# Patient Record
Sex: FEMALE | Race: WHITE | NOT HISPANIC OR LATINO | Employment: FULL TIME | URBAN - METROPOLITAN AREA
[De-identification: names, ages, dates, MRNs, and addresses within clinical notes are randomized per-mention and may not be internally consistent; named-entity substitution may affect disease eponyms.]

---

## 2017-01-03 ENCOUNTER — TELEPHONE (OUTPATIENT)
Dept: INTERNAL MEDICINE | Facility: CLINIC | Age: 57
End: 2017-01-03

## 2017-01-03 NOTE — TELEPHONE ENCOUNTER
----- Message from Josefina Browning sent at 1/3/2017  9:42 AM EST -----   CALLED PT AND LET HER KNOW TO BE HERE ON 1/4/17 AT 3:45  ----- Message -----     From: Josefina Browning     Sent: 12/29/2016   1:01 PM       To: Usman Mullins Clinical Pool    PATIENT IS BEING DISCHARGED FROM [facility] TODAY AND NEEDS A HOSPITAL FOLLOW UP WITH DR MULLINS NEXT WEEK.  TELL ME WHERE TO PUT HER PLEASE.      [phone]

## 2017-01-04 ENCOUNTER — OFFICE VISIT (OUTPATIENT)
Dept: INTERNAL MEDICINE | Facility: CLINIC | Age: 57
End: 2017-01-04

## 2017-01-04 VITALS
BODY MASS INDEX: 49.24 KG/M2 | WEIGHT: 288.4 LBS | DIASTOLIC BLOOD PRESSURE: 82 MMHG | SYSTOLIC BLOOD PRESSURE: 118 MMHG | HEIGHT: 64 IN | OXYGEN SATURATION: 97 % | HEART RATE: 82 BPM

## 2017-01-04 DIAGNOSIS — K21.9 GASTROESOPHAGEAL REFLUX DISEASE, ESOPHAGITIS PRESENCE NOT SPECIFIED: ICD-10-CM

## 2017-01-04 DIAGNOSIS — E66.01 MORBID OBESITY, UNSPECIFIED OBESITY TYPE (HCC): ICD-10-CM

## 2017-01-04 DIAGNOSIS — R60.9 EDEMA, UNSPECIFIED TYPE: ICD-10-CM

## 2017-01-04 DIAGNOSIS — I26.99 OTHER ACUTE PULMONARY EMBOLISM WITHOUT ACUTE COR PULMONALE (HCC): Primary | ICD-10-CM

## 2017-01-04 PROCEDURE — 99214 OFFICE O/P EST MOD 30 MIN: CPT | Performed by: INTERNAL MEDICINE

## 2017-01-04 NOTE — PROGRESS NOTES
Subjective     Zulema Sousa is a 56 y.o. female, who presents with a chief complaint of   Chief Complaint   Patient presents with   • Follow-up     Was in admitted to [facility] X4[facility]   • Pulmonary Embolism       HPI   The pt was admitted on 12/28/16 at [facility].  She had had pulmonary emboli in the past.  She was on xarelto x 1 year then she came off the med x 1 year.  She had new soa and was found to have PE in the ER.  They couldn't tell if the clots were old or new but her sx were all new.  She had low sats at night while in the hospital.  She hasnt had a sleep study.  She is back on her xarelto and her soa is improving.  She has LE edema and is back on her lasix.  She is also wearing compression hose.  LE dopplers were neg.     She is now on omeprazole as well. Her stomach has been ok as well.  She has had a lap band in the past.  She wants to have fluid put back in the band.      The following portions of the patient's history were reviewed and updated as appropriate: allergies, current medications, past family history, past medical history, past social history, past surgical history and problem list.    Allergies: Lortab [hydrocodone-acetaminophen]    Review of Systems   Constitutional: Negative.    HENT: Negative.    Eyes: Negative.    Respiratory: Positive for shortness of breath.    Cardiovascular: Negative.    Gastrointestinal: Negative.    Endocrine: Negative.    Genitourinary: Negative.    Musculoskeletal: Negative.    Skin: Negative.    Allergic/Immunologic: Negative.    Neurological: Negative.    Hematological: Negative.    Psychiatric/Behavioral: Negative.    All other systems reviewed and are negative.      Objective     Wt Readings from Last 3 Encounters:   01/04/17 288 lb 6.4 oz (131 kg)   12/29/16 282 lb 9.6 oz (128 kg)   12/27/16 290 lb (132 kg)     Temp Readings from Last 3 Encounters:   12/29/16 97.4 °F (36.3 °C) (Oral)   12/27/16 97.6 °F (36.4 °C) (Oral)   10/14/16 98.4 °F (36.9 °C) (Temporal  Artery )     BP Readings from Last 3 Encounters:   01/04/17 118/82   12/29/16 134/91   12/27/16 150/75     Pulse Readings from Last 3 Encounters:   01/04/17 82   12/29/16 74   12/27/16 76     Body mass index is 49.5 kg/(m^2).    Physical Exam   Constitutional: She is oriented to person, place, and time. She appears well-developed and well-nourished. No distress.   HENT:   Head: Normocephalic and atraumatic.   Right Ear: External ear normal.   Left Ear: External ear normal.   Nose: Nose normal.   Mouth/Throat: Oropharynx is clear and moist.   Eyes: Conjunctivae and EOM are normal. Pupils are equal, round, and reactive to light.   Neck: Normal range of motion. Neck supple.   Cardiovascular: Normal rate, regular rhythm, normal heart sounds and intact distal pulses.    Pulmonary/Chest: Effort normal and breath sounds normal. No respiratory distress. She has no wheezes.   Musculoskeletal: Normal range of motion.   Normal gait   Neurological: She is alert and oriented to person, place, and time.   Skin: Skin is warm and dry.   Psychiatric: She has a normal mood and affect. Her behavior is normal. Judgment and thought content normal.   Nursing note and vitals reviewed.        echocardiogram showed some very mild elevation of right ventricular systolic pressure at 40 mmHg, otherwise ejection fraction 60%. Diastolic function normal.    Results for orders placed or performed during the hospital encounter of 12/28/16   Respiratory Panel, PCR   Result Value Ref Range    ADENOVIRUS, PCR Not Detected Not Detected    Coronavirus 229E Not Detected Not Detected    Coronavirus HKU1 Not Detected Not Detected    Coronavirus NL63 Not Detected Not Detected    Coronavirus OC43 Not Detected Not Detected    Human Metapneumovirus Not Detected Not Detected    Human Rhinovirus/Enterovirus Not Detected Not Detected    Influenza B PCR Not Detected Not Detected    Parainfluenza Virus 1 Not Detected Not Detected    Parainfluenza Virus 2 Not  Detected Not Detected    Parainfluenza Virus 3 Not Detected Not Detected    Parainfluenza Virus 4 Not Detected Not Detected    Bordetella pertussis pcr Not Detected Not Detected    Influenza 2009 H1N1 by PCR Not Detected Not Detected    Chlamydophila pneumoniae PCR Not Detected Not Detected    Mycoplasma pneumo by PCR Not Detected Not Detected    Influenza A PCR Not Detected Not Detected    Influenza A H3 Not Detected Not Detected    Influenza A H1 Not Detected Not Detected    RSV, PCR Not Detected Not Detected   Comprehensive Metabolic Panel   Result Value Ref Range    Glucose 98 65 - 99 mg/dL    BUN 15 6 - 20 mg/dL    Creatinine 0.80 0.57 - 1.00 mg/dL    Sodium 142 136 - 145 mmol/L    Potassium 4.0 3.5 - 5.2 mmol/L    Chloride 100 98 - 107 mmol/L    CO2 28.9 22.0 - 29.0 mmol/L    Calcium 9.9 8.6 - 10.5 mg/dL    Total Protein 7.9 6.0 - 8.5 g/dL    Albumin 4.00 3.50 - 5.20 g/dL    ALT (SGPT) 13 1 - 33 U/L    AST (SGOT) 18 1 - 32 U/L    Alkaline Phosphatase 99 39 - 117 U/L    Total Bilirubin <0.2 0.1 - 1.2 mg/dL    eGFR Non African Amer 74 >60 mL/min/1.73    Globulin 3.9 gm/dL    A/G Ratio 1.0 g/dL    BUN/Creatinine Ratio 18.8 7.0 - 25.0    Anion Gap 13.1 mmol/L   BNP   Result Value Ref Range    proBNP 427.7 0.0 - 900.0 pg/mL   Troponin   Result Value Ref Range    Troponin T <0.010 0.000 - 0.030 ng/mL   CBC Auto Differential   Result Value Ref Range    WBC 9.00 4.50 - 10.70 10*3/mm3    RBC 4.49 3.90 - 5.20 10*6/mm3    Hemoglobin 12.8 11.9 - 15.5 g/dL    Hematocrit 41.2 35.6 - 45.5 %    MCV 91.8 80.5 - 98.2 fL    MCH 28.5 26.9 - 32.0 pg    MCHC 31.1 (L) 32.4 - 36.3 g/dL    RDW 15.0 (H) 11.7 - 13.0 %    RDW-SD 50.5 37.0 - 54.0 fl    MPV 9.6 6.0 - 12.0 fL    Platelets 326 140 - 500 10*3/mm3    Neutrophil % 47.0 42.7 - 76.0 %    Lymphocyte % 43.4 19.6 - 45.3 %    Monocyte % 6.6 5.0 - 12.0 %    Eosinophil % 2.6 0.3 - 6.2 %    Basophil % 0.2 0.0 - 1.5 %    Immature Grans % 0.2 0.0 - 0.5 %    Neutrophils, Absolute 4.23 1.90  - 8.10 10*3/mm3    Lymphocytes, Absolute 3.91 0.90 - 4.80 10*3/mm3    Monocytes, Absolute 0.59 0.20 - 1.20 10*3/mm3    Eosinophils, Absolute 0.23 0.00 - 0.70 10*3/mm3    Basophils, Absolute 0.02 0.00 - 0.20 10*3/mm3    Immature Grans, Absolute 0.02 0.00 - 0.03 10*3/mm3   Procalcitonin   Result Value Ref Range    Procalcitonin 0.03 (L) 0.10 - 0.25 ng/mL   Basic Metabolic Panel   Result Value Ref Range    Glucose 92 65 - 99 mg/dL    BUN 11 6 - 20 mg/dL    Creatinine 0.63 0.57 - 1.00 mg/dL    Sodium 140 136 - 145 mmol/L    Potassium 3.9 3.5 - 5.2 mmol/L    Chloride 102 98 - 107 mmol/L    CO2 25.8 22.0 - 29.0 mmol/L    Calcium 9.2 8.6 - 10.5 mg/dL    eGFR Non African Amer 97 >60 mL/min/1.73    BUN/Creatinine Ratio 17.5 7.0 - 25.0    Anion Gap 12.2 mmol/L   CBC Auto Differential   Result Value Ref Range    WBC 5.60 4.50 - 10.70 10*3/mm3    RBC 4.17 3.90 - 5.20 10*6/mm3    Hemoglobin 11.7 (L) 11.9 - 15.5 g/dL    Hematocrit 38.0 35.6 - 45.5 %    MCV 91.1 80.5 - 98.2 fL    MCH 28.1 26.9 - 32.0 pg    MCHC 30.8 (L) 32.4 - 36.3 g/dL    RDW 15.1 (H) 11.7 - 13.0 %    RDW-SD 49.8 37.0 - 54.0 fl    MPV 9.6 6.0 - 12.0 fL    Platelets 287 140 - 500 10*3/mm3    Neutrophil % 48.1 42.7 - 76.0 %    Lymphocyte % 41.3 19.6 - 45.3 %    Monocyte % 6.3 5.0 - 12.0 %    Eosinophil % 3.9 0.3 - 6.2 %    Basophil % 0.4 0.0 - 1.5 %    Immature Grans % 0.0 0.0 - 0.5 %    Neutrophils, Absolute 2.70 1.90 - 8.10 10*3/mm3    Lymphocytes, Absolute 2.31 0.90 - 4.80 10*3/mm3    Monocytes, Absolute 0.35 0.20 - 1.20 10*3/mm3    Eosinophils, Absolute 0.22 0.00 - 0.70 10*3/mm3    Basophils, Absolute 0.02 0.00 - 0.20 10*3/mm3    Immature Grans, Absolute 0.00 0.00 - 0.03 10*3/mm3   Adult Transthoracic Echo Complete   Result Value Ref Range    BSA 2.3 m^2    IVSd 0.8 cm    LVIDd 4.9 cm    LVIDs 2.6 cm    LVPWd 0.6 cm    IVS/LVPW 1.3     FS 46.9 %    EDV(Teich) 112.8 ml    ESV(Teich) 24.6 ml    EF(Teich) 78.2 %    EDV(cubed) 117.6 ml    ESV(cubed) 17.6 ml     EF(cubed) 85.1 %    LV mass(C)d 110.8 grams    LV mass(C)dI 49.0 grams/m^2    SV(Teich) 88.2 ml    SI(Teich) 39.0 ml/m^2    SV(cubed) 100.1 ml    SI(cubed) 44.3 ml/m^2    Ao root diam 3.2 cm    Ao root area 8.0 cm^2    ACS 1.8 cm    LA dimension 3.3 cm    LA/Ao 1.0     LVOT diam 2.0 cm    LVOT area 3.1 cm^2    LVOT area(traced) 3.1 cm^2    RVOT diam 2.4 cm    RVOT area 4.5 cm^2    LVLd ap4 9.0 cm    EDV(MOD-sp4) 110.0 ml    LVLs ap4 7.5 cm    ESV(MOD-sp4) 43.0 ml    EF(MOD-sp4) 60.9 %    LVLd ap2 9.1 cm    EDV(MOD-sp2) 110.0 ml    LVLs ap2 7.2 cm    ESV(MOD-sp2) 39.0 ml    EF(MOD-sp2) 64.5 %    SV(MOD-sp4) 67.0 ml    SI(MOD-sp4) 29.6 ml/m^2    SV(MOD-sp2) 71.0 ml    SI(MOD-sp2) 31.4 ml/m^2    Ao root area (BSA corrected) 1.4     Ao root area (BSA corrected) 64.5 ml/m^2    CONTRAST EF 4CH 60.9 ml/m^2    LV Diastolic corrected for BSA 48.7 ml/m^2    LV Systolic corrected for BSA 19.0 ml/m^2    MV A dur 0.13 sec    MV E max manuel 96.7 cm/sec    MV A max manuel 95.8 cm/sec    MV E/A 1.0     MV V2 mean 66.6 cm/sec    MV mean PG 2.0 mmHg    MV V2 VTI 27.9 cm    MVA(VTI) 2.9 cm^2    MV P1/2t max manuel 96.3 cm/sec    MV P1/2t 60.5 msec    MVA(P1/2t) 3.6 cm^2    MV dec slope 466.0 cm/sec^2    MV dec time 0.18 sec    Ao V2 mean 97.7 cm/sec    Ao mean PG 5.0 mmHg    Ao mean PG (full) 3.0 mmHg    Ao V2 VTI 31.6 cm    YESI(I,A) 2.6 cm^2    YESI(I,D) 2.6 cm^2    LV V1 mean PG 2.0 mmHg    LV V1 mean 71.1 cm/sec    LV V1 VTI 25.8 cm    SV(Ao) 254.1 ml    SI(Ao) 112.4 ml/m^2    SV(LVOT) 81.1 ml    SV(RVOT) 57.9 ml    SI(LVOT) 35.9 ml/m^2    PA V2 max 93.0 cm/sec    PA max PG 3.5 mmHg    PA max PG (full) 2.2 mmHg    BH CV ECHO DAVID - PVA(V,A) 2.8 cm^2    BH CV ECHO DAVID - PVA(V,D) 2.8 cm^2    PA acc slope 888.0 cm/sec^2    PA acc time 0.1 sec    RV V1 max PG 1.3 mmHg    RV V1 mean PG 1.0 mmHg    RV V1 max 57.1 cm/sec    RV V1 mean 38.4 cm/sec    RV V1 VTI 12.8 cm    TR max manuel 284.0 cm/sec    RVSP(TR) 40.3 mmHg    RAP systole 8.0 mmHg    PA  pr(Accel) 34.5 mmHg    Pulm Sys Germán 87.9 cm/sec    Pulm Light Germán 84.0 cm/sec    Pulm S/D 1.0     Qp/Qs 0.71     Pulm A Revs Dur 0.14 sec    Pulm A Revs Germán 30.7 cm/sec    MVA P1/2T LCG 2.3 cm^2     CV ECHO DAVID - BZI_BMI 48.2 kilograms/m^2     CV ECHO DAVID - BSA(HAYCOCK) 2.5 m^2     CV ECHO DAVID - BZI_METRIC_WEIGHT 127.5 kg     CV ECHO DAVID - BZI_METRIC_HEIGHT 162.6 cm    TDI S' 16.00 cm/sec    LA volume 52.0 cm3    E/E' ratio 8.0     LA Volume Index 23.0 mL/m2    Ao max PG (full) 10.0 mmHg    Ao pk germán 156.0 cm/sec    Lat Peak E' Germán 16.0 cm/sec    LV V1 max 111.0 cm/sec    Med Peak E' Germán 11.00 cm/sec    TAPSE (>1.6) 2.40 cm2   Light Blue Top   Result Value Ref Range    Extra Tube hold for add-on    Gold Top - SST   Result Value Ref Range    Extra Tube Hold for add-ons.    Duplex Venous Lower Extremity - Bilateral CAR   Result Value Ref Range    Right Saphenofemoral Junction Spont 1     Left Saphenofemoral Junction Spont 1     Right Common Femoral Spont Y     Right Common Femoral Phasic Y     Right Common Femoral Augment Y     Right Common Femoral Competent Y     Right Common Femoral Compress C     Right Proximal Femoral Compress C     Right Mid Femoral Spont Y     Right Mid Femoral Phasic Y     Right Mid Femoral Augment Y     Right Mid Femoral Competent Y     Right Mid Femoral Compress C     Right Distal Femoral Compress C     Right Popliteal Spont Y     Right Popliteal Phasic Y     Right Popliteal Augment Y     Right Popliteal Competent Y     Right Popliteal Compress C     Right Posterior Tibial Compress C     Right Peroneal Compress C     Right GastronemiusSoleal Compress C     Right Greater Saph AK Compress C     Right Greater Saph BK Compress C     Right Lesser Saph Compress C     Left Common Femoral Spont Y     Left Common Femoral Phasic Y     Left Common Femoral Augment Y     Left Common Femoral Competent Y     Left Common Femoral Compress C     Left Proximal Femoral Compress C     Left Mid Femoral  Spont Y     Left Mid Femoral Phasic Y     Left Mid Femoral Augment Y     Left Mid Femoral Competent Y     Left Mid Femoral Compress C     Left Distal Femoral Compress C     Left Popliteal Spont Y     Left Popliteal Phasic Y     Left Popliteal Augment Y     Left Popliteal Competent Y     Left Popliteal Compress C     Left Posterior Tibial Compress C     Left Peroneal Compress C     Left GastronemiusSoleal Compress C     Left Greater Saph AK Compress C     Left Greater Saph BK Compress C     Left Lesser Saph Compress C     Left Saphenofemoral Junction Competent N     Left Saphenofemoral Junction Augment Y     Left Saphenofemoral Junction Phasic Y     Left Saphenofemoral Junction Spont Y     Left Saphenofemoral Junction Compress C     Right Saphenofemoral Junction Compress C     Right Saphenofemoral Junction Competent N     Right Saphenofemoral Junction Augment Y     Right Saphenofemoral Junction Phasic Y     Right Saphenofemoral Junction Spont Y        Assessment/Plan   Zulema was seen today for follow-up and pulmonary embolism.    Diagnoses and all orders for this visit:    Other acute pulmonary embolism without acute cor pulmonale - continue xarelto    Gastroesophageal reflux disease, esophagitis presence not specified - cont PPI    Morbid obesity, unspecified obesity type - ok to discuss putting fluid back in lap band once pt on lower dose of xarelto.  Pt to discuss further with bariatric surgery.    Edema, unspecified type - lasix PRN.         Current Outpatient Prescriptions:   •  furosemide (LASIX) 20 MG tablet, Take 1 tablet by mouth daily as needed., Disp: , Rfl:   •  omeprazole (PriLOSEC) 40 MG capsule, TAKE ONE CAPSULE BY MOUTH DAILY, Disp: 90 capsule, Rfl: 2  •  Rivaroxaban (XARELTO STARTER PACK) 15 & 20 MG tablet therapy pack, Take as directed, Disp: , Rfl:   There are no discontinued medications.    Return in about 3 months (around 4/4/2017) for Recheck.

## 2017-01-04 NOTE — MR AVS SNAPSHOT
Zulema Sousa   1/4/2017 3:50 PM   Office Visit    Dept Phone:  [phone]   Encounter #:  [id number]    Provider:  Marsha Mullins MD   Department:  [facility] INTERNAL MED AND PEDS                Your Full Care Plan              Your Updated Medication List          This list is accurate as of: 1/4/17  5:16 PM.  Always use your most recent med list.                furosemide 20 MG tablet   Commonly known as:  LASIX       omeprazole 40 MG capsule   Commonly known as:  priLOSEC   TAKE ONE CAPSULE BY MOUTH DAILY       Rivaroxaban 15 & 20 MG tablet therapy pack   Commonly known as:  XARELTO STARTER PACK   Take as directed               You Were Diagnosed With        Codes Comments    Other acute pulmonary embolism without acute cor pulmonale    -  Primary ICD-10-CM: I26.99     Gastroesophageal reflux disease, esophagitis presence not specified     ICD-10-CM: K21.9  ICD-9-CM: 530.81     Morbid obesity, unspecified obesity type     ICD-10-CM: E66.01  ICD-9-CM: 278.01     Edema, unspecified type     ICD-10-CM: R60.9  ICD-9-CM: 782.3       Instructions     None    Patient Instructions History      Upcoming Appointments     Visit Type Date Time Department    OFFICE VISIT 1/4/2017  3:50 PM [facility] DIANNE MULLINS      myEDmatch Signup     Our records indicate that you have an active [facility] myEDmatch account.    You can view your After Visit Summary by going to [employer] and logging in with your myEDmatch username and password.  If you don't have a myEDmatch username and password but a parent or guardian has access to your record, the parent or guardian should login with their own myEDmatch username and password and access your record to view the After Visit Summary.    If you have questions, you can email [email or web address] or call [phone] to talk to our myEDmatch staff.  Remember, myEDmatch is NOT to be used for urgent needs.  For medical emergencies,  "dial 911.               Other Info from Your Visit           Allergies     Lortab [Hydrocodone-acetaminophen]  Itching      Reason for Visit     Follow-up Was in admitted to [facility]    Pulmonary Embolism           Vital Signs     Blood Pressure Pulse Height Weight Oxygen Saturation Body Mass Index    118/82 (BP Location: Left arm, Patient Position: Sitting, Cuff Size: Adult) 82 64\" (162.6 cm) 288 lb 6.4 oz (131 kg) 97% 49.5 kg/m2    Smoking Status                   Former Smoker           Problems and Diagnoses Noted     Accumulation of fluid in tissues    Acid reflux disease    Severe obesity    Blood clot to lung        "

## 2017-01-12 ENCOUNTER — TELEPHONE (OUTPATIENT)
Dept: INTERNAL MEDICINE | Facility: CLINIC | Age: 57
End: 2017-01-12

## 2017-01-12 NOTE — TELEPHONE ENCOUNTER
Patient has been advised of her results. Is already wearing compression stockings.    ----- Message from Marsha Young MD sent at 1/10/2017 11:20 AM EST -----  Call pt about u/s  Pt has varicose veins.  No clots.  Needs compression hose

## 2017-01-25 ENCOUNTER — OFFICE VISIT (OUTPATIENT)
Dept: CARDIOLOGY | Facility: CLINIC | Age: 57
End: 2017-01-25

## 2017-01-25 VITALS
HEART RATE: 79 BPM | BODY MASS INDEX: 48.32 KG/M2 | SYSTOLIC BLOOD PRESSURE: 130 MMHG | DIASTOLIC BLOOD PRESSURE: 68 MMHG | WEIGHT: 283 LBS | HEIGHT: 64 IN

## 2017-01-25 DIAGNOSIS — I30.0 IDIOPATHIC PERICARDITIS, UNSPECIFIED CHRONICITY: ICD-10-CM

## 2017-01-25 DIAGNOSIS — I26.99 OTHER ACUTE PULMONARY EMBOLISM WITHOUT ACUTE COR PULMONALE (HCC): Primary | ICD-10-CM

## 2017-01-25 PROCEDURE — 99213 OFFICE O/P EST LOW 20 MIN: CPT | Performed by: INTERNAL MEDICINE

## 2017-01-25 PROCEDURE — 93000 ELECTROCARDIOGRAM COMPLETE: CPT | Performed by: INTERNAL MEDICINE

## 2017-01-25 RX ORDER — ACETAMINOPHEN 325 MG/1
TABLET ORAL EVERY 6 HOURS PRN
COMMUNITY
End: 2018-01-05

## 2017-01-30 ENCOUNTER — DOCUMENTATION (OUTPATIENT)
Dept: PHYSICAL THERAPY | Facility: HOSPITAL | Age: 57
End: 2017-01-30

## 2017-01-30 DIAGNOSIS — M25.561 PAIN IN BOTH KNEES, UNSPECIFIED CHRONICITY: Primary | ICD-10-CM

## 2017-01-30 DIAGNOSIS — M25.562 PAIN IN BOTH KNEES, UNSPECIFIED CHRONICITY: Primary | ICD-10-CM

## 2017-01-30 RX ORDER — RIVAROXABAN 20 MG/1
TABLET, FILM COATED ORAL
Qty: 30 TABLET | Refills: 10 | Status: SHIPPED | OUTPATIENT
Start: 2017-01-30 | End: 2018-01-10 | Stop reason: SDUPTHER

## 2017-02-19 NOTE — PROGRESS NOTES
Subjective:     Encounter Date:01/25/2017      Patient ID: Zulema Sousa is a 56 y.o. female.    Chief Complaint: pulmonary embolus, pericarditis    History of Present Illness     Dear Dr. Young,     I had the pleasure of seeing your patient in cardiac followup today.  As you well know, she is a merrick 56-year-old woman with history of bilateral pulmonary emboli treated with thrombectomy and thrombolytics.  She had relapsing pericarditis but this improved after steroid therapy.      I last saw her about 8 months ago.  She was in the hospital just last month due to dyspnea on exertion.  Her CT angiogram suggested small bilateral pulmonary emboli which could be a residual from her prior event or a small new event.  Her echocardiogram suggested mild pulmonary hypertension.      Since her hospitalization, she reports doing well.  She denies any complaints of chest pain or dyspnea.  She is using a CPAP.  She is on Xarelto for the long-term.          Review of Systems   All other systems reviewed and are negative.        ECG 12 Lead  Date/Time: 2/19/2017 11:52 AM  Performed by: FRANTZ SLATER  Authorized by: FRANTZ SLATER   Comparison: compared with previous ECG   Similar to previous ECG  Rhythm: sinus rhythm  BPM: 79  Other findings: early transition               Objective:     Physical Exam   Constitutional: She is oriented to person, place, and time. She appears well-developed and well-nourished.   HENT:   Head: Normocephalic and atraumatic.   Neck: Normal range of motion. Neck supple.   Cardiovascular: Normal rate, regular rhythm and normal heart sounds.    Pulmonary/Chest: Effort normal and breath sounds normal.   Abdominal: Soft. Bowel sounds are normal.   Musculoskeletal: Normal range of motion.   Neurological: She is alert and oriented to person, place, and time.   Skin: Skin is warm and dry.   Psychiatric: She has a normal mood and affect. Her behavior is normal. Thought content normal.   Vitals reviewed.      Lab  Review:       Assessment:          Diagnosis Plan   1. Other acute pulmonary embolism without acute cor pulmonale     2. Idiopathic pericarditis, unspecified chronicity            Plan:        It was a pleasure to see your patient in cardiac followup today.  She has a pulmonary embolism about a year ago and has improved substantially since then.  She still has some small residual pulmonary emboli which are more likely an old residual rather than a new event.  She remains on anticoagulation.  I would probably leave her on this chronically.      She has a history of pericarditis which is not active currently.  She will see me again in six months or sooner if symptoms warrant.

## 2017-02-20 RX ORDER — OMEPRAZOLE 40 MG/1
CAPSULE, DELAYED RELEASE ORAL
Qty: 90 CAPSULE | Refills: 1 | Status: SHIPPED | OUTPATIENT
Start: 2017-02-20 | End: 2017-08-06 | Stop reason: SDUPTHER

## 2017-03-21 ENCOUNTER — HOSPITAL ENCOUNTER (OUTPATIENT)
Dept: SLEEP MEDICINE | Facility: HOSPITAL | Age: 57
Discharge: HOME | End: 2017-03-21
Attending: INTERNAL MEDICINE

## 2017-03-21 ENCOUNTER — HOSPITAL ENCOUNTER (OUTPATIENT)
Dept: SLEEP MEDICINE | Facility: HOSPITAL | Age: 57
Discharge: HOME | End: 2017-03-21
Admitting: INTERNAL MEDICINE

## 2017-03-21 DIAGNOSIS — G47.33 OSA (OBSTRUCTIVE SLEEP APNEA): ICD-10-CM

## 2017-03-21 DIAGNOSIS — G47.33 OSA (OBSTRUCTIVE SLEEP APNEA): Primary | ICD-10-CM

## 2017-03-21 PROCEDURE — 95811 POLYSOM 6/>YRS CPAP 4/> PARM: CPT

## 2017-03-21 PROCEDURE — G0463 HOSPITAL OUTPT CLINIC VISIT: HCPCS

## 2017-03-23 NOTE — PROGRESS NOTES
REFERRING PHYSICIAN:  Paras Hernández MD     I had the pleasure of seeing the patient in the office today. Below, please find summary of pertinent for and conclusions.     HISTORY: The patient is a very pleasant 56-year-old female who is here today for evaluation of obstructive sleep apnea. She was recently admitted at [facility] in December 2016 for bilateral small pulmonary embolus. She was found to have nocturnal hypoxemia during most recent stay. She was treated with CPAP machine to treat sleep apnea. However, she lost 100 pounds and subsequently was asked to discontinue the CPAP device. She does have history of PE 2 years ago and was found again to have PE on most recent admission in December. Her sleep schedule is as follows: She goes to bed at midnight and is up at 6:30 a.m. She usually falls asleep within 5 minutes and gets 6 hours of sleep at night. She feels drowsy during the day, especially in the afternoon.  She denies drowsiness while driving. She gained 80 pounds in the past 5 years. She does snore loudly and has witnessed apneas. She has morning headaches. She has difficulty falling asleep at night and difficulty staying asleep.     PAST MEDICAL HISTORY:    1.   PE.   2.   GERD.   3.   Pericardial effusion.     FAMILY HISTORY: Significant for pancreatic cancer, COPD, daytime sleepiness, heart disease.     SOCIAL HISTORY: She  is a .  Smokes 4 cigarettes a day, currently. She has smoked since age 16 up to 1/4 pack a day. She drinks 2-3 caffeinated beverages a day in the form of sodas.     REVIEW OF SYSTEMS: Painful joints and muscles, swelling of the ankles and legs.     EPWORTH SLEEPINESS SCORE:   8     PHYSICAL EXAMINATION:  VITAL SIGNS: Height 5 feet 4 inches, weight 291, BMI 50 blood pressure 130/80, heart rate 85, neck size 16.   HEENT: Class III Mallampati airway. Normal size tongue. No evidence of exudate.   NECK: Trachea midline.   LUNGS: Respiratory effort  nonlabored.   HEART: Regular rate and rhythm. No murmurs, rubs.   ABDOMEN:  Soft and nontender.  Bowel sounds are present.    EXTREMITIES: With 3+ edema with compression stockings on     ASSESSMENT:    1.   Hypersomnia.   2.   Snoring.   3.   History of sleep apnea.   4.   Morbid obesity.   5.   Recurrent pulmonary embolus.   6.   Current smoker.     PLAN: The patient does have hypersomnia, snoring and typical his symptoms sleep apnea. She does have more BMI which is morbidly obese category. She was previously on CPAP lost 100 pounds and CPAP was therefore discontinued. She essentially gained 80 pounds. I suspect sleep apnea is likely remerged.  I was scheduled for split-night polysomnography to establish diagnosis of sleep-disordered breathing and get her back on the CPAP machine. Weight loss will be strongly beneficial to reduce severity of sleep-disordered breathing. She will need to get her Lab-Band redone. She does have recurrent pulmonary embolus. She is currently on Xarelto. She may require it lifelong.  She is a current smoker and complete cessation is recommended.     I appreciate the opportunity to participate in this patient’s care.  Caution during activities that require concentration is advised. The patient will follow up with us at [facility] after completion of polysomnography I appreciate the opportunity to participate in this patient's care.         MARNIE Robbins dictating for MD Jaja Black ARNP*  AZ/es  D:  03/23/2017 10:25:56   T:  03/23/2017 12:27:02   Job ID:  [id number]   Document ID:  [id number]  cc:  MD Marsha Bergeron MD

## 2017-03-28 ENCOUNTER — OFFICE VISIT (OUTPATIENT)
Dept: INTERNAL MEDICINE | Facility: CLINIC | Age: 57
End: 2017-03-28

## 2017-03-28 VITALS
HEART RATE: 81 BPM | OXYGEN SATURATION: 97 % | BODY MASS INDEX: 49.31 KG/M2 | SYSTOLIC BLOOD PRESSURE: 124 MMHG | HEIGHT: 64 IN | DIASTOLIC BLOOD PRESSURE: 80 MMHG | TEMPERATURE: 97.1 F | WEIGHT: 288.8 LBS

## 2017-03-28 DIAGNOSIS — G47.33 OBSTRUCTIVE SLEEP APNEA, ADULT: ICD-10-CM

## 2017-03-28 DIAGNOSIS — I26.99 OTHER ACUTE PULMONARY EMBOLISM WITHOUT ACUTE COR PULMONALE (HCC): ICD-10-CM

## 2017-03-28 DIAGNOSIS — E78.2 MIXED HYPERLIPIDEMIA: ICD-10-CM

## 2017-03-28 DIAGNOSIS — G89.29 CHRONIC LEFT SHOULDER PAIN: Primary | ICD-10-CM

## 2017-03-28 DIAGNOSIS — M25.512 CHRONIC LEFT SHOULDER PAIN: Primary | ICD-10-CM

## 2017-03-28 DIAGNOSIS — Z00.00 HEALTHCARE MAINTENANCE: ICD-10-CM

## 2017-03-28 DIAGNOSIS — M17.0 ARTHRITIS OF BOTH KNEES: ICD-10-CM

## 2017-03-28 PROCEDURE — 99214 OFFICE O/P EST MOD 30 MIN: CPT | Performed by: INTERNAL MEDICINE

## 2017-03-28 NOTE — PROGRESS NOTES
Subjective     Zulema Sousa is a 56 y.o. female, who presents with a chief complaint of   Chief Complaint   Patient presents with   • Heartburn   • Edema       HPI   The pt is here for follow up.    She had pulmonary emboli in January. She is still on xarelto.    Gerd - on PPI and doing ok    She had a sleep study and did have severe sleep apnea.  She is waiting to get a cpap machine.      She takes lasix PRN LE edema.      Obesity - she follows back up with the bariatric surgeon soon.      She has had both knees injected by ortho (dr. Ann) several months ago.  she was told she had arthritis bilat.  She also has left shoulder pain that has persisted since last summer.  She can now lift her arm better but she has some tingling in her arm.  She cant lay on the left side.  She is taking tylenol arthritis.  She hasnt done any physical therapy or had an injection.       The following portions of the patient's history were reviewed and updated as appropriate: allergies, current medications, past family history, past medical history, past social history, past surgical history and problem list.    Allergies: Lortab [hydrocodone-acetaminophen]    Review of Systems   Constitutional: Negative.    HENT: Negative.    Eyes: Negative.    Respiratory: Negative.    Cardiovascular: Negative.    Gastrointestinal: Negative.    Endocrine: Negative.    Genitourinary: Negative.    Musculoskeletal: Positive for arthralgias.        Bilat knee pain  Left shoulder pain   Skin: Negative.    Allergic/Immunologic: Negative.    Neurological: Negative.    Hematological: Negative.    Psychiatric/Behavioral: Negative.    All other systems reviewed and are negative.      Objective     Wt Readings from Last 3 Encounters:   03/28/17 288 lb 12.8 oz (131 kg)   01/25/17 283 lb (128 kg)   01/04/17 288 lb 6.4 oz (131 kg)     Temp Readings from Last 3 Encounters:   03/28/17 97.1 °F (36.2 °C)   12/29/16 97.4 °F (36.3 °C) (Oral)   12/27/16 97.6 °F  (36.4 °C) (Oral)     BP Readings from Last 3 Encounters:   03/28/17 124/80   01/25/17 130/68   01/04/17 118/82     Pulse Readings from Last 3 Encounters:   03/28/17 81   01/25/17 79   01/04/17 82     Body mass index is 49.57 kg/(m^2).  SpO2 Readings from Last 3 Encounters:   03/28/17 97%   01/04/17 97%   12/29/16 94%       Physical Exam   Constitutional: She is oriented to person, place, and time. She appears well-developed and well-nourished. No distress.   HENT:   Head: Normocephalic and atraumatic.   Right Ear: External ear normal.   Left Ear: External ear normal.   Nose: Nose normal.   Mouth/Throat: Oropharynx is clear and moist.   Eyes: Conjunctivae and EOM are normal. Pupils are equal, round, and reactive to light.   Neck: Normal range of motion. Neck supple.   Cardiovascular: Normal rate, regular rhythm, normal heart sounds and intact distal pulses.    Pulmonary/Chest: Effort normal and breath sounds normal. No respiratory distress. She has no wheezes.   Musculoskeletal:   Normal gait  Left shoulder mild limitation with internal rotation and mild pain with empty can test.    Neurological: She is alert and oriented to person, place, and time.   Skin: Skin is warm and dry.   Psychiatric: She has a normal mood and affect. Her behavior is normal. Judgment and thought content normal.   Nursing note and vitals reviewed.      Results for orders placed or performed during the hospital encounter of 12/28/16   Respiratory Panel, PCR   Result Value Ref Range    ADENOVIRUS, PCR Not Detected Not Detected    Coronavirus 229E Not Detected Not Detected    Coronavirus HKU1 Not Detected Not Detected    Coronavirus NL63 Not Detected Not Detected    Coronavirus OC43 Not Detected Not Detected    Human Metapneumovirus Not Detected Not Detected    Human Rhinovirus/Enterovirus Not Detected Not Detected    Influenza B PCR Not Detected Not Detected    Parainfluenza Virus 1 Not Detected Not Detected    Parainfluenza Virus 2 Not  Detected Not Detected    Parainfluenza Virus 3 Not Detected Not Detected    Parainfluenza Virus 4 Not Detected Not Detected    Bordetella pertussis pcr Not Detected Not Detected    Influenza 2009 H1N1 by PCR Not Detected Not Detected    Chlamydophila pneumoniae PCR Not Detected Not Detected    Mycoplasma pneumo by PCR Not Detected Not Detected    Influenza A PCR Not Detected Not Detected    Influenza A H3 Not Detected Not Detected    Influenza A H1 Not Detected Not Detected    RSV, PCR Not Detected Not Detected   Comprehensive Metabolic Panel   Result Value Ref Range    Glucose 98 65 - 99 mg/dL    BUN 15 6 - 20 mg/dL    Creatinine 0.80 0.57 - 1.00 mg/dL    Sodium 142 136 - 145 mmol/L    Potassium 4.0 3.5 - 5.2 mmol/L    Chloride 100 98 - 107 mmol/L    CO2 28.9 22.0 - 29.0 mmol/L    Calcium 9.9 8.6 - 10.5 mg/dL    Total Protein 7.9 6.0 - 8.5 g/dL    Albumin 4.00 3.50 - 5.20 g/dL    ALT (SGPT) 13 1 - 33 U/L    AST (SGOT) 18 1 - 32 U/L    Alkaline Phosphatase 99 39 - 117 U/L    Total Bilirubin <0.2 0.1 - 1.2 mg/dL    eGFR Non African Amer 74 >60 mL/min/1.73    Globulin 3.9 gm/dL    A/G Ratio 1.0 g/dL    BUN/Creatinine Ratio 18.8 7.0 - 25.0    Anion Gap 13.1 mmol/L   BNP   Result Value Ref Range    proBNP 427.7 0.0 - 900.0 pg/mL   Troponin   Result Value Ref Range    Troponin T <0.010 0.000 - 0.030 ng/mL   CBC Auto Differential   Result Value Ref Range    WBC 9.00 4.50 - 10.70 10*3/mm3    RBC 4.49 3.90 - 5.20 10*6/mm3    Hemoglobin 12.8 11.9 - 15.5 g/dL    Hematocrit 41.2 35.6 - 45.5 %    MCV 91.8 80.5 - 98.2 fL    MCH 28.5 26.9 - 32.0 pg    MCHC 31.1 (L) 32.4 - 36.3 g/dL    RDW 15.0 (H) 11.7 - 13.0 %    RDW-SD 50.5 37.0 - 54.0 fl    MPV 9.6 6.0 - 12.0 fL    Platelets 326 140 - 500 10*3/mm3    Neutrophil % 47.0 42.7 - 76.0 %    Lymphocyte % 43.4 19.6 - 45.3 %    Monocyte % 6.6 5.0 - 12.0 %    Eosinophil % 2.6 0.3 - 6.2 %    Basophil % 0.2 0.0 - 1.5 %    Immature Grans % 0.2 0.0 - 0.5 %    Neutrophils, Absolute 4.23 1.90  - 8.10 10*3/mm3    Lymphocytes, Absolute 3.91 0.90 - 4.80 10*3/mm3    Monocytes, Absolute 0.59 0.20 - 1.20 10*3/mm3    Eosinophils, Absolute 0.23 0.00 - 0.70 10*3/mm3    Basophils, Absolute 0.02 0.00 - 0.20 10*3/mm3    Immature Grans, Absolute 0.02 0.00 - 0.03 10*3/mm3   Procalcitonin   Result Value Ref Range    Procalcitonin 0.03 (L) 0.10 - 0.25 ng/mL   Basic Metabolic Panel   Result Value Ref Range    Glucose 92 65 - 99 mg/dL    BUN 11 6 - 20 mg/dL    Creatinine 0.63 0.57 - 1.00 mg/dL    Sodium 140 136 - 145 mmol/L    Potassium 3.9 3.5 - 5.2 mmol/L    Chloride 102 98 - 107 mmol/L    CO2 25.8 22.0 - 29.0 mmol/L    Calcium 9.2 8.6 - 10.5 mg/dL    eGFR Non African Amer 97 >60 mL/min/1.73    BUN/Creatinine Ratio 17.5 7.0 - 25.0    Anion Gap 12.2 mmol/L   CBC Auto Differential   Result Value Ref Range    WBC 5.60 4.50 - 10.70 10*3/mm3    RBC 4.17 3.90 - 5.20 10*6/mm3    Hemoglobin 11.7 (L) 11.9 - 15.5 g/dL    Hematocrit 38.0 35.6 - 45.5 %    MCV 91.1 80.5 - 98.2 fL    MCH 28.1 26.9 - 32.0 pg    MCHC 30.8 (L) 32.4 - 36.3 g/dL    RDW 15.1 (H) 11.7 - 13.0 %    RDW-SD 49.8 37.0 - 54.0 fl    MPV 9.6 6.0 - 12.0 fL    Platelets 287 140 - 500 10*3/mm3    Neutrophil % 48.1 42.7 - 76.0 %    Lymphocyte % 41.3 19.6 - 45.3 %    Monocyte % 6.3 5.0 - 12.0 %    Eosinophil % 3.9 0.3 - 6.2 %    Basophil % 0.4 0.0 - 1.5 %    Immature Grans % 0.0 0.0 - 0.5 %    Neutrophils, Absolute 2.70 1.90 - 8.10 10*3/mm3    Lymphocytes, Absolute 2.31 0.90 - 4.80 10*3/mm3    Monocytes, Absolute 0.35 0.20 - 1.20 10*3/mm3    Eosinophils, Absolute 0.22 0.00 - 0.70 10*3/mm3    Basophils, Absolute 0.02 0.00 - 0.20 10*3/mm3    Immature Grans, Absolute 0.00 0.00 - 0.03 10*3/mm3   Adult Transthoracic Echo Complete   Result Value Ref Range    BSA 2.3 m^2    IVSd 0.8 cm    LVIDd 4.9 cm    LVIDs 2.6 cm    LVPWd 0.6 cm    IVS/LVPW 1.3     FS 46.9 %    EDV(Teich) 112.8 ml    ESV(Teich) 24.6 ml    EF(Teich) 78.2 %    EDV(cubed) 117.6 ml    ESV(cubed) 17.6 ml     EF(cubed) 85.1 %    LV mass(C)d 110.8 grams    LV mass(C)dI 49.0 grams/m^2    SV(Teich) 88.2 ml    SI(Teich) 39.0 ml/m^2    SV(cubed) 100.1 ml    SI(cubed) 44.3 ml/m^2    Ao root diam 3.2 cm    Ao root area 8.0 cm^2    ACS 1.8 cm    LA dimension 3.3 cm    LA/Ao 1.0     LVOT diam 2.0 cm    LVOT area 3.1 cm^2    LVOT area(traced) 3.1 cm^2    RVOT diam 2.4 cm    RVOT area 4.5 cm^2    LVLd ap4 9.0 cm    EDV(MOD-sp4) 110.0 ml    LVLs ap4 7.5 cm    ESV(MOD-sp4) 43.0 ml    EF(MOD-sp4) 60.9 %    LVLd ap2 9.1 cm    EDV(MOD-sp2) 110.0 ml    LVLs ap2 7.2 cm    ESV(MOD-sp2) 39.0 ml    EF(MOD-sp2) 64.5 %    SV(MOD-sp4) 67.0 ml    SI(MOD-sp4) 29.6 ml/m^2    SV(MOD-sp2) 71.0 ml    SI(MOD-sp2) 31.4 ml/m^2    Ao root area (BSA corrected) 1.4     Ao root area (BSA corrected) 64.5 ml/m^2    CONTRAST EF 4CH 60.9 ml/m^2    LV Diastolic corrected for BSA 48.7 ml/m^2    LV Systolic corrected for BSA 19.0 ml/m^2    MV A dur 0.13 sec    MV E max manuel 96.7 cm/sec    MV A max manuel 95.8 cm/sec    MV E/A 1.0     MV V2 mean 66.6 cm/sec    MV mean PG 2.0 mmHg    MV V2 VTI 27.9 cm    MVA(VTI) 2.9 cm^2    MV P1/2t max manuel 96.3 cm/sec    MV P1/2t 60.5 msec    MVA(P1/2t) 3.6 cm^2    MV dec slope 466.0 cm/sec^2    MV dec time 0.18 sec    Ao V2 mean 97.7 cm/sec    Ao mean PG 5.0 mmHg    Ao mean PG (full) 3.0 mmHg    Ao V2 VTI 31.6 cm    YESI(I,A) 2.6 cm^2    YESI(I,D) 2.6 cm^2    LV V1 mean PG 2.0 mmHg    LV V1 mean 71.1 cm/sec    LV V1 VTI 25.8 cm    SV(Ao) 254.1 ml    SI(Ao) 112.4 ml/m^2    SV(LVOT) 81.1 ml    SV(RVOT) 57.9 ml    SI(LVOT) 35.9 ml/m^2    PA V2 max 93.0 cm/sec    PA max PG 3.5 mmHg    PA max PG (full) 2.2 mmHg    BH CV ECHO DAVID - PVA(V,A) 2.8 cm^2    BH CV ECHO DAVID - PVA(V,D) 2.8 cm^2    PA acc slope 888.0 cm/sec^2    PA acc time 0.1 sec    RV V1 max PG 1.3 mmHg    RV V1 mean PG 1.0 mmHg    RV V1 max 57.1 cm/sec    RV V1 mean 38.4 cm/sec    RV V1 VTI 12.8 cm    TR max manuel 284.0 cm/sec    RVSP(TR) 40.3 mmHg    RAP systole 8.0 mmHg    PA  pr(Accel) 34.5 mmHg    Pulm Sys Germán 87.9 cm/sec    Pulm Light Germán 84.0 cm/sec    Pulm S/D 1.0     Qp/Qs 0.71     Pulm A Revs Dur 0.14 sec    Pulm A Revs Germán 30.7 cm/sec    MVA P1/2T LCG 2.3 cm^2     CV ECHO DAVID - BZI_BMI 48.2 kilograms/m^2     CV ECHO DAVID - BSA(HAYCOCK) 2.5 m^2     CV ECHO DAVID - BZI_METRIC_WEIGHT 127.5 kg     CV ECHO DAVID - BZI_METRIC_HEIGHT 162.6 cm    TDI S' 16.00 cm/sec    LA volume 52.0 cm3    E/E' ratio 8.0     LA Volume Index 23.0 mL/m2    Ao max PG (full) 10.0 mmHg    Ao pk germán 156.0 cm/sec    Lat Peak E' Germán 16.0 cm/sec    LV V1 max 111.0 cm/sec    Med Peak E' Germán 11.00 cm/sec    TAPSE (>1.6) 2.40 cm2   Light Blue Top   Result Value Ref Range    Extra Tube hold for add-on    Gold Top - SST   Result Value Ref Range    Extra Tube Hold for add-ons.    Duplex Venous Lower Extremity - Bilateral CAR   Result Value Ref Range    Right Saphenofemoral Junction Spont 1     Left Saphenofemoral Junction Spont 1     Right Common Femoral Spont Y     Right Common Femoral Phasic Y     Right Common Femoral Augment Y     Right Common Femoral Competent Y     Right Common Femoral Compress C     Right Proximal Femoral Compress C     Right Mid Femoral Spont Y     Right Mid Femoral Phasic Y     Right Mid Femoral Augment Y     Right Mid Femoral Competent Y     Right Mid Femoral Compress C     Right Distal Femoral Compress C     Right Popliteal Spont Y     Right Popliteal Phasic Y     Right Popliteal Augment Y     Right Popliteal Competent Y     Right Popliteal Compress C     Right Posterior Tibial Compress C     Right Peroneal Compress C     Right GastronemiusSoleal Compress C     Right Greater Saph AK Compress C     Right Greater Saph BK Compress C     Right Lesser Saph Compress C     Left Common Femoral Spont Y     Left Common Femoral Phasic Y     Left Common Femoral Augment Y     Left Common Femoral Competent Y     Left Common Femoral Compress C     Left Proximal Femoral Compress C     Left Mid Femoral  Spont Y     Left Mid Femoral Phasic Y     Left Mid Femoral Augment Y     Left Mid Femoral Competent Y     Left Mid Femoral Compress C     Left Distal Femoral Compress C     Left Popliteal Spont Y     Left Popliteal Phasic Y     Left Popliteal Augment Y     Left Popliteal Competent Y     Left Popliteal Compress C     Left Posterior Tibial Compress C     Left Peroneal Compress C     Left GastronemiusSoleal Compress C     Left Greater Saph AK Compress C     Left Greater Saph BK Compress C     Left Lesser Saph Compress C     Left Saphenofemoral Junction Competent N     Left Saphenofemoral Junction Augment Y     Left Saphenofemoral Junction Phasic Y     Left Saphenofemoral Junction Spont Y     Left Saphenofemoral Junction Compress C     Right Saphenofemoral Junction Compress C     Right Saphenofemoral Junction Competent N     Right Saphenofemoral Junction Augment Y     Right Saphenofemoral Junction Phasic Y     Right Saphenofemoral Junction Spont Y        Assessment/Plan   Zulema was seen today for heartburn and edema.    Diagnoses and all orders for this visit:    Chronic left shoulder pain - trial of PT.  If not improving schedule MRI of shoulder.  -     Ambulatory Referral to Physical Therapy Evaluate and treat    Mixed hyperlipidemia  -     Comprehensive Metabolic Panel; Future  -     Lipid Panel With LDL / HDL Ratio; Future    Other acute pulmonary embolism without acute cor pulmonale  -     Comprehensive Metabolic Panel; Future  -     CBC & Differential; Future    Obstructive sleep apnea, adult  -     Comprehensive Metabolic Panel; Future  -     CBC & Differential; Future  -     T4, Free; Future  -     TSH; Future  -     Lipid Panel With LDL / HDL Ratio; Future    Arthritis of both knees - encouraged weight loss, low impact exercise.  No nsaids bc pt on xarelto    Healthcare maintenance  -     Comprehensive Metabolic Panel; Future  -     CBC & Differential; Future  -     T4, Free; Future  -     TSH; Future  -      Lipid Panel With LDL / HDL Ratio; Future  -     Hepatitis C antibody; Future  -     Tdap Vaccine Greater Than or Equal To 8yo IM          Outpatient Medications Prior to Visit   Medication Sig Dispense Refill   • acetaminophen (TYLENOL) 325 MG tablet Take 650 mg by mouth Every 6 (Six) Hours As Needed for mild pain (1-3).     • furosemide (LASIX) 20 MG tablet Take 1 tablet by mouth daily as needed.     • omeprazole (priLOSEC) 40 MG capsule TAKE ONE CAPSULE BY MOUTH DAILY 90 capsule 1   • XARELTO 20 MG tablet TAKE ONE TABLET BY MOUTH DAILY --START AFTER 3 WEEKS 30 tablet 10     No facility-administered medications prior to visit.      No orders of the defined types were placed in this encounter.      There are no discontinued medications.      Return in about 6 months (around 9/28/2017) for Recheck, labs.

## 2017-03-30 ENCOUNTER — OFFICE VISIT (OUTPATIENT)
Dept: BARIATRICS/WEIGHT MGMT | Facility: CLINIC | Age: 57
End: 2017-03-30

## 2017-03-30 VITALS
BODY MASS INDEX: 49.85 KG/M2 | SYSTOLIC BLOOD PRESSURE: 151 MMHG | HEART RATE: 80 BPM | RESPIRATION RATE: 16 BRPM | WEIGHT: 292 LBS | TEMPERATURE: 98.6 F | HEIGHT: 64 IN | DIASTOLIC BLOOD PRESSURE: 91 MMHG

## 2017-03-30 DIAGNOSIS — M25.50 ARTHRALGIA OF MULTIPLE JOINTS: ICD-10-CM

## 2017-03-30 DIAGNOSIS — K21.9 GASTROESOPHAGEAL REFLUX DISEASE, ESOPHAGITIS PRESENCE NOT SPECIFIED: ICD-10-CM

## 2017-03-30 DIAGNOSIS — R63.5 UNINTENDED WEIGHT GAIN: ICD-10-CM

## 2017-03-30 DIAGNOSIS — E66.01 MORBID OBESITY WITH BMI OF 50.0-59.9, ADULT (HCC): Primary | ICD-10-CM

## 2017-03-30 DIAGNOSIS — R63.2 POLYPHAGIA(783.6): ICD-10-CM

## 2017-03-30 DIAGNOSIS — Z71.3 DIETARY COUNSELING: ICD-10-CM

## 2017-03-30 DIAGNOSIS — G47.33 OBSTRUCTIVE SLEEP APNEA, ADULT: ICD-10-CM

## 2017-03-30 DIAGNOSIS — E78.2 MIXED HYPERLIPIDEMIA: ICD-10-CM

## 2017-03-30 PROCEDURE — S2083 ADJUSTMENT GASTRIC BAND: HCPCS | Performed by: NURSE PRACTITIONER

## 2017-03-30 NOTE — PROGRESS NOTES
[facility]  [address]  [facility] [id number] [phone]  [address]  [facility] [id number] [phone]  Dept: [phone]  3/30/2017      Zulema Sousa.  [id number]  [id number]  1960  female      Chief Complaint   Patient presents with   • Follow-up     S/P LAPBAND       BH Post-Op Bariatric Surgery:   Zulema Sousa is status post Lapband procedure (Regular), performed on 08/09/13.     HPI:   Today's weight is 292 lb (132 kg)  pounds, today's BMI is Body mass index is 50.12 kg/(m^2). and she has a gain of 15 pounds since the last visit. The patient reports a portion size larger than the small plate, increased hunger and denies a loss of appetite.     Zulema Sousa denies nausea, dysphagia, or abdominal pain. The patientdoes not have vomitng. The patientdoes not have reflux.    Diet and Exercise: Diet history reviewed and discussed with the patient. Weight loss/gains to date discussed with the patient. She reports eating 3 meals per day, a typical portion size of greater than 1 cup, eating 2 snack per day, drinking 2 8-oz. glasses of water per day. The patient can tolerate solid protein.   The patient is eating protein first. The patient is not limiting food volume. The patient is not taking vitamins. The patient is limiting snacking. The patient is exercising regularly- just started walking routinely. She is drinking carbonated beverages- diet.     The following portions of the patient's history were reviewed and updated as appropriate: allergies, current medications, past family history, past medical history, past social history, past surgical history and problem list.    Vitals:    03/30/17 1450   BP: 151/91   Pulse: 80   Resp: 16   Temp: 98.6 °F (37 °C)       Review of Systems   Endocrine: Positive for polyphagia.   All other systems reviewed and are negative.      Physical Exam   Constitutional: She is  oriented to person, place, and time. She appears well-developed and well-nourished.   HENT:   Head: Normocephalic and atraumatic.   Eyes: EOM are normal.   Cardiovascular: Normal rate.    Pulmonary/Chest: Effort normal.   Abdominal: Soft.   Musculoskeletal: Normal range of motion.   Neurological: She is alert and oriented to person, place, and time.   Skin: Skin is warm and dry.   Psychiatric: She has a normal mood and affect. Her behavior is normal. Judgment and thought content normal.   Vitals reviewed.      Assessment: Post-operatively the patient has regressed and would benefit from additional restriction    Plan:     I think she would benefit from additional band restriction.  Under aseptic conditions, I accessed the port and 0.5mls of fluid were added for a total of 1.5mls.  She was able to tolerate a glass of water at the conclusion of the fill.  She was advised to consume soft solids for 24 hours before advancing back to a regular diet.  RTC for n/v/d/regurg.     We discussed her protein sources and that eating dense solid protein along with plenty of vegetables and fresh fruits is important for weight loss. Reviewed appropriate water intake- half of body weight in ounces and exercise routine- minimum of 150 minutes per with including both cardio and strength training. Instructed not to drink with meals and wait 45 minutes after each meal before drinking.    She should follow-up in 6 weeks       Activity restrictions: None.   Instructions / Recommendations: dietary counseling recommended, recommended a daily protein intake of  grams, patient was advised that the lap band system works best when consuming solid foods, vitamin supplement(s) recommended, recommended exercising at least 150 minutes per week, behavior modifications recommended and instructed to call the office for concerns, questions, or problems.     The patient was counseled regarding. Total time spent face to face was 12 minutes and more  than half the time was spent counseling.

## 2017-04-02 ENCOUNTER — RESULTS ENCOUNTER (OUTPATIENT)
Dept: INTERNAL MEDICINE | Facility: CLINIC | Age: 57
End: 2017-04-02

## 2017-04-02 DIAGNOSIS — E78.2 MIXED HYPERLIPIDEMIA: ICD-10-CM

## 2017-04-02 DIAGNOSIS — I26.99 OTHER ACUTE PULMONARY EMBOLISM WITHOUT ACUTE COR PULMONALE (HCC): ICD-10-CM

## 2017-04-02 DIAGNOSIS — G47.33 OBSTRUCTIVE SLEEP APNEA, ADULT: ICD-10-CM

## 2017-04-02 DIAGNOSIS — Z00.00 HEALTHCARE MAINTENANCE: ICD-10-CM

## 2017-04-17 ENCOUNTER — OFFICE VISIT (OUTPATIENT)
Dept: INTERNAL MEDICINE | Facility: CLINIC | Age: 57
End: 2017-04-17

## 2017-04-17 ENCOUNTER — HOSPITAL ENCOUNTER (OUTPATIENT)
Dept: ULTRASOUND IMAGING | Facility: HOSPITAL | Age: 57
Discharge: HOME | End: 2017-04-17
Attending: INTERNAL MEDICINE | Admitting: INTERNAL MEDICINE

## 2017-04-17 VITALS
WEIGHT: 285.2 LBS | HEART RATE: 70 BPM | DIASTOLIC BLOOD PRESSURE: 88 MMHG | BODY MASS INDEX: 48.69 KG/M2 | OXYGEN SATURATION: 97 % | SYSTOLIC BLOOD PRESSURE: 138 MMHG | HEIGHT: 64 IN

## 2017-04-17 DIAGNOSIS — M79.604 RIGHT LEG PAIN: Primary | ICD-10-CM

## 2017-04-17 DIAGNOSIS — M79.604 RIGHT LEG PAIN: ICD-10-CM

## 2017-04-17 DIAGNOSIS — Z86.718 HISTORY OF DVT (DEEP VEIN THROMBOSIS): ICD-10-CM

## 2017-04-17 PROCEDURE — 93971 EXTREMITY STUDY: CPT

## 2017-04-17 PROCEDURE — 99214 OFFICE O/P EST MOD 30 MIN: CPT | Performed by: INTERNAL MEDICINE

## 2017-04-17 NOTE — PROGRESS NOTES
Subjective     Zulema Sousa is a 56 y.o. female, who presents with a chief complaint of   Chief Complaint   Patient presents with   • Leg Pain     R leg pain, she cannot bend her knee. X3days Patient thinks she may have fluid. she has a hx of clots.        HPI   The pt is here bc of right leg pain.  The pain hurts behind her right knee and leg.  She says her leg is so fat she can't tell if it is swollen.  She can't bend the leg bc of the pain.  She is on xarelto currently.  She had a dvt in her leg in 2009 and a pulmonary embolism in dec 2016.  No trauma or injury.  No recent travel.  No hormone replacement therapy.  Pt denies soa.    The following portions of the patient's history were reviewed and updated as appropriate: allergies, current medications, past family history, past medical history, past social history, past surgical history and problem list.    Allergies: Lortab [hydrocodone-acetaminophen]    Review of Systems   Constitutional: Negative.    HENT: Negative.    Eyes: Negative.    Respiratory: Negative.    Cardiovascular: Negative.    Gastrointestinal: Negative.    Endocrine: Negative.    Genitourinary: Negative.    Musculoskeletal:        Right leg pain   Skin: Negative.    Allergic/Immunologic: Negative.    Neurological: Negative.    Hematological: Negative.    Psychiatric/Behavioral: Negative.    All other systems reviewed and are negative.      Objective     Wt Readings from Last 3 Encounters:   04/17/17 285 lb 3.2 oz (129 kg)   03/30/17 292 lb (132 kg)   03/28/17 288 lb 12.8 oz (131 kg)     Temp Readings from Last 3 Encounters:   03/30/17 98.6 °F (37 °C) (Oral)   03/28/17 97.1 °F (36.2 °C)   12/29/16 97.4 °F (36.3 °C) (Oral)     BP Readings from Last 3 Encounters:   04/17/17 138/88   03/30/17 151/91   03/28/17 124/80     Pulse Readings from Last 3 Encounters:   04/17/17 70   03/30/17 80   03/28/17 81     Body mass index is 48.95 kg/(m^2).  SpO2 Readings from Last 3 Encounters:   04/17/17 97%    03/28/17 97%   01/04/17 97%       Physical Exam   Constitutional: She is oriented to person, place, and time. She appears well-developed and well-nourished. No distress.   HENT:   Head: Normocephalic and atraumatic.   Right Ear: External ear normal.   Left Ear: External ear normal.   Nose: Nose normal.   Mouth/Throat: Oropharynx is clear and moist.   Eyes: Conjunctivae and EOM are normal. Pupils are equal, round, and reactive to light.   Neck: Normal range of motion. Neck supple.   Cardiovascular: Normal rate, regular rhythm, normal heart sounds and intact distal pulses.    Pulmonary/Chest: Effort normal and breath sounds normal. No respiratory distress. She has no wheezes.   Musculoskeletal:   Unable to bend right leg.  + ttp behind right knee and right post thigh.  Left leg normal.   Neurological: She is alert and oriented to person, place, and time.   Skin: Skin is warm and dry.   Psychiatric: She has a normal mood and affect. Her behavior is normal. Judgment and thought content normal.   Nursing note and vitals reviewed.      Results for orders placed or performed during the hospital encounter of 12/28/16   Respiratory Panel, PCR   Result Value Ref Range    ADENOVIRUS, PCR Not Detected Not Detected    Coronavirus 229E Not Detected Not Detected    Coronavirus HKU1 Not Detected Not Detected    Coronavirus NL63 Not Detected Not Detected    Coronavirus OC43 Not Detected Not Detected    Human Metapneumovirus Not Detected Not Detected    Human Rhinovirus/Enterovirus Not Detected Not Detected    Influenza B PCR Not Detected Not Detected    Parainfluenza Virus 1 Not Detected Not Detected    Parainfluenza Virus 2 Not Detected Not Detected    Parainfluenza Virus 3 Not Detected Not Detected    Parainfluenza Virus 4 Not Detected Not Detected    Bordetella pertussis pcr Not Detected Not Detected    Influenza 2009 H1N1 by PCR Not Detected Not Detected    Chlamydophila pneumoniae PCR Not Detected Not Detected    Mycoplasma  pneumo by PCR Not Detected Not Detected    Influenza A PCR Not Detected Not Detected    Influenza A H3 Not Detected Not Detected    Influenza A H1 Not Detected Not Detected    RSV, PCR Not Detected Not Detected   Comprehensive Metabolic Panel   Result Value Ref Range    Glucose 98 65 - 99 mg/dL    BUN 15 6 - 20 mg/dL    Creatinine 0.80 0.57 - 1.00 mg/dL    Sodium 142 136 - 145 mmol/L    Potassium 4.0 3.5 - 5.2 mmol/L    Chloride 100 98 - 107 mmol/L    CO2 28.9 22.0 - 29.0 mmol/L    Calcium 9.9 8.6 - 10.5 mg/dL    Total Protein 7.9 6.0 - 8.5 g/dL    Albumin 4.00 3.50 - 5.20 g/dL    ALT (SGPT) 13 1 - 33 U/L    AST (SGOT) 18 1 - 32 U/L    Alkaline Phosphatase 99 39 - 117 U/L    Total Bilirubin <0.2 0.1 - 1.2 mg/dL    eGFR Non African Amer 74 >60 mL/min/1.73    Globulin 3.9 gm/dL    A/G Ratio 1.0 g/dL    BUN/Creatinine Ratio 18.8 7.0 - 25.0    Anion Gap 13.1 mmol/L   BNP   Result Value Ref Range    proBNP 427.7 0.0 - 900.0 pg/mL   Troponin   Result Value Ref Range    Troponin T <0.010 0.000 - 0.030 ng/mL   CBC Auto Differential   Result Value Ref Range    WBC 9.00 4.50 - 10.70 10*3/mm3    RBC 4.49 3.90 - 5.20 10*6/mm3    Hemoglobin 12.8 11.9 - 15.5 g/dL    Hematocrit 41.2 35.6 - 45.5 %    MCV 91.8 80.5 - 98.2 fL    MCH 28.5 26.9 - 32.0 pg    MCHC 31.1 (L) 32.4 - 36.3 g/dL    RDW 15.0 (H) 11.7 - 13.0 %    RDW-SD 50.5 37.0 - 54.0 fl    MPV 9.6 6.0 - 12.0 fL    Platelets 326 140 - 500 10*3/mm3    Neutrophil % 47.0 42.7 - 76.0 %    Lymphocyte % 43.4 19.6 - 45.3 %    Monocyte % 6.6 5.0 - 12.0 %    Eosinophil % 2.6 0.3 - 6.2 %    Basophil % 0.2 0.0 - 1.5 %    Immature Grans % 0.2 0.0 - 0.5 %    Neutrophils, Absolute 4.23 1.90 - 8.10 10*3/mm3    Lymphocytes, Absolute 3.91 0.90 - 4.80 10*3/mm3    Monocytes, Absolute 0.59 0.20 - 1.20 10*3/mm3    Eosinophils, Absolute 0.23 0.00 - 0.70 10*3/mm3    Basophils, Absolute 0.02 0.00 - 0.20 10*3/mm3    Immature Grans, Absolute 0.02 0.00 - 0.03 10*3/mm3   Procalcitonin   Result Value Ref  Range    Procalcitonin 0.03 (L) 0.10 - 0.25 ng/mL   Basic Metabolic Panel   Result Value Ref Range    Glucose 92 65 - 99 mg/dL    BUN 11 6 - 20 mg/dL    Creatinine 0.63 0.57 - 1.00 mg/dL    Sodium 140 136 - 145 mmol/L    Potassium 3.9 3.5 - 5.2 mmol/L    Chloride 102 98 - 107 mmol/L    CO2 25.8 22.0 - 29.0 mmol/L    Calcium 9.2 8.6 - 10.5 mg/dL    eGFR Non African Amer 97 >60 mL/min/1.73    BUN/Creatinine Ratio 17.5 7.0 - 25.0    Anion Gap 12.2 mmol/L   CBC Auto Differential   Result Value Ref Range    WBC 5.60 4.50 - 10.70 10*3/mm3    RBC 4.17 3.90 - 5.20 10*6/mm3    Hemoglobin 11.7 (L) 11.9 - 15.5 g/dL    Hematocrit 38.0 35.6 - 45.5 %    MCV 91.1 80.5 - 98.2 fL    MCH 28.1 26.9 - 32.0 pg    MCHC 30.8 (L) 32.4 - 36.3 g/dL    RDW 15.1 (H) 11.7 - 13.0 %    RDW-SD 49.8 37.0 - 54.0 fl    MPV 9.6 6.0 - 12.0 fL    Platelets 287 140 - 500 10*3/mm3    Neutrophil % 48.1 42.7 - 76.0 %    Lymphocyte % 41.3 19.6 - 45.3 %    Monocyte % 6.3 5.0 - 12.0 %    Eosinophil % 3.9 0.3 - 6.2 %    Basophil % 0.4 0.0 - 1.5 %    Immature Grans % 0.0 0.0 - 0.5 %    Neutrophils, Absolute 2.70 1.90 - 8.10 10*3/mm3    Lymphocytes, Absolute 2.31 0.90 - 4.80 10*3/mm3    Monocytes, Absolute 0.35 0.20 - 1.20 10*3/mm3    Eosinophils, Absolute 0.22 0.00 - 0.70 10*3/mm3    Basophils, Absolute 0.02 0.00 - 0.20 10*3/mm3    Immature Grans, Absolute 0.00 0.00 - 0.03 10*3/mm3   Adult Transthoracic Echo Complete   Result Value Ref Range    BSA 2.3 m^2    IVSd 0.8 cm    LVIDd 4.9 cm    LVIDs 2.6 cm    LVPWd 0.6 cm    IVS/LVPW 1.3     FS 46.9 %    EDV(Teich) 112.8 ml    ESV(Teich) 24.6 ml    EF(Teich) 78.2 %    EDV(cubed) 117.6 ml    ESV(cubed) 17.6 ml    EF(cubed) 85.1 %    LV mass(C)d 110.8 grams    LV mass(C)dI 49.0 grams/m^2    SV(Teich) 88.2 ml    SI(Teich) 39.0 ml/m^2    SV(cubed) 100.1 ml    SI(cubed) 44.3 ml/m^2    Ao root diam 3.2 cm    Ao root area 8.0 cm^2    ACS 1.8 cm    LA dimension 3.3 cm    LA/Ao 1.0     LVOT diam 2.0 cm    LVOT area 3.1 cm^2     LVOT area(traced) 3.1 cm^2    RVOT diam 2.4 cm    RVOT area 4.5 cm^2    LVLd ap4 9.0 cm    EDV(MOD-sp4) 110.0 ml    LVLs ap4 7.5 cm    ESV(MOD-sp4) 43.0 ml    EF(MOD-sp4) 60.9 %    LVLd ap2 9.1 cm    EDV(MOD-sp2) 110.0 ml    LVLs ap2 7.2 cm    ESV(MOD-sp2) 39.0 ml    EF(MOD-sp2) 64.5 %    SV(MOD-sp4) 67.0 ml    SI(MOD-sp4) 29.6 ml/m^2    SV(MOD-sp2) 71.0 ml    SI(MOD-sp2) 31.4 ml/m^2    Ao root area (BSA corrected) 1.4     Ao root area (BSA corrected) 64.5 ml/m^2    CONTRAST EF 4CH 60.9 ml/m^2    LV Diastolic corrected for BSA 48.7 ml/m^2    LV Systolic corrected for BSA 19.0 ml/m^2    MV A dur 0.13 sec    MV E max germán 96.7 cm/sec    MV A max germán 95.8 cm/sec    MV E/A 1.0     MV V2 mean 66.6 cm/sec    MV mean PG 2.0 mmHg    MV V2 VTI 27.9 cm    MVA(VTI) 2.9 cm^2    MV P1/2t max germán 96.3 cm/sec    MV P1/2t 60.5 msec    MVA(P1/2t) 3.6 cm^2    MV dec slope 466.0 cm/sec^2    MV dec time 0.18 sec    Ao V2 mean 97.7 cm/sec    Ao mean PG 5.0 mmHg    Ao mean PG (full) 3.0 mmHg    Ao V2 VTI 31.6 cm    YESI(I,A) 2.6 cm^2    YESI(I,D) 2.6 cm^2    LV V1 mean PG 2.0 mmHg    LV V1 mean 71.1 cm/sec    LV V1 VTI 25.8 cm    SV(Ao) 254.1 ml    SI(Ao) 112.4 ml/m^2    SV(LVOT) 81.1 ml    SV(RVOT) 57.9 ml    SI(LVOT) 35.9 ml/m^2    PA V2 max 93.0 cm/sec    PA max PG 3.5 mmHg    PA max PG (full) 2.2 mmHg     CV ECHO DAVID - PVA(V,A) 2.8 cm^2     CV ECHO DAVID - PVA(V,D) 2.8 cm^2    PA acc slope 888.0 cm/sec^2    PA acc time 0.1 sec    RV V1 max PG 1.3 mmHg    RV V1 mean PG 1.0 mmHg    RV V1 max 57.1 cm/sec    RV V1 mean 38.4 cm/sec    RV V1 VTI 12.8 cm    TR max germán 284.0 cm/sec    RVSP(TR) 40.3 mmHg    RAP systole 8.0 mmHg    PA pr(Accel) 34.5 mmHg    Pulm Sys Germán 87.9 cm/sec    Pulm Light Germán 84.0 cm/sec    Pulm S/D 1.0     Qp/Qs 0.71     Pulm A Revs Dur 0.14 sec    Pulm A Revs Germán 30.7 cm/sec    MVA P1/2T LCG 2.3 cm^2     CV ECHO DAVID - BZI_BMI 48.2 kilograms/m^2     CV ECHO DAVID - BSA(HAYCOCK) 2.5 m^2     CV ECHO DAVID -  BZI_METRIC_WEIGHT 127.5 kg     CV ECHO DAVID - BZI_METRIC_HEIGHT 162.6 cm    TDI S' 16.00 cm/sec    LA volume 52.0 cm3    E/E' ratio 8.0     LA Volume Index 23.0 mL/m2    Ao max PG (full) 10.0 mmHg    Ao pk germán 156.0 cm/sec    Lat Peak E' Germán 16.0 cm/sec    LV V1 max 111.0 cm/sec    Med Peak E' Germán 11.00 cm/sec    TAPSE (>1.6) 2.40 cm2   Light Blue Top   Result Value Ref Range    Extra Tube hold for add-on    Gold Top - SST   Result Value Ref Range    Extra Tube Hold for add-ons.    Duplex Venous Lower Extremity - Bilateral CAR   Result Value Ref Range    Right Saphenofemoral Junction Spont 1     Left Saphenofemoral Junction Spont 1     Right Common Femoral Spont Y     Right Common Femoral Phasic Y     Right Common Femoral Augment Y     Right Common Femoral Competent Y     Right Common Femoral Compress C     Right Proximal Femoral Compress C     Right Mid Femoral Spont Y     Right Mid Femoral Phasic Y     Right Mid Femoral Augment Y     Right Mid Femoral Competent Y     Right Mid Femoral Compress C     Right Distal Femoral Compress C     Right Popliteal Spont Y     Right Popliteal Phasic Y     Right Popliteal Augment Y     Right Popliteal Competent Y     Right Popliteal Compress C     Right Posterior Tibial Compress C     Right Peroneal Compress C     Right GastronemiusSoleal Compress C     Right Greater Saph AK Compress C     Right Greater Saph BK Compress C     Right Lesser Saph Compress C     Left Common Femoral Spont Y     Left Common Femoral Phasic Y     Left Common Femoral Augment Y     Left Common Femoral Competent Y     Left Common Femoral Compress C     Left Proximal Femoral Compress C     Left Mid Femoral Spont Y     Left Mid Femoral Phasic Y     Left Mid Femoral Augment Y     Left Mid Femoral Competent Y     Left Mid Femoral Compress C     Left Distal Femoral Compress C     Left Popliteal Spont Y     Left Popliteal Phasic Y     Left Popliteal Augment Y     Left Popliteal Competent Y     Left Popliteal  Compress C     Left Posterior Tibial Compress C     Left Peroneal Compress C     Left GastronemiusSoleal Compress C     Left Greater Saph AK Compress C     Left Greater Saph BK Compress C     Left Lesser Saph Compress C     Left Saphenofemoral Junction Competent N     Left Saphenofemoral Junction Augment Y     Left Saphenofemoral Junction Phasic Y     Left Saphenofemoral Junction Spont Y     Left Saphenofemoral Junction Compress C     Right Saphenofemoral Junction Compress C     Right Saphenofemoral Junction Competent N     Right Saphenofemoral Junction Augment Y     Right Saphenofemoral Junction Phasic Y     Right Saphenofemoral Junction Spont Y        Assessment/Plan   Zulema was seen today for leg pain.    Diagnoses and all orders for this visit:    Right leg pain  -     US venous doppler lower extremity right (duplex); Future    History of DVT (deep vein thrombosis)  -     Ambulatory Referral to Hematology      Outpatient Medications Prior to Visit   Medication Sig Dispense Refill   • acetaminophen (TYLENOL) 325 MG tablet Take  by mouth Every 6 (Six) Hours As Needed for Mild Pain (1-3).     • furosemide (LASIX) 20 MG tablet Take 1 tablet by mouth daily as needed.     • omeprazole (priLOSEC) 40 MG capsule TAKE ONE CAPSULE BY MOUTH DAILY 90 capsule 1   • XARELTO 20 MG tablet TAKE ONE TABLET BY MOUTH DAILY --START AFTER 3 WEEKS 30 tablet 10     No facility-administered medications prior to visit.      No orders of the defined types were placed in this encounter.    There are no discontinued medications.    Return if symptoms worsen or fail to improve.

## 2017-04-26 ENCOUNTER — APPOINTMENT (OUTPATIENT)
Dept: LAB | Facility: HOSPITAL | Age: 57
End: 2017-04-26

## 2017-04-26 ENCOUNTER — APPOINTMENT (OUTPATIENT)
Dept: ONCOLOGY | Facility: CLINIC | Age: 57
End: 2017-04-26

## 2017-05-12 ENCOUNTER — TELEPHONE (OUTPATIENT)
Dept: SLEEP MEDICINE | Facility: HOSPITAL | Age: 57
End: 2017-05-12

## 2017-05-16 ENCOUNTER — TELEPHONE (OUTPATIENT)
Dept: INTERNAL MEDICINE | Facility: CLINIC | Age: 57
End: 2017-05-16

## 2017-05-26 ENCOUNTER — OFFICE VISIT (OUTPATIENT)
Dept: ORTHOPEDIC SURGERY | Facility: CLINIC | Age: 57
End: 2017-05-26

## 2017-05-26 VITALS — BODY MASS INDEX: 47.8 KG/M2 | TEMPERATURE: 97.6 F | HEIGHT: 64 IN | WEIGHT: 280 LBS

## 2017-05-26 DIAGNOSIS — M25.561 CHRONIC PAIN OF RIGHT KNEE: Primary | ICD-10-CM

## 2017-05-26 DIAGNOSIS — G89.29 CHRONIC PAIN OF RIGHT KNEE: Primary | ICD-10-CM

## 2017-05-26 DIAGNOSIS — M17.11 PRIMARY OSTEOARTHRITIS OF RIGHT KNEE: ICD-10-CM

## 2017-05-26 PROCEDURE — 99213 OFFICE O/P EST LOW 20 MIN: CPT | Performed by: ORTHOPAEDIC SURGERY

## 2017-05-26 PROCEDURE — 73562 X-RAY EXAM OF KNEE 3: CPT | Performed by: ORTHOPAEDIC SURGERY

## 2017-05-26 PROCEDURE — 20610 DRAIN/INJ JOINT/BURSA W/O US: CPT | Performed by: ORTHOPAEDIC SURGERY

## 2017-05-26 RX ORDER — BUPIVACAINE HYDROCHLORIDE 5 MG/ML
2 INJECTION, SOLUTION PERINEURAL
Status: COMPLETED | OUTPATIENT
Start: 2017-05-26 | End: 2017-05-26

## 2017-05-26 RX ORDER — TRAMADOL HYDROCHLORIDE 50 MG/1
50 TABLET ORAL EVERY 6 HOURS PRN
COMMUNITY
End: 2017-12-01 | Stop reason: SDUPTHER

## 2017-05-26 RX ORDER — METHYLPREDNISOLONE ACETATE 80 MG/ML
80 INJECTION, SUSPENSION INTRA-ARTICULAR; INTRALESIONAL; INTRAMUSCULAR; SOFT TISSUE
Status: COMPLETED | OUTPATIENT
Start: 2017-05-26 | End: 2017-05-26

## 2017-05-26 RX ORDER — LIDOCAINE HYDROCHLORIDE 10 MG/ML
2 INJECTION, SOLUTION INFILTRATION; PERINEURAL
Status: COMPLETED | OUTPATIENT
Start: 2017-05-26 | End: 2017-05-26

## 2017-05-26 RX ADMIN — LIDOCAINE HYDROCHLORIDE 2 ML: 10 INJECTION, SOLUTION INFILTRATION; PERINEURAL at 17:19

## 2017-05-26 RX ADMIN — METHYLPREDNISOLONE ACETATE 80 MG: 80 INJECTION, SUSPENSION INTRA-ARTICULAR; INTRALESIONAL; INTRAMUSCULAR; SOFT TISSUE at 17:19

## 2017-05-26 RX ADMIN — BUPIVACAINE HYDROCHLORIDE 2 ML: 5 INJECTION, SOLUTION PERINEURAL at 17:19

## 2017-07-13 ENCOUNTER — OFFICE VISIT (OUTPATIENT)
Dept: ORTHOPEDIC SURGERY | Facility: CLINIC | Age: 57
End: 2017-07-13

## 2017-07-13 VITALS — WEIGHT: 280 LBS | BODY MASS INDEX: 47.8 KG/M2 | TEMPERATURE: 98.4 F | HEIGHT: 64 IN

## 2017-07-13 DIAGNOSIS — M17.11 PRIMARY OSTEOARTHRITIS OF RIGHT KNEE: ICD-10-CM

## 2017-07-13 DIAGNOSIS — S83.241A TEAR OF MEDIAL MENISCUS OF RIGHT KNEE, CURRENT, UNSPECIFIED TEAR TYPE, INITIAL ENCOUNTER: ICD-10-CM

## 2017-07-13 DIAGNOSIS — M54.50 LUMBAR SPINE PAIN: Primary | ICD-10-CM

## 2017-07-13 DIAGNOSIS — M54.31 SCIATICA OF RIGHT SIDE: ICD-10-CM

## 2017-07-13 PROCEDURE — 72100 X-RAY EXAM L-S SPINE 2/3 VWS: CPT | Performed by: ORTHOPAEDIC SURGERY

## 2017-07-13 PROCEDURE — 99214 OFFICE O/P EST MOD 30 MIN: CPT | Performed by: ORTHOPAEDIC SURGERY

## 2017-07-13 NOTE — PROGRESS NOTES
"Knee Exam      Patient: Zulema Sousa    YOB: 1960 57 y.o. female    Chief Complaints: knee hurts    History of Present Illness: Patient is seen back today for her right knee and leg.  She was seen on 5/26/17 had a right knee cortisone injection which gave her nearly complete relief of her pain for about 2 weeks.  She's now had return of moderate aching throbbing pain mainly over the medial aspect of the right knee with some occasional feelings of uncomfortable popping.  She also complains of pain in the posterior aspect of her right knee the back of her leg that radiates from the buttock.  She was not having this at her last visit but has since had onset of this.  She denies any weakness numbness or tingling distally.  HPI    ROS: knee pain no numbness or tingling  Past Medical History:   Diagnosis Date   • Acid reflux    • Back pain    • Cardiac tamponade     March 2015   • Difficulty breathing     during exertion   • Edema    • Esophagitis, reflux    • Essential hypertriglyceridemia    • GERD (gastroesophageal reflux disease)    • Health care maintenance    • Heartburn    • Hyperlipidemia    • Hypertriglyceridemia    • Increased appetite    • Morbid obesity    • Multiple joint pain    • Osteoarthritis of knee    • Pericarditis    • Pulmonary embolism     Janurary 2015   • RHA (rheumatoid arthritis)    • Sciatica    • Unintended weight gain        Physical Exam:   Vitals:    07/13/17 1542   Temp: 98.4 °F (36.9 °C)   Weight: 280 lb (127 kg)   Height: 64\" (162.6 cm)     Well developed with normal mood.She's somewhat tender to palpation in the right lower lumbar spine and buttock and posterior thigh.  There is pain with straight leg testing.  Right knee shows mild effusion no warmth erythema there is moderate discomfort over the medial joint line tibial plateau.  She has pain with full extension and flexes to about 110°.  There is some discomfort with Kristin's      Radiology: 2 views of the lumbar " spine were ordered today to evaluate a reviewed there are no prior x-rays available for comparison show significant loss of space at the L5-S1 area and posterior arthropathic changes      Assessment/Plan:  1.  Right knee arthritis 2.  Right knee possible medial meniscal tear and stress reaction or fracture 3.  Lumbar radicular/sciatica symptoms.    We have discussed treatment options we are to get an MRI of her right knee to evaluate for meniscal tear or stress fracture or stress reaction.    She is seen Dr. Vaughn in the past for her back with an L5-S1 disc.  I worry that her leg symptoms may be coming more from her back than from her knee.  This may be 2 separate issues.  She would like to see him for repeat evaluation of her back and determine if this as an etiology of her pain.  I've made this referral.    She understands to call to schedule follow-up appointment for her knee once MRI has been scheduled.

## 2017-08-05 ENCOUNTER — HOSPITAL ENCOUNTER (OUTPATIENT)
Dept: MRI IMAGING | Facility: HOSPITAL | Age: 57
Discharge: HOME | End: 2017-08-05
Attending: ORTHOPAEDIC SURGERY | Admitting: ORTHOPAEDIC SURGERY

## 2017-08-05 DIAGNOSIS — S83.241A TEAR OF MEDIAL MENISCUS OF RIGHT KNEE, CURRENT, UNSPECIFIED TEAR TYPE, INITIAL ENCOUNTER: ICD-10-CM

## 2017-08-05 DIAGNOSIS — M17.11 PRIMARY OSTEOARTHRITIS OF RIGHT KNEE: ICD-10-CM

## 2017-08-05 PROCEDURE — 73721 MRI JNT OF LWR EXTRE W/O DYE: CPT

## 2017-08-07 RX ORDER — OMEPRAZOLE 40 MG/1
CAPSULE, DELAYED RELEASE ORAL
Qty: 90 CAPSULE | Refills: 1 | Status: SHIPPED | OUTPATIENT
Start: 2017-08-07 | End: 2018-01-11 | Stop reason: SDUPTHER

## 2017-08-10 ENCOUNTER — TELEPHONE (OUTPATIENT)
Dept: ORTHOPEDIC SURGERY | Facility: CLINIC | Age: 57
End: 2017-08-10

## 2017-08-10 DIAGNOSIS — M17.11 ARTHRITIS OF KNEE, RIGHT: Primary | ICD-10-CM

## 2017-08-14 NOTE — TELEPHONE ENCOUNTER
I spoke at length with patient and reviewed her MRI results.  she still having significant pain in her left knee.  She was injected back in May and had only 2 weeks of relief but during that time it was complete.  Previously she been on anti-inflammatories and did not have pain but cannot take that now that she's had multiple PEs and is on Xarelto.  She is not yet seen Dr. Vaughn for her back problems.  She has seen him in the past.    After long discussion with the patient she does not want to have knee replacement but I feel that ultimately she will require this.  She's going to check with her primary care physician and cardiologist as far as any type of other anti-inflammatory options while she is on Xarelto.  I will schedule her follow-up appointment and we will review the MRI again with her in person I reviewed with her that I don't feel that arthroscopy would be of benefit to her and could make things worse.  We did discuss possible Synvisc injection at follow-up appointment and will evaluate her for that at that time.    She is going to call to schedule an appointment with Dr. Vaughn for her back as she has seen him in the past.

## 2017-09-11 ENCOUNTER — TELEPHONE (OUTPATIENT)
Dept: INTERNAL MEDICINE | Facility: CLINIC | Age: 57
End: 2017-09-11

## 2017-09-11 RX ORDER — FUROSEMIDE 20 MG/1
20 TABLET ORAL DAILY PRN
Qty: 30 TABLET | Refills: 0 | Status: SHIPPED | OUTPATIENT
Start: 2017-09-11 | End: 2017-09-13 | Stop reason: SDUPTHER

## 2017-09-11 NOTE — TELEPHONE ENCOUNTER
----- Message from Regi Devine MA sent at 9/11/2017  3:17 PM EDT -----    Received request for Tramadol from [facility] in 4/17 with no narc agreement on file.  Scheduled this Friday to update chart for RX per [facility] rules.  Denied refill as of today.  [facility] on [facility].

## 2017-09-13 RX ORDER — FUROSEMIDE 20 MG/1
20 TABLET ORAL DAILY PRN
Qty: 30 TABLET | Refills: 0 | Status: SHIPPED | OUTPATIENT
Start: 2017-09-13 | End: 2018-01-05

## 2017-10-04 ENCOUNTER — TRANSCRIBE ORDERS (OUTPATIENT)
Dept: INTERNAL MEDICINE | Facility: CLINIC | Age: 57
End: 2017-10-04

## 2017-10-04 DIAGNOSIS — Z12.31 SCREENING MAMMOGRAM, ENCOUNTER FOR: Primary | ICD-10-CM

## 2017-10-06 ENCOUNTER — CLINICAL SUPPORT (OUTPATIENT)
Dept: ORTHOPEDIC SURGERY | Facility: CLINIC | Age: 57
End: 2017-10-06

## 2017-10-06 ENCOUNTER — HOSPITAL ENCOUNTER (OUTPATIENT)
Dept: MAMMOGRAPHY | Facility: HOSPITAL | Age: 57
Discharge: HOME | End: 2017-10-06
Attending: INTERNAL MEDICINE | Admitting: INTERNAL MEDICINE

## 2017-10-06 VITALS — WEIGHT: 281 LBS | TEMPERATURE: 98 F | HEIGHT: 64 IN | BODY MASS INDEX: 47.97 KG/M2

## 2017-10-06 DIAGNOSIS — M17.11 PRIMARY LOCALIZED OSTEOARTHROSIS OF RIGHT LOWER LEG: Primary | ICD-10-CM

## 2017-10-06 DIAGNOSIS — M17.11 ARTHRITIS OF KNEE, RIGHT: ICD-10-CM

## 2017-10-06 DIAGNOSIS — Z12.31 SCREENING MAMMOGRAM, ENCOUNTER FOR: ICD-10-CM

## 2017-10-06 DIAGNOSIS — S83.281D TEAR OF LATERAL MENISCUS OF RIGHT KNEE, CURRENT, UNSPECIFIED TEAR TYPE, SUBSEQUENT ENCOUNTER: ICD-10-CM

## 2017-10-06 PROCEDURE — 99213 OFFICE O/P EST LOW 20 MIN: CPT | Performed by: ORTHOPAEDIC SURGERY

## 2017-10-06 PROCEDURE — 20610 DRAIN/INJ JOINT/BURSA W/O US: CPT | Performed by: ORTHOPAEDIC SURGERY

## 2017-10-06 PROCEDURE — 77063 BREAST TOMOSYNTHESIS BI: CPT

## 2017-10-06 PROCEDURE — G0202 SCR MAMMO BI INCL CAD: HCPCS

## 2017-10-06 RX ADMIN — BUPIVACAINE HYDROCHLORIDE 4 ML: 5 INJECTION, SOLUTION EPIDURAL; INTRACAUDAL at 15:47

## 2017-10-06 RX ADMIN — METHYLPREDNISOLONE ACETATE 80 MG: 80 INJECTION, SUSPENSION INTRA-ARTICULAR; INTRALESIONAL; INTRAMUSCULAR; SOFT TISSUE at 15:47

## 2017-10-09 RX ORDER — METHYLPREDNISOLONE ACETATE 80 MG/ML
80 INJECTION, SUSPENSION INTRA-ARTICULAR; INTRALESIONAL; INTRAMUSCULAR; SOFT TISSUE
Status: COMPLETED | OUTPATIENT
Start: 2017-10-06 | End: 2017-10-06

## 2017-10-09 RX ORDER — BUPIVACAINE HYDROCHLORIDE 5 MG/ML
4 INJECTION, SOLUTION EPIDURAL; INTRACAUDAL
Status: COMPLETED | OUTPATIENT
Start: 2017-10-06 | End: 2017-10-06

## 2017-10-09 NOTE — PROGRESS NOTES
"Knee Exam      Patient: Zulema Sousa    YOB: 1960 57 y.o. female    Chief Complaints: knee hurts    History of Present Illness: Patient was last seen in July of this year and was sent for an MRI.  I discussed her MRI results with her on the phone and she was supposed to come in for a Synvisc injection however this was delayed due to problems with medications.  She has had some exacerbation in pain since I last saw her over the last several weeks but no new injury.  She describes moderate constant aching pain somewhat globally throughout the right knee and has not had any lottie locking or catching symptoms.  No new injury which she is aware  HPI    ROS: knee pain  Past Medical History:   Diagnosis Date   • Acid reflux    • Back pain    • Cardiac tamponade     March 2015   • Difficulty breathing     during exertion   • Edema    • Esophagitis, reflux    • Essential hypertriglyceridemia    • GERD (gastroesophageal reflux disease)    • Health care maintenance    • Heartburn    • Hyperlipidemia    • Hypertriglyceridemia    • Increased appetite    • Morbid obesity    • Multiple joint pain    • Osteoarthritis of knee    • Pericarditis    • Pulmonary embolism     Janurary 2015   • RHA (rheumatoid arthritis)    • Sciatica    • Unintended weight gain        Physical Exam:   Vitals:    10/06/17 1502   Temp: 98 °F (36.7 °C)   TempSrc: Temporal Artery    Weight: 281 lb (127 kg)   Height: 64\" (162.6 cm)     Well developed with normal mood.Right knee shows mild effusion no warmth or erythema.  0-110° range of motion with discomfort over the more so than medial joint line joint line with tenderness to palpation on patellofemoral compression.  Right calf and thigh are nontender without sign of DVT      Radiology: MRI films and report of the right knee dated 8/5/17 reviewed and show arthritic change of the knee mainly of the lateral and patellofemoral compartments with posterior horn lateral meniscal tear with a " vertical longitudinal tear of the posterior horn near the root as well as vertical longitudinal tear of the anterior horn lateral meniscus.  There is also some fluid undercutting a 6 x 5 mm articular cartilage segment weightbearing surface lateral femoral condyle with some reactive marrow edema in the lateral tibial plateau.  There is also advanced patellofemoral arthritis with some lateral tilt and subluxation of the patella with full-thickness loss of the patellofemoral compartment.  There is also Bakers cyst knee joint effusion      Assessment/Plan: 1.  Right knee lateral and patellofemoral arthritic change with full-thickness chondral loss the patellofemoral joint as well as fluid signal undercutting a 6 x 5 mm of articular cartilage weightbearing surface lateral femoral condyle and lateral meniscal tear.    We discussed operative and nonoperative treatment options and she would like to avoid surgical treatment is not having lottie mechanical symptoms.  She is also on Xarelto for previous history of  PE    We discussed nonoperative treatment options and after verbal consent and sterile prep discussion of risks which can include infection right knee was injected with steroid.  We decided to hold off on Synvisc as symptoms have exacerbated nothing we need more of an anti-inflammatory component to this.  She can't take oral anti-inflammatories doing to being on Xarelto.    We'll have her start some physical therapy at [facility] [employer] where she works and I will see her back in 6 weeks.    At follow-up I need to check to see if she ever saw Dr. Rosario advise this at her last visit but it looks from records that she canceled this on 8/9/17.    Large Joint Arthrocentesis  Date/Time: 10/6/2017 3:47 PM  Consent given by: patient  Site marked: site marked  Timeout: Immediately prior to procedure a time out was called to verify the correct patient, procedure, equipment, support staff and site/side marked as required    Supporting Documentation  Indications: pain   Procedure Details  Location: knee - R knee  Needle size: 22 G  Approach: anteromedial  Medications administered: 80 mg methylPREDNISolone acetate 80 MG/ML; 4 mL bupivacaine (PF) 0.5 %  Patient tolerance: patient tolerated the procedure well with no immediate complications

## 2017-10-10 ENCOUNTER — TELEPHONE (OUTPATIENT)
Dept: INTERNAL MEDICINE | Facility: CLINIC | Age: 57
End: 2017-10-10

## 2017-10-10 NOTE — TELEPHONE ENCOUNTER
Patient has been advised and voiced understanding.     ----- Message from Marsha Young MD sent at 10/7/2017 10:03 AM EDT -----  Mammogram normal.  Repeat 1 year

## 2017-10-11 ENCOUNTER — HOSPITAL ENCOUNTER (OUTPATIENT)
Dept: PHYSICAL THERAPY | Facility: HOSPITAL | Age: 57
Setting detail: THERAPIES SERIES
Discharge: HOME | End: 2017-10-11

## 2017-10-11 DIAGNOSIS — M17.11 PRIMARY LOCALIZED OSTEOARTHROSIS, LOWER LEG, RIGHT: Primary | ICD-10-CM

## 2017-10-11 DIAGNOSIS — S83.281D TEAR OF LATERAL MENISCUS OF RIGHT KNEE, CURRENT, UNSPECIFIED TEAR TYPE, SUBSEQUENT ENCOUNTER: ICD-10-CM

## 2017-10-11 PROCEDURE — G8978 MOBILITY CURRENT STATUS: HCPCS | Performed by: PHYSICAL THERAPIST

## 2017-10-11 PROCEDURE — G8979 MOBILITY GOAL STATUS: HCPCS | Performed by: PHYSICAL THERAPIST

## 2017-10-11 PROCEDURE — 97161 PT EVAL LOW COMPLEX 20 MIN: CPT | Performed by: PHYSICAL THERAPIST

## 2017-10-11 NOTE — THERAPY EVALUATION
Outpatient Physical Therapy Ortho Initial Evaluation  [facility]     Patient Name: Zulema Sousa  : 1960  MRN: [id number]  Today's Date: 10/11/2017      Visit Date: 10/11/2017    Patient Active Problem List   Diagnosis   • Gastroesophageal reflux disease   • Edema   • Hyperlipidemia   • Polyphagia(783.6)   • Morbid obesity with BMI of 50.0-59.9, adult   • Arthralgia of multiple joints   • Osteoarthritis of knee   • Pericarditis   • Pulmonary embolism   • Rheumatoid arthritis   • Sciatica   • Unintended weight gain   • Arthritis of both knees   • Obstructive sleep apnea, adult   • Dietary counseling   • History of DVT (deep vein thrombosis)        Past Medical History:   Diagnosis Date   • Acid reflux    • Back pain    • Cardiac tamponade     2015   • Difficulty breathing     during exertion   • Edema    • Esophagitis, reflux    • Essential hypertriglyceridemia    • GERD (gastroesophageal reflux disease)    • Health care maintenance    • Heartburn    • Hyperlipidemia    • Hypertriglyceridemia    • Increased appetite    • Morbid obesity    • Multiple joint pain    • Osteoarthritis of knee    • Pericarditis    • Pulmonary embolism     2015   • RHA (rheumatoid arthritis)    • Sciatica    • Unintended weight gain         Past Surgical History:   Procedure Laterality Date   • ANKLE SURGERY      treatment of ankle fracture   • APPENDECTOMY     • CARDIAC SURGERY      cardiac tamponade   •  SECTION     • CHOLECYSTECTOMY     • DILATATION AND CURETTAGE     • LAPAROSCOPIC GASTRIC BANDING     • PULMONARY EMBOLISM SURGERY     • TUBAL ABDOMINAL LIGATION         Visit Dx:     ICD-10-CM ICD-9-CM   1. Primary localized osteoarthrosis, lower leg, right M17.11 715.16   2. Tear of lateral meniscus of right knee, current, unspecified tear type, subsequent encounter S83.281D V58.89     836.1             Patient History       10/11/17 0600          History    Chief Complaint Difficulty  Walking;Difficulty with daily activities;Pain;Muscle weakness;Joint swelling  -      Type of Pain Lower Extremity / Leg;Knee pain   right  -      Date Current Problem(s) Began 06/11/17  -      Brief Description of Current Complaint Pt reports several month history of right knee and leg pain. She had an MRI done that showed a lateral meniscus tear. She received a cortisone injection 10/6/2017 which has decreased her pain significantly. She is now referred for outpatient therapy.  -      Patient/Caregiver Goals Return to prior level of function;Relieve pain;Improve mobility;Improve strength;Decrease swelling  -      Patient's Rating of General Health Good  -      Hand Dominance right-handed  -      Occupation/sports/leisure activities Patient is a   -      Pain     Pain Location Knee;Leg   right  -      Pain at Present 0  -      Pain at Best 0  -      Pain at Worst 10  -GC      Pain Frequency Intermittent  -      Pain Description Aching;Tightness;Throbbing;Sharp;Other (Comment)   unstable  -      What Performance Factors Make the Current Problem(s) WORSE? Pt c/o pain with being on her feet, performing work activities, transfers sit to stand and in/out of cars  -      What Performance Factors Make the Current Problem(s) BETTER? Pt feels better if she gets off her feet and rests  -      Difficulties at work? Pt has pain with her work activities that involve her using a foot pedal  -      Difficulties with ADL's? Pt has difficulty with going up/down stairs, getting in/out bath tub, getting in/out car, rising from seated postion  -      Fall Risk Assessment    Any falls in the past year: No  -GC      Daily Activities    Primary Language English  -      Are you able to read Yes  -      Are you able to write Yes  -      Teaching needs identified Home Exercise Program;Management of Condition  -      Patient is concerned about/has problems with Bed Mobility;Climbing  Stairs;Difficulty with self care (i.e. bathing, dressing, toileting:;Flexibility;Performing home management (household chores, shopping, care of dependents);Performing job responsibilities/community activities (work, school,;Standing;Walking;Transfers (getting out of a chair, bed)  -GC      Does patient have problems with the following? None  -GC      Barriers to learning None  -GC      Functional Status mobility issues preventing performance of daily activities;bathing  -GC      Pt Participated in POC and Goals Yes  -GC      Safety    Are you being hurt, hit, or frightened by anyone at home or in your life? No  -GC      Are you being neglected by a caregiver No  -GC        User Key  (r) = Recorded By, (t) = Taken By, (c) = Cosigned By    Initials Name Provider Type    GC Justus Tuttle PT Physical Therapist                PT Ortho       10/11/17 0600    Posture/Observations    Posture/Observations Comments Pt has mild edema right knee  -GC    Knee Palpation    Lateral Joint Line Right:;Tender  -GC    ITB Right:;Tender;Guarded/taut  -GC    Patellar Accessory Motions    Superior glide Right:;WNL  -GC    Inferior glide Right:;WNL  -GC    Medial glide Right:;WNL  -GC    Lateral glide Right:;WNL  -GC    Knee Special Tests    Lachman’s (ACL lesion) Right:;Negative  -GC    Valgus stress (MCL lesion) Right:;Negative  -GC    Varus stress (LCL lesion) Right:;Negative  -GC    Kristin’s test (meniscal lesion) Right:;Positive  -GC    Bounce home test (meniscal lesion) Right:;Positive  -GC    Patellar grind test (chondromalacia patella) Right:;Positive  -GC    Roel’s sign (DVT) Right:;Negative  -GC    Right Hip    Flexion AROM within functional limits  -GC    ABduction AROM within functional limits  -GC    ADduction AROM within functional limits  -GC    Right Knee    Extension/Flexion AROM Deficit 0-70 degrees  -GC    Right Ankle    Dorsiflexion AROM within functional limits  -GC    Plantarflexion AROM within functional limits   -GC    Right Hip    Hip Flexion Gross Movement (3+/5) fair plus  -GC    Hip Extension Gross Movement (4/5) good  -GC    Hip ABduction Gross Movement (3+/5) fair plus  -GC    Hip ADduction Gross Movement (3+/5) fair plus  -GC    Right Knee    Knee Extension Gross Movement (4/5) good  -GC    Knee Flexion Gross Movement (4/5) good  -GC    Right Ankle/Foot    Ankle PF Gross Movement (5/5) normal  -GC    Ankle Dorsiflexion Gross Movement (5/5) normal  -GC    Lower Extremity Flexibility    Hamstrings Right:;Moderately limited  -GC    Hip Flexors Right:;Mildly limited  -GC    Quadriceps Right:;Moderately limited  -GC    ITB Right:;Moderately limited  -GC    Gastrocnemius Right:;Mildly limited  -GC    Gait Assessment/Treatment    Gait, Comment Pt ambualtes with mild antalgic giat right LE showing a decreased stride length on the right compared to the left  -GC      User Key  (r) = Recorded By, (t) = Taken By, (c) = Cosigned By    Initials Name Provider Type    GLEN Tuttle PT Physical Therapist                            Therapy Education       10/11/17 0610          Therapy Education    Given HEP;Symptoms/condition management;Pain management  -GC      Program New  -GC      How Provided Verbal;Demonstration;Written  -GC      Provided to Patient  -GC      Level of Understanding Teach back education performed;Verbalized;Demonstrated  -GC        User Key  (r) = Recorded By, (t) = Taken By, (c) = Cosigned By    Initials Name Provider Type    GLEN Tuttle, PT Physical Therapist                PT OP Goals       10/11/17 0600       PT Short Term Goals    STG Date to Achieve 10/25/17  -     STG 1 Decrease right LE pain to 2-3/10 with activity.  -GC     STG 2 Increase right knee AROM to 0-90 degrees with testing.  -     STG 3 Increased right hamstring and ITB flexibility to only a minimal restricition with testing.  -     STG 4 Pt will be independent with her HEP issued by this therapist.  -GC     Long Term Goals     LTG Date to Achieve 11/08/17  -     LTG 1 Decrease right LE pain to 0-1/10 with activity.  -     LTG 2 Increase right knee AROM to 0-110 degrees with testing.  -     LTG 3 Increase right hamstring and ITB flexibility to WFL with testing.  -     LTG 4 Increase right LE strength to at least 4+/5 all planes with testing.  -     LTG 5 Pt will be indpendent with all ADLs and have a LEFS score > 60.  -     Time Calculation    PT Goal Re-Cert Due Date 11/08/17  -       User Key  (r) = Recorded By, (t) = Taken By, (c) = Cosigned By    Initials Name Provider Type     Justus Tuttle, PT Physical Therapist                PT Assessment/Plan       10/11/17 0600       PT Assessment    Functional Limitations Impaired gait;Limitation in home management;Limitations in community activities;Limitations in functional capacity and performance;Performance in leisure activities;Performance in self-care ADL;Performance in work activities  -     Impairments Edema;Gait;Impaired flexibility;Range of motion;Pain;Muscle strength  -     Assessment Comments Pt presents with several month history of right leg and knee pain that she rates up to 10/10 at times. She has difficulty with transfers, gait, and work related activities. She has decreased right knee ROM, decreased right LE flexibility, decreased right LE strength, and decreased function secondary to the above.  -     Rehab Potential Good  -     Patient/caregiver participated in establishment of treatment plan and goals Yes  -     Patient would benefit from skilled therapy intervention Yes  -     PT Plan    PT Frequency 1x/week;2x/week  -     Predicted Duration of Therapy Intervention (days/wks) 4 weeks  -     Planned CPT's? PT EVAL LOW COMPLEXITY: 84531;PT THER PROC EA 15 MIN: 21701;PT HOT OR COLD PACK TREAT MCARE;PT ELECTRICAL STIM UNATTEND: ;PT ULTRASOUND EA 15 MIN: 16980  -     PT Plan Comments Pt will continue her HEP 2x daily  -       User Key   (r) = Recorded By, (t) = Taken By, (c) = Cosigned By    Initials Name Provider Type    GLEN Tuttle PT Physical Therapist                Modalities       10/11/17 0600          Moist Heat    MH Applied Yes  -GC      Location right lateral hip and ITB  -GC      Rx Minutes 10 mins  -GC      MH Prior to Rx Yes  -GC        User Key  (r) = Recorded By, (t) = Taken By, (c) = Cosigned By    Initials Name Provider Type    GLEN Tuttle PT Physical Therapist              Exercises       10/11/17 0600          Exercise 1    Exercise Name 1 Hamstring stretch wiht ADD  -GC      Cueing 1 Verbal;Tactile  -GC      Reps 1 10  -GC      Time (Seconds) 1 10 secs  -GC      Exercise 2    Exercise Name 2 Piriformis stretch  -GC      Cueing 2 Verbal;Tactile  -GC      Reps 2 10  -GC      Time (Seconds) 2 10 secs  -GC      Exercise 3    Exercise Name 3 SAQ  -GC      Cueing 3 Verbal;Tactile  -GC      Reps 3 30  -GC      Exercise 4    Exercise Name 4 LAQ/ball squeeze  -GC      Cueing 4 Verbal;Tactile  -GC      Reps 4 30  -GC      Exercise 5    Exercise Name 5 TKE vs theraband  -GC      Cueing 5 Verbal  -GC      Reps 5 30  -GC      Additional Comments silver  -GC        User Key  (r) = Recorded By, (t) = Taken By, (c) = Cosigned By    Initials Name Provider Type    GLEN Tuttle PT Physical Therapist                              Outcome Measures       10/11/17 0600          Lower Extremity Functional Index    Any of your usual work, housework or school activities 1  -GC      Your usual hobbies, recreational or sporting activities 1  -GC      Getting into or out of the bath 1  -GC      Walking between rooms 2  -GC      Putting on your shoes or socks 3  -GC      Squatting 0  -GC      Lifting an object, like a bag of groceries from the floor 3  -GC      Performing light activities around your home 3  -GC      Performing heavy activities around your home 1  -GC      Getting into or out of a car 1  -GC      Walking 2 blocks 1  -GC       Walking a mile 0  -GC      Going up or down 10 stairs (about 1 flight of stairs) 0  -GC      Standing for 1 hour 0  -GC      Sitting for 1 hour 3  -GC      Running on even ground 0  -GC      Running on uneven ground 0  -GC      Making sharp turns while running fast 0  -GC      Hopping 0  -GC      Rolling over in bed 1  -GC      Total 21  -GC        User Key  (r) = Recorded By, (t) = Taken By, (c) = Cosigned By    Initials Name Provider Type    GC Justus Tuttle, PT Physical Therapist            Time Calculation:         Therapy Charges for Today     Code Description Service Date Service Provider Modifiers Qty    22369522024 HC PT MOBILITY CURRENT 10/11/2017 Justus Tuttle, PT GP, CK 1    83109467619 HC PT MOBILITY PROJECTED 10/11/2017 Justus Tuttle PT GP, CI 1    42447760705 HC PT EVAL LOW COMPLEXITY 3 10/11/2017 Justus Tuttle, PT GP 1          PT G-Codes  Outcome Measure Options: Lower Extremity Functional Scale (LEFS)  Score: 21  Functional Limitation: Mobility: Walking and moving around  Mobility: Walking and Moving Around Current Status (): At least 40 percent but less than 60 percent impaired, limited or restricted  Mobility: Walking and Moving Around Goal Status (): At least 1 percent but less than 20 percent impaired, limited or restricted         Justus Tuttle PT  10/11/2017

## 2017-10-16 ENCOUNTER — APPOINTMENT (OUTPATIENT)
Dept: PHYSICAL THERAPY | Facility: HOSPITAL | Age: 57
End: 2017-10-16

## 2017-10-16 ENCOUNTER — TELEPHONE (OUTPATIENT)
Dept: ORTHOPEDIC SURGERY | Facility: CLINIC | Age: 57
End: 2017-10-16

## 2017-10-19 ENCOUNTER — OFFICE VISIT (OUTPATIENT)
Dept: ORTHOPEDIC SURGERY | Facility: CLINIC | Age: 57
End: 2017-10-19

## 2017-10-19 VITALS — BODY MASS INDEX: 46.95 KG/M2 | HEIGHT: 64 IN | TEMPERATURE: 98 F | WEIGHT: 275 LBS

## 2017-10-19 DIAGNOSIS — S83.281D TEAR OF LATERAL MENISCUS OF RIGHT KNEE, CURRENT, UNSPECIFIED TEAR TYPE, SUBSEQUENT ENCOUNTER: Primary | ICD-10-CM

## 2017-10-19 DIAGNOSIS — M17.11 PRIMARY LOCALIZED OSTEOARTHROSIS OF RIGHT LOWER LEG: ICD-10-CM

## 2017-10-19 PROCEDURE — 99213 OFFICE O/P EST LOW 20 MIN: CPT | Performed by: ORTHOPAEDIC SURGERY

## 2017-10-19 NOTE — PROGRESS NOTES
"Knee Exam      Patient: Zulema Sousa    YOB: 1960 57 y.o. female    Chief Complaints: knee hurts    History of Present Illness: Patient was last seen on 10/6/17 with an MRI performed on 8-5-17 that showed a lateral meniscal tear with some fluid undercutting the cartilage in the lateral femoral condyle and lateral tibial plateau edema and patellofemoral changes.    At that time she was not having significant mechanical symptoms in her knee was injected with steroid.  She tolerated this well and was sent to physical therapy.  Her pain was improving significantly however 4 days ago on 10/16/17 upon arising from bed she had a significant pop in her right knee posterior laterally with difficulty extending the knee.  Since then the knee pain has improved and she's been able to move her knee better pain is improving but she is now had persistent intermittent feelings of locking and catching to the right knee.  HPI    ROS: knee pain  Past Medical History:   Diagnosis Date   • Acid reflux    • Back pain    • Cardiac tamponade     March 2015   • Difficulty breathing     during exertion   • Edema    • Esophagitis, reflux    • Essential hypertriglyceridemia    • GERD (gastroesophageal reflux disease)    • Health care maintenance    • Heartburn    • Hyperlipidemia    • Hypertriglyceridemia    • Increased appetite    • Morbid obesity    • Multiple joint pain    • Osteoarthritis of knee    • Pericarditis    • Pulmonary embolism     Janurary 2015   • RHA (rheumatoid arthritis)    • Sciatica    • Unintended weight gain        Physical Exam:   Vitals:    10/19/17 1608   Temp: 98 °F (36.7 °C)   Weight: 275 lb (125 kg)   Height: 64\" (162.6 cm)     Well developed with normal mood.Nonantalgic gait without assistive device today.  Right knee showed no warmth or erythema or sign of infection there was mild effusion.  0-110° range of motion with discomfort over the lateral more so than medial joint line exacerbated with " Kristin's.  There was moderate discomfort with patellofemoral compression.  Right calf is nontender without sign of DVT      Radiology: None performed      Assessment/Plan:  Right knee lateral meniscal tear with lateral and patellofemoral arthritic change    I worry now that she may have a displaced meniscal tear or cartilage fragment giving her mechanical symptoms to the right knee.  She'll continue with range of motion exercises and will limit activities as far as any kneeling squatting or twisting.  Pain has improved some since her episode several days ago but she has persistent mechanical symptoms.    We need to get a new MRI of her right knee to evaluate for displaced cartilage fragment or meniscal tear.    She understands to call to schedule follow-up appointment after MRI has been scheduled

## 2017-10-30 ENCOUNTER — OFFICE VISIT (OUTPATIENT)
Dept: INTERNAL MEDICINE | Facility: CLINIC | Age: 57
End: 2017-10-30

## 2017-10-30 VITALS
WEIGHT: 271.4 LBS | OXYGEN SATURATION: 98 % | BODY MASS INDEX: 46.33 KG/M2 | DIASTOLIC BLOOD PRESSURE: 80 MMHG | HEIGHT: 64 IN | SYSTOLIC BLOOD PRESSURE: 120 MMHG | HEART RATE: 72 BPM

## 2017-10-30 DIAGNOSIS — Z86.718 HISTORY OF DVT (DEEP VEIN THROMBOSIS): ICD-10-CM

## 2017-10-30 DIAGNOSIS — M79.605 LEFT LEG PAIN: Primary | ICD-10-CM

## 2017-10-30 PROCEDURE — 99213 OFFICE O/P EST LOW 20 MIN: CPT | Performed by: INTERNAL MEDICINE

## 2017-10-30 NOTE — PROGRESS NOTES
Subjective     Zulema Sousa is a 57 y.o. female, who presents with a chief complaint of   Chief Complaint   Patient presents with   • Knee Pain     Place behind L knee, hx of PE       HPI   The pt is here because she has a knot on the posteriorlateral left knee.  She says the spot is tender and pea sized.  She has had a dvt in the past and is on xarelto.  No new LE swelling.  She does have bites on the lateral side.  On her right knee she has a baker's cyst and knee pain.  She has a MRI on the knee tomorrow to look for a displaced meniscal tear.  No soa or chest pain.      The following portions of the patient's history were reviewed and updated as appropriate: allergies, current medications, past family history, past medical history, past social history, past surgical history and problem list.    Allergies: Lortab [hydrocodone-acetaminophen] and Nsaids    Review of Systems   Constitutional: Negative.    HENT: Negative.    Eyes: Negative.    Respiratory: Negative.    Cardiovascular: Negative.    Gastrointestinal: Negative.    Endocrine: Negative.    Genitourinary: Negative.    Musculoskeletal: Negative.         Right knee pain  Spot behind left knee.     Skin: Negative.    Allergic/Immunologic: Negative.    Neurological: Negative.    Hematological: Negative.    Psychiatric/Behavioral: Negative.    All other systems reviewed and are negative.      Objective     Wt Readings from Last 3 Encounters:   10/30/17 271 lb 6.4 oz (123 kg)   10/19/17 275 lb (125 kg)   10/06/17 281 lb (127 kg)     Temp Readings from Last 3 Encounters:   10/19/17 98 °F (36.7 °C)   10/06/17 98 °F (36.7 °C) (Temporal Artery )   07/13/17 98.4 °F (36.9 °C)     BP Readings from Last 3 Encounters:   10/30/17 120/80   04/17/17 138/88   03/30/17 151/91     Pulse Readings from Last 3 Encounters:   10/30/17 72   04/17/17 70   03/30/17 80     Body mass index is 46.59 kg/(m^2).  SpO2 Readings from Last 3 Encounters:   10/30/17 98%   04/17/17 97%    03/28/17 97%       Physical Exam   Constitutional: She is oriented to person, place, and time. She appears well-developed and well-nourished. No distress.   HENT:   Head: Normocephalic and atraumatic.   Right Ear: External ear normal.   Left Ear: External ear normal.   Nose: Nose normal.   Mouth/Throat: Oropharynx is clear and moist.   Eyes: Conjunctivae and EOM are normal. Pupils are equal, round, and reactive to light.   Neck: Normal range of motion. Neck supple.   Cardiovascular: Normal rate, regular rhythm, normal heart sounds and intact distal pulses.    Pulmonary/Chest: Effort normal and breath sounds normal. No respiratory distress. She has no wheezes.   Musculoskeletal: Normal range of motion.   Normal gait   Neurological: She is alert and oriented to person, place, and time.   Skin: Skin is warm and dry.   Psychiatric: She has a normal mood and affect. Her behavior is normal. Judgment and thought content normal.   Nursing note and vitals reviewed.      Results for orders placed or performed during the hospital encounter of 12/28/16   Respiratory Panel, PCR   Result Value Ref Range    ADENOVIRUS, PCR Not Detected Not Detected    Coronavirus 229E Not Detected Not Detected    Coronavirus HKU1 Not Detected Not Detected    Coronavirus NL63 Not Detected Not Detected    Coronavirus OC43 Not Detected Not Detected    Human Metapneumovirus Not Detected Not Detected    Human Rhinovirus/Enterovirus Not Detected Not Detected    Influenza B PCR Not Detected Not Detected    Parainfluenza Virus 1 Not Detected Not Detected    Parainfluenza Virus 2 Not Detected Not Detected    Parainfluenza Virus 3 Not Detected Not Detected    Parainfluenza Virus 4 Not Detected Not Detected    Bordetella pertussis pcr Not Detected Not Detected    Influenza 2009 H1N1 by PCR Not Detected Not Detected    Chlamydophila pneumoniae PCR Not Detected Not Detected    Mycoplasma pneumo by PCR Not Detected Not Detected    Influenza A PCR Not Detected  Not Detected    Influenza A H3 Not Detected Not Detected    Influenza A H1 Not Detected Not Detected    RSV, PCR Not Detected Not Detected   Comprehensive Metabolic Panel   Result Value Ref Range    Glucose 98 65 - 99 mg/dL    BUN 15 6 - 20 mg/dL    Creatinine 0.80 0.57 - 1.00 mg/dL    Sodium 142 136 - 145 mmol/L    Potassium 4.0 3.5 - 5.2 mmol/L    Chloride 100 98 - 107 mmol/L    CO2 28.9 22.0 - 29.0 mmol/L    Calcium 9.9 8.6 - 10.5 mg/dL    Total Protein 7.9 6.0 - 8.5 g/dL    Albumin 4.00 3.50 - 5.20 g/dL    ALT (SGPT) 13 1 - 33 U/L    AST (SGOT) 18 1 - 32 U/L    Alkaline Phosphatase 99 39 - 117 U/L    Total Bilirubin <0.2 0.1 - 1.2 mg/dL    eGFR Non African Amer 74 >60 mL/min/1.73    Globulin 3.9 gm/dL    A/G Ratio 1.0 g/dL    BUN/Creatinine Ratio 18.8 7.0 - 25.0    Anion Gap 13.1 mmol/L   BNP   Result Value Ref Range    proBNP 427.7 0.0 - 900.0 pg/mL   Troponin   Result Value Ref Range    Troponin T <0.010 0.000 - 0.030 ng/mL   CBC Auto Differential   Result Value Ref Range    WBC 9.00 4.50 - 10.70 10*3/mm3    RBC 4.49 3.90 - 5.20 10*6/mm3    Hemoglobin 12.8 11.9 - 15.5 g/dL    Hematocrit 41.2 35.6 - 45.5 %    MCV 91.8 80.5 - 98.2 fL    MCH 28.5 26.9 - 32.0 pg    MCHC 31.1 (L) 32.4 - 36.3 g/dL    RDW 15.0 (H) 11.7 - 13.0 %    RDW-SD 50.5 37.0 - 54.0 fl    MPV 9.6 6.0 - 12.0 fL    Platelets 326 140 - 500 10*3/mm3    Neutrophil % 47.0 42.7 - 76.0 %    Lymphocyte % 43.4 19.6 - 45.3 %    Monocyte % 6.6 5.0 - 12.0 %    Eosinophil % 2.6 0.3 - 6.2 %    Basophil % 0.2 0.0 - 1.5 %    Immature Grans % 0.2 0.0 - 0.5 %    Neutrophils, Absolute 4.23 1.90 - 8.10 10*3/mm3    Lymphocytes, Absolute 3.91 0.90 - 4.80 10*3/mm3    Monocytes, Absolute 0.59 0.20 - 1.20 10*3/mm3    Eosinophils, Absolute 0.23 0.00 - 0.70 10*3/mm3    Basophils, Absolute 0.02 0.00 - 0.20 10*3/mm3    Immature Grans, Absolute 0.02 0.00 - 0.03 10*3/mm3   Procalcitonin   Result Value Ref Range    Procalcitonin 0.03 (L) 0.10 - 0.25 ng/mL   Basic Metabolic  Panel   Result Value Ref Range    Glucose 92 65 - 99 mg/dL    BUN 11 6 - 20 mg/dL    Creatinine 0.63 0.57 - 1.00 mg/dL    Sodium 140 136 - 145 mmol/L    Potassium 3.9 3.5 - 5.2 mmol/L    Chloride 102 98 - 107 mmol/L    CO2 25.8 22.0 - 29.0 mmol/L    Calcium 9.2 8.6 - 10.5 mg/dL    eGFR Non African Amer 97 >60 mL/min/1.73    BUN/Creatinine Ratio 17.5 7.0 - 25.0    Anion Gap 12.2 mmol/L   CBC Auto Differential   Result Value Ref Range    WBC 5.60 4.50 - 10.70 10*3/mm3    RBC 4.17 3.90 - 5.20 10*6/mm3    Hemoglobin 11.7 (L) 11.9 - 15.5 g/dL    Hematocrit 38.0 35.6 - 45.5 %    MCV 91.1 80.5 - 98.2 fL    MCH 28.1 26.9 - 32.0 pg    MCHC 30.8 (L) 32.4 - 36.3 g/dL    RDW 15.1 (H) 11.7 - 13.0 %    RDW-SD 49.8 37.0 - 54.0 fl    MPV 9.6 6.0 - 12.0 fL    Platelets 287 140 - 500 10*3/mm3    Neutrophil % 48.1 42.7 - 76.0 %    Lymphocyte % 41.3 19.6 - 45.3 %    Monocyte % 6.3 5.0 - 12.0 %    Eosinophil % 3.9 0.3 - 6.2 %    Basophil % 0.4 0.0 - 1.5 %    Immature Grans % 0.0 0.0 - 0.5 %    Neutrophils, Absolute 2.70 1.90 - 8.10 10*3/mm3    Lymphocytes, Absolute 2.31 0.90 - 4.80 10*3/mm3    Monocytes, Absolute 0.35 0.20 - 1.20 10*3/mm3    Eosinophils, Absolute 0.22 0.00 - 0.70 10*3/mm3    Basophils, Absolute 0.02 0.00 - 0.20 10*3/mm3    Immature Grans, Absolute 0.00 0.00 - 0.03 10*3/mm3   Adult Transthoracic Echo Complete   Result Value Ref Range    BSA 2.3 m^2    IVSd 0.8 cm    LVIDd 4.9 cm    LVIDs 2.6 cm    LVPWd 0.6 cm    IVS/LVPW 1.3     FS 46.9 %    EDV(Teich) 112.8 ml    ESV(Teich) 24.6 ml    EF(Teich) 78.2 %    EDV(cubed) 117.6 ml    ESV(cubed) 17.6 ml    EF(cubed) 85.1 %    LV mass(C)d 110.8 grams    LV mass(C)dI 49.0 grams/m^2    SV(Teich) 88.2 ml    SI(Teich) 39.0 ml/m^2    SV(cubed) 100.1 ml    SI(cubed) 44.3 ml/m^2    Ao root diam 3.2 cm    Ao root area 8.0 cm^2    ACS 1.8 cm    LA dimension 3.3 cm    LA/Ao 1.0     LVOT diam 2.0 cm    LVOT area 3.1 cm^2    LVOT area(traced) 3.1 cm^2    RVOT diam 2.4 cm    RVOT area 4.5  cm^2    LVLd ap4 9.0 cm    EDV(MOD-sp4) 110.0 ml    LVLs ap4 7.5 cm    ESV(MOD-sp4) 43.0 ml    EF(MOD-sp4) 60.9 %    LVLd ap2 9.1 cm    EDV(MOD-sp2) 110.0 ml    LVLs ap2 7.2 cm    ESV(MOD-sp2) 39.0 ml    EF(MOD-sp2) 64.5 %    SV(MOD-sp4) 67.0 ml    SI(MOD-sp4) 29.6 ml/m^2    SV(MOD-sp2) 71.0 ml    SI(MOD-sp2) 31.4 ml/m^2    Ao root area (BSA corrected) 1.4     Ao root area (BSA corrected) 64.5 ml/m^2    CONTRAST EF 4CH 60.9 ml/m^2    LV Diastolic corrected for BSA 48.7 ml/m^2    LV Systolic corrected for BSA 19.0 ml/m^2    MV A dur 0.13 sec    MV E max germán 96.7 cm/sec    MV A max germán 95.8 cm/sec    MV E/A 1.0     MV V2 mean 66.6 cm/sec    MV mean PG 2.0 mmHg    MV V2 VTI 27.9 cm    MVA(VTI) 2.9 cm^2    MV P1/2t max germán 96.3 cm/sec    MV P1/2t 60.5 msec    MVA(P1/2t) 3.6 cm^2    MV dec slope 466.0 cm/sec^2    MV dec time 0.18 sec    Ao V2 mean 97.7 cm/sec    Ao mean PG 5.0 mmHg    Ao mean PG (full) 3.0 mmHg    Ao V2 VTI 31.6 cm    YESI(I,A) 2.6 cm^2    YESI(I,D) 2.6 cm^2    LV V1 mean PG 2.0 mmHg    LV V1 mean 71.1 cm/sec    LV V1 VTI 25.8 cm    SV(Ao) 254.1 ml    SI(Ao) 112.4 ml/m^2    SV(LVOT) 81.1 ml    SV(RVOT) 57.9 ml    SI(LVOT) 35.9 ml/m^2    PA V2 max 93.0 cm/sec    PA max PG 3.5 mmHg    PA max PG (full) 2.2 mmHg    BH CV ECHO DAVID - PVA(V,A) 2.8 cm^2     CV ECHO DAVID - PVA(V,D) 2.8 cm^2    PA acc slope 888.0 cm/sec^2    PA acc time 0.1 sec    RV V1 max PG 1.3 mmHg    RV V1 mean PG 1.0 mmHg    RV V1 max 57.1 cm/sec    RV V1 mean 38.4 cm/sec    RV V1 VTI 12.8 cm    TR max germán 284.0 cm/sec    RVSP(TR) 40.3 mmHg    RAP systole 8.0 mmHg    PA pr(Accel) 34.5 mmHg    Pulm Sys Germán 87.9 cm/sec    Pulm Light Germán 84.0 cm/sec    Pulm S/D 1.0     Qp/Qs 0.71     Pulm A Revs Dur 0.14 sec    Pulm A Revs Germán 30.7 cm/sec    MVA P1/2T LCG 2.3 cm^2     CV ECHO DAVID - BZI_BMI 48.2 kilograms/m^2     CV ECHO DAVID - BSA([place]) 2.5 m^2     CV ECHO DAVID - BZI_METRIC_WEIGHT 127.5 kg     CV ECHO DAVID - BZI_METRIC_HEIGHT 162.6  cm    TDI S' 16.00 cm/sec    LA volume 52.0 cm3    E/E' ratio 8.0     LA Volume Index 23.0 mL/m2    Ao max PG (full) 10.0 mmHg    Ao pk germán 156.0 cm/sec    Lat Peak E' Germán 16.0 cm/sec    LV V1 max 111.0 cm/sec    Med Peak E' Germán 11.00 cm/sec    TAPSE (>1.6) 2.40 cm2   Light Blue Top   Result Value Ref Range    Extra Tube hold for add-on    Gold Top - SST   Result Value Ref Range    Extra Tube Hold for add-ons.    Duplex Venous Lower Extremity - Bilateral CAR   Result Value Ref Range    Right Saphenofemoral Junction Spont 1     Left Saphenofemoral Junction Spont 1     Right Common Femoral Spont Y     Right Common Femoral Phasic Y     Right Common Femoral Augment Y     Right Common Femoral Competent Y     Right Common Femoral Compress C     Right Proximal Femoral Compress C     Right Mid Femoral Spont Y     Right Mid Femoral Phasic Y     Right Mid Femoral Augment Y     Right Mid Femoral Competent Y     Right Mid Femoral Compress C     Right Distal Femoral Compress C     Right Popliteal Spont Y     Right Popliteal Phasic Y     Right Popliteal Augment Y     Right Popliteal Competent Y     Right Popliteal Compress C     Right Posterior Tibial Compress C     Right Peroneal Compress C     Right GastronemiusSoleal Compress C     Right Greater Saph AK Compress C     Right Greater Saph BK Compress C     Right Lesser Saph Compress C     Left Common Femoral Spont Y     Left Common Femoral Phasic Y     Left Common Femoral Augment Y     Left Common Femoral Competent Y     Left Common Femoral Compress C     Left Proximal Femoral Compress C     Left Mid Femoral Spont Y     Left Mid Femoral Phasic Y     Left Mid Femoral Augment Y     Left Mid Femoral Competent Y     Left Mid Femoral Compress C     Left Distal Femoral Compress C     Left Popliteal Spont Y     Left Popliteal Phasic Y     Left Popliteal Augment Y     Left Popliteal Competent Y     Left Popliteal Compress C     Left Posterior Tibial Compress C     Left Peroneal Compress  C     Left GastronemiusSoleal Compress C     Left Greater Saph AK Compress C     Left Greater Saph BK Compress C     Left Lesser Saph Compress C     Left Saphenofemoral Junction Competent N     Left Saphenofemoral Junction Augment Y     Left Saphenofemoral Junction Phasic Y     Left Saphenofemoral Junction Spont Y     Left Saphenofemoral Junction Compress C     Right Saphenofemoral Junction Compress C     Right Saphenofemoral Junction Competent N     Right Saphenofemoral Junction Augment Y     Right Saphenofemoral Junction Phasic Y     Right Saphenofemoral Junction Spont Y        Assessment/Plan   Zulema was seen today for knee pain.    Diagnoses and all orders for this visit:    Left leg pain  -     Duplex Venous Lower Extremity - Bilateral CAR; Future    History of DVT (deep vein thrombosis)  -     Duplex Venous Lower Extremity - Bilateral CAR; Future      Cont xarelto at this time.      Outpatient Medications Prior to Visit   Medication Sig Dispense Refill   • acetaminophen (TYLENOL) 325 MG tablet Take  by mouth Every 6 (Six) Hours As Needed for Mild Pain (1-3).     • furosemide (LASIX) 20 MG tablet Take 1 tablet by mouth Daily As Needed (Take one tablet as needed every day.). 30 tablet 0   • omeprazole (priLOSEC) 40 MG capsule TAKE ONE CAPSULE BY MOUTH DAILY 90 capsule 1   • traMADol (ULTRAM) 50 MG tablet Take 50 mg by mouth Every 6 (Six) Hours As Needed for Moderate Pain (4-6).     • XARELTO 20 MG tablet TAKE ONE TABLET BY MOUTH DAILY --START AFTER 3 WEEKS 30 tablet 10     No facility-administered medications prior to visit.      No orders of the defined types were placed in this encounter.      There are no discontinued medications.      Return if symptoms worsen or fail to improve.

## 2017-10-31 ENCOUNTER — APPOINTMENT (OUTPATIENT)
Dept: CARDIOLOGY | Facility: HOSPITAL | Age: 57
End: 2017-10-31
Attending: INTERNAL MEDICINE

## 2017-10-31 ENCOUNTER — HOSPITAL ENCOUNTER (OUTPATIENT)
Dept: MRI IMAGING | Facility: HOSPITAL | Age: 57
Discharge: HOME | End: 2017-10-31
Attending: ORTHOPAEDIC SURGERY | Admitting: ORTHOPAEDIC SURGERY

## 2017-10-31 DIAGNOSIS — S83.281D TEAR OF LATERAL MENISCUS OF RIGHT KNEE, CURRENT, UNSPECIFIED TEAR TYPE, SUBSEQUENT ENCOUNTER: ICD-10-CM

## 2017-10-31 PROCEDURE — 73721 MRI JNT OF LWR EXTRE W/O DYE: CPT

## 2017-11-01 ENCOUNTER — HOSPITAL ENCOUNTER (OUTPATIENT)
Dept: CARDIOLOGY | Facility: HOSPITAL | Age: 57
Discharge: HOME | End: 2017-11-01
Attending: INTERNAL MEDICINE | Admitting: INTERNAL MEDICINE

## 2017-11-01 ENCOUNTER — TELEPHONE (OUTPATIENT)
Dept: INTERVENTIONAL RADIOLOGY/VASCULAR | Facility: HOSPITAL | Age: 57
End: 2017-11-01

## 2017-11-01 DIAGNOSIS — M79.605 LEFT LEG PAIN: ICD-10-CM

## 2017-11-01 DIAGNOSIS — Z86.718 HISTORY OF DVT (DEEP VEIN THROMBOSIS): ICD-10-CM

## 2017-11-01 LAB
BH CV LOWER VASCULAR LEFT COMMON FEMORAL AUGMENT: NORMAL
BH CV LOWER VASCULAR LEFT COMMON FEMORAL COMPETENT: NORMAL
BH CV LOWER VASCULAR LEFT COMMON FEMORAL COMPRESS: NORMAL
BH CV LOWER VASCULAR LEFT COMMON FEMORAL PHASIC: NORMAL
BH CV LOWER VASCULAR LEFT COMMON FEMORAL SPONT: NORMAL
BH CV LOWER VASCULAR LEFT DISTAL FEMORAL COMPRESS: NORMAL
BH CV LOWER VASCULAR LEFT GASTRONEMIUS COMPRESS: NORMAL
BH CV LOWER VASCULAR LEFT GREATER SAPH AK COMPRESS: NORMAL
BH CV LOWER VASCULAR LEFT GREATER SAPH BK COMPRESS: NORMAL
BH CV LOWER VASCULAR LEFT LESSER SAPH COMPRESS: NORMAL
BH CV LOWER VASCULAR LEFT MID FEMORAL AUGMENT: NORMAL
BH CV LOWER VASCULAR LEFT MID FEMORAL COMPETENT: NORMAL
BH CV LOWER VASCULAR LEFT MID FEMORAL COMPRESS: NORMAL
BH CV LOWER VASCULAR LEFT MID FEMORAL PHASIC: NORMAL
BH CV LOWER VASCULAR LEFT MID FEMORAL SPONT: NORMAL
BH CV LOWER VASCULAR LEFT PERONEAL COMPRESS: NORMAL
BH CV LOWER VASCULAR LEFT POPLITEAL AUGMENT: NORMAL
BH CV LOWER VASCULAR LEFT POPLITEAL COMPETENT: NORMAL
BH CV LOWER VASCULAR LEFT POPLITEAL COMPRESS: NORMAL
BH CV LOWER VASCULAR LEFT POPLITEAL PHASIC: NORMAL
BH CV LOWER VASCULAR LEFT POPLITEAL SPONT: NORMAL
BH CV LOWER VASCULAR LEFT POSTERIOR TIBIAL COMPRESS: NORMAL
BH CV LOWER VASCULAR LEFT PROXIMAL FEMORAL COMPRESS: NORMAL
BH CV LOWER VASCULAR LEFT SAPHENOFEMORAL JUNCTION AUGMENT: NORMAL
BH CV LOWER VASCULAR LEFT SAPHENOFEMORAL JUNCTION COMPETENT: NORMAL
BH CV LOWER VASCULAR LEFT SAPHENOFEMORAL JUNCTION COMPRESS: NORMAL
BH CV LOWER VASCULAR LEFT SAPHENOFEMORAL JUNCTION PHASIC: NORMAL
BH CV LOWER VASCULAR LEFT SAPHENOFEMORAL JUNCTION SPONT: NORMAL
BH CV LOWER VASCULAR RIGHT COMMON FEMORAL AUGMENT: NORMAL
BH CV LOWER VASCULAR RIGHT COMMON FEMORAL COMPETENT: NORMAL
BH CV LOWER VASCULAR RIGHT COMMON FEMORAL COMPRESS: NORMAL
BH CV LOWER VASCULAR RIGHT COMMON FEMORAL PHASIC: NORMAL
BH CV LOWER VASCULAR RIGHT COMMON FEMORAL SPONT: NORMAL
BH CV LOWER VASCULAR RIGHT DISTAL FEMORAL COMPRESS: NORMAL
BH CV LOWER VASCULAR RIGHT GASTRONEMIUS COMPRESS: NORMAL
BH CV LOWER VASCULAR RIGHT GREATER SAPH AK COMPRESS: NORMAL
BH CV LOWER VASCULAR RIGHT GREATER SAPH BK COMPRESS: NORMAL
BH CV LOWER VASCULAR RIGHT LESSER SAPH COMPRESS: NORMAL
BH CV LOWER VASCULAR RIGHT MID FEMORAL AUGMENT: NORMAL
BH CV LOWER VASCULAR RIGHT MID FEMORAL COMPETENT: NORMAL
BH CV LOWER VASCULAR RIGHT MID FEMORAL COMPRESS: NORMAL
BH CV LOWER VASCULAR RIGHT MID FEMORAL PHASIC: NORMAL
BH CV LOWER VASCULAR RIGHT MID FEMORAL SPONT: NORMAL
BH CV LOWER VASCULAR RIGHT PERONEAL COMPRESS: NORMAL
BH CV LOWER VASCULAR RIGHT POPLITEAL AUGMENT: NORMAL
BH CV LOWER VASCULAR RIGHT POPLITEAL COMPETENT: NORMAL
BH CV LOWER VASCULAR RIGHT POPLITEAL COMPRESS: NORMAL
BH CV LOWER VASCULAR RIGHT POPLITEAL PHASIC: NORMAL
BH CV LOWER VASCULAR RIGHT POPLITEAL SPONT: NORMAL
BH CV LOWER VASCULAR RIGHT POSTERIOR TIBIAL COMPRESS: NORMAL
BH CV LOWER VASCULAR RIGHT PROXIMAL FEMORAL COMPRESS: NORMAL
BH CV LOWER VASCULAR RIGHT SAPHENOFEMORAL JUNCTION AUGMENT: NORMAL
BH CV LOWER VASCULAR RIGHT SAPHENOFEMORAL JUNCTION COMPETENT: NORMAL
BH CV LOWER VASCULAR RIGHT SAPHENOFEMORAL JUNCTION COMPRESS: NORMAL
BH CV LOWER VASCULAR RIGHT SAPHENOFEMORAL JUNCTION PHASIC: NORMAL
BH CV LOWER VASCULAR RIGHT SAPHENOFEMORAL JUNCTION SPONT: NORMAL
BH CV VAS POP FLUID COLLECTED: 1

## 2017-11-01 PROCEDURE — 93970 EXTREMITY STUDY: CPT

## 2017-11-01 NOTE — NURSING NOTE
L thigh with small blister noted in the center of the area (marked). Encouraged to continue with ice bag. Dr. Lagos notified. Will assess pt.
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
## 2017-11-01 NOTE — NURSING NOTE
Pt assessed per Dr. Lagos and prescription given (Silvadene cream 1% to be applied bid) with instructions. Site unchanged from previous assessment. Pt instructed to f/u with her PCP if no improvement. Pt verbalizes understanding.

## 2017-11-01 NOTE — NURSING NOTE
Ice bag refilled with instructions to use for comfort prior to applying Silvadene cream; pt verbalizes understanding & pt discharged home.

## 2017-11-01 NOTE — NURSING NOTE
"Pt rates pain level at \"still about a 4 or a 5.\" Site unchanged from previous assessment. Will continue with ice and reassess.  "

## 2017-11-03 ENCOUNTER — TELEPHONE (OUTPATIENT)
Dept: INTERNAL MEDICINE | Facility: CLINIC | Age: 57
End: 2017-11-03

## 2017-11-03 NOTE — TELEPHONE ENCOUNTER
Patient notified.   ----- Message from Marsha Young MD sent at 11/3/2017 11:35 AM EDT -----  Call pt about ultrasound - no clots

## 2017-11-06 ENCOUNTER — TELEPHONE (OUTPATIENT)
Dept: INTERNAL MEDICINE | Facility: CLINIC | Age: 57
End: 2017-11-06

## 2017-11-06 ENCOUNTER — OFFICE VISIT (OUTPATIENT)
Dept: INTERNAL MEDICINE | Facility: CLINIC | Age: 57
End: 2017-11-06

## 2017-11-06 ENCOUNTER — TELEPHONE (OUTPATIENT)
Dept: INTERVENTIONAL RADIOLOGY/VASCULAR | Facility: HOSPITAL | Age: 57
End: 2017-11-06

## 2017-11-06 VITALS
BODY MASS INDEX: 46.47 KG/M2 | WEIGHT: 272.2 LBS | DIASTOLIC BLOOD PRESSURE: 88 MMHG | SYSTOLIC BLOOD PRESSURE: 122 MMHG | OXYGEN SATURATION: 98 % | HEIGHT: 64 IN | HEART RATE: 86 BPM

## 2017-11-06 DIAGNOSIS — G89.29 CHRONIC PAIN OF RIGHT KNEE: ICD-10-CM

## 2017-11-06 DIAGNOSIS — T24.202A: Primary | ICD-10-CM

## 2017-11-06 DIAGNOSIS — M25.561 CHRONIC PAIN OF RIGHT KNEE: ICD-10-CM

## 2017-11-06 PROCEDURE — 99213 OFFICE O/P EST LOW 20 MIN: CPT | Performed by: INTERNAL MEDICINE
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
## 2017-11-07 ENCOUNTER — TELEPHONE (OUTPATIENT)
Dept: INTERVENTIONAL RADIOLOGY/VASCULAR | Facility: HOSPITAL | Age: 57
End: 2017-11-07

## 2017-11-07 NOTE — TELEPHONE ENCOUNTER
Spoke with patient.  She saw Dr. Petrona Young yesterday.  Leg is healing well. No signs of infection per patient.  Dr. Young gave her Lidocaine gel for the discomfort and told her it would take several weeks for it to completely heal.  Advised patient to give us a call if she has further issues or questions.  Voiced understanding.

## 2017-11-29 ENCOUNTER — HOSPITAL ENCOUNTER (EMERGENCY)
Facility: HOSPITAL | Age: 57
Discharge: HOME | End: 2017-11-29
Attending: EMERGENCY MEDICINE | Admitting: EMERGENCY MEDICINE

## 2017-11-29 VITALS
HEART RATE: 69 BPM | DIASTOLIC BLOOD PRESSURE: 74 MMHG | SYSTOLIC BLOOD PRESSURE: 135 MMHG | TEMPERATURE: 97.7 F | RESPIRATION RATE: 16 BRPM | HEIGHT: 64 IN | OXYGEN SATURATION: 97 % | WEIGHT: 270 LBS | BODY MASS INDEX: 46.1 KG/M2

## 2017-11-29 DIAGNOSIS — M25.461 KNEE EFFUSION, RIGHT: ICD-10-CM

## 2017-11-29 DIAGNOSIS — M17.11 OSTEOARTHRITIS OF RIGHT KNEE, UNSPECIFIED OSTEOARTHRITIS TYPE: Primary | ICD-10-CM

## 2017-11-29 PROCEDURE — 99283 EMERGENCY DEPT VISIT LOW MDM: CPT

## 2017-11-29 RX ORDER — OXYCODONE HYDROCHLORIDE AND ACETAMINOPHEN 5; 325 MG/1; MG/1
1 TABLET ORAL EVERY 6 HOURS PRN
Qty: 15 TABLET | Refills: 0 | Status: SHIPPED | OUTPATIENT
Start: 2017-11-29 | End: 2018-01-05

## 2017-11-29 RX ORDER — ONDANSETRON 4 MG/1
4 TABLET, ORALLY DISINTEGRATING ORAL EVERY 8 HOURS PRN
Qty: 15 TABLET | Refills: 0 | Status: SHIPPED | OUTPATIENT
Start: 2017-11-29 | End: 2018-01-05

## 2017-12-01 ENCOUNTER — OFFICE VISIT (OUTPATIENT)
Dept: ORTHOPEDIC SURGERY | Facility: CLINIC | Age: 57
End: 2017-12-01

## 2017-12-01 VITALS — BODY MASS INDEX: 46.1 KG/M2 | WEIGHT: 270 LBS | TEMPERATURE: 98 F | HEIGHT: 64 IN

## 2017-12-01 DIAGNOSIS — M17.0 ARTHRITIS OF BOTH KNEES: Primary | ICD-10-CM

## 2017-12-01 DIAGNOSIS — S83.281D TEAR OF LATERAL MENISCUS OF RIGHT KNEE, CURRENT, UNSPECIFIED TEAR TYPE, SUBSEQUENT ENCOUNTER: ICD-10-CM

## 2017-12-01 PROBLEM — S83.281A TEAR OF LATERAL MENISCUS OF RIGHT KNEE, CURRENT: Status: ACTIVE | Noted: 2017-12-01

## 2017-12-01 PROCEDURE — 99213 OFFICE O/P EST LOW 20 MIN: CPT | Performed by: ORTHOPAEDIC SURGERY

## 2017-12-01 PROCEDURE — 20610 DRAIN/INJ JOINT/BURSA W/O US: CPT | Performed by: ORTHOPAEDIC SURGERY

## 2017-12-01 RX ORDER — METHYLPREDNISOLONE ACETATE 80 MG/ML
80 INJECTION, SUSPENSION INTRA-ARTICULAR; INTRALESIONAL; INTRAMUSCULAR; SOFT TISSUE
Status: COMPLETED | OUTPATIENT
Start: 2017-12-01 | End: 2017-12-01

## 2017-12-01 RX ORDER — BUPIVACAINE HYDROCHLORIDE 5 MG/ML
4 INJECTION, SOLUTION EPIDURAL; INTRACAUDAL
Status: COMPLETED | OUTPATIENT
Start: 2017-12-01 | End: 2017-12-01

## 2017-12-01 RX ORDER — TRAMADOL HYDROCHLORIDE 50 MG/1
50 TABLET ORAL EVERY 8 HOURS PRN
Qty: 30 TABLET | Refills: 0 | OUTPATIENT
Start: 2017-12-01 | End: 2017-12-05 | Stop reason: SDUPTHER

## 2017-12-01 RX ADMIN — METHYLPREDNISOLONE ACETATE 80 MG: 80 INJECTION, SUSPENSION INTRA-ARTICULAR; INTRALESIONAL; INTRAMUSCULAR; SOFT TISSUE at 16:05

## 2017-12-01 RX ADMIN — BUPIVACAINE HYDROCHLORIDE 4 ML: 5 INJECTION, SOLUTION EPIDURAL; INTRACAUDAL at 16:05

## 2017-12-01 NOTE — PROGRESS NOTES
"Knee Exam      Patient: Zulema Sousa    YOB: 1960 57 y.o. female    Chief Complaints: knee hurts    History of Present Illness: Patient was last seen on 10/19/17 and prior to that had an MRI in August that showed a lateral meniscal tear with some fluid undercutting cartilage lateral compartment with lateral tibial plateau edema and patellofemoral changes.    She been seen on 10/6/17 and had an injection and had some improvement until episode around 10/16/17 when she felt a large pop in her knee after getting out of bed with worsening pain and mechanical symptoms.    She was sent for an MRI.    In the interim she has been seeing a chiropractor with some improvement in pain but still describes severe constant aching pain globally throughout the right knee.    She is on tramadol for her primary care physician she's also been using a walker and a neoprene wraparound knee brace.    She can't take anti-inflammatories because she is on Xarelto  HPI    ROS: knee pain  Past Medical History:   Diagnosis Date   • Acid reflux    • Back pain    • Cardiac tamponade     March 2015   • Difficulty breathing     during exertion   • Edema    • Esophagitis, reflux    • Essential hypertriglyceridemia    • GERD (gastroesophageal reflux disease)    • Health care maintenance    • Heartburn    • Hyperlipidemia    • Hypertriglyceridemia    • Increased appetite    • Morbid obesity    • Multiple joint pain    • Osteoarthritis of knee    • Pericarditis    • Pulmonary embolism     Janurary 2015   • RHA (rheumatoid arthritis)    • Sciatica    • Unintended weight gain        Physical Exam:   Vitals:    12/01/17 1524   Temp: 98 °F (36.7 °C)   Weight: 270 lb (122 kg)   Height: 64\" (162.6 cm)     Well developed with normal mood.Slightly antalgic gait.  Right knee shows no warmth or erythema.  0-110° range of motion with moderate discomfort over the lateral more so the medial aspect of the right knee and discomfort with " patellofemoral compression.      Radiology: MRI films and report of the right knee dated 11/1/17 were reviewed show advanced arthritic change of the lateral patellofemoral compartments with a large complex lateral meniscal tear but no displaced meniscal fragment.  Medial meniscus posterior horn is smaller in volume than on previous exam there is an intra-articular body.  No stress fracture is noted      Assessment/Plan:  Right knee advanced degenerative change with associated meniscal tears.    I discussed treatment options with her today and I do not feel that arthroscopy would be in any benefit to her.    We discussed other treatment options and after verbal consent and sterile preparation discussion of risks which can include infection right knee was injected with steroid.    She'll continue with her brace and walker as well as chiropractic treatment and we'll get her into [facility] for physical therapy including aquatic.    Ultimately for definitive treatment we discussed that she will eventually need to have knee replacement and would like to see someone for evaluation for that.  We'll make those arrangements for her as I really nothing further to offer her and I will see her back as needed    Large Joint Arthrocentesis  Date/Time: 12/1/2017 4:05 PM  Consent given by: patient  Site marked: site marked  Timeout: Immediately prior to procedure a time out was called to verify the correct patient, procedure, equipment, support staff and site/side marked as required   Supporting Documentation  Indications: pain   Procedure Details  Location: knee - R knee  Preparation: Patient was prepped and draped in the usual sterile fashion  Needle size: 22 G  Approach: anteromedial  Medications administered: 80 mg methylPREDNISolone acetate 80 MG/ML; 4 mL bupivacaine (PF) 0.5 %  Patient tolerance: patient tolerated the procedure well with no immediate complications

## 2017-12-05 RX ORDER — TRAMADOL HYDROCHLORIDE 50 MG/1
50 TABLET ORAL EVERY 8 HOURS PRN
Qty: 30 TABLET | Refills: 0 | OUTPATIENT
Start: 2017-12-05 | End: 2018-02-16

## 2017-12-22 ENCOUNTER — OFFICE VISIT (OUTPATIENT)
Dept: SLEEP MEDICINE | Facility: HOSPITAL | Age: 57
End: 2017-12-22
Attending: INTERNAL MEDICINE

## 2017-12-22 VITALS
DIASTOLIC BLOOD PRESSURE: 68 MMHG | BODY MASS INDEX: 46.52 KG/M2 | WEIGHT: 272.5 LBS | SYSTOLIC BLOOD PRESSURE: 135 MMHG | HEART RATE: 90 BPM | OXYGEN SATURATION: 97 % | HEIGHT: 64 IN

## 2017-12-22 DIAGNOSIS — E66.01 OBESITY, MORBID, BMI 40.0-49.9 (HCC): ICD-10-CM

## 2017-12-22 DIAGNOSIS — G47.33 OBSTRUCTIVE SLEEP APNEA: Primary | ICD-10-CM

## 2017-12-22 DIAGNOSIS — J93.82 AIR LEAK: ICD-10-CM

## 2017-12-22 PROCEDURE — G0463 HOSPITAL OUTPT CLINIC VISIT: HCPCS

## 2017-12-22 NOTE — PROGRESS NOTES
[facility]  [address]  3rd [address]  [facility] [address]  [phone]    PCP: Marsha Young MD    Reason for visit:  Sleep disorders: JOSHUA    Zulema Sousa is a 57 y.o.female who was [facility] in the [facility] today. She was seen in March 2017 and had a PSG. She only got her machine in October. She had difficulty with her knee which makes it difficult to be compliant. Sleeps from 12MN to 6AM. She has been only poorly compliant. ESS is 9. She has a FFM and she feels the machine roars a lot when she sleeps. She wakes up with dry mouth. She is currently dealing with knee problems.    No cigs, alc, 2-3 coffee per day.    Current Medications:    Current Outpatient Prescriptions:   •  acetaminophen (TYLENOL) 325 MG tablet, Take  by mouth Every 6 (Six) Hours As Needed for Mild Pain (1-3)., Disp: , Rfl:   •  furosemide (LASIX) 20 MG tablet, Take 1 tablet by mouth Daily As Needed (Take one tablet as needed every day.)., Disp: 30 tablet, Rfl: 0  •  lidocaine (XYLOCAINE) 2 % jelly, Apply  topically As Needed for Mild Pain ., Disp: 85 g, Rfl: 1  •  omeprazole (priLOSEC) 40 MG capsule, TAKE ONE CAPSULE BY MOUTH DAILY, Disp: 90 capsule, Rfl: 1  •  ondansetron ODT (ZOFRAN ODT) 4 MG disintegrating tablet, Take 1 tablet by mouth Every 8 (Eight) Hours As Needed for Nausea (Take with pain medication if it makes you nauseated)., Disp: 15 tablet, Rfl: 0  •  oxyCODONE-acetaminophen (PERCOCET) 5-325 MG per tablet, Take 1 tablet by mouth Every 6 (Six) Hours As Needed for Severe Pain ., Disp: 15 tablet, Rfl: 0  •  traMADol (ULTRAM) 50 MG tablet, Take 1 tablet by mouth Every 8 (Eight) Hours As Needed for Moderate Pain ., Disp: 30 tablet, Rfl: 0  •  XARELTO 20 MG tablet, TAKE ONE TABLET BY MOUTH DAILY --START AFTER 3 WEEKS, Disp: 30 tablet, Rfl: 10   also entered in Sleep Questionnaire    Patient  has a past medical history of Acid reflux; Back pain; Cardiac tamponade; Difficulty breathing;  "Edema; Esophagitis, reflux; Essential hypertriglyceridemia; GERD (gastroesophageal reflux disease); Health care maintenance; Heartburn; Hyperlipidemia; Hypertriglyceridemia; Increased appetite; Morbid obesity; Multiple joint pain; Osteoarthritis of knee; Pericarditis; Pulmonary embolism; RHA (rheumatoid arthritis); Sciatica; and Unintended weight gain.   I have reviewed the Past Medical History, Past Surgical History, Social History and Family History in EPIC and Sleep Questionnaire.    Pertinent Positive Review of Systems of cough, acid reflux  Rest of Review of Systems was negative as recorded in Sleep Questionnaire.        Vital Signs: /68 (BP Location: Left arm, Patient Position: Sitting)  Pulse 90  Ht 162.6 cm (64\")  Wt 124 kg (272 lb 8 oz)  SpO2 97%  BMI 46.77 kg/m2        General: Alert. Cooperative. Well developed. No acute distress.             Head:  Normocephalic. Symmetrical. Atraumatic.              Nose: No septal deviation. No drainage.          Throat: No oral lesions. No thrush. Moist mucous membranes.    Tongue is normal and dentition is intact       Pharynx: Posterior pharyngeal pillars are wide with Mallampati score of Class III     Chest Wall:  Normal shape. Symmetric expansion with respiration. No tenderness.             Neck:  Trachea midline.           Lungs:  Clear to auscultation bilaterally. No wheezes. No rhonchi. No rales. Respirations regular, even and unlabored.            Heart:  Regular rhythm and normal rate. Normal S1 and S2. No murmur.     Abdomen:  Soft, non-tender and non-distended. Normal bowel sounds. No masses.  Extremities:  Moves all extremities well. No edema.    Psychiatric: Normal mood and affect.    Testing:  · Thirty-day compliance is 37%.  Average usage for days use 4 hours 45 minutes.  CPAP is set at 16 cm.  Her AHI is 3.5.  She does not have a auto machine.    I'mOK: [employer]    Impression:  1. Obstructive sleep apnea    2. Obesity, morbid, BMI " 40.0-49.9    3. Air leak        Plan:  The roaring sound patient hears is likely a leak.  We requested a new mask fit for her.  She was fitted with a ResMed AirFit F 20 mask for her medium size.  She also requires a chinstrap.  I would like to have switched her to a auto CPAP setting.  Unfortunately her machine is not.  We will decrease her CPAP pressure set up to 13 cm.  Hopefully this will improve her sleep quality with the machine and result in improvement in daytime alertness.    I reiterated the importance of effective treatment of obstructive sleep apnea with PAP machine.  Cardiovascular health risks of untreated sleep apnea were again reviewed.  Patient was asked to remain cautious if there is persistent hypersomnolence.    Change of PAP supplies regularly is important for effective use.  Change of cushion on the mask or plugs on nasal pillows along with disposable filters once every month and change of mask frame, tubing, headgear and Velcro straps every 6 months at the minimum was reiterated.    Patient will follow up in this clinic in 3 months     Thank you for allowing me to participate in your patient's care.    Mukesh Franco MD  [facility]  [address]  [address]  [place] [id number]  Dept: [phone]  Dept Fax: [phone]  Loc: [phone]    Part of this note may be an electronic transcription/translation of spoken language to printed text using the Dragon Dictation System.

## 2018-01-05 ENCOUNTER — CONSULT (OUTPATIENT)
Dept: ORTHOPEDIC SURGERY | Facility: CLINIC | Age: 58
End: 2018-01-05

## 2018-01-05 VITALS — WEIGHT: 272 LBS | TEMPERATURE: 98.5 F | HEIGHT: 64 IN | BODY MASS INDEX: 46.44 KG/M2

## 2018-01-05 DIAGNOSIS — G89.29 CHRONIC PAIN OF RIGHT KNEE: Primary | ICD-10-CM

## 2018-01-05 DIAGNOSIS — E66.01 OBESITY, CLASS III, BMI 40-49.9 (MORBID OBESITY) (HCC): ICD-10-CM

## 2018-01-05 DIAGNOSIS — M25.561 CHRONIC PAIN OF RIGHT KNEE: Primary | ICD-10-CM

## 2018-01-05 DIAGNOSIS — M17.11 ARTHRITIS OF RIGHT KNEE: ICD-10-CM

## 2018-01-05 PROBLEM — E66.813 OBESITY, CLASS III, BMI 40-49.9 (MORBID OBESITY): Status: ACTIVE | Noted: 2018-01-05

## 2018-01-05 PROCEDURE — 99214 OFFICE O/P EST MOD 30 MIN: CPT | Performed by: ORTHOPAEDIC SURGERY

## 2018-01-05 PROCEDURE — 73562 X-RAY EXAM OF KNEE 3: CPT | Performed by: ORTHOPAEDIC SURGERY

## 2018-01-05 RX ORDER — SENNOSIDES 8.6 MG
650 CAPSULE ORAL EVERY 8 HOURS PRN
COMMUNITY
End: 2018-05-18

## 2018-01-05 NOTE — PROGRESS NOTES
"Patient Name: Zulema Sousa   YOB: 1960  Referring Primary Care Physician: Marsha Young MD      Chief Complaint:    Chief Complaint   Patient presents with   • Right Knee - Establish Care, Pain        Subjective:    HPI:   Zulema Sousa is a pleasant 57 y.o. year old who presents today for evaluation of   Chief Complaint   Patient presents with   • Right Knee - Establish Care, Pain   .Many month of achy atraumatic right knee pain.  Had injection from [facility] a month or so ago and seemed to help.  Cant take nsaids- on xarelto for hx of mult PEs.  Had a lap bandin the past and lost 100 lbs but \"stopped working\"  She works in [employer] at [employer]>  Tried ordering synvisc but was recalled and sent here.  Has done chiro for sciatica and has knee exercises they gave her.  Doesn't want PT because of work hours.  Using therabands    This problem is new to this examiner.     Medications:   Home Medications:  Current Outpatient Prescriptions on File Prior to Visit   Medication Sig   • omeprazole (priLOSEC) 40 MG capsule TAKE ONE CAPSULE BY MOUTH DAILY   • traMADol (ULTRAM) 50 MG tablet Take 1 tablet by mouth Every 8 (Eight) Hours As Needed for Moderate Pain .   • XARELTO 20 MG tablet TAKE ONE TABLET BY MOUTH DAILY --START AFTER 3 WEEKS   • [DISCONTINUED] acetaminophen (TYLENOL) 325 MG tablet Take  by mouth Every 6 (Six) Hours As Needed for Mild Pain (1-3).   • [DISCONTINUED] furosemide (LASIX) 20 MG tablet Take 1 tablet by mouth Daily As Needed (Take one tablet as needed every day.).   • [DISCONTINUED] lidocaine (XYLOCAINE) 2 % jelly Apply  topically As Needed for Mild Pain .   • [DISCONTINUED] ondansetron ODT (ZOFRAN ODT) 4 MG disintegrating tablet Take 1 tablet by mouth Every 8 (Eight) Hours As Needed for Nausea (Take with pain medication if it makes you nauseated).   • [DISCONTINUED] oxyCODONE-acetaminophen (PERCOCET) 5-325 MG per tablet Take 1 tablet by mouth Every 6 (Six) Hours As Needed for Severe " Pain .     No current facility-administered medications on file prior to visit.      Current Medications:  Scheduled Meds:  Continuous Infusions:  No current facility-administered medications for this visit.   PRN Meds:.    I have reviewed the patient's medical history in detail and updated the computerized patient record.  Review and summarization of old records includes:    Past Medical History:   Diagnosis Date   • Acid reflux    • Back pain    • Cardiac tamponade     2015   • Difficulty breathing     during exertion   • Edema    • Esophagitis, reflux    • Essential hypertriglyceridemia    • GERD (gastroesophageal reflux disease)    • Health care maintenance    • Heartburn    • Hyperlipidemia    • Hypertriglyceridemia    • Increased appetite    • Morbid obesity    • Multiple joint pain    • Osteoarthritis of knee    • Pericarditis    • Pulmonary embolism     2015   • RHA (rheumatoid arthritis)    • Sciatica    • Unintended weight gain         Past Surgical History:   Procedure Laterality Date   • ANKLE SURGERY      treatment of ankle fracture   • APPENDECTOMY     • CARDIAC SURGERY      cardiac tamponade   •  SECTION     • CHOLECYSTECTOMY     • DILATATION AND CURETTAGE     • LAPAROSCOPIC GASTRIC BANDING     • PULMONARY EMBOLISM SURGERY     • TUBAL ABDOMINAL LIGATION          Social History     Occupational History   •  [employer]     Social History Main Topics   • Smoking status: Former Smoker     Quit date: 2015   • Smokeless tobacco: Never Used      Comment: caffeine use   • Alcohol use Yes      Comment: social/ occassional   • Drug use: No   • Sexual activity: Defer    Social History     Social History Narrative        Family History   Problem Relation Age of Onset   • Cancer Mother    • Obesity Mother    • Diabetes Father    • Hypertension Father    • Heart disease Father    • Obesity Father    • Heart disease Brother    • Diabetes Paternal  Grandfather    • Diabetes Paternal Aunt        ROS: 14 point review of systems was performed and all other systems were reviewed and are negative except for documented findings in HPI and today's encounter.     Allergies:   Allergies   Allergen Reactions   • Lortab [Hydrocodone-Acetaminophen] Itching   • Nsaids      Constitutional:  Denies fever, shaking or chills   Eyes:  Denies change in visual acuity   HENT:  Denies nasal congestion or sore throat   Respiratory:  Denies cough or shortness of breath   Cardiovascular:  Denies chest pain or severe LE edema   GI:  Denies abdominal pain, nausea, vomiting, bloody stools or diarrhea   Musculoskeletal:  Numbness, tingling, pain, or loss of motor function only as noted above in history of present illness.  : Denies painful urination or hematuria  Integument:  Denies rash, lesion or ulceration   Neurologic:  Denies headache or focal weakness  Endocrine:  Denies lymphadenopathy  Psych:  Denies confusion or change in mental status   Hem:  Denies active bleeding    Objective:    Physical Exam: 57 y.o. female  Body mass index is 46.69 kg/(m^2)., @WT@, @HT@  Vitals:    01/05/18 1136   Temp: 98.5 °F (36.9 °C)     Vital signs reviewed.   General Appearance:    Alert, cooperative, in no acute distress                  Eyes: conjunctiva clear  ENT: external ears and nose atraumatic  CV: no peripheral edema  Resp: normal respiratory effort  Skin: no rashes or wounds; normal turgor  Psych: mood and affect appropriate  Lymph: no nodes appreciated  Neuro: gross sensation intact  Vascular:  Palpable peripheral pulse in noted extremity  Musculoskeletal Extremities: KNEE Exam: medial joint line tenderness with crepitation, synovitis, swelling, and joint effusion right knee.    Radiology:   Imaging done today and discussed at length with the patient:    Indication: pain related symptoms,  Views: 3V AP, LAT & 40 degree PA right knee(s)   Findings: severe end-stage arthritis (bone on bone,  subchondral sclerosis/cysts, osteophytes)  Comparison views: not available      Assessment:     ICD-10-CM ICD-9-CM   1. Chronic pain of right knee M25.561 719.46    G89.29 338.29   2. Arthritis of right knee M17.11 716.96        Procedures       Plan: Biomechanics of pertinent body area discussed.  Risks, benefits, alternatives, comparisons, and complications of accepted medicines, injections, recommendations, surgical procedures, and therapies explained and education provided in laymen's terms. The patient was given the opportunity to ask questions and they were answerved to their satisfaction.   Natural history and expected course of this patient's diagnosis discussed along with evaluation of therapies. Questions answered.  Address and update of wt loss, physical exercises, use of assistive devices, and monitoring of Medications per orders to address ortho complaints; Evaluation and discussion of safety, precautions, side effects, and warnings given especially of long term NSAID therapy.  Lifestyle measures for weight loss, suggest starting at 27lbs, with biomechanical explanations for weight loss and how this affects orthopedic condition.  Cryotherapy/brachy therapy as indicated with instructions.   PT referral offered and declined, advised on stretching/strengthening exercises for self at home.  Rest, ice, compression, and elevation (RICE) therapy.  Biomechanics and proper use of ambulatory aids:  crutches, walker, cane, &/or trekking poles.  Because of bmi and PE hx not good surgical candidate and higher risk.  Could try visco but olya seems to help so will go with that and could repeat in about 6 weeks.  Says bmi used to be more than 50 and is 47 today.    1/5/2018

## 2018-01-11 RX ORDER — OMEPRAZOLE 40 MG/1
CAPSULE, DELAYED RELEASE ORAL
Qty: 90 CAPSULE | Refills: 0 | Status: SHIPPED | OUTPATIENT
Start: 2018-01-11 | End: 2018-02-06 | Stop reason: SDUPTHER

## 2018-02-06 ENCOUNTER — DOCUMENTATION (OUTPATIENT)
Dept: SLEEP MEDICINE | Facility: HOSPITAL | Age: 58
End: 2018-02-06

## 2018-02-06 RX ORDER — OMEPRAZOLE AND SODIUM BICARBONATE 40; 1100 MG/1; MG/1
1 CAPSULE ORAL
Qty: 90 CAPSULE | Refills: 1 | Status: SHIPPED | OUTPATIENT
Start: 2018-02-06 | End: 2018-02-19 | Stop reason: CLARIF

## 2018-02-06 NOTE — PROGRESS NOTES
Received a note from [facility].  Patient returned her CPAP machine.

## 2018-02-16 ENCOUNTER — OFFICE VISIT (OUTPATIENT)
Dept: ORTHOPEDIC SURGERY | Facility: CLINIC | Age: 58
End: 2018-02-16

## 2018-02-16 VITALS — BODY MASS INDEX: 45.41 KG/M2 | HEIGHT: 64 IN | TEMPERATURE: 97.6 F | WEIGHT: 266 LBS

## 2018-02-16 DIAGNOSIS — Z86.718 HISTORY OF DVT (DEEP VEIN THROMBOSIS): ICD-10-CM

## 2018-02-16 DIAGNOSIS — E66.01 OBESITY, CLASS III, BMI 40-49.9 (MORBID OBESITY) (HCC): ICD-10-CM

## 2018-02-16 DIAGNOSIS — M17.11 ARTHRITIS OF RIGHT KNEE: Primary | ICD-10-CM

## 2018-02-16 PROCEDURE — 20610 DRAIN/INJ JOINT/BURSA W/O US: CPT | Performed by: ORTHOPAEDIC SURGERY

## 2018-02-16 PROCEDURE — 99213 OFFICE O/P EST LOW 20 MIN: CPT | Performed by: ORTHOPAEDIC SURGERY

## 2018-02-16 RX ORDER — METHYLPREDNISOLONE ACETATE 80 MG/ML
80 INJECTION, SUSPENSION INTRA-ARTICULAR; INTRALESIONAL; INTRAMUSCULAR; SOFT TISSUE
Status: COMPLETED | OUTPATIENT
Start: 2018-02-16 | End: 2018-02-16

## 2018-02-16 RX ORDER — LIDOCAINE HYDROCHLORIDE 10 MG/ML
4 INJECTION, SOLUTION EPIDURAL; INFILTRATION; INTRACAUDAL; PERINEURAL
Status: COMPLETED | OUTPATIENT
Start: 2018-02-16 | End: 2018-02-16

## 2018-02-16 RX ADMIN — METHYLPREDNISOLONE ACETATE 80 MG: 80 INJECTION, SUSPENSION INTRA-ARTICULAR; INTRALESIONAL; INTRAMUSCULAR; SOFT TISSUE at 11:12

## 2018-02-16 RX ADMIN — LIDOCAINE HYDROCHLORIDE 4 ML: 10 INJECTION, SOLUTION EPIDURAL; INFILTRATION; INTRACAUDAL; PERINEURAL at 11:12

## 2018-02-16 NOTE — PROGRESS NOTES
Patient Name: Zulema Sousa   YOB: 1960  Referring Primary Care Physician: Marsha Young MD      Chief Complaint:    Chief Complaint   Patient presents with   • Right Knee - Follow-up, Pain        Subjective:    HPI:   Zulema Sousa is a pleasant 57 y.o. year old who presents today for evaluation of   Chief Complaint   Patient presents with   • Right Knee - Follow-up, Pain   fu today.  Dr eagle injected about 2.5 months ago.  Has gone from 285-266 diesting.  Having right lateral knee pain and posterior tightness and swelling.   chiro is dry needling etc.  Had dvt and pe( on thinners) in the past.  Had chronic venous/lymphedema changes in her legs- has been treated before.  bmi 44    This problem is not new to this examiner.     Medications:   Home Medications:  Current Outpatient Prescriptions on File Prior to Visit   Medication Sig   • acetaminophen (TYLENOL) 650 MG 8 hr tablet Take 650 mg by mouth Every 8 (Eight) Hours As Needed for Mild Pain .   • omeprazole-sodium bicarbonate (ZEGERID)  MG per capsule Take 1 capsule by mouth Every Morning Before Breakfast.   • rivaroxaban (XARELTO) 20 MG tablet Take 1 tablet by mouth Daily With Dinner.   • [DISCONTINUED] traMADol (ULTRAM) 50 MG tablet Take 1 tablet by mouth Every 8 (Eight) Hours As Needed for Moderate Pain .     No current facility-administered medications on file prior to visit.      Current Medications:  Scheduled Meds:  Continuous Infusions:  No current facility-administered medications for this visit.   PRN Meds:.    I have reviewed the patient's medical history in detail and updated the computerized patient record.  Review and summarization of old records includes:    Past Medical History:   Diagnosis Date   • Acid reflux    • Back pain    • Cardiac tamponade     March 2015   • Difficulty breathing     during exertion   • Edema    • Esophagitis, reflux    • Essential hypertriglyceridemia    • GERD (gastroesophageal reflux  disease)    • Health care maintenance    • Heartburn    • Hyperlipidemia    • Hypertriglyceridemia    • Increased appetite    • Morbid obesity    • Multiple joint pain    • Osteoarthritis of knee    • Pericarditis    • Pulmonary embolism     2015   • RHA (rheumatoid arthritis)    • Sciatica    • Unintended weight gain         Past Surgical History:   Procedure Laterality Date   • ANKLE SURGERY      treatment of ankle fracture   • APPENDECTOMY     • CARDIAC SURGERY      cardiac tamponade   •  SECTION     • CHOLECYSTECTOMY     • DILATATION AND CURETTAGE     • LAPAROSCOPIC GASTRIC BANDING     • PULMONARY EMBOLISM SURGERY     • TUBAL ABDOMINAL LIGATION          Social History     Occupational History   •  [employer]     Social History Main Topics   • Smoking status: Former Smoker     Quit date: 2015   • Smokeless tobacco: Never Used      Comment: caffeine use   • Alcohol use Yes      Comment: social/ occassional   • Drug use: No   • Sexual activity: Defer    Social History     Social History Narrative        Family History   Problem Relation Age of Onset   • Cancer Mother    • Obesity Mother    • Diabetes Father    • Hypertension Father    • Heart disease Father    • Obesity Father    • Heart disease Brother    • Diabetes Paternal Grandfather    • Diabetes Paternal Aunt        ROS: 14 point review of systems was performed and all other systems were reviewed and are negative except for documented findings in HPI and today's encounter.     Allergies:   Allergies   Allergen Reactions   • Lortab [Hydrocodone-Acetaminophen] Itching   • Nsaids    • Tramadol Dizziness     Constitutional:  Denies fever, shaking or chills   Eyes:  Denies change in visual acuity   HENT:  Denies nasal congestion or sore throat   Respiratory:  Denies cough or shortness of breath   Cardiovascular:  Denies chest pain or severe LE edema   GI:  Denies abdominal pain, nausea, vomiting, bloody  stools or diarrhea   Musculoskeletal:  Numbness, tingling, pain, or loss of motor function only as noted above in history of present illness.  : Denies painful urination or hematuria  Integument:  Denies rash, lesion or ulceration   Neurologic:  Denies headache or focal weakness  Endocrine:  Denies lymphadenopathy  Psych:  Denies confusion or change in mental status   Hem:  Denies active bleeding    Objective:    Physical Exam: 57 y.o. female  Body mass index is 45.66 kg/(m^2)., @WT@, @HT@  Vitals:    02/16/18 1038   Temp: 97.6 °F (36.4 °C)     Vital signs reviewed.   General Appearance:    Alert, cooperative, in no acute distress                  Eyes: conjunctiva clear  ENT: external ears and nose atraumatic  CV: no peripheral edema  Resp: normal respiratory effort  Skin: no rashes or wounds; normal turgor  Psych: mood and affect appropriate  Lymph: no nodes appreciated  Neuro: gross sensation intact  Vascular:  Palpable peripheral pulse in noted extremity; chronic changes cw venous stasis and lymphedema.  Musculoskeletal Extremities: KNEE Exam: lateral joint line tenderness with crepitation, synovitis, swelling, and joint effusion right knee.    Radiology:   Imaging done previously in the office and discussed at length with the patient:    Indication: pain related symptoms,  Views: 3V AP, LAT & 40 degree PA right knee(s)   Findings: severe end-stage arthritis (bone on bone, subchondral sclerosis/cysts, osteophytes)  Comparison views: not available      Assessment:     ICD-10-CM ICD-9-CM   1. Arthritis of right knee M17.11 716.96   2. History of DVT (deep vein thrombosis) Z86.718 V12.51   3. Obesity, Class III, BMI 40-49.9 (morbid obesity) E66.01 278.01        Large Joint Arthrocentesis  Date/Time: 2/16/2018 11:12 AM  Consent given by: patient  Site marked: site marked  Timeout: Immediately prior to procedure a time out was called to verify the correct patient, procedure, equipment, support staff and site/side  marked as required   Supporting Documentation  Indications: pain and joint swelling   Procedure Details  Location: knee - R knee  Preparation: Patient was prepped and draped in the usual sterile fashion  Needle size: 22 G  Approach: anterolateral  Medications administered: 80 mg methylPREDNISolone acetate 80 MG/ML; 4 mL lidocaine PF 1% 1 %  Patient tolerance: patient tolerated the procedure well with no immediate complications             Plan: Biomechanics of pertinent body area discussed.  Risks, benefits, alternatives, comparisons, and complications of accepted medicines, injections, recommendations, surgical procedures, and therapies explained and education provided in laymen's terms. The patient was given the opportunity to ask questions and they were answerved to their satisfaction.   Natural history and expected course of this patient's diagnosis discussed along with evaluation of therapies. Questions answered.  Cortisone Injection. See procedure note.   Cont wieght loss.  MAY become a candidate for TKA if continues current trajectory but would need clearance and medical ability to come off thinnners with hx of PEs etcc.      2/16/2018

## 2018-02-19 RX ORDER — OMEPRAZOLE 40 MG/1
40 CAPSULE, DELAYED RELEASE ORAL DAILY
Qty: 90 CAPSULE | Refills: 1 | Status: SHIPPED | OUTPATIENT
Start: 2018-02-19 | End: 2018-11-13 | Stop reason: SDUPTHER

## 2018-02-19 RX ORDER — SODIUM BICARBONATE 325 MG/1
325 TABLET ORAL 4 TIMES DAILY
Qty: 90 TABLET | Refills: 1 | Status: SHIPPED | OUTPATIENT
Start: 2018-02-19 | End: 2018-05-18

## 2018-03-02 ENCOUNTER — APPOINTMENT (OUTPATIENT)
Dept: SLEEP MEDICINE | Facility: HOSPITAL | Age: 58
End: 2018-03-02
Attending: INTERNAL MEDICINE

## 2018-04-27 ENCOUNTER — APPOINTMENT (OUTPATIENT)
Dept: SLEEP MEDICINE | Facility: HOSPITAL | Age: 58
End: 2018-04-27
Attending: INTERNAL MEDICINE

## 2018-04-27 ENCOUNTER — OFFICE VISIT (OUTPATIENT)
Dept: SLEEP MEDICINE | Facility: HOSPITAL | Age: 58
End: 2018-04-27
Attending: INTERNAL MEDICINE

## 2018-04-27 VITALS
WEIGHT: 258 LBS | OXYGEN SATURATION: 96 % | DIASTOLIC BLOOD PRESSURE: 65 MMHG | HEIGHT: 63 IN | SYSTOLIC BLOOD PRESSURE: 125 MMHG | BODY MASS INDEX: 45.71 KG/M2

## 2018-04-27 DIAGNOSIS — G47.33 OBSTRUCTIVE SLEEP APNEA, ADULT: Primary | ICD-10-CM

## 2018-04-27 DIAGNOSIS — E66.01 OBESITY, MORBID, BMI 40.0-49.9 (HCC): ICD-10-CM

## 2018-04-27 PROCEDURE — G0463 HOSPITAL OUTPT CLINIC VISIT: HCPCS

## 2018-05-14 RX ORDER — RIVAROXABAN 20 MG/1
TABLET, FILM COATED ORAL
Qty: 90 TABLET | Refills: 0 | Status: SHIPPED | OUTPATIENT
Start: 2018-05-14 | End: 2018-08-17 | Stop reason: SDUPTHER

## 2018-05-18 ENCOUNTER — CLINICAL SUPPORT (OUTPATIENT)
Dept: ORTHOPEDIC SURGERY | Facility: CLINIC | Age: 58
End: 2018-05-18

## 2018-05-18 VITALS — BODY MASS INDEX: 45.71 KG/M2 | TEMPERATURE: 97.3 F | HEIGHT: 63 IN | WEIGHT: 258 LBS

## 2018-05-18 DIAGNOSIS — E66.01 OBESITY, CLASS III, BMI 40-49.9 (MORBID OBESITY) (HCC): ICD-10-CM

## 2018-05-18 DIAGNOSIS — M17.11 ARTHRITIS OF RIGHT KNEE: Primary | ICD-10-CM

## 2018-05-18 PROCEDURE — 20610 DRAIN/INJ JOINT/BURSA W/O US: CPT | Performed by: NURSE PRACTITIONER

## 2018-05-18 PROCEDURE — 99212 OFFICE O/P EST SF 10 MIN: CPT | Performed by: NURSE PRACTITIONER

## 2018-05-18 RX ORDER — LIDOCAINE HYDROCHLORIDE 20 MG/ML
2 INJECTION, SOLUTION EPIDURAL; INFILTRATION; INTRACAUDAL; PERINEURAL
Status: COMPLETED | OUTPATIENT
Start: 2018-05-18 | End: 2018-05-18

## 2018-05-18 RX ORDER — METHYLPREDNISOLONE ACETATE 80 MG/ML
80 INJECTION, SUSPENSION INTRA-ARTICULAR; INTRALESIONAL; INTRAMUSCULAR; SOFT TISSUE
Status: COMPLETED | OUTPATIENT
Start: 2018-05-18 | End: 2018-05-18

## 2018-05-18 RX ADMIN — METHYLPREDNISOLONE ACETATE 80 MG: 80 INJECTION, SUSPENSION INTRA-ARTICULAR; INTRALESIONAL; INTRAMUSCULAR; SOFT TISSUE at 11:12

## 2018-05-18 RX ADMIN — LIDOCAINE HYDROCHLORIDE 2 ML: 20 INJECTION, SOLUTION EPIDURAL; INFILTRATION; INTRACAUDAL; PERINEURAL at 11:12

## 2018-05-18 NOTE — PROGRESS NOTES
Patient: Zulema Sousa  YOB: 1960    Chief Complaints:  right knee pain    Subjective:    History of Present Illness: Here today for knee pain. The pain is a generalized joint tenderness.  It has been progressive in nature but remains intermittent.  Worsened by prolonged standing or walking and squatting activities. Has had improvement in the past with ice/heat, rest, and injections.     This problem is not new to this examiner.     Allergies:   Allergies   Allergen Reactions   • Lortab [Hydrocodone-Acetaminophen] Itching   • Nsaids    • Tramadol Dizziness       Medications:   Home Medications:  Current Outpatient Prescriptions on File Prior to Visit   Medication Sig   • omeprazole (PRILOSEC) 40 MG capsule Take 1 capsule by mouth Daily.   • XARELTO 20 MG tablet TAKE ONE TABLET BY MOUTH DAILY WITH DINNER   • [DISCONTINUED] acetaminophen (TYLENOL) 650 MG 8 hr tablet Take 650 mg by mouth Every 8 (Eight) Hours As Needed for Mild Pain .   • [DISCONTINUED] sodium bicarbonate 325 MG tablet Take 1 tablet by mouth 4 (Four) Times a Day.     No current facility-administered medications on file prior to visit.      Current Medications:  Scheduled Meds:  Continuous Infusions:  No current facility-administered medications for this visit.   PRN Meds:.    I have reviewed the patient's medical history in detail and updated the computerized patient record.  Review and summarization of old records include:    Past Medical History:   Diagnosis Date   • Acid reflux    • Back pain    • Cardiac tamponade     March 2015   • Difficulty breathing     during exertion   • Edema    • Esophagitis, reflux    • Essential hypertriglyceridemia    • GERD (gastroesophageal reflux disease)    • Health care maintenance    • Heartburn    • Hyperlipidemia    • Hypertriglyceridemia    • Increased appetite    • Morbid obesity    • Multiple joint pain    • Osteoarthritis of knee    • Pericarditis    • Pulmonary embolism     Janurary 2015    • RHA (rheumatoid arthritis)    • Sciatica    • Unintended weight gain         Past Surgical History:   Procedure Laterality Date   • ANKLE SURGERY      treatment of ankle fracture   • APPENDECTOMY     • CARDIAC SURGERY      cardiac tamponade   •  SECTION     • CHOLECYSTECTOMY     • DILATATION AND CURETTAGE     • LAPAROSCOPIC GASTRIC BANDING     • PULMONARY EMBOLISM SURGERY     • TUBAL ABDOMINAL LIGATION          Social History     Occupational History   •  [employer]     Social History Main Topics   • Smoking status: Former Smoker     Quit date: 2015   • Smokeless tobacco: Never Used      Comment: caffeine use   • Alcohol use Yes      Comment: social/ occassional   • Drug use: No   • Sexual activity: Defer      Social History     Social History Narrative   • No narrative on file        Family History   Problem Relation Age of Onset   • Cancer Mother    • Obesity Mother    • Diabetes Father    • Hypertension Father    • Heart disease Father    • Obesity Father    • Heart disease Brother    • Diabetes Paternal Grandfather    • Diabetes Paternal Aunt        ROS: 14 point review of systems was performed and was negative except for documented findings in HPI and today's encounter.     Allergies:   Allergies   Allergen Reactions   • Lortab [Hydrocodone-Acetaminophen] Itching   • Nsaids    • Tramadol Dizziness     Constitutional:  Denies fever, shaking or chills   Eyes:  Denies change in visual acuity   HENT:  Denies nasal congestion or sore throat   Respiratory:  Denies cough or shortness of breath   Cardiovascular:  Denies chest pain or severe LE edema   GI:  Denies abdominal pain, nausea, vomiting, bloody stools or diarrhea   Musculoskeletal:  Numbness, tingling, or loss of motor function only as noted above in history of present illness.  : Denies painful urination or hematuria  Integument:  Denies rash, lesion or ulceration   Neurologic:  Denies headache or focal  "weakness  Endocrine:  Denies lymphadenopathy  Psych:  Denies confusion or change in mental status   Hem:  Denies active bleeding    Physical Exam:  Wt Readings from Last 3 Encounters:   05/18/18 117 kg (258 lb)   04/27/18 117 kg (258 lb)   02/16/18 121 kg (266 lb)     Ht Readings from Last 3 Encounters:   05/18/18 160 cm (63\")   04/27/18 160 cm (63\")   02/16/18 162.6 cm (64\")     Body mass index is 45.7 kg/m².  Facility age limit for growth percentiles is 20 years.  Vitals:    05/18/18 1109   Temp: 97.3 °F (36.3 °C)     Vital Signs:  reviewed  Constitutional: Awake alert and oriented x3, well developed, no acute distress, non-toxic appearance.  EYES: symmetric, sclera clear  ENT:  Normocephalic, Atraumatic.   Respiratory:  No respiratory distress, No wheezing  CV: pulse regular, no palpitations or pallor.  GI:  Abdomen soft, non-tender.   Vascular:  Intact distal pulses, No cyanosis, no signs or symptoms of DVT.  Neurologic: Sensation grossly intact to the involved extremity, No focal deficits noted.   Neck: No tenderness, Supple.  Integument: warm, dry, no ulcerations.   Psychiatric:  Oriented, no pathological affect.  Musculoskeletal:    Affected knee(s):  Painful gait with a subtle limp, positive for synovitis, swelling, joint effusion with crepitation.  Lachman negative  Posterior drawer negative  Kristin's negative  Patellofemoral grind +  Sensation grossly intact to light touch throughout the lower extremity  Skin is intact  Distal pulses are palpable  No signs or symptoms of DVT      Radiology:   Imaging done previously in the office and discussed at length with the patient:     Indication: pain related symptoms,  Views: 3V AP, LAT & 40 degree PA right knee(s)   Findings: severe end-stage arthritis (bone on bone, subchondral sclerosis/cysts, osteophytes)  Comparison views: not available    Procedure:  Large Joint Arthrocentesis  Date/Time: 5/18/2018 11:12 AM  Consent given by: patient  Site marked: site " marked  Timeout: Immediately prior to procedure a time out was called to verify the correct patient, procedure, equipment, support staff and site/side marked as required   Supporting Documentation  Indications: pain and joint swelling   Procedure Details  Location: knee - R knee  Preparation: Patient was prepped and draped in the usual sterile fashion  Needle size: 22 G  Approach: anterolateral  Medications administered: 2 mL lidocaine PF 2% 2 %; 80 mg methylPREDNISolone acetate 80 MG/ML  Patient tolerance: patient tolerated the procedure well with no immediate complications          Assessment:     ICD-10-CM ICD-9-CM   1. Arthritis of right knee M17.11 716.96   2. Obesity, Class III, BMI 40-49.9 (morbid obesity) E66.01 278.01           Plan: Is to proceed with injection  Follow up as indicated.  Ice, elevate, and rest as needed.  Additional interventions include:  15 min spent face to face with patient 10 min spent counseling about natural history and expected course of assessed complaint and reviewed treatment options that have been tried and not tried and those currently available. Questions answered.    Natural history and expected course of this patient's diagnosis discussed along with evaluation of therapies. Questions answered.  Advice on benefits of, and types of regular/moderate exercise including biomechanical forces involved as it pertains to this complaint, with a goal of 5 min at least 7 times a week.    Address and update of wt loss, physical exercises, use of assistive devices, and monitoring of Medications per orders to address ortho complaints; Evaluation and discussion of safety, precautions, side effects, and warnings given especially of long term NSAID therapy.  Lifestyle measures for weight loss, suggest starting at 20lbs, with biomechanical explanations for weight loss and how this affects orthopedic condition.  Cortisone Injection. See procedure note.  OTC analgesics as needed with dosage  warning and instructions given.  Cryotherapy/brachy therapy as indicated with instructions.   Advised on strengthening exercises, stressed importance of these with progression of arthritis, demonstrated exercises to patient with repeat demonstration and verb of understanding.   Taught thigh strengthening exercises to do daily.  Will try to do every day until fu in 3 months.   5/18/2018  MARLYN

## 2018-05-29 RX ORDER — FLUTICASONE PROPIONATE 50 MCG
2 SPRAY, SUSPENSION (ML) NASAL DAILY
Qty: 16 G | Refills: 5 | Status: SHIPPED | OUTPATIENT
Start: 2018-05-29 | End: 2020-05-05

## 2018-08-17 RX ORDER — RIVAROXABAN 20 MG/1
TABLET, FILM COATED ORAL
Qty: 30 TABLET | Refills: 0 | OUTPATIENT
Start: 2018-08-17

## 2018-08-20 ENCOUNTER — OFFICE VISIT (OUTPATIENT)
Dept: CARDIOLOGY | Facility: CLINIC | Age: 58
End: 2018-08-20

## 2018-08-20 VITALS
DIASTOLIC BLOOD PRESSURE: 78 MMHG | WEIGHT: 239 LBS | HEART RATE: 78 BPM | SYSTOLIC BLOOD PRESSURE: 116 MMHG | HEIGHT: 63 IN | BODY MASS INDEX: 42.35 KG/M2 | OXYGEN SATURATION: 100 %

## 2018-08-20 DIAGNOSIS — I30.0 IDIOPATHIC PERICARDITIS, UNSPECIFIED CHRONICITY: ICD-10-CM

## 2018-08-20 DIAGNOSIS — I26.99 OTHER ACUTE PULMONARY EMBOLISM WITHOUT ACUTE COR PULMONALE (HCC): Primary | ICD-10-CM

## 2018-08-20 PROCEDURE — 93000 ELECTROCARDIOGRAM COMPLETE: CPT | Performed by: PHYSICIAN ASSISTANT

## 2018-08-20 PROCEDURE — 99214 OFFICE O/P EST MOD 30 MIN: CPT | Performed by: PHYSICIAN ASSISTANT

## 2018-08-20 NOTE — PROGRESS NOTES
Date of Office Visit: 2018  Encounter Provider: RAMON Carreno  Place of Service: [facility] CARDIOLOGY  Patient Name: Zulema Sousa  :1960    Chief Complaint   Patient presents with   • Coronary Artery Disease     1 year follow up   :     HPI: Zulema Souas is a 58 y.o. female, new to me, who presents today for follow-up.  Old records have been obtained and reviewed by me.  She is a patient of Dr. Hernández's with a history of bilateral pulmonary emboli that was treated with thrombectomy and thrombolytics.  She also has had a relapsing pericarditis that improved after steroid therapy.  After her initial PE with thrombectomy, she presented to the hospital again with shortness of breath on exertion and was found to have small bilateral pulmonary emboli.  It was felt that this could be residual from her prior event or a small new event.  An echocardiogram at that time showed mild pulmonary hypertension.  She was last in our office to see Dr. Hernández on 2017.  At that visit she was doing well without complaints of angina or heart failure.  Dr. Hernández remarked that we will keep her on Xarelto indefinitely.  She is here today for follow-up.   Since she was last in our office she's been doing really well.  By my records that she has lost almost 50 pounds, she states that she has been doing a ketogenic diet.  She has not had any more bouts of pericarditis, and her breathing is normal.  She denies any chest pain, palpitations, edema, dizziness, or syncope.  She does have some pretty significant osteoarthritis in her right knee.  She is a  for the hospital and works from home.      Past Medical History:   Diagnosis Date   • Acid reflux    • Back pain    • Cardiac tamponade     2015   • Difficulty breathing     during exertion   • Edema    • Esophagitis, reflux    • Essential hypertriglyceridemia    • GERD (gastroesophageal reflux disease)    • Health care  maintenance    • Heartburn    • Hyperlipidemia    • Hypertriglyceridemia    • Increased appetite    • Morbid obesity (CMS/HCC)    • Multiple joint pain    • Osteoarthritis of knee    • Pericarditis    • Pulmonary embolism (CMS/HCC)     2015   • RHA (rheumatoid arthritis) (CMS/HCC)    • Sciatica    • Unintended weight gain        Past Surgical History:   Procedure Laterality Date   • ANKLE SURGERY      treatment of ankle fracture   • APPENDECTOMY     • CARDIAC SURGERY      cardiac tamponade   •  SECTION     • CHOLECYSTECTOMY     • DILATATION AND CURETTAGE     • LAPAROSCOPIC GASTRIC BANDING     • PULMONARY EMBOLISM SURGERY     • TUBAL ABDOMINAL LIGATION         Social History     Social History   • Marital status:      Spouse name: Jac   • Number of children: N/A   • Years of education: N/A     Occupational History   •  [employer]     Social History Main Topics   • Smoking status: Former Smoker     Quit date: 2015   • Smokeless tobacco: Never Used      Comment: caffeine use   • Alcohol use 0.6 oz/week     1 Shots of liquor per week      Comment: social/ occassional   • Drug use: No   • Sexual activity: Defer     Other Topics Concern   • Not on file     Social History Narrative   • No narrative on file       Family History   Problem Relation Age of Onset   • Cancer Mother    • Obesity Mother    • Diabetes Father    • Hypertension Father    • Heart disease Father    • Obesity Father    • Heart disease Brother    • Diabetes Paternal Grandfather    • Diabetes Paternal Aunt    • No Known Problems Maternal Grandmother    • No Known Problems Maternal Grandfather    • No Known Problems Paternal Grandmother        Review of Systems   Constitution: Negative for chills, fever and malaise/fatigue.   Cardiovascular: Negative for chest pain, dyspnea on exertion, leg swelling, near-syncope, orthopnea, palpitations, paroxysmal nocturnal dyspnea and syncope.  "  Respiratory: Negative for cough and shortness of breath.    Musculoskeletal: Positive for joint pain. Negative for joint swelling and myalgias.   Gastrointestinal: Negative for abdominal pain, diarrhea, melena, nausea and vomiting.   Genitourinary: Negative for frequency and hematuria.   Neurological: Negative for light-headedness, numbness, paresthesias and seizures.   Allergic/Immunologic: Negative.    All other systems reviewed and are negative.      Allergies   Allergen Reactions   • Lortab [Hydrocodone-Acetaminophen] Itching   • Nsaids    • Tramadol Dizziness         Current Outpatient Prescriptions:   •  fluticasone (FLONASE) 50 MCG/ACT nasal spray, 2 sprays into each nostril Daily., Disp: 16 g, Rfl: 5  •  omeprazole (PRILOSEC) 40 MG capsule, Take 1 capsule by mouth Daily., Disp: 90 capsule, Rfl: 1  •  rivaroxaban (XARELTO) 20 MG tablet, Take 1 tablet by mouth Daily With Dinner., Disp: 30 tablet, Rfl: 0      Objective:     Vitals:    08/20/18 1533 08/20/18 1545   BP: 118/80 116/78   BP Location: Right arm Left arm   Pulse: 78    SpO2: 100%    Weight: 108 kg (239 lb)    Height: 160 cm (63\")      Body mass index is 42.34 kg/m².    PHYSICAL EXAM:    Physical Exam   Constitutional: She is oriented to person, place, and time. She appears well-developed and well-nourished. No distress.   HENT:   Head: Normocephalic and atraumatic.   Eyes: Pupils are equal, round, and reactive to light.   Neck: No JVD present. No thyromegaly present.   Cardiovascular: Normal rate, regular rhythm, normal heart sounds and intact distal pulses.    No murmur heard.  Pulmonary/Chest: Effort normal and breath sounds normal. No respiratory distress.   Abdominal: Soft. Bowel sounds are normal. She exhibits no distension. There is no splenomegaly or hepatomegaly. There is no tenderness.   Musculoskeletal: Normal range of motion. She exhibits no edema.   Neurological: She is alert and oriented to person, place, and time.   Skin: Skin is warm " and dry. She is not diaphoretic. No erythema.   Psychiatric: She has a normal mood and affect. Her behavior is normal. Judgment normal.         ECG 12 Lead  Date/Time: 8/20/2018 3:53 PM  Performed by: ROLAND AMEZCUA  Authorized by: ROLAND AMEZCUA   Comparison: compared with previous ECG from 1/25/2017  Similar to previous ECG  Rhythm: sinus rhythm  BPM: 74  Clinical impression: normal ECG  Comments: Indication: Pulmonary embolus, pericarditis.              Assessment:       Diagnosis Plan   1. Other acute pulmonary embolism without acute cor pulmonale (CMS/HCC)  ECG 12 Lead   2. Idiopathic pericarditis, unspecified chronicity  ECG 12 Lead     Orders Placed This Encounter   Procedures   • ECG 12 Lead     This order was created via procedure documentation          Plan:       1.  Pulmonary embolus.  She is on Xarelto and will be long-term.  Her breathing is stable and normal.  I think that we loss is [place] her, and I am really proud that she's been able to lose 50 pounds.  We did talk about the right way to do the ketogenic diet, which incorporates lots of locally carbohydrate vegetables.    2.  Pericarditis.  She has not had any episodes of pericarditis in quite some time.  Continue current medical regimen.  Of note, I do think that taking curcumin daily would be of benefit for her with her knee, and may even help to prevent recurrent pericarditis.    I'm not going to make any changes to her medical regimen, and she will follow-up with Dr. Hernández in 1 year or sooner if needed.    As always, it has been a pleasure to participate in your patient's care.      Sincerely,         Roland Amezcua PA-C

## 2018-08-22 ENCOUNTER — CLINICAL SUPPORT (OUTPATIENT)
Dept: ORTHOPEDIC SURGERY | Facility: CLINIC | Age: 58
End: 2018-08-22

## 2018-08-22 VITALS — HEIGHT: 64 IN | BODY MASS INDEX: 40.89 KG/M2 | WEIGHT: 239.5 LBS | TEMPERATURE: 97.7 F

## 2018-08-22 DIAGNOSIS — M17.11 ARTHRITIS OF RIGHT KNEE: ICD-10-CM

## 2018-08-22 DIAGNOSIS — M25.561 CHRONIC PAIN OF RIGHT KNEE: Primary | ICD-10-CM

## 2018-08-22 DIAGNOSIS — G89.29 CHRONIC PAIN OF RIGHT KNEE: Primary | ICD-10-CM

## 2018-08-22 PROCEDURE — 99213 OFFICE O/P EST LOW 20 MIN: CPT | Performed by: ORTHOPAEDIC SURGERY

## 2018-08-22 PROCEDURE — 73562 X-RAY EXAM OF KNEE 3: CPT | Performed by: ORTHOPAEDIC SURGERY

## 2018-08-22 PROCEDURE — 20610 DRAIN/INJ JOINT/BURSA W/O US: CPT | Performed by: ORTHOPAEDIC SURGERY

## 2018-08-22 RX ORDER — METHYLPREDNISOLONE ACETATE 80 MG/ML
80 INJECTION, SUSPENSION INTRA-ARTICULAR; INTRALESIONAL; INTRAMUSCULAR; SOFT TISSUE
Status: COMPLETED | OUTPATIENT
Start: 2018-08-22 | End: 2018-08-22

## 2018-08-22 RX ORDER — LIDOCAINE HYDROCHLORIDE 10 MG/ML
3 INJECTION, SOLUTION EPIDURAL; INFILTRATION; INTRACAUDAL; PERINEURAL
Status: COMPLETED | OUTPATIENT
Start: 2018-08-22 | End: 2018-08-22

## 2018-08-22 RX ADMIN — METHYLPREDNISOLONE ACETATE 80 MG: 80 INJECTION, SUSPENSION INTRA-ARTICULAR; INTRALESIONAL; INTRAMUSCULAR; SOFT TISSUE at 15:28

## 2018-08-22 RX ADMIN — LIDOCAINE HYDROCHLORIDE 3 ML: 10 INJECTION, SOLUTION EPIDURAL; INFILTRATION; INTRACAUDAL; PERINEURAL at 15:28

## 2018-08-22 NOTE — PROGRESS NOTES
Patient: Zulema Sousa  YOB: 1960    Chief Complaints:  right knee pain    Subjective:    History of Present Illness: Here today for knee pain. The pain is a generalized joint tenderness.  It has been progressive in nature but remains intermittent.  Worsened by prolonged standing or walking and squatting activities. Has had improvement in the past with ice/heat, rest, and injections. Doing exercises.  Has lost about 45 lbs!!!    This problem is not new to this examiner.     Allergies:   Allergies   Allergen Reactions   • Lortab [Hydrocodone-Acetaminophen] Itching   • Nsaids    • Tramadol Dizziness       Medications:   Home Medications:  Current Outpatient Prescriptions on File Prior to Visit   Medication Sig   • omeprazole (PRILOSEC) 40 MG capsule Take 1 capsule by mouth Daily.   • rivaroxaban (XARELTO) 20 MG tablet Take 1 tablet by mouth Daily With Dinner.   • fluticasone (FLONASE) 50 MCG/ACT nasal spray 2 sprays into each nostril Daily.     No current facility-administered medications on file prior to visit.      Current Medications:  Scheduled Meds:  Continuous Infusions:  No current facility-administered medications for this visit.   PRN Meds:.    I have reviewed the patient's medical history in detail and updated the computerized patient record.  Review and summarization of old records include:    Past Medical History:   Diagnosis Date   • Acid reflux    • Back pain    • Cardiac tamponade     March 2015   • Difficulty breathing     during exertion   • Edema    • Esophagitis, reflux    • Essential hypertriglyceridemia    • GERD (gastroesophageal reflux disease)    • Health care maintenance    • Heartburn    • Hyperlipidemia    • Hypertriglyceridemia    • Increased appetite    • Morbid obesity (CMS/[facility])    • Multiple joint pain    • Osteoarthritis of knee    • Pericarditis    • Pulmonary embolism (CMS/HCC)     Janurary 2015   • RHA (rheumatoid arthritis) (CMS/[facility])    • Sciatica    • Unintended  weight gain         Past Surgical History:   Procedure Laterality Date   • ANKLE SURGERY      treatment of ankle fracture   • APPENDECTOMY     • CARDIAC SURGERY      cardiac tamponade   •  SECTION     • CHOLECYSTECTOMY     • DILATATION AND CURETTAGE     • LAPAROSCOPIC GASTRIC BANDING     • PULMONARY EMBOLISM SURGERY     • TUBAL ABDOMINAL LIGATION          Social History     Occupational History   •  [employer]     Social History Main Topics   • Smoking status: Former Smoker     Quit date: 2015   • Smokeless tobacco: Never Used      Comment: caffeine use   • Alcohol use 0.6 oz/week     1 Shots of liquor per week      Comment: social/ occassional   • Drug use: No   • Sexual activity: Defer      Social History     Social History Narrative   • No narrative on file        Family History   Problem Relation Age of Onset   • Cancer Mother    • Obesity Mother    • Diabetes Father    • Hypertension Father    • Heart disease Father    • Obesity Father    • Heart disease Brother    • Diabetes Paternal Grandfather    • Diabetes Paternal Aunt    • No Known Problems Maternal Grandmother    • No Known Problems Maternal Grandfather    • No Known Problems Paternal Grandmother        ROS: 14 point review of systems was performed and was negative except for documented findings in HPI and today's encounter.     Allergies:   Allergies   Allergen Reactions   • Lortab [Hydrocodone-Acetaminophen] Itching   • Nsaids    • Tramadol Dizziness     Constitutional:  Denies fever, shaking or chills   Eyes:  Denies change in visual acuity   HENT:  Denies nasal congestion or sore throat   Respiratory:  Denies cough or shortness of breath   Cardiovascular:  Denies chest pain or severe LE edema   GI:  Denies abdominal pain, nausea, vomiting, bloody stools or diarrhea   Musculoskeletal:  Numbness, tingling, or loss of motor function only as noted above in history of present illness.  : Denies painful  "urination or hematuria  Integument:  Denies rash, lesion or ulceration   Neurologic:  Denies headache or focal weakness  Endocrine:  Denies lymphadenopathy  Psych:  Denies confusion or change in mental status   Hem:  Denies active bleeding    Physical Exam:  Wt Readings from Last 3 Encounters:   08/22/18 109 kg (239 lb 8 oz)   08/20/18 108 kg (239 lb)   05/18/18 117 kg (258 lb)     Ht Readings from Last 3 Encounters:   08/22/18 162.6 cm (64\")   08/20/18 160 cm (63\")   05/18/18 160 cm (63\")     Body mass index is 41.11 kg/m².  Facility age limit for growth percentiles is 20 years.  Vitals:    08/22/18 1526   Temp: 97.7 °F (36.5 °C)     Vital Signs:  reviewed  Constitutional: Awake alert and oriented x3, well developed, no acute distress, non-toxic appearance.  EYES: symmetric, sclera clear  ENT:  Normocephalic, Atraumatic.   Respiratory:  No respiratory distress, No wheezing  CV: pulse regular, no palpitations or pallor.  GI:  Abdomen soft, non-tender.   Vascular:  Intact distal pulses, No cyanosis, no signs or symptoms of DVT.  Neurologic: Sensation grossly intact to the involved extremity, No focal deficits noted.   Neck: No tenderness, Supple.  Integument: warm, dry, no ulcerations.   Psychiatric:  Oriented, no pathological affect.  Musculoskeletal:    Affected knee(s):  Painful gait with a subtle limp, positive for synovitis, swelling, joint effusion with crepitation.  Lachman negative  Posterior drawer negative  Kristin's negative  Patellofemoral grind +  Sensation grossly intact to light touch throughout the lower extremity  Skin is intact  Distal pulses are palpable  No signs or symptoms of DVT        Diagnostic Data:     Imaging was done today and discussed with the patient:    Indication: pain related symptoms,  Views: 3V AP, LAT & 40 degree PA right knee(s)   Findings: severe end-stage arthritis (bone on bone, subchondral sclerosis/cysts, osteophytes)  Comparison views: viewed last xray done in the office. "     Procedure:  Large Joint Arthrocentesis  Date/Time: 8/22/2018 3:28 PM  Consent given by: patient  Site marked: site marked  Timeout: Immediately prior to procedure a time out was called to verify the correct patient, procedure, equipment, support staff and site/side marked as required   Supporting Documentation  Indications: pain   Procedure Details  Location: knee - R knee  Preparation: Patient was prepped and draped in the usual sterile fashion  Needle size: 25 G  Approach: anteromedial  Medications administered: 80 mg methylPREDNISolone acetate 80 MG/ML; 3 mL lidocaine PF 1% 1 %  Patient tolerance: patient tolerated the procedure well with no immediate complications          Assessment:     ICD-10-CM ICD-9-CM   1. Chronic pain of right knee M25.561 719.46    G89.29 338.29   2. Arthritis of right knee M17.11 716.96           Plan: Is to proceed with injection  Follow up as indicated.  Ice, elevate, and rest as needed.  Additional interventions include: Biomechanics of pertinent body area discussed.  Risks, benefits, alternatives, comparisons, and complications of accepted medicines, injections, recommendations, surgical procedures, and therapies explained and education provided in laymen's terms. The patient was given the opportunity to ask questions and they were answerved to their satisfaction.   Natural history and expected course of this patient's diagnosis discussed along with evaluation of therapies. Questions answered.  The concept of BMI body mass index and its importance and implications discussed.  BMI suggested to be < 40 or as low as possible.   Cortisone Injection. See procedure note.  Cryotherapy/brachy therapy as indicated with instructions.   Advised on strengthening exercises, stressed importance of these with progression of arthritis, demonstrated exercises to patient with repeat demonstration and verb of understanding.     8/22/2018

## 2018-09-07 ENCOUNTER — APPOINTMENT (OUTPATIENT)
Dept: CT IMAGING | Facility: HOSPITAL | Age: 58
End: 2018-09-07

## 2018-09-07 ENCOUNTER — APPOINTMENT (OUTPATIENT)
Dept: GENERAL RADIOLOGY | Facility: HOSPITAL | Age: 58
End: 2018-09-07

## 2018-09-07 ENCOUNTER — HOSPITAL ENCOUNTER (EMERGENCY)
Facility: HOSPITAL | Age: 58
Discharge: HOME | End: 2018-09-07
Attending: EMERGENCY MEDICINE | Admitting: EMERGENCY MEDICINE

## 2018-09-07 VITALS
DIASTOLIC BLOOD PRESSURE: 60 MMHG | HEART RATE: 77 BPM | BODY MASS INDEX: 39.61 KG/M2 | RESPIRATION RATE: 16 BRPM | SYSTOLIC BLOOD PRESSURE: 103 MMHG | HEIGHT: 64 IN | TEMPERATURE: 98 F | WEIGHT: 232 LBS | OXYGEN SATURATION: 99 %

## 2018-09-07 DIAGNOSIS — R07.89 ATYPICAL CHEST PAIN: ICD-10-CM

## 2018-09-07 DIAGNOSIS — K21.00 GASTROESOPHAGEAL REFLUX DISEASE WITH ESOPHAGITIS: Primary | ICD-10-CM

## 2018-09-07 LAB
ALBUMIN SERPL-MCNC: 3.7 G/DL (ref 3.5–5.2)
ALBUMIN/GLOB SERPL: 1.2 G/DL
ALP SERPL-CCNC: 75 U/L (ref 40–129)
ALT SERPL W P-5'-P-CCNC: 11 U/L (ref 5–33)
ANION GAP SERPL CALCULATED.3IONS-SCNC: 12 MMOL/L
AST SERPL-CCNC: 15 U/L (ref 5–32)
BASOPHILS # BLD AUTO: 0.02 10*3/MM3 (ref 0–0.2)
BASOPHILS NFR BLD AUTO: 0.2 % (ref 0–2)
BILIRUB SERPL-MCNC: <0.2 MG/DL (ref 0.2–1.2)
BUN BLD-MCNC: 15 MG/DL (ref 6–20)
BUN/CREAT SERPL: 24.2 (ref 7–25)
CALCIUM SPEC-SCNC: 10 MG/DL (ref 8.6–10.5)
CHLORIDE SERPL-SCNC: 100 MMOL/L (ref 98–107)
CO2 SERPL-SCNC: 26 MMOL/L (ref 22–29)
CREAT BLD-MCNC: 0.62 MG/DL (ref 0.57–1)
D DIMER PPP FEU-MCNC: 0.98 MCGFEU/ML (ref 0–0.46)
DEPRECATED RDW RBC AUTO: 53.5 FL (ref 37–54)
EOSINOPHIL # BLD AUTO: 0.16 10*3/MM3 (ref 0.1–0.3)
EOSINOPHIL NFR BLD AUTO: 1.3 % (ref 0–4)
ERYTHROCYTE [DISTWIDTH] IN BLOOD BY AUTOMATED COUNT: 16.2 % (ref 11.5–14.5)
GFR SERPL CREATININE-BSD FRML MDRD: 99 ML/MIN/1.73
GLOBULIN UR ELPH-MCNC: 3.2 GM/DL
GLUCOSE BLD-MCNC: 99 MG/DL (ref 65–99)
HCT VFR BLD AUTO: 39.8 % (ref 37–47)
HGB BLD-MCNC: 12.6 G/DL (ref 12–16)
IMM GRANULOCYTES # BLD: 0.03 10*3/MM3 (ref 0–0.03)
IMM GRANULOCYTES NFR BLD: 0.2 % (ref 0–0.5)
LYMPHOCYTES # BLD AUTO: 3.67 10*3/MM3 (ref 0.6–4.8)
LYMPHOCYTES NFR BLD AUTO: 30.3 % (ref 20–45)
MCH RBC QN AUTO: 28.5 PG (ref 27–31)
MCHC RBC AUTO-ENTMCNC: 31.7 G/DL (ref 31–37)
MCV RBC AUTO: 90 FL (ref 81–99)
MONOCYTES # BLD AUTO: 0.85 10*3/MM3 (ref 0–1)
MONOCYTES NFR BLD AUTO: 7 % (ref 3–8)
NEUTROPHILS # BLD AUTO: 7.37 10*3/MM3 (ref 1.5–8.3)
NEUTROPHILS NFR BLD AUTO: 61 % (ref 45–70)
NRBC BLD MANUAL-RTO: 0 /100 WBC (ref 0–0)
PLATELET # BLD AUTO: 293 10*3/MM3 (ref 140–500)
PMV BLD AUTO: 9.5 FL (ref 7.4–10.4)
POTASSIUM BLD-SCNC: 3.6 MMOL/L (ref 3.5–5.2)
PROT SERPL-MCNC: 6.9 G/DL (ref 6–8.5)
RBC # BLD AUTO: 4.42 10*6/MM3 (ref 4.2–5.4)
SODIUM BLD-SCNC: 138 MMOL/L (ref 136–145)
TROPONIN T SERPL-MCNC: <0.01 NG/ML (ref 0–0.03)
WBC NRBC COR # BLD: 12.1 10*3/MM3 (ref 4.8–10.8)

## 2018-09-07 PROCEDURE — 0 IOPAMIDOL PER 1 ML: Performed by: EMERGENCY MEDICINE

## 2018-09-07 PROCEDURE — 71275 CT ANGIOGRAPHY CHEST: CPT

## 2018-09-07 PROCEDURE — 93005 ELECTROCARDIOGRAM TRACING: CPT | Performed by: EMERGENCY MEDICINE

## 2018-09-07 PROCEDURE — 85379 FIBRIN DEGRADATION QUANT: CPT | Performed by: EMERGENCY MEDICINE

## 2018-09-07 PROCEDURE — 71045 X-RAY EXAM CHEST 1 VIEW: CPT

## 2018-09-07 PROCEDURE — 99284 EMERGENCY DEPT VISIT MOD MDM: CPT | Performed by: EMERGENCY MEDICINE

## 2018-09-07 PROCEDURE — 99283 EMERGENCY DEPT VISIT LOW MDM: CPT

## 2018-09-07 PROCEDURE — 84484 ASSAY OF TROPONIN QUANT: CPT | Performed by: EMERGENCY MEDICINE

## 2018-09-07 PROCEDURE — 93010 ELECTROCARDIOGRAM REPORT: CPT | Performed by: INTERNAL MEDICINE

## 2018-09-07 PROCEDURE — 85025 COMPLETE CBC W/AUTO DIFF WBC: CPT | Performed by: EMERGENCY MEDICINE

## 2018-09-07 PROCEDURE — 80053 COMPREHEN METABOLIC PANEL: CPT | Performed by: EMERGENCY MEDICINE

## 2018-09-07 RX ORDER — MAGNESIUM HYDROXIDE/ALUMINUM HYDROXICE/SIMETHICONE 120; 1200; 1200 MG/30ML; MG/30ML; MG/30ML
30 SUSPENSION ORAL ONCE
Status: DISCONTINUED | OUTPATIENT
Start: 2018-09-07 | End: 2018-09-07

## 2018-09-07 RX ORDER — ALUMINA, MAGNESIA, AND SIMETHICONE 2400; 2400; 240 MG/30ML; MG/30ML; MG/30ML
SUSPENSION ORAL
Status: COMPLETED
Start: 2018-09-07 | End: 2018-09-07

## 2018-09-07 RX ORDER — SUCRALFATE 1 G/1
1 TABLET ORAL
Status: COMPLETED | OUTPATIENT
Start: 2018-09-07 | End: 2018-09-07

## 2018-09-07 RX ORDER — ALUMINA, MAGNESIA, AND SIMETHICONE 2400; 2400; 240 MG/30ML; MG/30ML; MG/30ML
15 SUSPENSION ORAL EVERY 6 HOURS PRN
Status: DISCONTINUED | OUTPATIENT
Start: 2018-09-07 | End: 2018-09-07 | Stop reason: HOSPADM

## 2018-09-07 RX ORDER — SUCRALFATE ORAL 1 G/10ML
1 SUSPENSION ORAL 4 TIMES DAILY
Qty: 420 ML | Refills: 0 | Status: ON HOLD | OUTPATIENT
Start: 2018-09-07 | End: 2018-11-27

## 2018-09-07 RX ADMIN — SUCRALFATE 1 G: 1 TABLET ORAL at 01:43

## 2018-09-07 RX ADMIN — ALUMINA, MAGNESIA, AND SIMETHICONE 15 ML: 2400; 2400; 240 SUSPENSION ORAL at 00:25

## 2018-09-07 RX ADMIN — IOPAMIDOL 100 ML: 755 INJECTION, SOLUTION INTRAVENOUS at 01:06

## 2018-09-07 RX ADMIN — LIDOCAINE HYDROCHLORIDE 5 ML: 20 SOLUTION ORAL; TOPICAL at 00:25

## 2018-09-07 RX ADMIN — IOPAMIDOL 18 ML: 755 INJECTION, SOLUTION INTRAVENOUS at 01:06

## 2018-09-07 RX ADMIN — ALUMINUM HYDROXIDE, MAGNESIUM HYDROXIDE, AND DIMETHICONE 15 ML: 400; 400; 40 SUSPENSION ORAL at 00:25

## 2018-09-07 NOTE — ED PROVIDER NOTES
Subjective   History of Present Illness  History of Present Illness    Chief complaint: Chest pain    Location: Substernal, nonradiating    Quality/Severity:  7/10 at its worst, 7/10 currently, pressure type pain    Timing/Onset/Duration: Acute onset at 9 PM on Wednesday, more than 24 hours ago, while the patient was eating steak and mashed cauliflower    Modifying Factors: It hurts to take a deep breath, nothing makes it better    Associated Symptoms: The patient claims of shortness of breath.  No diaphoresis.  No nausea or vomiting.  No fever, chills, or cough.  No abdominal pain.    Narrative: This 58-year-old white female with a history of pulmonary embolus and cardiac tamponade secondary to Pericarditis, presents with Substernal Chest Pain That Started at 9 PM on Wednesday.  This occurred while the patient was eating steak and mashed cauliflower.  Patient had a cardiac catheterization in 2015 or 16 that she says was normal.  Her last stress test was around that time.  The pulmonary embolus was in January 2015 after sustaining an ankle fracture.  The patient has never had an MI.  The patient had a brother with an MI at 49 years of age and her father had an MI in his 60s.  The patient does not have elevated cholesterol.  The patient is not diabetic.  She is a smoker.    PCP:Hector    Cardiology:  Edgar      Review of Systems   Constitutional: Negative for chills and fever.   HENT: Negative for ear pain and sore throat.    Eyes: Negative for discharge and redness.   Respiratory: Positive for shortness of breath. Negative for cough and chest tightness.    Cardiovascular: Positive for chest pain. Negative for palpitations and leg swelling.   Gastrointestinal: Negative for abdominal pain, blood in stool, constipation, diarrhea, nausea and vomiting.   Endocrine: Negative for polyphagia.   Genitourinary: Negative for decreased urine volume, difficulty urinating and dysuria.   Musculoskeletal: Negative for back pain.   Skin:  Negative for rash.   Neurological: Negative for headaches.   Psychiatric/Behavioral: Negative.  Negative for confusion.        Medication List      ASK your doctor about these medications    Black Pepper-Turmeric 3-500 MG capsule     fluticasone 50 MCG/ACT nasal spray  Commonly known as:  FLONASE  2 sprays into each nostril Daily.     omeprazole 40 MG capsule  Commonly known as:  PRILOSEC  Take 1 capsule by mouth Daily.     rivaroxaban 20 MG tablet  Commonly known as:  XARELTO  Take 1 tablet by mouth Daily With Dinner.          Past Medical History:   Diagnosis Date   • Acid reflux    • Back pain    • Cardiac tamponade     2015   • Difficulty breathing     during exertion   • Edema    • Esophagitis, reflux    • Essential hypertriglyceridemia    • GERD (gastroesophageal reflux disease)    • Health care maintenance    • Heartburn    • Hyperlipidemia    • Hypertriglyceridemia    • Increased appetite    • Morbid obesity (CMS/HCC)    • Multiple joint pain    • Osteoarthritis of knee    • Pericarditis    • Pulmonary embolism (CMS/HCC)     2015   • RHA (rheumatoid arthritis) (CMS/[facility])    • Sciatica    • Unintended weight gain        Allergies   Allergen Reactions   • Lortab [Hydrocodone-Acetaminophen] Itching   • Nsaids    • Tramadol Dizziness       Past Surgical History:   Procedure Laterality Date   • ANKLE SURGERY      treatment of ankle fracture   • APPENDECTOMY     • CARDIAC SURGERY      cardiac tamponade   •  SECTION     • CHOLECYSTECTOMY     • DILATATION AND CURETTAGE     • LAPAROSCOPIC GASTRIC BANDING     • PULMONARY EMBOLISM SURGERY     • TUBAL ABDOMINAL LIGATION         Family History   Problem Relation Age of Onset   • Cancer Mother    • Obesity Mother    • Diabetes Father    • Hypertension Father    • Heart disease Father    • Obesity Father    • Heart disease Brother    • Diabetes Paternal Grandfather    • Diabetes Paternal Aunt    • No Known Problems Maternal Grandmother    • No Known  Problems Maternal Grandfather    • No Known Problems Paternal Grandmother        Social History     Social History   • Marital status:      Spouse name: Jac     Occupational History   •  [employer]     Social History Main Topics   • Smoking status: Former Smoker     Quit date: 1/6/2015   • Smokeless tobacco: Never Used      Comment: caffeine use   • Alcohol use 0.6 oz/week     1 Shots of liquor per week      Comment: social/ occassional   • Drug use: No   • Sexual activity: Defer     Other Topics Concern   • Not on file           Objective   Physical Exam   Constitutional: She is oriented to person, place, and time. She appears well-developed and well-nourished. No distress.   ED Triage Vitals (09/07/18 0007)  Temp: 98 °F (36.7 °C)  Heart Rate: 87  Resp: 16  BP: 142/83  SpO2: 99 %  Temp src: Oral  Heart Rate Source: Monitor  Patient Position: Lying  BP Location: Left arm  FiO2 (%): n/a    The patient's vitals were reviewed by me.  Unless otherwise noted they are within normal limits.     HENT:   Head: Normocephalic and atraumatic.   Right Ear: External ear normal.   Left Ear: External ear normal.   Nose: Nose normal.   Mouth/Throat: Oropharynx is clear and moist.   Eyes: Pupils are equal, round, and reactive to light. Conjunctivae and EOM are normal. Right eye exhibits no discharge. Left eye exhibits no discharge. No scleral icterus.   Neck: Normal range of motion. Neck supple. No JVD present. No tracheal deviation present. No thyromegaly present.   No carotid bruits   Cardiovascular: Normal rate, regular rhythm, normal heart sounds and intact distal pulses.  Exam reveals no gallop and no friction rub.    No murmur heard.  Pulmonary/Chest: Effort normal and breath sounds normal. No stridor. No respiratory distress. She has no wheezes. She has no rales. She exhibits no tenderness.   Abdominal: Soft. Bowel sounds are normal. She exhibits no distension and no mass. There  is no tenderness. There is no rebound and no guarding. No hernia.   Musculoskeletal: Normal range of motion. She exhibits no edema or deformity.   Lymphadenopathy:     She has no cervical adenopathy.   Neurological: She is alert and oriented to person, place, and time.   Skin: Skin is warm and dry. No rash noted. She is not diaphoretic. No erythema. No pallor.   Psychiatric: Her behavior is normal.   Nursing note and vitals reviewed.      Procedural Sedation  Date/Time: 9/7/2018 1:12 AM  Performed by: AUDRA KUHN  Authorized by: AUDRA KUHN                  ED Course  ED Course as of Sep 07 0111   Fri Sep 07, 2018   0041 The laboratory values were reviewed by me.  The d-dimer is elevated at 0.98.  The white blood cell count is 12.1.  The laboratory values are otherwise unremarkable.  [RC]      ED Course User Index  [RC] Audra Kuhn MD      12:08 AM, 09/07/18:  The EKG was obtained at 0006.  EKG was read by me at 0008.  EKG shows a normal sinus rhythm with rate of 85.  There is a normal axis with no hypertrophy.  The NM and QT intervals are unremarkable.  The QRS is prolonged at 113 ms.  There is incomplete right bundle branch block with no hypertrophy.  There is no ectopy.  There is no acute ST elevation or depression.  The EKG was compared to an EKG dated August 20, 2018 and is essentially unchanged    12:23 AM, 09/07/18:  The chest x-ray was contemporaneously reviewed by me.  There is no active disease.    12:35 AM, 09/07/18:  The patient's pain is now 2/10 after the GI cocktail.    1:29 AM, 09/07/18:  The CT angiogram the chest shows no PE.  The lungs are clear.  Esophagus is slightly dilated and filled with fluid.  Patient has gastric banding device.  Otherwise negative.    1:29 AM, 09/07/18:  Patient was reassessed.  She feels better.  She tolerated clear liquids.  Her vital signs were reviewed and are stable.  Abdominal exam: Soft nontender no masses positive bowel sounds.  1:30 AM,  09/07/18:  The patient's diagnosis of GERD and esophagitis and atypical chest pain were discussed with her.  The patient will be given a prescription for Carafate.  She has been advised to avoid spicy and fatty foods.  Patient should follow-up with Dr. Young within one week.  She should return to the emergency department if she has worsening pain, shortness of breath, worse in any way at all.  All the patient's questions were answered she will be discharged in good condition          MDM    No orders to display     Labs Reviewed - No data to display  Xr Knee 3 View Right    Result Date: 8/22/2018  Impression: Ordering physician's impression is located in the Encounter Note dated 08/22/18. X-ray performed in the DR room.       Final diagnoses:   Gastroesophageal reflux disease with esophagitis   Atypical chest pain         ED Medications:  Medications - No data to display    New Medications:     Medication List      ASK your doctor about these medications    Black Pepper-Turmeric 3-500 MG capsule     fluticasone 50 MCG/ACT nasal spray  Commonly known as:  FLONASE  2 sprays into each nostril Daily.     omeprazole 40 MG capsule  Commonly known as:  PRILOSEC  Take 1 capsule by mouth Daily.     rivaroxaban 20 MG tablet  Commonly known as:  XARELTO  Take 1 tablet by mouth Daily With Dinner.          Stopped Medications:     Medication List      ASK your doctor about these medications    Black Pepper-Turmeric 3-500 MG capsule     fluticasone 50 MCG/ACT nasal spray  Commonly known as:  FLONASE  2 sprays into each nostril Daily.     omeprazole 40 MG capsule  Commonly known as:  PRILOSEC  Take 1 capsule by mouth Daily.     rivaroxaban 20 MG tablet  Commonly known as:  XARELTO  Take 1 tablet by mouth Daily With Dinner.            Final diagnoses:   Gastroesophageal reflux disease with esophagitis   Atypical chest pain            Abhishek Gonzalez MD  09/07/18 0131       Abhishek Gonzalez MD  09/07/18 0134

## 2018-09-07 NOTE — DISCHARGE INSTRUCTIONS
Avoid spicy and fatty foods.  Follow-up with Dr. Young within one week.  Return to emergency department if there is worsening pain, shortness of breath, worse in any way at all.

## 2018-09-14 ENCOUNTER — OFFICE VISIT (OUTPATIENT)
Dept: BARIATRICS/WEIGHT MGMT | Facility: CLINIC | Age: 58
End: 2018-09-14

## 2018-09-14 VITALS
TEMPERATURE: 97.8 F | RESPIRATION RATE: 16 BRPM | DIASTOLIC BLOOD PRESSURE: 81 MMHG | HEIGHT: 64 IN | HEART RATE: 95 BPM | WEIGHT: 236.5 LBS | SYSTOLIC BLOOD PRESSURE: 120 MMHG | BODY MASS INDEX: 40.37 KG/M2

## 2018-09-14 DIAGNOSIS — R10.13 EPIGASTRIC PAIN: ICD-10-CM

## 2018-09-14 DIAGNOSIS — Z86.718 HISTORY OF DVT (DEEP VEIN THROMBOSIS): ICD-10-CM

## 2018-09-14 DIAGNOSIS — G47.33 OBSTRUCTIVE SLEEP APNEA, ADULT: ICD-10-CM

## 2018-09-14 DIAGNOSIS — Z71.3 DIETARY COUNSELING: ICD-10-CM

## 2018-09-14 DIAGNOSIS — E66.01 OBESITY, CLASS III, BMI 40-49.9 (MORBID OBESITY) (HCC): Primary | ICD-10-CM

## 2018-09-14 DIAGNOSIS — Z98.84 HISTORY OF LAPAROSCOPIC ADJUSTABLE GASTRIC BANDING: ICD-10-CM

## 2018-09-14 DIAGNOSIS — K21.9 GASTROESOPHAGEAL REFLUX DISEASE, ESOPHAGITIS PRESENCE NOT SPECIFIED: ICD-10-CM

## 2018-09-14 PROCEDURE — 99213 OFFICE O/P EST LOW 20 MIN: CPT | Performed by: SURGERY

## 2018-09-14 NOTE — PROGRESS NOTES
[facility]  [address]  [facility] [address]  [phone]  [address]  [facility] [id number] [phone]  Dept: [phone]  9/14/2018      Zulema Sousa.  [id number]  [id number]  1960  female      Chief Complaint   Patient presents with   • Follow-up     AGB follow up        Post-Op Bariatric Surgery:   Zulema Sousa is status post Lapband procedure, performed on 8/9/13 with 1.5mls by Dr Olsen    HPI:   Today's weight is 107 kg (236 lb 8 oz) pounds, today's BMI is Body mass index is 40.57 kg/m². and she has a loss of 55 pounds since the last visit. The patient reports experiencing hunger, but decreased hunger and loss of appetite.     Zulema Sousa denies abdominal pain. The patientdoes not have vomiting or regurgitation. The patientdoes have heartburn/reflux.    58 year female status post LAP-BAND 2013 who is doing well after last adjustment March 2017 in which 1.5 cc was added to her band.  She did have all the fluid removed prior to that.  Last week she ate steak and has started having epigastric abdominal pain and since she has history of cardiac tamponade not in pulmonary edema she went to the emergency room for further evaluation.  Cardiac and pulmonary workup was negative.  She was noted to have fluid filled and dilated esophagus on CT.  Patient now returns for follow-up.  Patient states that she has no dysphagia with solids but only at one time after eating steak but no vomiting.    Patient states their portion size is the small plate and their hunger is appropriate.    Diet and Exercise: Diet history reviewed and discussed with the patient. Weight loss/gains to date discussed with the patient. She reports eating 3 meals per day, a typical portion size of 1 cup, eating 1 snack per day, drinking 5 8-oz. glasses of water per day. The patient can tolerate solid protein.   The patient is eating protein  first. The patient is limiting food volume. The patient is not taking vitamins. The patient is limiting snacking. The patient is regular exercise. She is not drinking carbonated beverages.     The following portions of the patient's history were reviewed and updated as appropriate: allergies, current medications, past family history, past medical history, past social history, past surgical history and problem list.    Review of Systems   Gastrointestinal: Positive for constipation.   Musculoskeletal: Positive for arthralgias.   All other systems reviewed and are negative.      Vitals:    09/14/18 0839   BP: 120/81   Pulse: 95   Resp: 16   Temp: 97.8 °F (36.6 °C)       Physical Exam   Constitutional: She is oriented to person, place, and time. She appears well-nourished.   HENT:   Head: Normocephalic and atraumatic.   Mouth/Throat: Oropharynx is clear and moist.   Eyes: Pupils are equal, round, and reactive to light. Conjunctivae and EOM are normal. No scleral icterus.   Neck: Normal range of motion. Neck supple. No thyromegaly present.   Cardiovascular: Normal rate and regular rhythm.    Pulmonary/Chest: Effort normal and breath sounds normal.   Abdominal: Soft. Bowel sounds are normal. She exhibits no distension and no mass. There is no tenderness. There is no rebound and no guarding. No hernia.   Musculoskeletal: Normal range of motion.   Lymphadenopathy:     She has no cervical adenopathy.   Neurological: She is alert and oriented to person, place, and time. No cranial nerve deficit. Coordination normal.   Skin: Skin is warm and dry. No erythema.   Psychiatric: She has a normal mood and affect. Her behavior is normal.   Vitals reviewed.        Assessment: Post-operatively the patient status post LAP-BAND 2013 with 1.5 cc in her band.  She denies any dysphagia.  She has not had any complaints except last week with the epigastric pain after eating steak.  Cardiac and pulmonary workup were negative.  CT scan did  reveal dilated esophagus with fluid filled.  Patient did not want to have any fluid removed.  We did agreed to proceed with upper GI to further evaluation of the LAP-BAND and esophagus.  We'll follow-up after the upper GI.  We did discuss cleaning eating and strength training with her exercise routine..     Encounter Diagnoses   Name Primary?   • Obesity, Class III, BMI 40-49.9 (morbid obesity) (CMS/[facility]) Yes   • History of DVT (deep vein thrombosis)    • Dietary counseling    • Obstructive sleep apnea, adult    • Gastroesophageal reflux disease, esophagitis presence not specified    • Epigastric pain    • History of laparoscopic adjustable gastric banding        Plan:      No adjustment today as patient has ideal level of restriction. RTC for n/v/d/regurg. Encouraged patient to be sure to eat plenty of dense lean protein and high fiber foods like vegetables and fresh fruits while reducing simple carbohydrates. Reviewed the importance of eating solid foods vs. soft which will contribute to weight gain. Discussed the recommended amount of water to intake daily- half of body weight in ounces. Not drinking with or right after meals.    Activity restrictions: None.   Instructions / Recommendations: dietary counseling recommended, recommended a daily protein intake of  grams, patient was advised that the lap band system works best when consuming solid foods, vitamin supplement(s) recommended, recommended exercising at least 150 minutes per week inclduing both cardio and strength training, behavior modifications recommended and instructed to call the office for concerns, questions, or problems.     The patient was instructed to follow up in 2-3 wks      The patient was counseled regarding. Total time spent face to face was 25 minutes and 20 minutes was spent counseling.

## 2018-09-26 ENCOUNTER — APPOINTMENT (OUTPATIENT)
Dept: GENERAL RADIOLOGY | Facility: HOSPITAL | Age: 58
End: 2018-09-26
Attending: SURGERY

## 2018-09-28 ENCOUNTER — TELEPHONE (OUTPATIENT)
Dept: INTERNAL MEDICINE | Facility: CLINIC | Age: 58
End: 2018-09-28

## 2018-09-28 ENCOUNTER — OFFICE VISIT (OUTPATIENT)
Dept: INTERNAL MEDICINE | Facility: CLINIC | Age: 58
End: 2018-09-28

## 2018-09-28 VITALS
HEIGHT: 64 IN | BODY MASS INDEX: 40.12 KG/M2 | DIASTOLIC BLOOD PRESSURE: 76 MMHG | HEART RATE: 74 BPM | RESPIRATION RATE: 16 BRPM | OXYGEN SATURATION: 98 % | SYSTOLIC BLOOD PRESSURE: 114 MMHG | TEMPERATURE: 98.2 F | WEIGHT: 235 LBS

## 2018-09-28 DIAGNOSIS — R68.89 FULLNESS IN HEAD: Primary | ICD-10-CM

## 2018-09-28 DIAGNOSIS — R26.9 ABNORMAL GAIT: ICD-10-CM

## 2018-09-28 DIAGNOSIS — H69.83 DYSFUNCTION OF BOTH EUSTACHIAN TUBES: ICD-10-CM

## 2018-09-28 PROCEDURE — 99214 OFFICE O/P EST MOD 30 MIN: CPT | Performed by: INTERNAL MEDICINE

## 2018-09-28 NOTE — PROGRESS NOTES
"      Zulema Sousa is a 58 y.o. female, who presents with a chief complaint of   Chief Complaint   Patient presents with   • Gait Problem     wednesdy after getting mail, \"cloudy\" feeling        59 yo F with h/o PE's on Xarelto here for acute visit. Wednesday during the day, she started \"stumbling\" while walking back from the mail box. She leaned on the pool and then walked into the house and sit down. Felt better after just sitting. No vision changes, no weakness, no palpitations or CP. Her head does still feel \"foggy\". No headache. She does have sinus pressure. Today she had a hot flash that went away   on it's own. No n/v/d. Her ears do have some ringing and fullness.     She was seen in ER on 9/7 with CP and had normal labs besides elevated d-dimer and had CT chest concerning for obstruction in the distal esophagus. Started carafate which has helped. She is more stressed. She has not been drinking enough fluids.          The following portions of the patient's history were reviewed and updated as appropriate: allergies, current medications, past family history, past medical history, past social history, past surgical history and problem list.    Allergies: Lortab [hydrocodone-acetaminophen]; Nsaids; and Tramadol    Current Outpatient Prescriptions:   •  Black Pepper-Turmeric 3-500 MG capsule, Take  by mouth Daily., Disp: , Rfl:   •  fluticasone (FLONASE) 50 MCG/ACT nasal spray, 2 sprays into each nostril Daily., Disp: 16 g, Rfl: 5  •  omeprazole (PRILOSEC) 40 MG capsule, Take 1 capsule by mouth Daily., Disp: 90 capsule, Rfl: 1  •  rivaroxaban (XARELTO) 20 MG tablet, Take 1 tablet by mouth Daily With Dinner., Disp: 90 tablet, Rfl: 3  •  sucralfate (CARAFATE) 1 GM/10ML suspension, Take 10 mL by mouth 4 (Four) Times a Day., Disp: 420 mL, Rfl: 0  There are no discontinued medications.    Review of Systems   Constitutional: Positive for fatigue. Negative for chills and fever.   HENT: Positive for ear pain (ear " "fullness) and sinus pressure. Negative for congestion and ear discharge.    Respiratory: Negative for cough and shortness of breath.    Cardiovascular: Negative for chest pain.   Gastrointestinal: Negative for abdominal pain, diarrhea, nausea, rectal pain and vomiting.   Endocrine: Positive for heat intolerance.   Skin: Negative for rash.   Neurological: Positive for dizziness and light-headedness.             /76 (BP Location: Left arm, Patient Position: Sitting, Cuff Size: Large Adult)   Pulse 74   Temp 98.2 °F (36.8 °C) (Oral)   Resp 16   Ht 162.6 cm (64.02\")   Wt 107 kg (235 lb)   SpO2 98%   BMI 40.32 kg/m²       Physical Exam   Constitutional: She is oriented to person, place, and time. She appears well-developed and well-nourished. No distress.   HENT:   Head: Normocephalic and atraumatic.   Right Ear: Hearing, external ear and ear canal normal. A middle ear effusion is present.   Left Ear: Hearing, external ear and ear canal normal. A middle ear effusion is present.   Nose: Nose normal.   Mouth/Throat: Uvula is midline and mucous membranes are normal. Posterior oropharyngeal erythema present. No oropharyngeal exudate or posterior oropharyngeal edema. Tonsils are 0 on the right. Tonsils are 0 on the left. No tonsillar exudate.   Eyes: Conjunctivae are normal. Right eye exhibits no discharge. Left eye exhibits no discharge. No scleral icterus.   Neck: Neck supple.   Cardiovascular: Normal rate, regular rhythm and normal heart sounds.  Exam reveals no gallop and no friction rub.    No murmur heard.  Pulmonary/Chest: Effort normal and breath sounds normal. No respiratory distress. She has no wheezes. She has no rales.   Abdominal: Soft. Bowel sounds are normal. She exhibits no distension and no mass. There is no tenderness. There is no guarding.   Lap band palpated and non-tender    Lymphadenopathy:     She has no cervical adenopathy.   Neurological: She is alert and oriented to person, place, and " time. She exhibits normal muscle tone. Coordination normal.   Skin: Skin is warm. Capillary refill takes less than 2 seconds. No rash noted.   Psychiatric: She has a normal mood and affect. Her behavior is normal.   Nursing note and vitals reviewed.        Results for orders placed or performed during the hospital encounter of 09/07/18   Comprehensive Metabolic Panel   Result Value Ref Range    Glucose 99 65 - 99 mg/dL    BUN 15 6 - 20 mg/dL    Creatinine 0.62 0.57 - 1.00 mg/dL    Sodium 138 136 - 145 mmol/L    Potassium 3.6 3.5 - 5.2 mmol/L    Chloride 100 98 - 107 mmol/L    CO2 26.0 22.0 - 29.0 mmol/L    Calcium 10.0 8.6 - 10.5 mg/dL    Total Protein 6.9 6.0 - 8.5 g/dL    Albumin 3.70 3.50 - 5.20 g/dL    ALT (SGPT) 11 5 - 33 U/L    AST (SGOT) 15 5 - 32 U/L    Alkaline Phosphatase 75 40 - 129 U/L    Total Bilirubin <0.2 (L) 0.2 - 1.2 mg/dL    eGFR Non African Amer 99 >60 mL/min/1.73    Globulin 3.2 gm/dL    A/G Ratio 1.2 g/dL    BUN/Creatinine Ratio 24.2 7.0 - 25.0    Anion Gap 12.0 mmol/L   D-dimer, Quantitative   Result Value Ref Range    D-Dimer, Quantitative 0.98 (H) 0.00 - 0.46 MCGFEU/mL   Troponin   Result Value Ref Range    Troponin T <0.010 0.000 - 0.030 ng/mL   CBC Auto Differential   Result Value Ref Range    WBC 12.10 (H) 4.80 - 10.80 10*3/mm3    RBC 4.42 4.20 - 5.40 10*6/mm3    Hemoglobin 12.6 12.0 - 16.0 g/dL    Hematocrit 39.8 37.0 - 47.0 %    MCV 90.0 81.0 - 99.0 fL    MCH 28.5 27.0 - 31.0 pg    MCHC 31.7 31.0 - 37.0 g/dL    RDW 16.2 (H) 11.5 - 14.5 %    RDW-SD 53.5 37.0 - 54.0 fl    MPV 9.5 7.4 - 10.4 fL    Platelets 293 140 - 500 10*3/mm3    Neutrophil % 61.0 45.0 - 70.0 %    Lymphocyte % 30.3 20.0 - 45.0 %    Monocyte % 7.0 3.0 - 8.0 %    Eosinophil % 1.3 0.0 - 4.0 %    Basophil % 0.2 0.0 - 2.0 %    Immature Grans % 0.2 0.0 - 0.5 %    Neutrophils, Absolute 7.37 1.50 - 8.30 10*3/mm3    Lymphocytes, Absolute 3.67 0.60 - 4.80 10*3/mm3    Monocytes, Absolute 0.85 0.00 - 1.00 10*3/mm3    Eosinophils,  Absolute 0.16 0.10 - 0.30 10*3/mm3    Basophils, Absolute 0.02 0.00 - 0.20 10*3/mm3    Immature Grans, Absolute 0.03 0.00 - 0.03 10*3/mm3    nRBC 0.0 0.0 - 0.0 /100 WBC           Zulema was seen today for gait problem.    Diagnoses and all orders for this visit:    Fullness in head    Abnormal gait    Dysfunction of both eustachian tubes    She does seem to be getting better and no more staggering that was short lived. I think that this may be either orthostatic hypotension or some ETD causing her dizziness. I reviewed labs and CT scan from 9/7 in ER.     She is going to continue flonase and push fluids and call us if symptoms return. At that point, if still having dizziness and abnormal gait (now resolved), would pursue MRI of brain and carotid u/s.     Spent 50% of 25 minutes in counseling regarding symptoms, management and further workup if needed       Return if symptoms worsen or fail to improve.    Effie Devine MD  09/28/2018

## 2018-09-28 NOTE — TELEPHONE ENCOUNTER
----- Message from Nathalie Casanova MA sent at 9/28/2018  3:41 PM EDT -----  Regarding: FW: lightheaded/dizziness      ----- Message -----  From: Marsha Young MD  Sent: 9/28/2018   1:53 PM  To: Ena Lewis MA  Subject: RE: lightheaded/dizziness                        I think pt seeing mac now  ----- Message -----  From: Ena Lewis MA  Sent: 9/27/2018   4:01 PM  To: Marsha Young MD, Eda Wright MA  Subject: FW: lightheaded/dizziness                            ----- Message -----  From: Amee Galdamez  Sent: 9/27/2018  12:20 PM  To: Usman Young Clinical Pool  Subject: lightheaded/dizziness                            enrique    Patient was walking to get mail yesterday, 9/26/18 and when returning to house became lightheaded and dizzy.  Had to hold on to concrete wall to keep steady.      PCP not in on Thursday and currently only Acute slots open on Friday.  Asked patient to call first thing on Friday morning to see about getting an acute with Dr. Devine in between pt's work hours.    Patient works til 12N and back to work gain at 4pm.

## 2018-09-28 NOTE — TELEPHONE ENCOUNTER
Patient scheduled with Dr. Devine.  ----- Message from Ena Lewis MA sent at 9/27/2018  4:01 PM EDT -----  Regarding: FW: lightheaded/dizziness      ----- Message -----  From: Amee Galdamez  Sent: 9/27/2018  12:20 PM  To: Usman Young Clinical Pool  Subject: lightheaded/dizziness                            enrique    Patient was walking to get mail yesterday, 9/26/18 and when returning to house became lightheaded and dizzy.  Had to hold on to concrete wall to keep steady.      PCP not in on Thursday and currently only Acute slots open on Friday.  Asked patient to call first thing on Friday morning to see about getting an acute with Dr. Devine in between pt's work hours.    Patient works til 12N and back to work gain at 4pm.

## 2018-10-08 ENCOUNTER — HOSPITAL ENCOUNTER (OUTPATIENT)
Dept: GENERAL RADIOLOGY | Facility: HOSPITAL | Age: 58
Discharge: HOME | End: 2018-10-08
Attending: SURGERY | Admitting: SURGERY

## 2018-10-08 PROCEDURE — 74241: CPT

## 2018-10-08 RX ADMIN — BARIUM SULFATE 60 ML: 960 POWDER, FOR SUSPENSION ORAL at 08:47

## 2018-10-17 ENCOUNTER — OFFICE VISIT (OUTPATIENT)
Dept: BARIATRICS/WEIGHT MGMT | Facility: CLINIC | Age: 58
End: 2018-10-17

## 2018-10-17 VITALS
TEMPERATURE: 98.3 F | HEIGHT: 64 IN | HEART RATE: 76 BPM | DIASTOLIC BLOOD PRESSURE: 70 MMHG | WEIGHT: 230.5 LBS | SYSTOLIC BLOOD PRESSURE: 114 MMHG | BODY MASS INDEX: 39.35 KG/M2 | RESPIRATION RATE: 16 BRPM

## 2018-10-17 DIAGNOSIS — K21.9 GASTROESOPHAGEAL REFLUX DISEASE, ESOPHAGITIS PRESENCE NOT SPECIFIED: ICD-10-CM

## 2018-10-17 DIAGNOSIS — Z71.3 DIETARY COUNSELING: ICD-10-CM

## 2018-10-17 DIAGNOSIS — Z98.84 HISTORY OF LAPAROSCOPIC ADJUSTABLE GASTRIC BANDING: ICD-10-CM

## 2018-10-17 DIAGNOSIS — K22.89 ESOPHAGEAL DILATATION: ICD-10-CM

## 2018-10-17 DIAGNOSIS — E66.01 OBESITY, CLASS III, BMI 40-49.9 (MORBID OBESITY) (HCC): Primary | ICD-10-CM

## 2018-10-17 DIAGNOSIS — R93.3 ABNORMAL UGI SERIES: ICD-10-CM

## 2018-10-17 PROCEDURE — 99214 OFFICE O/P EST MOD 30 MIN: CPT | Performed by: SURGERY

## 2018-10-17 NOTE — PROGRESS NOTES
[facility] SURGERY  [address]  [facility] [address] [phone]  [address]  [facility] [address] [phone]  Dept: [phone]  10/17/2018      Zulema Sousa.  [id number]  [id number]  1960  female      Chief Complaint   Patient presents with   • Follow-up     band follow up       BH Post-Op Bariatric Surgery:   Zulema Sousa is status post Lapband procedure reg, performed on 2003.     HPI:   Today's weight is 105 kg (230 lb 8 oz)  pounds, today's BMI is Body mass index is 39.55 kg/m². and she has a loss of 6 pounds since the last visit. The patient reports decreased hunger and  loss of appetite.     Patient admits to nausea and dysphagia. The patient denies abdominal pain. The patientdoes not have vomitng. The patientdoes have reflux.    Diet and Exercise: Diet history reviewed and discussed with the patient. Weight loss/gains to date discussed with the patient. She reports eating 3 meals per day, a typical portion size of 1 cup, eating 2 snack per day, drinking 3 8-oz. glasses of water per day. The patient can tolerate solid protein.   The patient is eating protein first. The patient is limiting food volume. The patient is not taking vitamins. The patient is limiting snacking. The patient is exercising regularly. She is not drinking carbonated beverages.     The following portions of the patient's history were reviewed and updated as appropriate: allergies, current medications, past family history, past medical history, past social history, past surgical history and problem list.    Vitals:    10/17/18 0946   BP: 114/70   Pulse: 76   Resp: 16   Temp: 98.3 °F (36.8 °C)       Review of Systems   Respiratory: Positive for cough.    Musculoskeletal: Positive for arthralgias.   All other systems reviewed and are negative.      Physical Exam   Constitutional: She is oriented to person, place, and time. She appears  well-nourished.   HENT:   Head: Normocephalic and atraumatic.   Mouth/Throat: Oropharynx is clear and moist.   Eyes: Pupils are equal, round, and reactive to light. Conjunctivae and EOM are normal. No scleral icterus.   Neck: Normal range of motion. Neck supple. No thyromegaly present.   Cardiovascular: Normal rate and regular rhythm.    Pulmonary/Chest: Effort normal and breath sounds normal.   Abdominal: Soft. Bowel sounds are normal. She exhibits no distension. There is no tenderness. There is no rebound. No hernia.   Musculoskeletal: Normal range of motion.   Lymphadenopathy:     She has no cervical adenopathy.   Neurological: She is alert and oriented to person, place, and time. No cranial nerve deficit. Coordination normal.   Skin: Skin is warm and dry. No erythema.   Psychiatric: She has a normal mood and affect. Her behavior is normal.   Vitals reviewed.        Assessment: Post-operatively the patient is post LAP-BAND 2003 who transferred her care to us with 1.5 cc in her regular band.  Patient presented to the emergency room early with CT scan revealing dilated esophagus with minimal fluid going through the band after eating steak which she thinks got stuck.  We then follow-up in the office after that and then ordered an upper GI.  The upper GI does show esophageal and pouch dilatation.  All of the fluid was removed as below.  We'll plan for repeat upper GI and about 4 months.  She'll continue with her PPI.  She needs to have a colonoscopy schedule so she will talk with her gastroenterologist and plan for upper and lower endoscopy at the same time to rule out esophagitis.    Encounter Diagnoses   Name Primary?   • Obesity, Class III, BMI 40-49.9 (morbid obesity) (CMS/HCC) Yes   • History of laparoscopic adjustable gastric banding    • Dietary counseling    • Abnormal UGI series    • Esophageal dilatation    • Gastroesophageal reflux disease, esophagitis presence not specified        Plan:    My impression  is Zulema Sousa has too much restriction of her band.  I think she would benefit from fluid removal from her band.  Under aseptic conditions, I accessed the port and removed 1.0cc to bring the total band volume to 0cc.  She was able to tolerate a glass of water at the conclusion of the fill.  She was advised to consume warm liquids for today and then soft solids for 24 hours before advancing back to a regular diet.   RTC for n/v/d/regurg.  Approximately 25 minutes was spent with the patient in over 20 minutes spent counseling.    Reviewed the importance of being able to tolerate dense/solid protein and vegetables for weight loss. Discussed eating foods that are easier to tolerate, softer foods, usually contribute to weight gain because they are higher calorie/carb and will not keep patient full for the recommended 3-4 hours.      She should follow-up in 4 months.      UGI reviewed with patient and new upper GI ordered    Activity restrictions: None.   Instructions / Recommendations: dietary counseling recommended, recommended a daily protein intake of  grams, patient was advised that the lap band system works best when consuming solid foods, vitamin supplement(s) recommended, recommended exercising at least 150 minutes per week, behavior modifications recommended and instructed to call the office for concerns, questions, or problems.

## 2018-10-19 ENCOUNTER — OFFICE VISIT (OUTPATIENT)
Dept: SLEEP MEDICINE | Facility: HOSPITAL | Age: 58
End: 2018-10-19
Attending: INTERNAL MEDICINE

## 2018-10-19 VITALS
DIASTOLIC BLOOD PRESSURE: 60 MMHG | BODY MASS INDEX: 38.96 KG/M2 | WEIGHT: 228.2 LBS | HEART RATE: 71 BPM | HEIGHT: 64 IN | SYSTOLIC BLOOD PRESSURE: 109 MMHG | OXYGEN SATURATION: 96 %

## 2018-10-19 DIAGNOSIS — G47.33 OBSTRUCTIVE SLEEP APNEA, ADULT: Primary | ICD-10-CM

## 2018-10-19 DIAGNOSIS — E66.01 OBESITY, MORBID, BMI 40.0-49.9 (HCC): ICD-10-CM

## 2018-10-19 PROCEDURE — G0463 HOSPITAL OUTPT CLINIC VISIT: HCPCS

## 2018-10-19 NOTE — PROGRESS NOTES
[facility] SLEEP MEDICINE  [address] [facility]  3rd Floor  [facility] [address]  [phone]    PCP: Marsha Young MD    Reason for visit:  Sleep disorders: JOSHUA    Zulema is a 58 y.o.female who was seen in the [facility] today.  She has done well on the CPAP device. Her mask is fitting well but irritating nasal bridge. She sleeps from 12mn to 6a. Wakes up rested. We reduced CPAP to 10 cms and she is tolerating. She is not sleepy during day. She is using FFM and it fits well. She denies air leaks. She has lost additional 30 lbs. She is having esophagitis and EGD planned. She has a lap-band also.    [place] Sleepiness Scale is 3. Caffeine 2 per day. Alcohol 0 per week.    Zulema  reports that she quit smoking about 3 years ago. She has never used smokeless tobacco.    Pertinent Positive Review of Systems of PND, cough  Rest of Review of Systems was negative as recorded in Sleep Questionnaire.    Patient  has a past medical history of Acid reflux; Back pain; Cardiac tamponade; Difficulty breathing; Edema; Esophagitis, reflux; Essential hypertriglyceridemia; GERD (gastroesophageal reflux disease); Health care maintenance; Heartburn; Hyperlipidemia; Hypertriglyceridemia; Increased appetite; Morbid obesity (CMS/HCC); Multiple joint pain; Osteoarthritis of knee; Pericarditis; Pulmonary embolism (CMS/HCC); RHA (rheumatoid arthritis) (CMS/HCC); Sciatica; and Unintended weight gain.     Current Medications:    Current Outpatient Prescriptions:   •  Black Pepper-Turmeric 3-500 MG capsule, Take  by mouth Daily., Disp: , Rfl:   •  fluticasone (FLONASE) 50 MCG/ACT nasal spray, 2 sprays into each nostril Daily., Disp: 16 g, Rfl: 5  •  omeprazole (PRILOSEC) 40 MG capsule, Take 1 capsule by mouth Daily., Disp: 90 capsule, Rfl: 1  •  rivaroxaban (XARELTO) 20 MG tablet, Take 1 tablet by mouth Daily With Dinner., Disp: 90 tablet, Rfl: 3  •  sucralfate (CARAFATE) 1 GM/10ML suspension, Take 10 mL by  "mouth 4 (Four) Times a Day., Disp: 420 mL, Rfl: 0   also entered in Sleep Questionnaire         Vital Signs: /60 (BP Location: Left arm, Patient Position: Sitting)   Pulse 71   Ht 162.6 cm (64\")   Wt 104 kg (228 lb 3.2 oz)   SpO2 96%   BMI 39.17 kg/m²     Body mass index is 39.17 kg/m².       Tongue: normal      Dentition: good       Pharynx: Posterior pharyngeal pillars are wide   Mallampatti: II (hard and soft palate, upper portion of tonsils anduvula visible)        General: Alert. Cooperative. Well developed. No acute distress.             Head:  Normocephalic. Symmetrical. Atraumatic.              Nose: No septal deviation. No drainage.          Throat: No oral lesions. No thrush. Moist mucous membranes.    Chest Wall:  Normal shape. Symmetric expansion with respiration. No tenderness.             Neck:  Trachea midline.           Lungs:  Clear to auscultation bilaterally. No wheezes. No rhonchi. No rales. Respirations regular, even and unlabored.            Heart:  Regular rhythm and normal rate. Normal S1 and S2. No murmur.     Abdomen:  Soft, non-tender and non-distended. Normal bowel sounds. No masses.  Extremities:  Moves all extremities well. No edema.    Psychiatric: Normal mood and affect.    Study:  3/22/17- overnight split polysomnogram study.  Diagnostic portion from 10:16 PM to 12:31 AM.  Sleep efficiency 88% with 1.98 hours total sleep time.  Sleep distribution showed decreased REM sleep at 4.6%.  Apnea hypopneas index 47.9 indicating very severe obstructive sleep apnea.  In 43 minutes of supine sleep index was even higher at 115.  In 5 minutes of REM sleep index was 130.  Oxygen saturation fell below 89% for over 9 minutes or over 6% of total sleep time.  Arousal index is 40.  PLM index is not increased.  Snoring for 40% of sleep time.    Following above titration study from 12:31 AM to 5:48 AM.  Sleep efficiency 83%.  Rebound and slow-wave sleep to 31% rebound in rem sleep to 22%.  " Apnea hypopneas index improved even with that her REM sleep.  Maximum CPAP of 16 cm with supine position.  REM sleep was achieved.  Apneas hypopneas were completely corrected even and REM sleep at 16 cm water pressure.    Testing:  · 9/19/18-10/18/18, 73% use with average nightly use of 6 hours and 6 min on CPAP 10cm with AHI 4.4, average leak of 1 min and 38 sec.    [facility] [employer]: Carl    Impression:  1. Obstructive sleep apnea, adult    2. Obesity, morbid, BMI 40.0-49.9 (CMS/[facility])        Plan:  Zulema is doing well on the CPAP device and remains compliant. Will get gecko-pads for nasal bridge irritation. She has managed additional 30 lbs weight loss and was congratulated.    I reiterated the importance of effective treatment of obstructive sleep apnea with PAP machine.  Cardiovascular health risks of untreated sleep apnea were again reviewed.  Patient was asked to remain cautious if there is persistent hypersomnolence. The benefit of weight loss in reducing severity of obstructive sleep apnea was discussed.  Patient would benefit from adhering to a strict diet to achieve ideal BMI.     Change of PAP supplies regularly is important for effective use.  Change of cushion on the mask or plugs on nasal pillows along with disposable filters once every month and change of mask frame, tubing, headgear and Velcro straps every 6 months at the minimum was reiterated.    This patient is compliant with PAP machine and benefits from its use.  Apnea hypopneas index is corrected/improved.  Daytime hypersomnolence has resolved.     Patient will follow up in this clinic in 1 year APRN.    Thank you for allowing me to participate in your patient's care.    Mukesh Franco MD    Part of this note may be an electronic transcription/translation of spoken language to printed text using the Dragon Dictation System.

## 2018-11-12 ENCOUNTER — OFFICE VISIT (OUTPATIENT)
Dept: GASTROENTEROLOGY | Facility: CLINIC | Age: 58
End: 2018-11-12

## 2018-11-12 VITALS
HEIGHT: 64 IN | DIASTOLIC BLOOD PRESSURE: 80 MMHG | TEMPERATURE: 98.2 F | SYSTOLIC BLOOD PRESSURE: 118 MMHG | WEIGHT: 231.2 LBS | BODY MASS INDEX: 39.47 KG/M2

## 2018-11-12 DIAGNOSIS — R93.89 ABNORMAL FINDING ON RADIOLOGY EXAM: Primary | ICD-10-CM

## 2018-11-12 PROCEDURE — 99204 OFFICE O/P NEW MOD 45 MIN: CPT | Performed by: INTERNAL MEDICINE

## 2018-11-12 NOTE — PROGRESS NOTES
Chief Complaint   Patient presents with   • Esophagitis       Zulema Sousa is a 58 y.o. female who presents with abnormal imaging, due for screening colonoscopy    58-year-old female with history of lap band procedure presents with occasional GERD, bloating and indigestion.  Upper GI series was ordered showing likely esophagitis.  She is due for screening colonoscopy this time.  She's had no rectal bleeding, vomiting or weight loss        Past Medical History:   Diagnosis Date   • Acid reflux    • Back pain    • Cardiac tamponade     2015   • Difficulty breathing     during exertion   • Edema    • Esophagitis, reflux    • Essential hypertriglyceridemia    • GERD (gastroesophageal reflux disease)    • Health care maintenance    • Heartburn    • Hyperlipidemia    • Hypertriglyceridemia    • Increased appetite    • Morbid obesity (CMS/HCC)    • Multiple joint pain    • Osteoarthritis of knee    • Pericarditis    • Pulmonary embolism (CMS/HCC)     2015   • RHA (rheumatoid arthritis) (CMS/HCC)    • Sciatica    • Unintended weight gain        Past Surgical History:   Procedure Laterality Date   • ANKLE SURGERY      treatment of ankle fracture   • APPENDECTOMY     • CARDIAC SURGERY      cardiac tamponade   •  SECTION     • CHOLECYSTECTOMY     • DILATATION AND CURETTAGE     • LAPAROSCOPIC GASTRIC BANDING     • PULMONARY EMBOLISM SURGERY     • TUBAL ABDOMINAL LIGATION           Current Outpatient Medications:   •  Black Pepper-Turmeric 3-500 MG capsule, Take  by mouth Daily., Disp: , Rfl:   •  fluticasone (FLONASE) 50 MCG/ACT nasal spray, 2 sprays into each nostril Daily., Disp: 16 g, Rfl: 5  •  omeprazole (PRILOSEC) 40 MG capsule, Take 1 capsule by mouth Daily., Disp: 90 capsule, Rfl: 1  •  rivaroxaban (XARELTO) 20 MG tablet, Take 1 tablet by mouth Daily With Dinner., Disp: 90 tablet, Rfl: 3  •  sucralfate (CARAFATE) 1 GM/10ML suspension, Take 10 mL by mouth 4 (Four) Times a Day., Disp: 420 mL,  Rfl: 0    Allergies   Allergen Reactions   • Lortab [Hydrocodone-Acetaminophen] Itching   • Nsaids    • Tramadol Dizziness       Social History     Socioeconomic History   • Marital status:      Spouse name: Jac   • Number of children: Not on file   • Years of education: Not on file   • Highest education level: Not on file   Social Needs   • Financial resource strain: Not on file   • Food insecurity - worry: Not on file   • Food insecurity - inability: Not on file   • Transportation needs - medical: Not on file   • Transportation needs - non-medical: Not on file   Occupational History   • Occupation:      Employer: [employer]   Tobacco Use   • Smoking status: Former Smoker     Last attempt to quit: 1/6/2015     Years since quitting: 3.8   • Smokeless tobacco: Never Used   • Tobacco comment: caffeine use   Substance and Sexual Activity   • Alcohol use: Yes     Alcohol/week: 0.6 oz     Types: 1 Shots of liquor per week     Comment: social/ occassional   • Drug use: No   • Sexual activity: Defer   Other Topics Concern   • Not on file   Social History Narrative   • Not on file       Family History   Problem Relation Age of Onset   • Cancer Mother    • Obesity Mother    • Diabetes Father    • Hypertension Father    • Heart disease Father    • Obesity Father    • Heart disease Brother    • Diabetes Paternal Grandfather    • Diabetes Paternal Aunt    • No Known Problems Maternal Grandmother    • No Known Problems Maternal Grandfather    • No Known Problems Paternal Grandmother        Review of Systems   Gastrointestinal: Positive for abdominal distention, abdominal pain and nausea. Negative for constipation and rectal pain.   All other systems reviewed and are negative.      Vitals:    11/12/18 1546   BP: 118/80   Temp: 98.2 °F (36.8 °C)       Physical Exam   Constitutional: She is oriented to person, place, and time. She appears well-developed and well-nourished.   HENT:    Head: Normocephalic and atraumatic.   Eyes: Pupils are equal, round, and reactive to light.   Cardiovascular: Normal rate, regular rhythm and normal heart sounds.   Pulmonary/Chest: Effort normal and breath sounds normal.   Abdominal: Soft. Bowel sounds are normal. She exhibits no shifting dullness, no distension, no pulsatile liver, no fluid wave, no abdominal bruit, no ascites, no pulsatile midline mass and no mass. There is no hepatosplenomegaly. There is no tenderness. There is no rigidity and no guarding. No hernia.   Musculoskeletal: Normal range of motion.   Neurological: She is alert and oriented to person, place, and time.   Skin: Skin is warm and dry.   Psychiatric: She has a normal mood and affect. Her behavior is normal. Thought content normal.   Nursing note and vitals reviewed.    Study Result     CLINICAL HISTORY: 58-year-old female with Lap-Band gastroplasty in 2003.  Most recent adjustment with reported increased fluid introduction was in  2017. Patient reports epigastric pain.     UPPER GI SERIES DATED 10/08/2018     FINDINGS: The preliminary radiographs of the abdomen for the current  exam are compared to the preliminary radiographs for previous upper GI  series dated 02/25/2015. Surgical clips in the right upper abdomen are  again noted. Stool and traces of gas in the colon are noted. The  Lap-Band is at the medial left upper abdomen with injection port  overlying the right side of L3 vertebra on AP projection.     As has been requested for upper GI series exams on patients with  Lap-Band, a total of 60 mL of thin barium liquid mixture was used. The  barium was administered in 2 aliquots, with patient standing erect and  with patient horizontal on the x-ray table, both under fluoroscopy and  radiography by Dr. Lorenz. On the current exam, there is somewhat  greater dilatation of mid to distal thoracic esophagus with  esophagogastric junction apparently over 4 cm diameter and with  hiatal  herniation of some of the enlarged upper gastric pouch. Ongoing to and  fro gastroesophageal reflux was demonstrated throughout the exam, and  the esophageal mucosal folds appear thickened, suggesting esophagitis  versus Paula's esophagus. The Lap-Band stoma currently has luminal  diameters between 1.3 cm and 1.5 cm but with mucosal folds filling much  of the stoma. Predominantly with the patient upright, there was gradual  emptying of some of the esophageal and upper pouch contents through the  stoma into the distal stomach where there was relatively prompt onset of  emptying of some of the gastric barium into the duodenum with prompt  propulsion of barium through the duodenum and upper jejunal loops. No  gross esophageal, gastric, or duodenal mass or mucosal ulcer was seen.     Total of 3 minutes 21 seconds fluoroscopy was used. A total of 2 digital  overhead radiographs and 67 digital fluoroscopic spot image radiographs  were acquired.     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 There was very little emptying of barium from  esophagus and upper pouch into the distal stomach with patient  horizontal, with most of this emptying occurring with patient upright.  See above for further discussion.     Problem list    GERD  History of pulmonary embolism 2016  Screening colonoscopy  Abnormal imaging with Paula's esophagus versus esophagitis      Assessment/Plan    We will proceed EGD and colonoscopy based on the above issues  She will take her Xarelto 24 hours before scopes and then I will likely let her take it immediately after if we don't remove a  large polyp  She will continue PPI  Further recommendations after EGD and colonoscopy complete

## 2018-11-12 NOTE — H&P (VIEW-ONLY)
Chief Complaint   Patient presents with   • Esophagitis       Zulema Sousa is a 58 y.o. female who presents with abnormal imaging, due for screening colonoscopy    58-year-old female with history of lap band procedure presents with occasional GERD, bloating and indigestion.  Upper GI series was ordered showing likely esophagitis.  She is due for screening colonoscopy this time.  She's had no rectal bleeding, vomiting or weight loss        Past Medical History:   Diagnosis Date   • Acid reflux    • Back pain    • Cardiac tamponade     2015   • Difficulty breathing     during exertion   • Edema    • Esophagitis, reflux    • Essential hypertriglyceridemia    • GERD (gastroesophageal reflux disease)    • Health care maintenance    • Heartburn    • Hyperlipidemia    • Hypertriglyceridemia    • Increased appetite    • Morbid obesity (CMS/HCC)    • Multiple joint pain    • Osteoarthritis of knee    • Pericarditis    • Pulmonary embolism (CMS/HCC)     2015   • RHA (rheumatoid arthritis) (CMS/HCC)    • Sciatica    • Unintended weight gain        Past Surgical History:   Procedure Laterality Date   • ANKLE SURGERY      treatment of ankle fracture   • APPENDECTOMY     • CARDIAC SURGERY      cardiac tamponade   •  SECTION     • CHOLECYSTECTOMY     • DILATATION AND CURETTAGE     • LAPAROSCOPIC GASTRIC BANDING     • PULMONARY EMBOLISM SURGERY     • TUBAL ABDOMINAL LIGATION           Current Outpatient Medications:   •  Black Pepper-Turmeric 3-500 MG capsule, Take  by mouth Daily., Disp: , Rfl:   •  fluticasone (FLONASE) 50 MCG/ACT nasal spray, 2 sprays into each nostril Daily., Disp: 16 g, Rfl: 5  •  omeprazole (PRILOSEC) 40 MG capsule, Take 1 capsule by mouth Daily., Disp: 90 capsule, Rfl: 1  •  rivaroxaban (XARELTO) 20 MG tablet, Take 1 tablet by mouth Daily With Dinner., Disp: 90 tablet, Rfl: 3  •  sucralfate (CARAFATE) 1 GM/10ML suspension, Take 10 mL by mouth 4 (Four) Times a Day., Disp: 420 mL,  Rfl: 0    Allergies   Allergen Reactions   • Lortab [Hydrocodone-Acetaminophen] Itching   • Nsaids    • Tramadol Dizziness       Social History     Socioeconomic History   • Marital status:      Spouse name: Jac   • Number of children: Not on file   • Years of education: Not on file   • Highest education level: Not on file   Social Needs   • Financial resource strain: Not on file   • Food insecurity - worry: Not on file   • Food insecurity - inability: Not on file   • Transportation needs - medical: Not on file   • Transportation needs - non-medical: Not on file   Occupational History   • Occupation:      Employer: [employer]   Tobacco Use   • Smoking status: Former Smoker     Last attempt to quit: 1/6/2015     Years since quitting: 3.8   • Smokeless tobacco: Never Used   • Tobacco comment: caffeine use   Substance and Sexual Activity   • Alcohol use: Yes     Alcohol/week: 0.6 oz     Types: 1 Shots of liquor per week     Comment: social/ occassional   • Drug use: No   • Sexual activity: Defer   Other Topics Concern   • Not on file   Social History Narrative   • Not on file       Family History   Problem Relation Age of Onset   • Cancer Mother    • Obesity Mother    • Diabetes Father    • Hypertension Father    • Heart disease Father    • Obesity Father    • Heart disease Brother    • Diabetes Paternal Grandfather    • Diabetes Paternal Aunt    • No Known Problems Maternal Grandmother    • No Known Problems Maternal Grandfather    • No Known Problems Paternal Grandmother        Review of Systems   Gastrointestinal: Positive for abdominal distention, abdominal pain and nausea. Negative for constipation and rectal pain.   All other systems reviewed and are negative.      Vitals:    11/12/18 1546   BP: 118/80   Temp: 98.2 °F (36.8 °C)       Physical Exam   Constitutional: She is oriented to person, place, and time. She appears well-developed and well-nourished.   HENT:    Head: Normocephalic and atraumatic.   Eyes: Pupils are equal, round, and reactive to light.   Cardiovascular: Normal rate, regular rhythm and normal heart sounds.   Pulmonary/Chest: Effort normal and breath sounds normal.   Abdominal: Soft. Bowel sounds are normal. She exhibits no shifting dullness, no distension, no pulsatile liver, no fluid wave, no abdominal bruit, no ascites, no pulsatile midline mass and no mass. There is no hepatosplenomegaly. There is no tenderness. There is no rigidity and no guarding. No hernia.   Musculoskeletal: Normal range of motion.   Neurological: She is alert and oriented to person, place, and time.   Skin: Skin is warm and dry.   Psychiatric: She has a normal mood and affect. Her behavior is normal. Thought content normal.   Nursing note and vitals reviewed.    Study Result     CLINICAL HISTORY: 58-year-old female with Lap-Band gastroplasty in 2003.  Most recent adjustment with reported increased fluid introduction was in  2017. Patient reports epigastric pain.     UPPER GI SERIES DATED 10/08/2018     FINDINGS: The preliminary radiographs of the abdomen for the current  exam are compared to the preliminary radiographs for previous upper GI  series dated 02/25/2015. Surgical clips in the right upper abdomen are  again noted. Stool and traces of gas in the colon are noted. The  Lap-Band is at the medial left upper abdomen with injection port  overlying the right side of L3 vertebra on AP projection.     As has been requested for upper GI series exams on patients with  Lap-Band, a total of 60 mL of thin barium liquid mixture was used. The  barium was administered in 2 aliquots, with patient standing erect and  with patient horizontal on the x-ray table, both under fluoroscopy and  radiography by Dr. Lorenz. On the current exam, there is somewhat  greater dilatation of mid to distal thoracic esophagus with  esophagogastric junction apparently over 4 cm diameter and with  hiatal  herniation of some of the enlarged upper gastric pouch. Ongoing to and  fro gastroesophageal reflux was demonstrated throughout the exam, and  the esophageal mucosal folds appear thickened, suggesting esophagitis  versus Paula's esophagus. The Lap-Band stoma currently has luminal  diameters between 1.3 cm and 1.5 cm but with mucosal folds filling much  of the stoma. Predominantly with the patient upright, there was gradual  emptying of some of the esophageal and upper pouch contents through the  stoma into the distal stomach where there was relatively prompt onset of  emptying of some of the gastric barium into the duodenum with prompt  propulsion of barium through the duodenum and upper jejunal loops. No  gross esophageal, gastric, or duodenal mass or mucosal ulcer was seen.     Total of 3 minutes 21 seconds fluoroscopy was used. A total of 2 digital  overhead radiographs and 67 digital fluoroscopic spot image radiographs  were acquired.     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 There was very little emptying of barium from  esophagus and upper pouch into the distal stomach with patient  horizontal, with most of this emptying occurring with patient upright.  See above for further discussion.     Problem list    GERD  History of pulmonary embolism 2016  Screening colonoscopy  Abnormal imaging with Paula's esophagus versus esophagitis      Assessment/Plan    We will proceed EGD and colonoscopy based on the above issues  She will take her Xarelto 24 hours before scopes and then I will likely let her take it immediately after if we don't remove a  large polyp  She will continue PPI  Further recommendations after EGD and colonoscopy complete

## 2018-11-13 RX ORDER — OMEPRAZOLE 40 MG/1
CAPSULE, DELAYED RELEASE ORAL
Qty: 90 CAPSULE | Refills: 0 | Status: SHIPPED | OUTPATIENT
Start: 2018-11-13 | End: 2019-01-09 | Stop reason: SDUPTHER

## 2018-11-27 ENCOUNTER — ANESTHESIA (OUTPATIENT)
Dept: GASTROENTEROLOGY | Facility: HOSPITAL | Age: 58
End: 2018-11-27

## 2018-11-27 ENCOUNTER — HOSPITAL ENCOUNTER (OUTPATIENT)
Facility: HOSPITAL | Age: 58
Setting detail: HOSPITAL OUTPATIENT SURGERY
Discharge: HOME | End: 2018-11-27
Attending: INTERNAL MEDICINE | Admitting: INTERNAL MEDICINE

## 2018-11-27 ENCOUNTER — ANESTHESIA EVENT (OUTPATIENT)
Dept: GASTROENTEROLOGY | Facility: HOSPITAL | Age: 58
End: 2018-11-27

## 2018-11-27 VITALS
HEART RATE: 68 BPM | TEMPERATURE: 98 F | WEIGHT: 227.7 LBS | BODY MASS INDEX: 38.87 KG/M2 | RESPIRATION RATE: 16 BRPM | DIASTOLIC BLOOD PRESSURE: 58 MMHG | OXYGEN SATURATION: 98 % | SYSTOLIC BLOOD PRESSURE: 111 MMHG | HEIGHT: 64 IN

## 2018-11-27 DIAGNOSIS — R93.89 ABNORMAL FINDING ON RADIOLOGY EXAM: ICD-10-CM

## 2018-11-27 PROCEDURE — 43235 EGD DIAGNOSTIC BRUSH WASH: CPT | Performed by: INTERNAL MEDICINE

## 2018-11-27 PROCEDURE — 45385 COLONOSCOPY W/LESION REMOVAL: CPT | Performed by: INTERNAL MEDICINE

## 2018-11-27 PROCEDURE — 45380 COLONOSCOPY AND BIOPSY: CPT | Performed by: INTERNAL MEDICINE

## 2018-11-27 PROCEDURE — 88305 TISSUE EXAM BY PATHOLOGIST: CPT | Performed by: INTERNAL MEDICINE

## 2018-11-27 PROCEDURE — 25010000002 PROPOFOL 10 MG/ML EMULSION: Performed by: ANESTHESIOLOGY

## 2018-11-27 DEVICE — DEV CLIP ENDO RESOLUTION360 CONTRL ROT 235CM: Type: IMPLANTABLE DEVICE | Site: ASCENDING COLON | Status: FUNCTIONAL

## 2018-11-27 RX ORDER — SODIUM CHLORIDE, SODIUM LACTATE, POTASSIUM CHLORIDE, CALCIUM CHLORIDE 600; 310; 30; 20 MG/100ML; MG/100ML; MG/100ML; MG/100ML
1000 INJECTION, SOLUTION INTRAVENOUS CONTINUOUS
Status: DISCONTINUED | OUTPATIENT
Start: 2018-11-27 | End: 2018-11-27 | Stop reason: HOSPADM

## 2018-11-27 RX ORDER — SODIUM CHLORIDE 0.9 % (FLUSH) 0.9 %
3 SYRINGE (ML) INJECTION AS NEEDED
Status: DISCONTINUED | OUTPATIENT
Start: 2018-11-27 | End: 2018-11-27 | Stop reason: HOSPADM

## 2018-11-27 RX ORDER — LIDOCAINE HYDROCHLORIDE 20 MG/ML
INJECTION, SOLUTION INFILTRATION; PERINEURAL AS NEEDED
Status: DISCONTINUED | OUTPATIENT
Start: 2018-11-27 | End: 2018-11-27 | Stop reason: SURG

## 2018-11-27 RX ORDER — PROPOFOL 10 MG/ML
VIAL (ML) INTRAVENOUS CONTINUOUS PRN
Status: DISCONTINUED | OUTPATIENT
Start: 2018-11-27 | End: 2018-11-27 | Stop reason: SURG

## 2018-11-27 RX ORDER — PROPOFOL 10 MG/ML
VIAL (ML) INTRAVENOUS AS NEEDED
Status: DISCONTINUED | OUTPATIENT
Start: 2018-11-27 | End: 2018-11-27 | Stop reason: SURG

## 2018-11-27 RX ORDER — LIDOCAINE HYDROCHLORIDE 10 MG/ML
0.5 INJECTION, SOLUTION INFILTRATION; PERINEURAL ONCE AS NEEDED
Status: DISCONTINUED | OUTPATIENT
Start: 2018-11-27 | End: 2018-11-27 | Stop reason: HOSPADM

## 2018-11-27 RX ADMIN — EPHEDRINE SULFATE 10 MG: 50 INJECTION INTRAMUSCULAR; INTRAVENOUS; SUBCUTANEOUS at 15:22

## 2018-11-27 RX ADMIN — SODIUM CHLORIDE, POTASSIUM CHLORIDE, SODIUM LACTATE AND CALCIUM CHLORIDE: 600; 310; 30; 20 INJECTION, SOLUTION INTRAVENOUS at 15:04

## 2018-11-27 RX ADMIN — LIDOCAINE HYDROCHLORIDE 50 MG: 20 INJECTION, SOLUTION INFILTRATION; PERINEURAL at 15:07

## 2018-11-27 RX ADMIN — PROPOFOL 300 MCG/KG/MIN: 10 INJECTION, EMULSION INTRAVENOUS at 15:07

## 2018-11-27 RX ADMIN — PROPOFOL 100 MG: 10 INJECTION, EMULSION INTRAVENOUS at 15:07

## 2018-11-27 RX ADMIN — SODIUM CHLORIDE, POTASSIUM CHLORIDE, SODIUM LACTATE AND CALCIUM CHLORIDE 1000 ML: 600; 310; 30; 20 INJECTION, SOLUTION INTRAVENOUS at 14:35

## 2018-11-27 NOTE — DISCHARGE INSTRUCTIONS
For the next 24 hours patient needs to be with a responsible adult.    For 24 hours DO NOT drive, operate machinery, appliances, drink alcohol, make important decisions or sign legal documents.    Start with a light or bland diet and advance to regular diet as tolerated.    Follow recommendations on procedure report provided by your doctor.    Call Dr Ornelas for problems [phone]    Problems may include but not limited to: large amounts of bleeding, trouble breathing, repeated vomiting, severe unrelieved pain, fever or chills.

## 2018-11-27 NOTE — ANESTHESIA PREPROCEDURE EVALUATION
Anesthesia Evaluation     Patient summary reviewed and Nursing notes reviewed                Airway   Mallampati: II  TM distance: <3 FB  Neck ROM: full  Possible difficult intubation  Dental - normal exam     Pulmonary - normal exam   (+) pulmonary embolism, a smoker Former, sleep apnea,   Cardiovascular - normal exam    (+) DVT resolved, hyperlipidemia,       Neuro/Psych  (+) numbness,     GI/Hepatic/Renal/Endo    (+) obesity, morbid obesity, GERD,      ROS Comment: S/P lap banding    Musculoskeletal     (+) back pain,   Abdominal   (+) obese,    Substance History      OB/GYN          Other   (+) arthritis                   Anesthesia Plan    ASA 3     MAC     intravenous induction   Anesthetic plan, all risks, benefits, and alternatives have been provided, discussed and informed consent has been obtained with: patient.

## 2018-11-27 NOTE — ANESTHESIA POSTPROCEDURE EVALUATION
Patient: Zulema Sousa    Procedure Summary     Date:  11/27/18 Room / Location:   [facility]    Anesthesia Start:  1504 Anesthesia Stop:  1533    Procedures:       ESOPHAGOGASTRODUODENOSCOPY (N/A Esophagus)      COLONOSCOPY to cecum and t.i. with polypectomy and clip x2 to ascending colon (N/A ) Diagnosis:       Abnormal finding on radiology exam      (Abnormal finding on radiology exam [R93.89])    Surgeon:  Pierre Ornelas MD Provider:  John Paul Toney MD    Anesthesia Type:  MAC ASA Status:  3          Anesthesia Type: MAC  Last vitals  BP   120/61 (11/27/18 1428)   Temp   36.7 °C (98 °F) (11/27/18 1428)   Pulse   71 (11/27/18 1428)   Resp   16 (11/27/18 1428)     SpO2   98 % (11/27/18 1428)     Post Anesthesia Care and Evaluation    Patient location during evaluation: PHASE II  Patient participation: complete - patient participated  Level of consciousness: awake and alert  Pain management: adequate  Airway patency: patent  Anesthetic complications: No anesthetic complications  PONV Status: none  Cardiovascular status: acceptable  Respiratory status: acceptable  Hydration status: acceptable

## 2018-11-28 ENCOUNTER — CLINICAL SUPPORT (OUTPATIENT)
Dept: ORTHOPEDIC SURGERY | Facility: CLINIC | Age: 58
End: 2018-11-28

## 2018-11-28 VITALS — BODY MASS INDEX: 38.76 KG/M2 | TEMPERATURE: 98.3 F | HEIGHT: 64 IN | WEIGHT: 227 LBS

## 2018-11-28 DIAGNOSIS — M17.11 ARTHRITIS OF RIGHT KNEE: Primary | ICD-10-CM

## 2018-11-28 PROCEDURE — 99212 OFFICE O/P EST SF 10 MIN: CPT | Performed by: NURSE PRACTITIONER

## 2018-11-28 PROCEDURE — 20610 DRAIN/INJ JOINT/BURSA W/O US: CPT | Performed by: NURSE PRACTITIONER

## 2018-11-28 RX ORDER — METHYLPREDNISOLONE ACETATE 80 MG/ML
80 INJECTION, SUSPENSION INTRA-ARTICULAR; INTRALESIONAL; INTRAMUSCULAR; SOFT TISSUE
Status: COMPLETED | OUTPATIENT
Start: 2018-11-28 | End: 2018-11-28

## 2018-11-28 RX ORDER — LIDOCAINE HYDROCHLORIDE 10 MG/ML
4 INJECTION, SOLUTION EPIDURAL; INFILTRATION; INTRACAUDAL; PERINEURAL
Status: COMPLETED | OUTPATIENT
Start: 2018-11-28 | End: 2018-11-28

## 2018-11-28 RX ADMIN — LIDOCAINE HYDROCHLORIDE 4 ML: 10 INJECTION, SOLUTION EPIDURAL; INFILTRATION; INTRACAUDAL; PERINEURAL at 15:38

## 2018-11-28 RX ADMIN — METHYLPREDNISOLONE ACETATE 80 MG: 80 INJECTION, SUSPENSION INTRA-ARTICULAR; INTRALESIONAL; INTRAMUSCULAR; SOFT TISSUE at 15:38

## 2018-11-28 NOTE — PATIENT INSTRUCTIONS
Do Daily THIGH exercises sitting up straight in a supportive chair, lean forward at the hips keeping the back as straight as possible, and in that forward leaning position, lift the thigh very slowly up and down.  Give assist with your hand under the knee to lift as needed. 15x on each thigh once a day, every day.

## 2018-11-28 NOTE — PROGRESS NOTES
Patient: Zulema Sousa  YOB: 1960    Chief Complaints:  right knee pain    Subjective:    History of Present Illness: Here today for knee pain. Had better pain relief this time in between injections. The pain is a generalized joint tenderness.  It has been progressive in nature but remains intermittent.  Worsened by prolonged standing or walking and squatting activities. Has had improvement in the past with ice/heat, rest, and injections.     This problem is not new to this examiner.     Allergies:   Allergies   Allergen Reactions   • Lortab [Hydrocodone-Acetaminophen] Itching   • Nsaids    • Tramadol Dizziness       Medications:   Home Medications:  Current Outpatient Medications on File Prior to Visit   Medication Sig   • Black Pepper-Turmeric 3-500 MG capsule Take  by mouth Daily.   • fluticasone (FLONASE) 50 MCG/ACT nasal spray 2 sprays into each nostril Daily.   • omeprazole (priLOSEC) 40 MG capsule TAKE ONE CAPSULE BY MOUTH DAILY   • rivaroxaban (XARELTO) 20 MG tablet Take 1 tablet by mouth Daily With Dinner.     Current Facility-Administered Medications on File Prior to Visit   Medication   • [DISCONTINUED] lactated ringers infusion 1,000 mL   • [DISCONTINUED] lidocaine (XYLOCAINE) 1 % injection 0.5 mL   • [DISCONTINUED] sodium chloride 0.9 % flush 3 mL     Current Medications:  Scheduled Meds:  Continuous Infusions:  No current facility-administered medications for this visit.   PRN Meds:.    I have reviewed the patient's medical history in detail and updated the computerized patient record.  Review and summarization of old records include:    Past Medical History:   Diagnosis Date   • Acid reflux    • Back pain    • Cardiac tamponade     March 2015   • Difficulty breathing     during exertion   • Edema    • Esophagitis, reflux    • Essential hypertriglyceridemia    • GERD (gastroesophageal reflux disease)    • Health care maintenance    • Heartburn    • Hyperlipidemia    •  Hypertriglyceridemia    • Increased appetite    • Morbid obesity (CMS/HCC)    • Multiple joint pain    • Osteoarthritis of knee    • Pericarditis    • Pulmonary embolism (CMS/HCC)     2015   • RHA (rheumatoid arthritis) (CMS/HCC)    • Sciatica    • Sleep apnea    • Unintended weight gain         Past Surgical History:   Procedure Laterality Date   • ANKLE SURGERY      treatment of ankle fracture   • APPENDECTOMY     • CARDIAC SURGERY      cardiac tamponade   •  SECTION     • CHOLECYSTECTOMY     • DILATATION AND CURETTAGE     • LAPAROSCOPIC GASTRIC BANDING     • PULMONARY EMBOLISM SURGERY     • TUBAL ABDOMINAL LIGATION          Social History     Occupational History   • Occupation:      Employer: [employer]   Tobacco Use   • Smoking status: Former Smoker     Last attempt to quit: 2015     Years since quitting: 3.8   • Smokeless tobacco: Never Used   • Tobacco comment: caffeine use   Substance and Sexual Activity   • Alcohol use: Yes     Alcohol/week: 0.6 oz     Types: 1 Shots of liquor per week     Comment: social/ occassional   • Drug use: No   • Sexual activity: Defer      Social History     Social History Narrative   • Not on file        Family History   Problem Relation Age of Onset   • Cancer Mother    • Obesity Mother    • Diabetes Father    • Hypertension Father    • Heart disease Father    • Obesity Father    • Heart disease Brother    • Diabetes Paternal Grandfather    • Diabetes Paternal Aunt    • No Known Problems Maternal Grandmother    • No Known Problems Maternal Grandfather    • No Known Problems Paternal Grandmother        ROS: 14 point review of systems was performed and was negative except for documented findings in HPI and today's encounter.     Allergies:   Allergies   Allergen Reactions   • Lortab [Hydrocodone-Acetaminophen] Itching   • Nsaids    • Tramadol Dizziness     Constitutional:  Denies fever, shaking or chills   Eyes:  Denies  "change in visual acuity   HENT:  Denies nasal congestion or sore throat   Respiratory:  Denies cough or shortness of breath   Cardiovascular:  Denies chest pain or severe LE edema   GI:  Denies abdominal pain, nausea, vomiting, bloody stools or diarrhea   Musculoskeletal:  Numbness, tingling, or loss of motor function only as noted above in history of present illness.  : Denies painful urination or hematuria  Integument:  Denies rash, lesion or ulceration   Neurologic:  Denies headache or focal weakness  Endocrine:  Denies lymphadenopathy  Psych:  Denies confusion or change in mental status   Hem:  Denies active bleeding    Physical Exam:  Wt Readings from Last 3 Encounters:   11/28/18 103 kg (227 lb)   11/27/18 103 kg (227 lb 11.2 oz)   11/12/18 105 kg (231 lb 3.2 oz)     Ht Readings from Last 3 Encounters:   11/28/18 162.6 cm (64\")   11/27/18 162.6 cm (64\")   11/12/18 162.6 cm (64\")     Body mass index is 38.96 kg/m².  Facility age limit for growth percentiles is 20 years.  Vitals:    11/28/18 1537   Temp: 98.3 °F (36.8 °C)     Vital Signs:  reviewed  Constitutional: Awake alert and oriented x3, well developed, no acute distress, non-toxic appearance.  EYES: symmetric, sclera clear  ENT:  Normocephalic, Atraumatic.   Respiratory:  No respiratory distress, No wheezing  CV: pulse regular, no palpitations or pallor.  GI:  Abdomen soft, non-tender.   Vascular:  Intact distal pulses, No cyanosis, no signs or symptoms of DVT.  Neurologic: Sensation grossly intact to the involved extremity, No focal deficits noted.   Neck: No tenderness, Supple.  Integument: warm, dry, no ulcerations.   Psychiatric:  Oriented, no pathological affect.  Musculoskeletal:    Affected knee(s):  Painful gait with a subtle limp, positive for synovitis, swelling, joint effusion with crepitation.  Lachman negative  Posterior drawer negative  Kristin's negative  Patellofemoral grind +  Sensation grossly intact to light touch throughout the " lower extremity  Skin is intact  Distal pulses are palpable  No signs or symptoms of DVT        Diagnostic Data:     Imaging was done previously in the office, images were personally viewed and discussed with the patient:    Indication: pain related symptoms,  Views: 3V AP, LAT & 40 degree PA right knee(s)   Findings: severe end-stage arthritis (bone on bone, subchondral sclerosis/cysts, osteophytes)  Comparison views: viewed last xray done in the office.     Procedure:  Large Joint Arthrocentesis: R knee  Date/Time: 11/28/2018 3:38 PM  Consent given by: patient  Site marked: site marked  Timeout: Immediately prior to procedure a time out was called to verify the correct patient, procedure, equipment, support staff and site/side marked as required   Supporting Documentation  Indications: pain and joint swelling   Procedure Details  Location: knee - R knee  Preparation: Patient was prepped and draped in the usual sterile fashion  Needle size: 22 G  Approach: anterolateral  Medications administered: 4 mL lidocaine PF 1% 1 %; 80 mg methylPREDNISolone acetate 80 MG/ML  Patient tolerance: patient tolerated the procedure well with no immediate complications          Assessment:     ICD-10-CM ICD-9-CM   1. Arthritis of right knee M17.11 716.96           Plan: Is to proceed with injection  Follow up as indicated.  Ice, elevate, and rest as needed.  Additional interventions include:  15 min spent face to face with patient 9 min spent counseling about natural history and expected course of assessed complaint and reviewed treatment options that have been tried and not tried and those currently available. Questions answered.    Natural history and expected course of this patient's diagnosis discussed along with evaluation of therapies. Questions answered.  Advice on benefits of, and types of regular/moderate exercise including biomechanical forces involved as it pertains to this complaint, with a goal of 5 min at least 7 times  a week.    Cortisone Injection. See procedure note.  Cryotherapy/brachy therapy as indicated with instructions.   Advised on strengthening exercises, stressed importance of these with progression of arthritis, demonstrated exercises to patient with repeat demonstration and verb of understanding.   Do Daily THIGH exercises sitting up straight in a supportive chair, lean forward at the hips keeping the back as straight as possible, and in that forward leaning position, lift the thigh very slowly up and down.  Give assist with your hand under the knee to lift as needed. 15x on each thigh once a day, every day.   Has lost 293 to 226 now and is working on exercises.   11/28/2018  MARLYN

## 2018-11-29 LAB
CYTO UR: NORMAL
LAB AP CASE REPORT: NORMAL
PATH REPORT.FINAL DX SPEC: NORMAL
PATH REPORT.GROSS SPEC: NORMAL

## 2018-12-27 ENCOUNTER — TELEPHONE (OUTPATIENT)
Dept: GASTROENTEROLOGY | Facility: CLINIC | Age: 58
End: 2018-12-27

## 2018-12-27 NOTE — TELEPHONE ENCOUNTER
----- Message from Pierre Ornelas MD sent at 11/29/2018  1:10 PM EST -----  Sessile serated adenoma less than 1 cm in size, c/s recall ok for 5 yrs

## 2018-12-27 NOTE — TELEPHONE ENCOUNTER
Called pt and advised of the note from Dr Ornelas. She verb understanding.    Health Maintenance updated to reflect C/S due 11/2023.

## 2019-01-10 RX ORDER — OMEPRAZOLE 40 MG/1
CAPSULE, DELAYED RELEASE ORAL
Qty: 90 CAPSULE | Refills: 0 | Status: SHIPPED | OUTPATIENT
Start: 2019-01-10 | End: 2019-05-03

## 2019-02-06 ENCOUNTER — HOSPITAL ENCOUNTER (OUTPATIENT)
Dept: GENERAL RADIOLOGY | Facility: HOSPITAL | Age: 59
Discharge: HOME | End: 2019-02-06
Attending: SURGERY | Admitting: SURGERY

## 2019-02-06 PROCEDURE — 74241: CPT

## 2019-02-06 RX ADMIN — BARIUM SULFATE 60 ML: 960 POWDER, FOR SUSPENSION ORAL at 09:05

## 2019-02-15 ENCOUNTER — OFFICE VISIT (OUTPATIENT)
Dept: BARIATRICS/WEIGHT MGMT | Facility: CLINIC | Age: 59
End: 2019-02-15

## 2019-02-15 VITALS
RESPIRATION RATE: 18 BRPM | SYSTOLIC BLOOD PRESSURE: 121 MMHG | HEART RATE: 85 BPM | DIASTOLIC BLOOD PRESSURE: 73 MMHG | WEIGHT: 227.5 LBS | BODY MASS INDEX: 38.84 KG/M2 | HEIGHT: 64 IN | TEMPERATURE: 98.6 F

## 2019-02-15 DIAGNOSIS — R93.3 ABNORMAL UGI SERIES: ICD-10-CM

## 2019-02-15 DIAGNOSIS — E66.01 OBESITY, CLASS III, BMI 40-49.9 (MORBID OBESITY) (HCC): ICD-10-CM

## 2019-02-15 DIAGNOSIS — Z98.84 HISTORY OF LAPAROSCOPIC ADJUSTABLE GASTRIC BANDING: ICD-10-CM

## 2019-02-15 DIAGNOSIS — R10.13 EPIGASTRIC PAIN: ICD-10-CM

## 2019-02-15 DIAGNOSIS — G47.33 OBSTRUCTIVE SLEEP APNEA, ADULT: ICD-10-CM

## 2019-02-15 DIAGNOSIS — K95.09 GASTRIC BAND SLIPPAGE: Primary | ICD-10-CM

## 2019-02-15 DIAGNOSIS — Z86.718 HISTORY OF DVT (DEEP VEIN THROMBOSIS): ICD-10-CM

## 2019-02-15 DIAGNOSIS — K21.9 GASTROESOPHAGEAL REFLUX DISEASE, ESOPHAGITIS PRESENCE NOT SPECIFIED: ICD-10-CM

## 2019-02-15 DIAGNOSIS — K22.89 ESOPHAGEAL DILATATION: ICD-10-CM

## 2019-02-15 PROCEDURE — 99214 OFFICE O/P EST MOD 30 MIN: CPT | Performed by: SURGERY

## 2019-02-15 NOTE — PROGRESS NOTES
[facility]  [address]  [facility] [address] [phone]  [address]  [facility] [address] [phone]  Dept: [phone]  2/15/2019      Zulema Sousa.  [id number]  [id number]  1960  female      Chief Complaint   Patient presents with   • Follow-up     band/UGI follow up       [facility] Post-Op Bariatric Surgery:   Zulema Sousa is status post Lapband procedure reg, performed on 8/9/13 with 0mls    HPI:   Today's weight is 103 kg (227 lb 8 oz) pounds, today's BMI is Body mass index is 39.03 kg/m². and she has a loss of 3 pounds since the last visit. The patient reports experiencing hunger, but decreased hunger and loss of appetite.     Zulema Sousa denies abdominal pain. The patientdoes not have vomiting or regurgitation. The patientdoes have heartburn/reflux.    58-year-old female status post lap band placement with all of the fluid removed and upper GI revealing partial lap band slippage.  Patient does complaint of epigastric pain and went to the emergency room recently and underwent cardiac evaluation and was unremarkable.  Patient would like to go ahead and proceed with band removal which I agree.  Patient with history of blood clots which has been over a year ago per patient.  Patient does see Dr. Paras Hernández.      Diet and Exercise: Diet history reviewed and discussed with the patient. Weight loss/gains to date discussed with the patient. She reports eating 3 meals per day, a typical portion size of 1 cup, eating 2 snack per day, drinking 2 8-oz. glasses of water per day. The patient can tolerate solid protein.   The patient is eating protein first. The patient is limiting food volume. The patient is not taking vitamins. The patient is limiting snacking. The patient is not regular exercise. She is drinking carbonated beverages.     The following portions of the patient's history were reviewed and updated as  appropriate: allergies, current medications, past family history, past medical history, past social history, past surgical history and problem list.    Review of Systems   Constitutional: Positive for fatigue.   Gastrointestinal: Positive for constipation.   All other systems reviewed and are negative.      Vitals:    02/15/19 0916   BP: 121/73   Pulse: 85   Resp: 18   Temp: 98.6 °F (37 °C)       Physical Exam   Constitutional: She is oriented to person, place, and time. She appears well-nourished.   HENT:   Head: Normocephalic and atraumatic.   Mouth/Throat: Oropharynx is clear and moist.   Eyes: Conjunctivae and EOM are normal. Pupils are equal, round, and reactive to light. No scleral icterus.   Neck: Normal range of motion. Neck supple. No thyromegaly present.   Cardiovascular: Normal rate and regular rhythm.   Pulmonary/Chest: Effort normal and breath sounds normal.   Abdominal: Soft. Bowel sounds are normal. She exhibits no distension. There is no tenderness. There is no rebound and no guarding. No hernia.   Musculoskeletal: Normal range of motion.   Lymphadenopathy:     She has no cervical adenopathy.   Neurological: She is alert and oriented to person, place, and time. No cranial nerve deficit. Coordination normal.   Skin: Skin is warm and dry. No erythema.   Psychiatric: She has a normal mood and affect. Her behavior is normal.   Vitals reviewed.        Assessment: Post-operatively the patient status post lap band placement with upper GI revealing partial lap band slippage.  Patient has had all of the fluid removed and patient still with symptoms.  Patient agrees to go ahead and proceed with band removal.  Patient with history of blood clots and does see Dr. Paras Hernández.  We will get approval from him to make sure she is good to go for band removal.  She does take Xarelto and will stop this 2 days prior to surgery unless cardiologist as otherwise.  The risks and benefits of the procedure were discussed with  the patient in detail and all questions were answered.  Possibility of open, bleeding, infection, bowel injury, deep venous thrombosis, pulmonary embolism, incisional hernias, renal failure, myocardial infarction, respiratory and cardiac arrest and death were discussed. Consent will be signed and witnessed..     Encounter Diagnoses   Name Primary?   • Gastric band slippage Yes   • Abnormal UGI series    • Esophageal dilatation    • History of laparoscopic adjustable gastric banding    • Epigastric pain    • Obesity, Class III, BMI 40-49.9 (morbid obesity) (CMS/[facility])    • History of DVT (deep vein thrombosis)    • Obstructive sleep apnea, adult    • Gastroesophageal reflux disease, esophagitis presence not specified        Plan:      No adjustment today as patient has ideal level of restriction. RTC for n/v/d/regurg. Encouraged patient to be sure to eat plenty of dense lean protein and high fiber foods like vegetables and fresh fruits while reducing simple carbohydrates. Reviewed the importance of eating solid foods vs. soft which will contribute to weight gain. Discussed the recommended amount of water to intake daily- half of body weight in ounces. Not drinking with or right after meals.    Activity restrictions: None.   Instructions / Recommendations: dietary counseling recommended, recommended a daily protein intake of  grams, patient was advised that the lap band system works best when consuming solid foods, vitamin supplement(s) recommended, recommended exercising at least 150 minutes per week inclduing both cardio and strength training, behavior modifications recommended and instructed to call the office for concerns, questions, or problems.     The patient was instructed to follow up in after band removal    The patient was counseled regarding. Total time spent face to face was 25 minutes and 20 minutes was spent counseling.

## 2019-03-06 ENCOUNTER — CLINICAL SUPPORT (OUTPATIENT)
Dept: ORTHOPEDIC SURGERY | Facility: CLINIC | Age: 59
End: 2019-03-06

## 2019-03-06 VITALS — WEIGHT: 227 LBS | BODY MASS INDEX: 38.76 KG/M2 | HEIGHT: 64 IN | TEMPERATURE: 98.1 F

## 2019-03-06 DIAGNOSIS — G89.29 CHRONIC PAIN OF RIGHT KNEE: Primary | ICD-10-CM

## 2019-03-06 DIAGNOSIS — M25.561 CHRONIC PAIN OF RIGHT KNEE: Primary | ICD-10-CM

## 2019-03-06 DIAGNOSIS — M17.11 ARTHRITIS OF RIGHT KNEE: ICD-10-CM

## 2019-03-06 PROCEDURE — 73562 X-RAY EXAM OF KNEE 3: CPT | Performed by: NURSE PRACTITIONER

## 2019-03-06 PROCEDURE — 20610 DRAIN/INJ JOINT/BURSA W/O US: CPT | Performed by: NURSE PRACTITIONER

## 2019-03-06 PROCEDURE — 99212 OFFICE O/P EST SF 10 MIN: CPT | Performed by: NURSE PRACTITIONER

## 2019-03-06 RX ORDER — LIDOCAINE HYDROCHLORIDE 20 MG/ML
4 INJECTION, SOLUTION EPIDURAL; INFILTRATION; INTRACAUDAL; PERINEURAL
Status: COMPLETED | OUTPATIENT
Start: 2019-03-06 | End: 2019-03-06

## 2019-03-06 RX ORDER — METHYLPREDNISOLONE ACETATE 80 MG/ML
80 INJECTION, SUSPENSION INTRA-ARTICULAR; INTRALESIONAL; INTRAMUSCULAR; SOFT TISSUE
Status: COMPLETED | OUTPATIENT
Start: 2019-03-06 | End: 2019-03-06

## 2019-03-06 RX ADMIN — LIDOCAINE HYDROCHLORIDE 4 ML: 20 INJECTION, SOLUTION EPIDURAL; INFILTRATION; INTRACAUDAL; PERINEURAL at 15:41

## 2019-03-06 RX ADMIN — METHYLPREDNISOLONE ACETATE 80 MG: 80 INJECTION, SUSPENSION INTRA-ARTICULAR; INTRALESIONAL; INTRAMUSCULAR; SOFT TISSUE at 15:41

## 2019-03-06 NOTE — PROGRESS NOTES
Patient: Zulema Sousa  YOB: 1960    Chief Complaints:  right knee pain    Subjective:    History of Present Illness: Here today for knee pain. The right knee continues to get worse.  Hx of blood clots and obesity are too high risk factors for surgery at this point.  She has lost 70lbs and gained some back over the holidays. The pain is a generalized joint tenderness.  It has been progressive in nature but remains intermittent.  Worsened by prolonged standing or walking and squatting activities. Has had improvement in the past with ice/heat, rest, and injections.     This problem is not new to this examiner.     Allergies:   Allergies   Allergen Reactions   • Lortab [Hydrocodone-Acetaminophen] Itching   • Nsaids    • Tramadol Dizziness       Medications:   Home Medications:  Current Outpatient Medications on File Prior to Visit   Medication Sig   • Black Pepper-Turmeric 3-500 MG capsule Take  by mouth Daily.   • omeprazole (priLOSEC) 40 MG capsule TAKE ONE CAPSULE BY MOUTH DAILY   • rivaroxaban (XARELTO) 20 MG tablet Take 1 tablet by mouth Daily With Dinner.   • fluticasone (FLONASE) 50 MCG/ACT nasal spray 2 sprays into each nostril Daily.     No current facility-administered medications on file prior to visit.      Current Medications:  Scheduled Meds:  Continuous Infusions:  No current facility-administered medications for this visit.   PRN Meds:.    I have reviewed the patient's medical history in detail and updated the computerized patient record.  Review and summarization of old records include:    Past Medical History:   Diagnosis Date   • Acid reflux    • Back pain    • Cardiac tamponade     March 2015   • Difficulty breathing     during exertion   • Edema    • Esophagitis, reflux    • Essential hypertriglyceridemia    • GERD (gastroesophageal reflux disease)    • Health care maintenance    • Heartburn    • Hyperlipidemia    • Hypertriglyceridemia    • Increased appetite    • Morbid obesity  (CMS/HCC)    • Multiple joint pain    • Osteoarthritis of knee    • Pericarditis    • Pulmonary embolism (CMS/HCC)     2015   • RHA (rheumatoid arthritis) (CMS/[facility])    • Sciatica    • Sleep apnea    • Unintended weight gain         Past Surgical History:   Procedure Laterality Date   • ANKLE SURGERY      treatment of ankle fracture   • APPENDECTOMY     • CARDIAC SURGERY      cardiac tamponade   •  SECTION     • CHOLECYSTECTOMY     • COLONOSCOPY N/A 2018    Procedure: COLONOSCOPY to cecum and t.i. with polypectomy and clip x2 to ascending colon;  Surgeon: Pierre Ornelas MD;  Location: [facility] ENDOSCOPY;  Service: Gastroenterology   • DILATATION AND CURETTAGE     • ENDOSCOPY N/A 2018    Procedure: ESOPHAGOGASTRODUODENOSCOPY;  Surgeon: Pierre Ornelas MD;  Location: [facility] ENDOSCOPY;  Service: Gastroenterology   • LAPAROSCOPIC GASTRIC BANDING     • PULMONARY EMBOLISM SURGERY     • TUBAL ABDOMINAL LIGATION          Social History     Occupational History   • Occupation:      Employer: [employer] [place]   Tobacco Use   • Smoking status: Former Smoker     Last attempt to quit: 2015     Years since quittin.1   • Smokeless tobacco: Never Used   • Tobacco comment: caffeine use   Substance and Sexual Activity   • Alcohol use: Yes     Alcohol/week: 0.6 oz     Types: 1 Shots of liquor per week     Comment: social/ occassional   • Drug use: No   • Sexual activity: Defer      Social History     Social History Narrative   • Not on file        Family History   Problem Relation Age of Onset   • Cancer Mother    • Obesity Mother    • Diabetes Father    • Hypertension Father    • Heart disease Father    • Obesity Father    • Heart disease Brother    • Diabetes Paternal Grandfather    • Diabetes Paternal Aunt    • No Known Problems Maternal Grandmother    • No Known Problems Maternal Grandfather    • No Known Problems Paternal Grandmother        ROS: 14 point review  "of systems was performed and was negative except for documented findings in HPI and today's encounter.     Allergies:   Allergies   Allergen Reactions   • Lortab [Hydrocodone-Acetaminophen] Itching   • Nsaids    • Tramadol Dizziness     Constitutional:  Denies fever, shaking or chills   Eyes:  Denies change in visual acuity   HENT:  Denies nasal congestion or sore throat   Respiratory:  Denies cough or shortness of breath   Cardiovascular:  Denies chest pain or severe LE edema   GI:  Denies abdominal pain, nausea, vomiting, bloody stools or diarrhea   Musculoskeletal:  Numbness, tingling, or loss of motor function only as noted above in history of present illness.  : Denies painful urination or hematuria  Integument:  Denies rash, lesion or ulceration   Neurologic:  Denies headache or focal weakness  Endocrine:  Denies lymphadenopathy  Psych:  Denies confusion or change in mental status   Hem:  Denies active bleeding    Physical Exam:  Wt Readings from Last 3 Encounters:   03/06/19 103 kg (227 lb)   02/15/19 103 kg (227 lb 8 oz)   11/28/18 103 kg (227 lb)     Ht Readings from Last 3 Encounters:   03/06/19 162.6 cm (64\")   02/15/19 162.6 cm (64.02\")   11/28/18 162.6 cm (64\")     Body mass index is 38.96 kg/m².  Facility age limit for growth percentiles is 20 years.  Vitals:    03/06/19 1538   Temp: 98.1 °F (36.7 °C)     Vital Signs:  reviewed  Constitutional: Awake alert and oriented x3, well developed, no acute distress, non-toxic appearance.  EYES: symmetric, sclera clear  ENT:  Normocephalic, Atraumatic.   Respiratory:  No respiratory distress, No wheezing  CV: pulse regular, no palpitations or pallor.  GI:  Abdomen soft, non-tender.   Vascular:  Intact distal pulses, No cyanosis, no signs or symptoms of DVT.  Neurologic: Sensation grossly intact to the involved extremity, No focal deficits noted.   Neck: No tenderness, Supple.  Integument: warm, dry, no ulcerations.   Psychiatric:  Oriented, no pathological " affect.  Musculoskeletal:    Affected knee(s):  Painful gait with a subtle limp, positive for synovitis, swelling, joint effusion with crepitation.  Lachman negative  Posterior drawer negative  Kristin's negative  Patellofemoral grind +  Sensation grossly intact to light touch throughout the lower extremity  Skin is intact  Distal pulses are palpable  No signs or symptoms of DVT        Diagnostic Data:     Imaging was done today, images were personally viewed and discussed with the patient:    Indication: pain related symptoms,  Views: 3V AP, LAT & 40 degree PA right knee(s)   Findings: severe end-stage arthritis (bone on bone, subchondral sclerosis/cysts, osteophytes)  Comparison views: viewed last xray done in the office.     Procedure:  Large Joint Arthrocentesis: R knee  Date/Time: 3/6/2019 3:41 PM  Consent given by: patient  Site marked: site marked  Timeout: Immediately prior to procedure a time out was called to verify the correct patient, procedure, equipment, support staff and site/side marked as required   Supporting Documentation  Indications: pain   Procedure Details  Location: knee - R knee  Preparation: Patient was prepped and draped in the usual sterile fashion  Needle size: 22 G  Approach: anterolateral  Medications administered: 80 mg methylPREDNISolone acetate 80 MG/ML; 4 mL lidocaine PF 2% 2 %  Patient tolerance: patient tolerated the procedure well with no immediate complications          Assessment:     ICD-10-CM ICD-9-CM   1. Chronic pain of right knee M25.561 719.46    G89.29 338.29   2. Arthritis of right knee M17.11 716.96           Plan: Is to proceed with injection  Follow up as indicated.  Ice, elevate, and rest as needed.  Additional interventions include:  15 min spent face to face with patient 9 min spent counseling about:  Biomechanics of pertinent body area discussed.  Risks, benefits, alternatives, comparisons, and complications of accepted medicines, injections, recommendations,  surgical procedures, and therapies explained and education provided in laymen's terms. Natural history and expected course of this patient's diagnosis discussed along with evaluation of therapies. Questions answered. When appropriate I also discussed proper use of cane, walker, trekking poles.   BMI:  The concept of BMI body mass index and its importance and implications discussed.  BMI suggested to be < 40 or as low as possible. Lifestyle measures for weight loss and how this affects orthopedic condition.  EXERCISES:  Advice on benefits of, and types of regular/moderate exercise including biomechanical forces involved as it pertains to this complaint.  RICE: Rest, ice, compression, and elevation therapy, Cryotherapy/brachy therapy, and or OTC linaments as indicated with instructions.   Cortisone Injection. See procedure note.  Pertinent educational materials distributed.  Do Daily KNEE exercises sitting up straight in a supportive chair, lean forward at the hips keeping the back as straight as possible, and in that forward leaning position, lift the thigh very slowly up and down.  Give assist with your hand under the knee to lift as needed. 15x on each thigh once a day, every day.   hx of blood clots and cardiac tamponade, obesity, non surgical candidate, has lost 70lbs so far, enc to continue with wt loss and exercises and recommended vibration machine to help with generalized aching and stiffness.   3/6/2019    MJR

## 2019-04-08 ENCOUNTER — PREP FOR SURGERY (OUTPATIENT)
Dept: OTHER | Facility: HOSPITAL | Age: 59
End: 2019-04-08

## 2019-04-08 DIAGNOSIS — K95.09 GASTRIC BAND SLIPPAGE: Primary | ICD-10-CM

## 2019-04-08 RX ORDER — SODIUM CHLORIDE 0.9 % (FLUSH) 0.9 %
3 SYRINGE (ML) INJECTION EVERY 12 HOURS SCHEDULED
Status: CANCELLED | OUTPATIENT
Start: 2019-05-20

## 2019-04-08 RX ORDER — METOCLOPRAMIDE HYDROCHLORIDE 5 MG/ML
10 INJECTION INTRAMUSCULAR; INTRAVENOUS ONCE
Status: CANCELLED | OUTPATIENT
Start: 2019-05-20 | End: 2019-04-08

## 2019-04-08 RX ORDER — ACETAMINOPHEN 160 MG/5ML
975 SOLUTION ORAL ONCE
Status: CANCELLED | OUTPATIENT
Start: 2019-05-20 | End: 2019-04-08

## 2019-04-08 RX ORDER — SODIUM CHLORIDE 0.9 % (FLUSH) 0.9 %
1-10 SYRINGE (ML) INJECTION AS NEEDED
Status: CANCELLED | OUTPATIENT
Start: 2019-05-20

## 2019-04-08 RX ORDER — CHLORHEXIDINE GLUCONATE 0.12 MG/ML
15 RINSE ORAL
Status: CANCELLED | OUTPATIENT
Start: 2019-05-20 | End: 2019-05-20

## 2019-04-08 RX ORDER — CEFAZOLIN SODIUM IN 0.9 % NACL 3 G/100 ML
3 INTRAVENOUS SOLUTION, PIGGYBACK (ML) INTRAVENOUS
Status: CANCELLED | OUTPATIENT
Start: 2019-05-20

## 2019-04-08 RX ORDER — SCOLOPAMINE TRANSDERMAL SYSTEM 1 MG/1
1 PATCH, EXTENDED RELEASE TRANSDERMAL ONCE
Status: CANCELLED | OUTPATIENT
Start: 2019-05-20 | End: 2019-04-08

## 2019-04-08 RX ORDER — SODIUM CHLORIDE, SODIUM LACTATE, POTASSIUM CHLORIDE, CALCIUM CHLORIDE 600; 310; 30; 20 MG/100ML; MG/100ML; MG/100ML; MG/100ML
100 INJECTION, SOLUTION INTRAVENOUS CONTINUOUS
Status: CANCELLED | OUTPATIENT
Start: 2019-05-20

## 2019-04-08 RX ORDER — FAMOTIDINE 10 MG/ML
20 INJECTION, SOLUTION INTRAVENOUS ONCE
Status: CANCELLED | OUTPATIENT
Start: 2019-05-20 | End: 2019-04-08

## 2019-04-12 ENCOUNTER — OFFICE VISIT (OUTPATIENT)
Dept: INTERNAL MEDICINE | Facility: CLINIC | Age: 59
End: 2019-04-12

## 2019-04-12 VITALS
BODY MASS INDEX: 39.61 KG/M2 | WEIGHT: 232 LBS | TEMPERATURE: 98.1 F | DIASTOLIC BLOOD PRESSURE: 68 MMHG | SYSTOLIC BLOOD PRESSURE: 122 MMHG | HEIGHT: 64 IN | HEART RATE: 73 BPM | OXYGEN SATURATION: 97 % | RESPIRATION RATE: 18 BRPM

## 2019-04-12 DIAGNOSIS — K95.09 GASTRIC BAND SLIPPAGE: ICD-10-CM

## 2019-04-12 DIAGNOSIS — E78.2 MIXED HYPERLIPIDEMIA: ICD-10-CM

## 2019-04-12 DIAGNOSIS — Z98.84 HISTORY OF LAPAROSCOPIC ADJUSTABLE GASTRIC BANDING: ICD-10-CM

## 2019-04-12 DIAGNOSIS — N32.81 OVERACTIVE BLADDER: ICD-10-CM

## 2019-04-12 DIAGNOSIS — Z86.718 HISTORY OF DVT (DEEP VEIN THROMBOSIS): Primary | ICD-10-CM

## 2019-04-12 DIAGNOSIS — E66.01 OBESITY, CLASS III, BMI 40-49.9 (MORBID OBESITY) (HCC): ICD-10-CM

## 2019-04-12 DIAGNOSIS — K21.9 GASTROESOPHAGEAL REFLUX DISEASE, ESOPHAGITIS PRESENCE NOT SPECIFIED: ICD-10-CM

## 2019-04-12 DIAGNOSIS — I26.99 OTHER ACUTE PULMONARY EMBOLISM WITHOUT ACUTE COR PULMONALE (HCC): ICD-10-CM

## 2019-04-12 DIAGNOSIS — R35.0 URINARY FREQUENCY: ICD-10-CM

## 2019-04-12 DIAGNOSIS — Z00.00 HEALTHCARE MAINTENANCE: ICD-10-CM

## 2019-04-12 PROCEDURE — 99214 OFFICE O/P EST MOD 30 MIN: CPT | Performed by: INTERNAL MEDICINE

## 2019-04-12 RX ORDER — OXYBUTYNIN CHLORIDE 10 MG/1
10 TABLET, EXTENDED RELEASE ORAL DAILY
Qty: 90 TABLET | Refills: 0 | Status: SHIPPED | OUTPATIENT
Start: 2019-04-12 | End: 2019-06-07

## 2019-04-12 NOTE — PROGRESS NOTES
Zulema Sousa is a 58 y.o. female, who presents with a chief complaint of   Chief Complaint   Patient presents with   • Deep Vein Thrombosis     discuss medication    • Urinary Incontinence       HPI   Pt here to discuss meds.  She has had issues with her lap band and is going to have to have it removed next month.  Any time they adjusted the band she had more reflux issues. She is on xarelto and will have to be off the med 3 days prior to surgery. She has had the lap band for about 13 years.  The band is still there but there is no saline in the band.  She has lost 70 pounds with no fluid in the band.  She knows that she has to get rid of carbs to keep weight off.  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  She has knee pain and has had to get steroid injections in her knee.  She has struggled with getting regular exercise.        oab - pt having to wear pads all the time. She has frequency.  No fever, abd pain, dysuria.  She gets up at night 1 time a night but goes multiple times all day.      Due for pap    leeann - on cpap.     The following portions of the patient's history were reviewed and updated as appropriate: allergies, current medications, past family history, past medical history, past social history, past surgical history and problem list.    Allergies: Lortab [hydrocodone-acetaminophen]; Nsaids; and Tramadol    Review of Systems   Constitutional: Negative.    HENT: Negative.    Eyes: Negative.    Respiratory: Negative.    Cardiovascular: Negative.    Gastrointestinal: Negative.    Endocrine: Negative.    Genitourinary: Negative.    Musculoskeletal: Negative.    Skin: Negative.    Allergic/Immunologic: Negative.    Neurological: Negative.    Hematological: Negative.    Psychiatric/Behavioral: Negative.    All other systems reviewed and are negative.          Wt Readings from Last 3 Encounters:   04/12/19 105 kg  (232 lb)   03/06/19 103 kg (227 lb)   02/15/19 103 kg (227 lb 8 oz)     Temp Readings from Last 3 Encounters:   04/12/19 98.1 °F (36.7 °C)   03/06/19 98.1 °F (36.7 °C) (Temporal)   02/15/19 98.6 °F (37 °C) (Oral)     BP Readings from Last 3 Encounters:   04/12/19 122/68   02/15/19 121/73   11/27/18 111/58     Pulse Readings from Last 3 Encounters:   04/12/19 73   02/15/19 85   11/27/18 68     Body mass index is 39.82 kg/m².  @LASTSAO2(3)@    Physical Exam   Constitutional: She is oriented to person, place, and time. She appears well-developed and well-nourished. No distress.   HENT:   Head: Normocephalic and atraumatic.   Right Ear: External ear normal.   Left Ear: External ear normal.   Nose: Nose normal.   Mouth/Throat: Oropharynx is clear and moist.   Eyes: Conjunctivae and EOM are normal. Pupils are equal, round, and reactive to light.   Neck: Normal range of motion. Neck supple.   Cardiovascular: Normal rate, regular rhythm, normal heart sounds and intact distal pulses.   Pulmonary/Chest: Effort normal and breath sounds normal. No respiratory distress. She has no wheezes.   Musculoskeletal: Normal range of motion.   Normal gait   Neurological: She is alert and oriented to person, place, and time.   Skin: Skin is warm and dry.   Psychiatric: She has a normal mood and affect. Her behavior is normal. Judgment and thought content normal.   Nursing note and vitals reviewed.      Results for orders placed or performed during the hospital encounter of 11/27/18   Tissue Pathology Exam   Result Value Ref Range    Case Report       Surgical Pathology Report                         Case: [id number]                                  Authorizing Provider:  Pierre Ornelas MD        Collected:           11/27/2018 03:19 PM          Ordering Location:     [facility]  Received:            11/27/2018 11:22 PM                                 ENDO SUITES                                                                   Pathologist:           Cynthia Polk MD                                                    Specimen:    Large Intestine, Right / Ascending Colon, ascending colon polyp                            Final Diagnosis       1.  Ascending Colon, Biopsy:    A.  Sessile serrated adenoma.     B.  Negative for high grade dysplasia.    SWM/brb       Gross Description       1. The specimen is received in formalin labeled with the patient's name and further designated 'Large Intestine, Right / Ascending Colon polyp' are multiple small fragments of grey-tan tissue.  The specimen is submitted for embedding as received.        Microscopic Description       Microscopic performed, incorporated in diagnosis.        UA - domenica Poole was seen today for deep vein thrombosis and urinary incontinence.    Diagnoses and all orders for this visit:    History of DVT (deep vein thrombosis)    Gastric band slippage    Mixed hyperlipidemia  -     Comprehensive Metabolic Panel; Future  -     Lipid Panel With LDL / HDL Ratio; Future    Other acute pulmonary embolism without acute cor pulmonale (CMS/HCC)    Gastroesophageal reflux disease, esophagitis presence not specified  -     Comprehensive Metabolic Panel; Future  -     CBC & Differential; Future    Urinary frequency  -     Comprehensive Metabolic Panel; Future    Obesity, Class III, BMI 40-49.9 (morbid obesity) (CMS/HCC)  -     Comprehensive Metabolic Panel; Future  -     CBC & Differential; Future  -     T4, Free; Future  -     TSH; Future  -     Lipid Panel With LDL / HDL Ratio; Future  -     Hemoglobin A1c; Future  -     Vitamin B12; Future  -     Vitamin D 25 Hydroxy; Future  -     Iron Profile; Future    History of laparoscopic adjustable gastric banding  -     Comprehensive Metabolic Panel; Future  -     CBC & Differential; Future  -     Hemoglobin A1c; Future  -     Vitamin B12; Future  -     Vitamin D 25 Hydroxy; Future  -     Iron Profile; Future    Overactive  bladder  -     Comprehensive Metabolic Panel; Future  -     oxybutynin XL (DITROPAN XL) 10 MG 24 hr tablet; Take 1 tablet by mouth Daily.  -     Ambulatory Referral to Gynecology    Healthcare maintenance  -     Comprehensive Metabolic Panel; Future  -     CBC & Differential; Future  -     T4, Free; Future  -     TSH; Future  -     Lipid Panel With LDL / HDL Ratio; Future  -     Hemoglobin A1c; Future  -     Vitamin B12; Future  -     Vitamin D 25 Hydroxy; Future  -     Iron Profile; Future  -     Hepatitis C Antibody  -     Ambulatory Referral to Gynecology    cont current meds.  Hold xarelto 3 days prior to surgery per surgery /dr. engel instructions.        Outpatient Medications Prior to Visit   Medication Sig Dispense Refill   • Black Pepper-Turmeric 3-500 MG capsule Take  by mouth Daily.     • fluticasone (FLONASE) 50 MCG/ACT nasal spray 2 sprays into each nostril Daily. 16 g 5   • omeprazole (priLOSEC) 40 MG capsule TAKE ONE CAPSULE BY MOUTH DAILY 90 capsule 0   • rivaroxaban (XARELTO) 20 MG tablet Take 1 tablet by mouth Daily With Dinner. 90 tablet 3     No facility-administered medications prior to visit.      New Medications Ordered This Visit   Medications   • oxybutynin XL (DITROPAN XL) 10 MG 24 hr tablet     Sig: Take 1 tablet by mouth Daily.     Dispense:  90 tablet     Refill:  0     [unfilled]  There are no discontinued medications.      Return in about 6 months (around 10/12/2019) for Recheck, labs.

## 2019-04-17 ENCOUNTER — RESULTS ENCOUNTER (OUTPATIENT)
Dept: INTERNAL MEDICINE | Facility: CLINIC | Age: 59
End: 2019-04-17

## 2019-04-17 DIAGNOSIS — E66.01 OBESITY, CLASS III, BMI 40-49.9 (MORBID OBESITY) (HCC): ICD-10-CM

## 2019-04-17 DIAGNOSIS — N32.81 OVERACTIVE BLADDER: ICD-10-CM

## 2019-04-17 DIAGNOSIS — K21.9 GASTROESOPHAGEAL REFLUX DISEASE, ESOPHAGITIS PRESENCE NOT SPECIFIED: ICD-10-CM

## 2019-04-17 DIAGNOSIS — Z00.00 HEALTHCARE MAINTENANCE: ICD-10-CM

## 2019-04-17 DIAGNOSIS — E78.2 MIXED HYPERLIPIDEMIA: ICD-10-CM

## 2019-04-17 DIAGNOSIS — Z98.84 HISTORY OF LAPAROSCOPIC ADJUSTABLE GASTRIC BANDING: ICD-10-CM

## 2019-04-17 DIAGNOSIS — R35.0 URINARY FREQUENCY: ICD-10-CM

## 2019-05-03 ENCOUNTER — APPOINTMENT (OUTPATIENT)
Dept: PREADMISSION TESTING | Facility: HOSPITAL | Age: 59
End: 2019-05-03

## 2019-05-03 ENCOUNTER — APPOINTMENT (OUTPATIENT)
Dept: LAB | Facility: HOSPITAL | Age: 59
End: 2019-05-03

## 2019-05-03 VITALS
HEIGHT: 64 IN | TEMPERATURE: 98.3 F | OXYGEN SATURATION: 97 % | RESPIRATION RATE: 16 BRPM | HEART RATE: 75 BPM | BODY MASS INDEX: 39.82 KG/M2

## 2019-05-03 DIAGNOSIS — K95.09 GASTRIC BAND SLIPPAGE: ICD-10-CM

## 2019-05-03 LAB
25(OH)D3 SERPL-MCNC: 20.9 NG/ML (ref 30–100)
ALBUMIN SERPL-MCNC: 3.5 G/DL (ref 3.5–5.2)
ALBUMIN/GLOB SERPL: 1.1 G/DL
ALP SERPL-CCNC: 72 U/L (ref 39–117)
ALT SERPL W P-5'-P-CCNC: 16 U/L (ref 1–33)
ANION GAP SERPL CALCULATED.3IONS-SCNC: 9.9 MMOL/L
AST SERPL-CCNC: 21 U/L (ref 1–32)
BASOPHILS # BLD AUTO: 0.03 10*3/MM3 (ref 0–0.2)
BASOPHILS NFR BLD AUTO: 0.5 % (ref 0–1.5)
BILIRUB SERPL-MCNC: 0.3 MG/DL (ref 0.2–1.2)
BUN BLD-MCNC: 17 MG/DL (ref 8–23)
BUN/CREAT SERPL: 24.6 (ref 7–25)
CALCIUM SPEC-SCNC: 9.6 MG/DL (ref 8.6–10.5)
CHLORIDE SERPL-SCNC: 103 MMOL/L (ref 98–107)
CHOLEST SERPL-MCNC: 217 MG/DL (ref 0–200)
CO2 SERPL-SCNC: 26.1 MMOL/L (ref 22–29)
CREAT BLD-MCNC: 0.69 MG/DL (ref 0.57–1)
DEPRECATED RDW RBC AUTO: 50.8 FL (ref 37–54)
EOSINOPHIL # BLD AUTO: 0.12 10*3/MM3 (ref 0–0.4)
EOSINOPHIL NFR BLD AUTO: 1.9 % (ref 0.3–6.2)
ERYTHROCYTE [DISTWIDTH] IN BLOOD BY AUTOMATED COUNT: 15.3 % (ref 12.3–15.4)
GFR SERPL CREATININE-BSD FRML MDRD: 87 ML/MIN/1.73
GLOBULIN UR ELPH-MCNC: 3.3 GM/DL
GLUCOSE BLD-MCNC: 93 MG/DL (ref 65–99)
HBA1C MFR BLD: 5.5 % (ref 4.8–5.6)
HCT VFR BLD AUTO: 39.3 % (ref 34–46.6)
HCV AB SER DONR QL: NORMAL
HDLC SERPL-MCNC: 69 MG/DL (ref 40–60)
HGB BLD-MCNC: 12.4 G/DL (ref 12–15.9)
IMM GRANULOCYTES # BLD AUTO: 0.02 10*3/MM3 (ref 0–0.05)
IMM GRANULOCYTES NFR BLD AUTO: 0.3 % (ref 0–0.5)
IRON 24H UR-MRATE: 77 MCG/DL (ref 37–145)
IRON SATN MFR SERPL: 18 % (ref 20–50)
LDLC SERPL CALC-MCNC: 134 MG/DL (ref 0–100)
LDLC/HDLC SERPL: 1.95 {RATIO}
LYMPHOCYTES # BLD AUTO: 2.22 10*3/MM3 (ref 0.7–3.1)
LYMPHOCYTES NFR BLD AUTO: 34.6 % (ref 19.6–45.3)
MCH RBC QN AUTO: 28.6 PG (ref 26.6–33)
MCHC RBC AUTO-ENTMCNC: 31.6 G/DL (ref 31.5–35.7)
MCV RBC AUTO: 90.8 FL (ref 79–97)
MONOCYTES # BLD AUTO: 0.52 10*3/MM3 (ref 0.1–0.9)
MONOCYTES NFR BLD AUTO: 8.1 % (ref 5–12)
NEUTROPHILS # BLD AUTO: 3.5 10*3/MM3 (ref 1.7–7)
NEUTROPHILS NFR BLD AUTO: 54.6 % (ref 42.7–76)
NRBC BLD AUTO-RTO: 0 /100 WBC (ref 0–0.2)
PLATELET # BLD AUTO: 261 10*3/MM3 (ref 140–450)
PMV BLD AUTO: 9.6 FL (ref 6–12)
POTASSIUM BLD-SCNC: 4 MMOL/L (ref 3.5–5.2)
PROT SERPL-MCNC: 6.8 G/DL (ref 6–8.5)
RBC # BLD AUTO: 4.33 10*6/MM3 (ref 3.77–5.28)
SODIUM BLD-SCNC: 139 MMOL/L (ref 136–145)
T4 FREE SERPL-MCNC: 1.05 NG/DL (ref 0.93–1.7)
TIBC SERPL-MCNC: 435 MCG/DL (ref 298–536)
TRANSFERRIN SERPL-MCNC: 292 MG/DL (ref 200–360)
TRIGL SERPL-MCNC: 68 MG/DL (ref 0–150)
TSH SERPL DL<=0.05 MIU/L-ACNC: 2.81 MIU/ML (ref 0.27–4.2)
VIT B12 BLD-MCNC: 415 PG/ML (ref 211–946)
VLDLC SERPL-MCNC: 13.6 MG/DL (ref 5–40)
WBC NRBC COR # BLD: 6.41 10*3/MM3 (ref 3.4–10.8)

## 2019-05-03 PROCEDURE — 86803 HEPATITIS C AB TEST: CPT | Performed by: INTERNAL MEDICINE

## 2019-05-03 PROCEDURE — 82607 VITAMIN B-12: CPT | Performed by: INTERNAL MEDICINE

## 2019-05-03 PROCEDURE — 84439 ASSAY OF FREE THYROXINE: CPT | Performed by: INTERNAL MEDICINE

## 2019-05-03 PROCEDURE — 80061 LIPID PANEL: CPT | Performed by: INTERNAL MEDICINE

## 2019-05-03 PROCEDURE — 85025 COMPLETE CBC W/AUTO DIFF WBC: CPT | Performed by: INTERNAL MEDICINE

## 2019-05-03 PROCEDURE — 84466 ASSAY OF TRANSFERRIN: CPT | Performed by: INTERNAL MEDICINE

## 2019-05-03 PROCEDURE — 36415 COLL VENOUS BLD VENIPUNCTURE: CPT | Performed by: INTERNAL MEDICINE

## 2019-05-03 PROCEDURE — 93010 ELECTROCARDIOGRAM REPORT: CPT | Performed by: INTERNAL MEDICINE

## 2019-05-03 PROCEDURE — 82306 VITAMIN D 25 HYDROXY: CPT | Performed by: INTERNAL MEDICINE

## 2019-05-03 PROCEDURE — 93005 ELECTROCARDIOGRAM TRACING: CPT

## 2019-05-03 PROCEDURE — 84443 ASSAY THYROID STIM HORMONE: CPT | Performed by: INTERNAL MEDICINE

## 2019-05-03 PROCEDURE — 83036 HEMOGLOBIN GLYCOSYLATED A1C: CPT | Performed by: INTERNAL MEDICINE

## 2019-05-03 PROCEDURE — 83540 ASSAY OF IRON: CPT | Performed by: INTERNAL MEDICINE

## 2019-05-03 PROCEDURE — 80053 COMPREHEN METABOLIC PANEL: CPT | Performed by: INTERNAL MEDICINE

## 2019-05-03 RX ORDER — CHLORHEXIDINE GLUCONATE 500 MG/1
CLOTH TOPICAL
COMMUNITY
End: 2019-07-02 | Stop reason: HOSPADM

## 2019-05-03 RX ORDER — OMEPRAZOLE 40 MG/1
40 CAPSULE, DELAYED RELEASE ORAL DAILY
COMMUNITY
End: 2019-05-22 | Stop reason: SDUPTHER

## 2019-05-03 NOTE — DISCHARGE INSTRUCTIONS
Take the following medications the morning of surgery with a small sip of water:      General Instructions:   • You may have up to 20 oz of clear-artificially sweetened liquid (to include Powerade Zero, Water, Tea/Coffee with no cream or milk added).  Nothing red in color.  Drink must be completed 2 hours before your arrival time. Patients who avoid smoking, chewing tobacco and alcohol for 4 weeks prior to surgery have a reduced risk of post-operative complications.  Quit smoking as many days before surgery as you can.  • Do not smoke, use chewing tobacco or drink alcohol the day of surgery.   • Bring any papers given to you in the doctor’s office.  • Wear clean comfortable clothes and socks.  • Do not wear contact lenses, false eyelashes or make-up.  Bring a case for your glasses.   • Bring crutches or walker if applicable.  • Remove all piercings.  Leave jewelry and any other valuables at home.  • Hair extensions with metal clips must be removed prior to surgery.  • The Pre-Admission Testing nurse will instruct you to bring medications if unable to obtain an accurate list in Pre-Admission Testing.        If you were given a blood bank ID arm band remember to bring it with you the day of surgery.    Preventing a Surgical Site Infection:  • For 2 to 3 days before surgery, avoid shaving with a razor because the razor can irritate skin and make it easier to develop an infection.    • Any areas of open skin can increase the risk of a post-operative wound infection by allowing bacteria to enter and travel throughout the body.  Notify your surgeon if you have any skin wounds / rashes even if it is not near the expected surgical site.  The area will need assessed to determine if surgery should be delayed until it is healed.  • The day before surgery take a shower using a fresh bar of anti-bacterial soap (such as Dial) and clean washcloth.  Dry with a clean towel.  Then utilize one package of the cloths given to you in  PAT.    • The night prior to surgery sleep in a clean bed with clean clothing.  Do not allow pets to sleep with you.  • Shower on the morning of surgery using a fresh bar of anti-bacterial soap (such as Dial) and clean washcloth.  Dry with a clean towel.  Then utilize the other package of the cloths given to you in PAT.  Dress in clean clothing.  • Do not use any cologne, deodorant or powder morning of surgery.    • Ask your surgeon if you will be receiving antibiotics prior to surgery.  • Make sure you, your family, and all healthcare providers clean their hands with soap and water or an alcohol based hand  before caring for you or your wound.      Day of surgery:  Upon arrival, a Pre-op nurse and Anesthesiologist will review your health history, obtain vital signs, and answer questions you may have.  A Pre-op nurse will start an IV and you may receive medication in preparation for surgery, including something to help you relax.  Your family will be able to see you in the Pre-op area.  While you are in surgery your family should notify the waiting room  if they leave the waiting room area and provide a contact phone number.  If you are staying overnight your family can leave your belongings in the car and bring them to your room later.  If applicable, we do ask that you have your C-PAP/BI-PAP machine available if needed.  It is not used the first night after surgery and only needed in the event you stay addition nights.      Please be aware that surgery does come with discomfort.  We want to make every effort to control your discomfort so please discuss any uncontrolled symptoms with your nurse.   Your doctor will most likely have prescribed pain medications.      If you are going home after surgery you will receive individualized written care instructions before being discharged.  A responsible adult must drive you to and from the hospital on the day of your surgery and stay with you for 24  hours.    If you are staying overnight following surgery, you will be transported to your hospital room following the recovery period.  [facility]    You have received a list of surgical assistants for your reference.  If you have any questions please call Pre-Admission Testing at [phone].  Deductibles and co-payments are collected on the day of service. Please be prepared to pay the required co-pay, deductible or deposit on the day of service as defined by your plan.      2% CHLORAHEXIDINE GLUCONATE* CLOTH  Preparing or “prepping” skin before surgery can reduce the risk of infection at the surgical site. To make the process easier, [facility] has chosen disposable cloths moistened with a rinse-free, 2% Chlorhexidine Gluconate (CHG) antiseptic solution. The steps below outline the prepping process and should be carefully followed.        Use the prep cloth on the area that is circled in the diagram             Directions Night before Surgery  1) Shower using a fresh bar of anti-bacterial soap (such as Dial) and clean washcloth.  Use a clean towel to completely dry your skin.  2) Do not use any lotions, oils or creams on your skin.  3) Open the package and remove 1 cloth, wipe your skin for 30 seconds in a circular motion.  Allow to dry for 3 minutes.  4) Repeat #3 with second cloth.  5) Do not touch your eyes, ears, or mouth with the prep cloth.  6) Allow the wet prep solution to air dry.  7) Discard the prep cloth and wash your hands with soap and water.   8) Dress in clean bed clothes and sleep on fresh clean bed sheets.   9) You may experience some temporary itching after the prep.    Directions Day of Surgery  1) Repeat steps 1,2,3,4,5,6,7, and 9.   2) Dress in clean clothes before coming to the hospital.

## 2019-05-22 RX ORDER — OMEPRAZOLE 40 MG/1
CAPSULE, DELAYED RELEASE ORAL
Qty: 90 CAPSULE | Refills: 1 | Status: SHIPPED | OUTPATIENT
Start: 2019-05-22 | End: 2019-06-07

## 2019-05-24 ENCOUNTER — OFFICE VISIT (OUTPATIENT)
Dept: OBSTETRICS AND GYNECOLOGY | Facility: CLINIC | Age: 59
End: 2019-05-24

## 2019-05-24 ENCOUNTER — TELEPHONE (OUTPATIENT)
Dept: OBSTETRICS AND GYNECOLOGY | Facility: CLINIC | Age: 59
End: 2019-05-24

## 2019-05-24 VITALS
SYSTOLIC BLOOD PRESSURE: 124 MMHG | DIASTOLIC BLOOD PRESSURE: 71 MMHG | WEIGHT: 231.2 LBS | BODY MASS INDEX: 39.47 KG/M2 | HEIGHT: 64 IN

## 2019-05-24 DIAGNOSIS — E66.01 OBESITY, CLASS III, BMI 40-49.9 (MORBID OBESITY) (HCC): ICD-10-CM

## 2019-05-24 DIAGNOSIS — Z01.419 PAP SMEAR, LOW-RISK: Primary | ICD-10-CM

## 2019-05-24 DIAGNOSIS — Z86.718 HISTORY OF DVT (DEEP VEIN THROMBOSIS): ICD-10-CM

## 2019-05-24 DIAGNOSIS — Z11.51 SPECIAL SCREENING EXAMINATION FOR HUMAN PAPILLOMAVIRUS (HPV): ICD-10-CM

## 2019-05-24 DIAGNOSIS — Z01.419 WELL WOMAN EXAM WITH ROUTINE GYNECOLOGICAL EXAM: ICD-10-CM

## 2019-05-24 DIAGNOSIS — N39.46 MIXED INCONTINENCE URGE AND STRESS: ICD-10-CM

## 2019-05-24 PROBLEM — N32.81 OVERACTIVE BLADDER: Status: ACTIVE | Noted: 2019-05-24

## 2019-05-24 PROBLEM — N32.81 OVERACTIVE BLADDER: Status: RESOLVED | Noted: 2019-05-24 | Resolved: 2019-05-24

## 2019-05-24 LAB
BILIRUB BLD-MCNC: NEGATIVE MG/DL
CLARITY, POC: CLEAR
COLOR UR: YELLOW
GLUCOSE UR STRIP-MCNC: NEGATIVE MG/DL
KETONES UR QL: NEGATIVE
LEUKOCYTE EST, POC: NEGATIVE
NITRITE UR-MCNC: NEGATIVE MG/ML
PH UR: 5 [PH] (ref 5–8)
PROT UR STRIP-MCNC: NEGATIVE MG/DL
RBC # UR STRIP: NEGATIVE /UL
SP GR UR: 1 (ref 1–1.03)
UROBILINOGEN UR QL: NORMAL

## 2019-05-24 PROCEDURE — 81002 URINALYSIS NONAUTO W/O SCOPE: CPT | Performed by: OBSTETRICS & GYNECOLOGY

## 2019-05-24 PROCEDURE — 99386 PREV VISIT NEW AGE 40-64: CPT | Performed by: OBSTETRICS & GYNECOLOGY

## 2019-05-24 NOTE — TELEPHONE ENCOUNTER
Please schedule urodynamic studies for this pt who works in the [employer] system.  Dx: mixed urinary incontinence.

## 2019-05-24 NOTE — PROGRESS NOTES
GYN Annual Exam     CC- Here for annual exam.     Pt new to practice? no  Pt new to me? yes     HPI: History of Present Illness      Zulema Sousa is a 59 y.o. female who presents for annual well woman exam. No LMP recorded. Patient is postmenopausal.  Pt needs annual and urinary incontinence evaluated. Pt has multiple medical problems.     MAMMOGRAM UP TO DATE IF AGE APPROPRIATE?  yes    COLONOSCOPY UP TO DATE IF AGE APPROPRIATE? yes    Fhx breast cancer? no    Fhx ovarian cancer? Maternal aunt    Fhx colon cancer? no    Invitae testing offered? no      PMHX:  Patient Active Problem List   Diagnosis   • Gastroesophageal reflux disease   • Edema   • Hyperlipidemia   • Arthralgia of multiple joints   • Osteoarthritis of knee   • Pericarditis   • Pulmonary embolism (CMS/HCC)   • Rheumatoid arthritis (CMS/HCC)   • Sciatica   • Unintended weight gain   • Arthritis of both knees   • Obstructive sleep apnea, adult   • Dietary counseling   • History of DVT (deep vein thrombosis)   • Tear of lateral meniscus of right knee, current   • Arthritis of right knee   • Chronic pain of right knee   • Obesity, Class III, BMI 40-49.9 (morbid obesity) (CMS/HCC)   • Epigastric pain   • History of laparoscopic adjustable gastric banding   • Abnormal UGI series   • Esophageal dilatation   • Abnormal finding on radiology exam   • Gastric band slippage   • Mixed incontinence urge and stress   ; otherwise none    OB History      Para Term  AB Living    2 2 2          SAB TAB Ectopic Molar Multiple Live Births                           Past Medical History:   Diagnosis Date   • Acid reflux    • Back pain    • Cardiac tamponade     2015   • Difficulty breathing     during exertion   • Edema    • Esophagitis, reflux    • Essential hypertriglyceridemia    • GERD (gastroesophageal reflux disease)    • Health care maintenance    • Heartburn    • Hyperlipidemia    • Hypertriglyceridemia    • Increased appetite    • Morbid  obesity (CMS/HCC)    • Multiple joint pain    • Osteoarthritis of knee    • Pericarditis    • Pulmonary embolism (CMS/HCC)     2015   • RHA (rheumatoid arthritis) (CMS/HCC)    • Sciatica    • Sleep apnea    • Unintended weight gain        Past Surgical History:   Procedure Laterality Date   • ANKLE SURGERY      treatment of ankle fracture   • APPENDECTOMY     • CARDIAC SURGERY      cardiac tamponade   •  SECTION     • CHOLECYSTECTOMY     • COLONOSCOPY N/A 2018    Procedure: COLONOSCOPY to cecum and t.i. with polypectomy and clip x2 to ascending colon;  Surgeon: Pierre Ornelas MD;  Location: [facility] ENDOSCOPY;  Service: Gastroenterology   • DILATATION AND CURETTAGE     • ENDOSCOPY N/A 2018    Procedure: ESOPHAGOGASTRODUODENOSCOPY;  Surgeon: Pierre Ornelas MD;  Location: [facility] ENDOSCOPY;  Service: Gastroenterology   • LAPAROSCOPIC GASTRIC BANDING     • PULMONARY EMBOLISM SURGERY     • TUBAL ABDOMINAL LIGATION           Current Outpatient Medications:   •  Black Pepper-Turmeric 3-500 MG capsule, Take 1 tablet by mouth 2 (Two) Times a Day. INSTRUCTED PATIENT TO STOP FOR SURGERY, Disp: , Rfl:   •  Chlorhexidine Gluconate Cloth 2 % pads, Apply  topically. PER MD INSTRUCTIONS, Disp: , Rfl:   •  fluticasone (FLONASE) 50 MCG/ACT nasal spray, 2 sprays into each nostril Daily., Disp: 16 g, Rfl: 5  •  omeprazole (priLOSEC) 40 MG capsule, TAKE ONE CAPSULE BY MOUTH DAILY, Disp: 90 capsule, Rfl: 1  •  oxybutynin XL (DITROPAN XL) 10 MG 24 hr tablet, Take 1 tablet by mouth Daily., Disp: 90 tablet, Rfl: 0  •  PREVIDENT 5000 SENSITIVE 1.1-5 % paste, , Disp: , Rfl:   •  rivaroxaban (XARELTO) 20 MG tablet, Take 1 tablet by mouth Daily With Dinner. (Patient taking differently: Take 20 mg by mouth Daily With Dinner. PATIENT TO STOP 3 DAYS BEFORE SURGERY), Disp: 90 tablet, Rfl: 3    Allergies   Allergen Reactions   • Lortab [Hydrocodone-Acetaminophen] Itching   • Nsaids    • Tramadol Dizziness       Social  "History     Tobacco Use   • Smoking status: Former Smoker     Last attempt to quit: 2015     Years since quittin.3   • Smokeless tobacco: Never Used   • Tobacco comment: caffeine use   Substance Use Topics   • Alcohol use: Yes     Alcohol/week: 0.6 oz     Types: 1 Shots of liquor per week     Comment: social/ occassional   • Drug use: No       I advised Zulema of the risks of continuing to use tobacco, and I provided her with tobacco cessation educational materials in the After Visit Summary.     During this visit, I spent 5 minutes counseling the patient regarding tobacco cessation.        Family History   Problem Relation Age of Onset   • Cancer Mother    • Obesity Mother    • Diabetes Father    • Hypertension Father    • Heart disease Father    • Obesity Father    • Heart disease Brother    • Diabetes Paternal Grandfather    • Diabetes Paternal Aunt    • No Known Problems Maternal Grandmother    • No Known Problems Maternal Grandfather    • No Known Problems Paternal Grandmother    • Malig Hyperthermia Neg Hx        Review of Systems        EXAM:  /71   Ht 162.6 cm (64.02\")   Wt 105 kg (231 lb 3.2 oz)   Breastfeeding? No   BMI 39.67 kg/m²     Physical Exam   Constitutional: She is oriented to person, place, and time. She appears well-developed and well-nourished.   HENT:   Head: Normocephalic and atraumatic.   Neck: Normal range of motion. Neck supple. No thyromegaly present.   Cardiovascular: Normal rate and regular rhythm.   Pulmonary/Chest: Effort normal and breath sounds normal. Right breast exhibits no mass and no nipple discharge. Left breast exhibits no mass and no nipple discharge. Breasts are symmetrical. There is no breast swelling.   Abdominal: Soft. Bowel sounds are normal. She exhibits no distension and no mass. There is no tenderness. There is no rebound and no guarding.   Genitourinary: Vagina normal and uterus normal. No breast tenderness, discharge or bleeding. Pelvic exam was " performed with patient prone. There is no lesion on the right labia. There is no lesion on the left labia. Cervix exhibits no motion tenderness and no discharge. Right adnexum displays no mass. Left adnexum displays no mass.   Genitourinary Comments: cx wnl, pap done   Musculoskeletal: Normal range of motion. She exhibits no edema.   Neurological: She is alert and oriented to person, place, and time.   Skin: Skin is warm and dry.   Breasts wnl bilaterally  Pt has multiple moles present.    Psychiatric: She has a normal mood and affect. Her behavior is normal. Judgment and thought content normal.   Nursing note and vitals reviewed.         As part of wellness and prevention, the following topics were discussed with the patient:  []  Nutrition  []  Physical activity/regular exercise   [x]  Healthy weight  []  Injury prevention  [x]  Substance misuse/abuse  []  Sexual behavior  []  STD prevention  []  Contaception  []  Dental health  [x]  Mental health  []  Immunization  [x]  Encouraged SBE     Counseling and guidance done:  Nutrition, physical activity, healthy weight, injury prevention, misuse of tobacco, alcohol and drugs, sexual behavior and STDs, contraception, dental health, mental health, immunizations breast cancer screening and exams.    Assessment     1) GYN annual well woman exam.   2) PAP done today? yes  3) problems addressed: mixed incontinence.       Plan       Follow up prn and one year.    Zulema was seen today for gynecologic exam and bladder problem.    Diagnoses and all orders for this visit:    Pap smear, low-risk  -     Pap IG, HPV-hr    Well woman exam with routine gynecological exam  -     POC Urinalysis Dipstick  -     Pap IG, HPV-hr    Special screening examination for human papillomavirus (HPV)  -     Pap IG, HPV-hr    Mixed incontinence urge and stress    History of DVT (deep vein thrombosis)    Obesity, Class III, BMI 40-49.9 (morbid obesity) (CMS/[facility])      Urodynamic studies.  Hold  ditropan for now.    RTO Return in about 1 year (around 5/24/2020) for Annual physical.        Tez Ramírez MD  [unfilled]  11:16 AM

## 2019-05-30 LAB
CYTOLOGIST CVX/VAG CYTO: NORMAL
CYTOLOGY CVX/VAG DOC CYTO: NORMAL
CYTOLOGY CVX/VAG DOC THIN PREP: NORMAL
DX ICD CODE: NORMAL
HIV 1 & 2 AB SER-IMP: NORMAL
HPV I/H RISK 1 DNA CVX QL PROBE+SIG AMP: NEGATIVE
OTHER STN SPEC: NORMAL
STAT OF ADQ CVX/VAG CYTO-IMP: NORMAL

## 2019-06-07 ENCOUNTER — APPOINTMENT (OUTPATIENT)
Dept: PREADMISSION TESTING | Facility: HOSPITAL | Age: 59
End: 2019-06-07

## 2019-06-07 ENCOUNTER — TELEPHONE (OUTPATIENT)
Dept: BARIATRICS/WEIGHT MGMT | Facility: CLINIC | Age: 59
End: 2019-06-07

## 2019-06-07 VITALS
HEIGHT: 64 IN | HEART RATE: 66 BPM | DIASTOLIC BLOOD PRESSURE: 70 MMHG | WEIGHT: 229 LBS | RESPIRATION RATE: 16 BRPM | OXYGEN SATURATION: 97 % | BODY MASS INDEX: 39.09 KG/M2 | SYSTOLIC BLOOD PRESSURE: 111 MMHG | TEMPERATURE: 97.7 F

## 2019-06-07 LAB
ANION GAP SERPL CALCULATED.3IONS-SCNC: 10.6 MMOL/L
BUN BLD-MCNC: 12 MG/DL (ref 8–23)
BUN/CREAT SERPL: 17.4 (ref 7–25)
CALCIUM SPEC-SCNC: 9.6 MG/DL (ref 8.6–10.5)
CHLORIDE SERPL-SCNC: 99 MMOL/L (ref 98–107)
CO2 SERPL-SCNC: 27.4 MMOL/L (ref 22–29)
CREAT BLD-MCNC: 0.69 MG/DL (ref 0.57–1)
DEPRECATED RDW RBC AUTO: 50.3 FL (ref 37–54)
ERYTHROCYTE [DISTWIDTH] IN BLOOD BY AUTOMATED COUNT: 14.8 % (ref 12.3–15.4)
GFR SERPL CREATININE-BSD FRML MDRD: 87 ML/MIN/1.73
GLUCOSE BLD-MCNC: 91 MG/DL (ref 65–99)
HCT VFR BLD AUTO: 43.1 % (ref 34–46.6)
HGB BLD-MCNC: 13.3 G/DL (ref 12–15.9)
MCH RBC QN AUTO: 28.2 PG (ref 26.6–33)
MCHC RBC AUTO-ENTMCNC: 30.9 G/DL (ref 31.5–35.7)
MCV RBC AUTO: 91.5 FL (ref 79–97)
PLATELET # BLD AUTO: 284 10*3/MM3 (ref 140–450)
PMV BLD AUTO: 9.4 FL (ref 6–12)
POTASSIUM BLD-SCNC: 3.8 MMOL/L (ref 3.5–5.2)
RBC # BLD AUTO: 4.71 10*6/MM3 (ref 3.77–5.28)
SODIUM BLD-SCNC: 137 MMOL/L (ref 136–145)
WBC NRBC COR # BLD: 6.58 10*3/MM3 (ref 3.4–10.8)

## 2019-06-07 PROCEDURE — 80048 BASIC METABOLIC PNL TOTAL CA: CPT | Performed by: SURGERY

## 2019-06-07 PROCEDURE — 85027 COMPLETE CBC AUTOMATED: CPT | Performed by: SURGERY

## 2019-06-07 PROCEDURE — 36415 COLL VENOUS BLD VENIPUNCTURE: CPT

## 2019-06-07 RX ORDER — OMEPRAZOLE 40 MG/1
40 CAPSULE, DELAYED RELEASE ORAL NIGHTLY
COMMUNITY
End: 2019-11-21 | Stop reason: SDUPTHER

## 2019-06-07 NOTE — DISCHARGE INSTRUCTIONS
Take only the following medications the morning of surgery with a small sip of water:   NONE    ARRIVE AT 0545.      Do not take Bariatric Vitamins, Folic Acid, Actigall (if applicable) or Lovenox Injections (if applicable) the morning of surgery.  If you have a history of blood clots or have a BMI greater than 50, Dr. Salamanca may order Lovenox for after surgery. Do not take Lovenox blood thinner before surgery.      General Instructions:   • Do not eat solid food after midnight the night before surgery.    • After midnight, you may have up to 20 oz of clear-artificially sweetened liquid (to include Gatorade Zero, Powerade Zero, Water, Tea/Coffee with no cream, milk or sugar).  Nothing red in color.  Any drinks must be completed 2 hours before your arrival time. Patients who avoid smoking, chewing tobacco and alcohol for 4 weeks prior to surgery have a reduced risk of post-operative complications.  Quit smoking as many days before surgery as you can.  • Do not smoke, use chewing tobacco or drink alcohol the day of surgery.   • Bring any papers given to you in the doctor’s office.  Wear clean comfortable clothes and socks.  • Do not wear contact lenses, false eyelashes or make-up.  Bring a case for your glasses.   • Bring crutches or walker if applicable.  • Remove all piercings.  Leave jewelry and any other valuables at home.  • Remove fingernail polish, gel overlays or any artificial nails.  • Hair extensions with metal clips must be removed prior to surgery.  • The Pre-Admission Testing nurse will instruct you to bring medications if unable to obtain an accurate list in Pre-Admission Testing.      If you were given a blood bank ID arm band remember to bring it with you the day of surgery.    Preventing a Surgical Site Infection:  • For 2 to 3 days before surgery, avoid shaving with a razor because the razor can irritate skin and make it easier to develop an infection.    • Any areas of open skin can increase the  risk of a post-operative wound infection by allowing bacteria to enter and travel throughout the body.  Notify your surgeon if you have any skin wounds / rashes even if it is not near the expected surgical site.  The area will need assessed to determine if surgery should be delayed until it is healed.  • 2 days prior to surgery, take a shower using a fresh bar of anti-bacterial soap (such as Dial).  Use a clean washcloth and dry with a clean towel.    • The day prior to surgery, take a shower using a fresh bar of anti-bacterial soap (such as Dial).  Use a clean washcloth and dry with a clean towel.  After the shower, utilize a package of cloths given to you in PAT.  Sleep in a clean bed with clean clothing.  Do not allow pets to sleep with you.  • The morning of surgery shower using a fresh bar of anti-bacterial soap (such as Dial).  Use a clean washcloth and dry with a clean towel.  Then utilize the other package of the cloths given to you in PAT.  Dress in clean clothing.  • Do not use any cologne, deodorant, lotion or powder morning of surgery.   Ask your surgeon if you will be receiving antibiotics prior to surgery.  • Make sure you, your family, and all healthcare providers clean their hands with soap and water or an alcohol based hand  before caring for you or your wound.      Day of surgery:  Upon arrival, a Pre-op nurse and Anesthesiologist will review your health history, obtain vital signs, and answer questions you may have.  A Pre-op nurse will start an IV and you may receive medication in preparation for surgery, including something to help you relax.  Your family will be able to see you in the Pre-op area.  While you are in surgery, your family should notify the waiting room  if they leave the waiting room area and provide a contact phone number.  If you are staying overnight your family can leave your belongings in the car and bring them to your room later.  If applicable, we do  ask that you have your C-PAP/BI-PAP machine available. It can be utilized the night of surgery.     Please be aware that surgery does come with discomfort.  We want to make every effort to control your discomfort so please discuss any uncontrolled symptoms with your nurse.   Your doctor will most likely have prescribed pain medications.      If you are going home after surgery you will receive individualized written care instructions before being discharged.  A responsible adult must drive you to and from the hospital on the day of your surgery and stay with you for 24 hours.    If you are staying overnight following surgery, you will be transported to your hospital room following the recovery period.  [facility] has all private rooms.    You have received a list of surgical assistants for your reference.   If you have any questions please call Pre-Admission Testing at [phone].  Deductibles and co-payments are collected on the day of service. Please be prepared to pay the required co-pay, deductible or deposit on the day of service as defined by your plan.

## 2019-06-10 DIAGNOSIS — N39.46 MIXED INCONTINENCE: Primary | ICD-10-CM

## 2019-06-24 ENCOUNTER — CLINICAL SUPPORT (OUTPATIENT)
Dept: ORTHOPEDIC SURGERY | Facility: CLINIC | Age: 59
End: 2019-06-24

## 2019-06-24 VITALS — WEIGHT: 230 LBS | BODY MASS INDEX: 39.27 KG/M2 | HEIGHT: 64 IN

## 2019-06-24 DIAGNOSIS — M77.8 RIGHT ELBOW TENDONITIS: ICD-10-CM

## 2019-06-24 DIAGNOSIS — M17.11 ARTHRITIS OF RIGHT KNEE: Primary | ICD-10-CM

## 2019-06-24 DIAGNOSIS — E66.01 OBESITY, CLASS III, BMI 40-49.9 (MORBID OBESITY) (HCC): ICD-10-CM

## 2019-06-24 PROCEDURE — 99213 OFFICE O/P EST LOW 20 MIN: CPT | Performed by: NURSE PRACTITIONER

## 2019-06-24 PROCEDURE — 20610 DRAIN/INJ JOINT/BURSA W/O US: CPT | Performed by: NURSE PRACTITIONER

## 2019-06-24 RX ORDER — METHYLPREDNISOLONE ACETATE 80 MG/ML
80 INJECTION, SUSPENSION INTRA-ARTICULAR; INTRALESIONAL; INTRAMUSCULAR; SOFT TISSUE
Status: COMPLETED | OUTPATIENT
Start: 2019-06-24 | End: 2019-06-24

## 2019-06-24 RX ADMIN — METHYLPREDNISOLONE ACETATE 80 MG: 80 INJECTION, SUSPENSION INTRA-ARTICULAR; INTRALESIONAL; INTRAMUSCULAR; SOFT TISSUE at 15:33

## 2019-06-24 NOTE — PROGRESS NOTES
Patient: Zulema Sousa  YOB: 1960    Chief Complaints:  right knee pain    Subjective:    History of Present Illness: Here today for knee pain. Is having lap band removed on Monday.  Is also having right elbow pain new  Of late. Sore medially and laterally uses mouse at work more now, not relieved by biofreeze. The knee pain is a generalized joint tenderness.  It has been progressive in nature but remains intermittent.  Worsened by prolonged standing or walking and squatting activities. Has had improvement in the past with ice/heat, rest, and injections.     This problem is new to this examiner.     Allergies:   Allergies   Allergen Reactions   • Lortab [Hydrocodone-Acetaminophen] Itching   • Nsaids Other (See Comments)     DOESN'T TAKE BECAUSE SHES ON BLOOD THINNER.   • Tramadol Dizziness       Medications:   Home Medications:  Current Outpatient Medications on File Prior to Visit   Medication Sig   • Black Pepper-Turmeric 3-500 MG capsule Take 1 tablet by mouth 2 (Two) Times a Day. INSTRUCTED PATIENT TO STOP FOR SURGERY   • Chlorhexidine Gluconate Cloth 2 % pads Apply  topically. PER MD INSTRUCTIONS   • fluticasone (FLONASE) 50 MCG/ACT nasal spray 2 sprays into each nostril Daily.   • omeprazole (priLOSEC) 40 MG capsule Take 40 mg by mouth Every Night.   • PREVIDENT 5000 SENSITIVE 1.1-5 % paste    • rivaroxaban (XARELTO) 20 MG tablet Take 1 tablet by mouth Daily With Dinner. (Patient taking differently: Take 20 mg by mouth Every Night. PATIENT TO STOP 3 DAYS BEFORE SURGERY)     No current facility-administered medications on file prior to visit.      Current Medications:  Scheduled Meds:  Continuous Infusions:  No current facility-administered medications for this visit.   PRN Meds:.    I have reviewed the patient's medical history in detail and updated the computerized patient record.  Review and summarization of old records include:    Past Medical History:   Diagnosis Date   • Acid reflux     • Back pain    • Cardiac tamponade     2015   • Difficulty breathing     during exertion   • Edema    • Esophagitis, reflux    • Essential hypertriglyceridemia    • GERD (gastroesophageal reflux disease)    • Health care maintenance    • Heartburn    • Hyperlipidemia    • Hypertriglyceridemia    • Increased appetite    • Morbid obesity (CMS/HCC)    • Multiple joint pain    • Osteoarthritis of knee    • Pericarditis    • Pulmonary embolism (CMS/HCC)     2015   • RHA (rheumatoid arthritis) (CMS/[facility])    • Sciatica    • Sleep apnea    • Unintended weight gain         Past Surgical History:   Procedure Laterality Date   • ANKLE SURGERY      treatment of ankle fracture   • APPENDECTOMY     • CARDIAC SURGERY      cardiac tamponade   •  SECTION     • CHOLECYSTECTOMY     • COLONOSCOPY N/A 2018    Procedure: COLONOSCOPY to cecum and t.i. with polypectomy and clip x2 to ascending colon;  Surgeon: Pierre Ornelas MD;  Location: [facility] ENDOSCOPY;  Service: Gastroenterology   • DILATATION AND CURETTAGE     • ENDOSCOPY N/A 2018    Procedure: ESOPHAGOGASTRODUODENOSCOPY;  Surgeon: Pierre Ornelas MD;  Location: [facility] ENDOSCOPY;  Service: Gastroenterology   • LAPAROSCOPIC GASTRIC BANDING     • PULMONARY EMBOLISM SURGERY     • TUBAL ABDOMINAL LIGATION          Social History     Occupational History   • Occupation:      Employer: [employer]   Tobacco Use   • Smoking status: Former Smoker     Last attempt to quit: 2015     Years since quittin.4   • Smokeless tobacco: Never Used   • Tobacco comment: caffeine use   Substance and Sexual Activity   • Alcohol use: Yes     Alcohol/week: 0.6 oz     Types: 1 Shots of liquor per week     Comment: social/ occassional   • Drug use: No   • Sexual activity: Defer      Social History     Social History Narrative   • Not on file        Family History   Problem Relation Age of Onset   • Cancer Mother    • Obesity  "Mother    • Diabetes Father    • Hypertension Father    • Heart disease Father    • Obesity Father    • Heart disease Brother    • Diabetes Paternal Grandfather    • Diabetes Paternal Aunt    • No Known Problems Maternal Grandmother    • No Known Problems Maternal Grandfather    • No Known Problems Paternal Grandmother    • Malig Hyperthermia Neg Hx        ROS: 14 point review of systems was performed and was negative except for documented findings in HPI and today's encounter.     Allergies:   Allergies   Allergen Reactions   • Lortab [Hydrocodone-Acetaminophen] Itching   • Nsaids Other (See Comments)     DOESN'T TAKE BECAUSE SHES ON BLOOD THINNER.   • Tramadol Dizziness     Constitutional:  Denies fever, shaking or chills   Eyes:  Denies change in visual acuity   HENT:  Denies nasal congestion or sore throat   Respiratory:  Denies cough or shortness of breath   Cardiovascular:  Denies chest pain or severe LE edema   GI:  Denies abdominal pain, nausea, vomiting, bloody stools or diarrhea   Musculoskeletal:  Numbness, tingling, or loss of motor function only as noted above in history of present illness.  : Denies painful urination or hematuria  Integument:  Denies rash, lesion or ulceration   Neurologic:  Denies headache or focal weakness  Endocrine:  Denies lymphadenopathy  Psych:  Denies confusion or change in mental status   Hem:  Denies active bleeding    Physical Exam:  Wt Readings from Last 3 Encounters:   06/24/19 104 kg (230 lb)   06/07/19 104 kg (229 lb)   05/24/19 105 kg (231 lb 3.2 oz)     Ht Readings from Last 3 Encounters:   06/24/19 162.6 cm (64\")   06/07/19 162.6 cm (64\")   05/24/19 162.6 cm (64.02\")     Body mass index is 39.48 kg/m².  Facility age limit for growth percentiles is 20 years.  There were no vitals filed for this visit.  Vital Signs:  reviewed  Constitutional: Awake alert and oriented x3, well developed, no acute distress, non-toxic appearance.  EYES: symmetric, sclera clear  ENT:  " Normocephalic, Atraumatic.   Respiratory:  No respiratory distress, No wheezing  CV: pulse regular, no palpitations or pallor.  GI:  Abdomen soft, non-tender.   Vascular:  Intact distal pulses, No cyanosis, no signs or symptoms of DVT.  Neurologic: Sensation grossly intact to the involved extremity, No focal deficits noted.   Neck: No tenderness, Supple.  Integument: warm, dry, no ulcerations.   Psychiatric:  Oriented, no pathological affect.  Musculoskeletal:    Affected knee(s):  Painful gait with a subtle limp, positive for synovitis, swelling, joint effusion with crepitation.  Lachman negative  Posterior drawer negative  Kristin's negative  Patellofemoral grind +  Sensation grossly intact to light touch throughout the lower extremity  Skin is intact  Distal pulses are palpable  No signs or symptoms of DVT  Medial and lateral right elbow tendon tenderness uses mouse at work and flexes wrist back and forth       Diagnostic Data:     Imaging was done previously in the office, images were personally viewed and discussed with the patient:    Indication: pain related symptoms,  Views: 3V AP, LAT & 40 degree PA right knee(s)   Findings: severe end-stage arthritis (bone on bone, subchondral sclerosis/cysts, osteophytes)  Comparison views: viewed last xray done in the office.     Procedure:  Large Joint Arthrocentesis: R knee  Date/Time: 6/24/2019 3:33 PM  Consent given by: patient  Site marked: site marked  Timeout: Immediately prior to procedure a time out was called to verify the correct patient, procedure, equipment, support staff and site/side marked as required   Supporting Documentation  Indications: pain and joint swelling   Procedure Details  Location: knee - R knee  Preparation: Patient was prepped and draped in the usual sterile fashion  Needle size: 22 G (21)  Approach: anterolateral  Medications administered: 4 mL lidocaine (cardiac); 80 mg methylPREDNISolone acetate 80 MG/ML  Patient tolerance: patient  tolerated the procedure well with no immediate complications          Assessment:     ICD-10-CM ICD-9-CM   1. Arthritis of right knee M17.11 716.96   2. Obesity, Class III, BMI 40-49.9 (morbid obesity) (CMS/[facility]) E66.01 278.01   3. Right elbow tendonitis M77.8 727.09           Plan: Is to proceed with injection  Follow up as indicated.  Ice, elevate, and rest as needed.  Additional interventions include:  15 min spent face to face with patient 11 min spent counseling about:  Biomechanics of pertinent body area discussed.  Risks, benefits, alternatives, comparisons, and complications of accepted medicines, injections, recommendations, surgical procedures, and therapies explained and education provided in laymen's terms. Natural history and expected course of this patient's diagnosis discussed along with evaluation of therapies. Questions answered. When appropriate I also discussed proper use of cane, walker, trekking poles.   BMI:  The concept of BMI body mass index and its importance and implications discussed.  BMI suggested to be < 40 or as low as possible. Lifestyle measures for weight loss and how this affects orthopedic condition.  EXERCISES:  Advice on benefits of, and types of regular/moderate exercise including biomechanical forces involved as it pertains to this complaint.  RICE: Rest, ice, compression, and elevation therapy, Cryotherapy/brachy therapy, and or OTC linaments as indicated with instructions.   Cortisone Injection. See procedure note.  Ice right elbow, cannot take NSAIDS on chronic Xarelto, recommend band and ice and restrict flexion of wrist at work while using mouse which is likely the offending cause.   6/24/2019  Marcella Bar, APRN

## 2019-07-01 ENCOUNTER — ANESTHESIA EVENT (OUTPATIENT)
Dept: PERIOP | Facility: HOSPITAL | Age: 59
End: 2019-07-01

## 2019-07-01 ENCOUNTER — HOSPITAL ENCOUNTER (OUTPATIENT)
Facility: HOSPITAL | Age: 59
Discharge: HOME | End: 2019-07-02
Attending: SURGERY | Admitting: SURGERY

## 2019-07-01 ENCOUNTER — ANESTHESIA (OUTPATIENT)
Dept: PERIOP | Facility: HOSPITAL | Age: 59
End: 2019-07-01

## 2019-07-01 DIAGNOSIS — K95.09 GASTRIC BAND SLIPPAGE: ICD-10-CM

## 2019-07-01 PROCEDURE — 25010000002 MIDAZOLAM PER 1 MG: Performed by: ANESTHESIOLOGY

## 2019-07-01 PROCEDURE — 25010000002 METOCLOPRAMIDE PER 10 MG: Performed by: SURGERY

## 2019-07-01 PROCEDURE — 25010000002 FENTANYL CITRATE (PF) 100 MCG/2ML SOLUTION: Performed by: NURSE ANESTHETIST, CERTIFIED REGISTERED

## 2019-07-01 PROCEDURE — 25010000002 PROMETHAZINE PER 50 MG: Performed by: ANESTHESIOLOGY

## 2019-07-01 PROCEDURE — 94640 AIRWAY INHALATION TREATMENT: CPT

## 2019-07-01 PROCEDURE — 25010000002 PROPOFOL 10 MG/ML EMULSION: Performed by: NURSE ANESTHETIST, CERTIFIED REGISTERED

## 2019-07-01 PROCEDURE — 94799 UNLISTED PULMONARY SVC/PX: CPT

## 2019-07-01 PROCEDURE — 25010000002 ENOXAPARIN PER 10 MG: Performed by: SURGERY

## 2019-07-01 PROCEDURE — 25010000002 PIPERACILLIN SOD-TAZOBACTAM PER 1 G: Performed by: SURGERY

## 2019-07-01 PROCEDURE — 43774 LAP RMVL GASTR ADJ ALL PARTS: CPT | Performed by: SURGERY

## 2019-07-01 PROCEDURE — 43774 LAP RMVL GASTR ADJ ALL PARTS: CPT | Performed by: NURSE PRACTITIONER

## 2019-07-01 PROCEDURE — 25010000002 ONDANSETRON PER 1 MG: Performed by: NURSE ANESTHETIST, CERTIFIED REGISTERED

## 2019-07-01 PROCEDURE — G0378 HOSPITAL OBSERVATION PER HR: HCPCS

## 2019-07-01 PROCEDURE — 25010000002 DEXAMETHASONE PER 1 MG: Performed by: NURSE ANESTHETIST, CERTIFIED REGISTERED

## 2019-07-01 PROCEDURE — 25010000002 CEFAZOLIN PER 500 MG: Performed by: SURGERY

## 2019-07-01 PROCEDURE — 25010000002 HYDROMORPHONE PER 4 MG: Performed by: ANESTHESIOLOGY

## 2019-07-01 RX ORDER — DIPHENHYDRAMINE HYDROCHLORIDE 50 MG/ML
25 INJECTION INTRAMUSCULAR; INTRAVENOUS EVERY 4 HOURS PRN
Status: DISCONTINUED | OUTPATIENT
Start: 2019-07-01 | End: 2019-07-02 | Stop reason: HOSPADM

## 2019-07-01 RX ORDER — ONDANSETRON 2 MG/ML
4 INJECTION INTRAMUSCULAR; INTRAVENOUS EVERY 4 HOURS PRN
Status: DISCONTINUED | OUTPATIENT
Start: 2019-07-01 | End: 2019-07-02 | Stop reason: HOSPADM

## 2019-07-01 RX ORDER — CHLORHEXIDINE GLUCONATE 0.12 MG/ML
15 RINSE ORAL
Status: COMPLETED | OUTPATIENT
Start: 2019-07-01 | End: 2019-07-01

## 2019-07-01 RX ORDER — SODIUM CHLORIDE 0.9 % (FLUSH) 0.9 %
1-10 SYRINGE (ML) INJECTION AS NEEDED
Status: DISCONTINUED | OUTPATIENT
Start: 2019-07-01 | End: 2019-07-01 | Stop reason: HOSPADM

## 2019-07-01 RX ORDER — ACETAMINOPHEN 160 MG/5ML
975 SOLUTION ORAL ONCE
Status: COMPLETED | OUTPATIENT
Start: 2019-07-01 | End: 2019-07-01

## 2019-07-01 RX ORDER — HYDROMORPHONE HYDROCHLORIDE 1 MG/ML
0.25 INJECTION, SOLUTION INTRAMUSCULAR; INTRAVENOUS; SUBCUTANEOUS
Status: DISCONTINUED | OUTPATIENT
Start: 2019-07-01 | End: 2019-07-01 | Stop reason: HOSPADM

## 2019-07-01 RX ORDER — FENTANYL CITRATE 50 UG/ML
50 INJECTION, SOLUTION INTRAMUSCULAR; INTRAVENOUS
Status: DISCONTINUED | OUTPATIENT
Start: 2019-07-01 | End: 2019-07-01 | Stop reason: HOSPADM

## 2019-07-01 RX ORDER — PROMETHAZINE HYDROCHLORIDE 25 MG/ML
12.5 INJECTION, SOLUTION INTRAMUSCULAR; INTRAVENOUS EVERY 4 HOURS PRN
Status: DISCONTINUED | OUTPATIENT
Start: 2019-07-01 | End: 2019-07-02 | Stop reason: HOSPADM

## 2019-07-01 RX ORDER — PROMETHAZINE HYDROCHLORIDE 25 MG/ML
6.25 INJECTION, SOLUTION INTRAMUSCULAR; INTRAVENOUS ONCE AS NEEDED
Status: COMPLETED | OUTPATIENT
Start: 2019-07-01 | End: 2019-07-01

## 2019-07-01 RX ORDER — SODIUM CHLORIDE, SODIUM LACTATE, POTASSIUM CHLORIDE, CALCIUM CHLORIDE 600; 310; 30; 20 MG/100ML; MG/100ML; MG/100ML; MG/100ML
75 INJECTION, SOLUTION INTRAVENOUS CONTINUOUS
Status: DISCONTINUED | OUTPATIENT
Start: 2019-07-01 | End: 2019-07-02 | Stop reason: HOSPADM

## 2019-07-01 RX ORDER — METOCLOPRAMIDE HYDROCHLORIDE 5 MG/ML
10 INJECTION INTRAMUSCULAR; INTRAVENOUS ONCE
Status: COMPLETED | OUTPATIENT
Start: 2019-07-01 | End: 2019-07-01

## 2019-07-01 RX ORDER — LORAZEPAM 1 MG/1
1 TABLET ORAL EVERY 12 HOURS PRN
Status: DISCONTINUED | OUTPATIENT
Start: 2019-07-01 | End: 2019-07-02 | Stop reason: HOSPADM

## 2019-07-01 RX ORDER — MORPHINE SULFATE 2 MG/ML
2 INJECTION, SOLUTION INTRAMUSCULAR; INTRAVENOUS
Status: DISCONTINUED | OUTPATIENT
Start: 2019-07-01 | End: 2019-07-02 | Stop reason: HOSPADM

## 2019-07-01 RX ORDER — MAGNESIUM HYDROXIDE 1200 MG/15ML
LIQUID ORAL AS NEEDED
Status: DISCONTINUED | OUTPATIENT
Start: 2019-07-01 | End: 2019-07-01 | Stop reason: HOSPADM

## 2019-07-01 RX ORDER — NALOXONE HCL 0.4 MG/ML
0.1 VIAL (ML) INJECTION
Status: DISCONTINUED | OUTPATIENT
Start: 2019-07-01 | End: 2019-07-02 | Stop reason: HOSPADM

## 2019-07-01 RX ORDER — LABETALOL HYDROCHLORIDE 5 MG/ML
10 INJECTION, SOLUTION INTRAVENOUS
Status: DISCONTINUED | OUTPATIENT
Start: 2019-07-01 | End: 2019-07-02 | Stop reason: HOSPADM

## 2019-07-01 RX ORDER — SODIUM CHLORIDE 0.9 % (FLUSH) 0.9 %
3 SYRINGE (ML) INJECTION EVERY 12 HOURS SCHEDULED
Status: DISCONTINUED | OUTPATIENT
Start: 2019-07-01 | End: 2019-07-02 | Stop reason: HOSPADM

## 2019-07-01 RX ORDER — ACETAMINOPHEN 500 MG
1000 TABLET ORAL EVERY 6 HOURS PRN
Status: DISCONTINUED | OUTPATIENT
Start: 2019-07-01 | End: 2019-07-02 | Stop reason: HOSPADM

## 2019-07-01 RX ORDER — FAMOTIDINE 10 MG/ML
20 INJECTION, SOLUTION INTRAVENOUS EVERY 12 HOURS SCHEDULED
Status: DISCONTINUED | OUTPATIENT
Start: 2019-07-01 | End: 2019-07-02 | Stop reason: HOSPADM

## 2019-07-01 RX ORDER — BUPIVACAINE HYDROCHLORIDE AND EPINEPHRINE 5; 5 MG/ML; UG/ML
INJECTION, SOLUTION EPIDURAL; INTRACAUDAL; PERINEURAL AS NEEDED
Status: DISCONTINUED | OUTPATIENT
Start: 2019-07-01 | End: 2019-07-01 | Stop reason: HOSPADM

## 2019-07-01 RX ORDER — ONDANSETRON 4 MG/1
4 TABLET, FILM COATED ORAL EVERY 4 HOURS PRN
Status: DISCONTINUED | OUTPATIENT
Start: 2019-07-01 | End: 2019-07-02 | Stop reason: HOSPADM

## 2019-07-01 RX ORDER — SODIUM CHLORIDE, SODIUM LACTATE, POTASSIUM CHLORIDE, CALCIUM CHLORIDE 600; 310; 30; 20 MG/100ML; MG/100ML; MG/100ML; MG/100ML
100 INJECTION, SOLUTION INTRAVENOUS CONTINUOUS
Status: DISCONTINUED | OUTPATIENT
Start: 2019-07-01 | End: 2019-07-01 | Stop reason: HOSPADM

## 2019-07-01 RX ORDER — HYDROMORPHONE HYDROCHLORIDE 1 MG/ML
0.5 INJECTION, SOLUTION INTRAMUSCULAR; INTRAVENOUS; SUBCUTANEOUS
Status: DISCONTINUED | OUTPATIENT
Start: 2019-07-01 | End: 2019-07-02 | Stop reason: HOSPADM

## 2019-07-01 RX ORDER — PROMETHAZINE HYDROCHLORIDE 25 MG/1
25 SUPPOSITORY RECTAL ONCE AS NEEDED
Status: COMPLETED | OUTPATIENT
Start: 2019-07-01 | End: 2019-07-01

## 2019-07-01 RX ORDER — LIDOCAINE HYDROCHLORIDE 10 MG/ML
0.5 INJECTION, SOLUTION EPIDURAL; INFILTRATION; INTRACAUDAL; PERINEURAL ONCE AS NEEDED
Status: DISCONTINUED | OUTPATIENT
Start: 2019-07-01 | End: 2019-07-01 | Stop reason: HOSPADM

## 2019-07-01 RX ORDER — SODIUM CHLORIDE 0.9 % (FLUSH) 0.9 %
3-10 SYRINGE (ML) INJECTION AS NEEDED
Status: DISCONTINUED | OUTPATIENT
Start: 2019-07-01 | End: 2019-07-02 | Stop reason: HOSPADM

## 2019-07-01 RX ORDER — ROCURONIUM BROMIDE 10 MG/ML
INJECTION, SOLUTION INTRAVENOUS AS NEEDED
Status: DISCONTINUED | OUTPATIENT
Start: 2019-07-01 | End: 2019-07-01 | Stop reason: SURG

## 2019-07-01 RX ORDER — LORAZEPAM 2 MG/ML
1 INJECTION INTRAMUSCULAR EVERY 12 HOURS PRN
Status: DISCONTINUED | OUTPATIENT
Start: 2019-07-01 | End: 2019-07-02 | Stop reason: HOSPADM

## 2019-07-01 RX ORDER — ONDANSETRON 4 MG/1
4 TABLET, ORALLY DISINTEGRATING ORAL EVERY 4 HOURS PRN
Status: DISCONTINUED | OUTPATIENT
Start: 2019-07-01 | End: 2019-07-02 | Stop reason: HOSPADM

## 2019-07-01 RX ORDER — SCOLOPAMINE TRANSDERMAL SYSTEM 1 MG/1
1 PATCH, EXTENDED RELEASE TRANSDERMAL ONCE
Status: DISCONTINUED | OUTPATIENT
Start: 2019-07-01 | End: 2019-07-01

## 2019-07-01 RX ORDER — FENTANYL CITRATE 50 UG/ML
INJECTION, SOLUTION INTRAMUSCULAR; INTRAVENOUS AS NEEDED
Status: DISCONTINUED | OUTPATIENT
Start: 2019-07-01 | End: 2019-07-01 | Stop reason: SURG

## 2019-07-01 RX ORDER — NALOXONE HCL 0.4 MG/ML
0.4 VIAL (ML) INJECTION AS NEEDED
Status: DISCONTINUED | OUTPATIENT
Start: 2019-07-01 | End: 2019-07-01 | Stop reason: HOSPADM

## 2019-07-01 RX ORDER — HYDRALAZINE HYDROCHLORIDE 20 MG/ML
5 INJECTION INTRAMUSCULAR; INTRAVENOUS
Status: DISCONTINUED | OUTPATIENT
Start: 2019-07-01 | End: 2019-07-01 | Stop reason: HOSPADM

## 2019-07-01 RX ORDER — PROMETHAZINE HYDROCHLORIDE 25 MG/1
25 TABLET ORAL ONCE AS NEEDED
Status: COMPLETED | OUTPATIENT
Start: 2019-07-01 | End: 2019-07-01

## 2019-07-01 RX ORDER — ONDANSETRON 2 MG/ML
INJECTION INTRAMUSCULAR; INTRAVENOUS AS NEEDED
Status: DISCONTINUED | OUTPATIENT
Start: 2019-07-01 | End: 2019-07-01 | Stop reason: SURG

## 2019-07-01 RX ORDER — MIDAZOLAM HYDROCHLORIDE 1 MG/ML
1 INJECTION INTRAMUSCULAR; INTRAVENOUS
Status: DISCONTINUED | OUTPATIENT
Start: 2019-07-01 | End: 2019-07-01 | Stop reason: HOSPADM

## 2019-07-01 RX ORDER — SODIUM CHLORIDE 0.9 % (FLUSH) 0.9 %
3 SYRINGE (ML) INJECTION EVERY 12 HOURS SCHEDULED
Status: DISCONTINUED | OUTPATIENT
Start: 2019-07-01 | End: 2019-07-01 | Stop reason: HOSPADM

## 2019-07-01 RX ORDER — SODIUM CHLORIDE, SODIUM LACTATE, POTASSIUM CHLORIDE, CALCIUM CHLORIDE 600; 310; 30; 20 MG/100ML; MG/100ML; MG/100ML; MG/100ML
9 INJECTION, SOLUTION INTRAVENOUS CONTINUOUS
Status: DISCONTINUED | OUTPATIENT
Start: 2019-07-01 | End: 2019-07-01

## 2019-07-01 RX ORDER — FAMOTIDINE 10 MG/ML
20 INJECTION, SOLUTION INTRAVENOUS ONCE
Status: COMPLETED | OUTPATIENT
Start: 2019-07-01 | End: 2019-07-01

## 2019-07-01 RX ORDER — DIPHENHYDRAMINE HYDROCHLORIDE 50 MG/ML
12.5 INJECTION INTRAMUSCULAR; INTRAVENOUS
Status: DISCONTINUED | OUTPATIENT
Start: 2019-07-01 | End: 2019-07-01 | Stop reason: HOSPADM

## 2019-07-01 RX ORDER — CEFAZOLIN SODIUM IN 0.9 % NACL 3 G/100 ML
3 INTRAVENOUS SOLUTION, PIGGYBACK (ML) INTRAVENOUS
Status: COMPLETED | OUTPATIENT
Start: 2019-07-01 | End: 2019-07-01

## 2019-07-01 RX ORDER — PROPOFOL 10 MG/ML
VIAL (ML) INTRAVENOUS AS NEEDED
Status: DISCONTINUED | OUTPATIENT
Start: 2019-07-01 | End: 2019-07-01 | Stop reason: SURG

## 2019-07-01 RX ORDER — HYDROMORPHONE HYDROCHLORIDE 2 MG/1
2 TABLET ORAL EVERY 4 HOURS PRN
Status: DISCONTINUED | OUTPATIENT
Start: 2019-07-01 | End: 2019-07-02 | Stop reason: HOSPADM

## 2019-07-01 RX ORDER — ALBUTEROL SULFATE 2.5 MG/3ML
2.5 SOLUTION RESPIRATORY (INHALATION)
Status: DISCONTINUED | OUTPATIENT
Start: 2019-07-01 | End: 2019-07-02 | Stop reason: HOSPADM

## 2019-07-01 RX ORDER — MIDAZOLAM HYDROCHLORIDE 1 MG/ML
2 INJECTION INTRAMUSCULAR; INTRAVENOUS
Status: DISCONTINUED | OUTPATIENT
Start: 2019-07-01 | End: 2019-07-01 | Stop reason: HOSPADM

## 2019-07-01 RX ORDER — LIDOCAINE HYDROCHLORIDE 20 MG/ML
INJECTION, SOLUTION INFILTRATION; PERINEURAL AS NEEDED
Status: DISCONTINUED | OUTPATIENT
Start: 2019-07-01 | End: 2019-07-01 | Stop reason: SURG

## 2019-07-01 RX ORDER — NALOXONE HCL 0.4 MG/ML
0.4 VIAL (ML) INJECTION
Status: DISCONTINUED | OUTPATIENT
Start: 2019-07-01 | End: 2019-07-02 | Stop reason: HOSPADM

## 2019-07-01 RX ORDER — NITROGLYCERIN 0.4 MG/1
0.4 TABLET SUBLINGUAL
Status: DISCONTINUED | OUTPATIENT
Start: 2019-07-01 | End: 2019-07-02 | Stop reason: HOSPADM

## 2019-07-01 RX ORDER — DEXAMETHASONE SODIUM PHOSPHATE 10 MG/ML
INJECTION INTRAMUSCULAR; INTRAVENOUS AS NEEDED
Status: DISCONTINUED | OUTPATIENT
Start: 2019-07-01 | End: 2019-07-01 | Stop reason: SURG

## 2019-07-01 RX ADMIN — SODIUM CHLORIDE, POTASSIUM CHLORIDE, SODIUM LACTATE AND CALCIUM CHLORIDE 75 ML/HR: 600; 310; 30; 20 INJECTION, SOLUTION INTRAVENOUS at 20:40

## 2019-07-01 RX ADMIN — PROPOFOL 200 MG: 10 INJECTION, EMULSION INTRAVENOUS at 10:06

## 2019-07-01 RX ADMIN — FENTANYL CITRATE 50 MCG: 50 INJECTION INTRAMUSCULAR; INTRAVENOUS at 11:20

## 2019-07-01 RX ADMIN — FENTANYL CITRATE 50 MCG: 50 INJECTION INTRAMUSCULAR; INTRAVENOUS at 11:29

## 2019-07-01 RX ADMIN — TAZOBACTAM SODIUM AND PIPERACILLIN SODIUM 3.38 G: 375; 3 INJECTION, SOLUTION INTRAVENOUS at 16:58

## 2019-07-01 RX ADMIN — HYDROMORPHONE HYDROCHLORIDE 0.25 MG: 1 INJECTION, SOLUTION INTRAMUSCULAR; INTRAVENOUS; SUBCUTANEOUS at 12:11

## 2019-07-01 RX ADMIN — SODIUM CHLORIDE, POTASSIUM CHLORIDE, SODIUM LACTATE AND CALCIUM CHLORIDE: 600; 310; 30; 20 INJECTION, SOLUTION INTRAVENOUS at 10:28

## 2019-07-01 RX ADMIN — HYDROMORPHONE HYDROCHLORIDE 0.25 MG: 1 INJECTION, SOLUTION INTRAMUSCULAR; INTRAVENOUS; SUBCUTANEOUS at 12:18

## 2019-07-01 RX ADMIN — CHLORHEXIDINE GLUCONATE 15 ML: 1.2 RINSE ORAL at 08:53

## 2019-07-01 RX ADMIN — ROCURONIUM BROMIDE 50 MG: 10 INJECTION, SOLUTION INTRAVENOUS at 10:06

## 2019-07-01 RX ADMIN — HYOSCYAMINE SULFATE 125 MCG: 0.12 TABLET, ORALLY DISINTEGRATING ORAL at 16:58

## 2019-07-01 RX ADMIN — ONDANSETRON 4 MG: 2 INJECTION INTRAMUSCULAR; INTRAVENOUS at 10:31

## 2019-07-01 RX ADMIN — MIDAZOLAM 1 MG: 1 INJECTION INTRAMUSCULAR; INTRAVENOUS at 08:33

## 2019-07-01 RX ADMIN — SODIUM CHLORIDE 3 G: 9 INJECTION, SOLUTION INTRAVENOUS at 09:54

## 2019-07-01 RX ADMIN — PROMETHAZINE HYDROCHLORIDE 6.25 MG: 25 INJECTION INTRAMUSCULAR; INTRAVENOUS at 12:49

## 2019-07-01 RX ADMIN — SUGAMMADEX 200 MG: 100 INJECTION, SOLUTION INTRAVENOUS at 11:39

## 2019-07-01 RX ADMIN — SODIUM CHLORIDE, PRESERVATIVE FREE 3 ML: 5 INJECTION INTRAVENOUS at 20:39

## 2019-07-01 RX ADMIN — METRONIDAZOLE 500 MG: 500 INJECTION, SOLUTION INTRAVENOUS at 10:47

## 2019-07-01 RX ADMIN — ALBUTEROL SULFATE 2.5 MG: 2.5 SOLUTION RESPIRATORY (INHALATION) at 15:54

## 2019-07-01 RX ADMIN — FAMOTIDINE 20 MG: 10 INJECTION INTRAVENOUS at 08:20

## 2019-07-01 RX ADMIN — HYOSCYAMINE SULFATE 125 MCG: 0.12 TABLET, ORALLY DISINTEGRATING ORAL at 20:39

## 2019-07-01 RX ADMIN — LIDOCAINE HYDROCHLORIDE 100 MG: 20 INJECTION, SOLUTION INFILTRATION; PERINEURAL at 10:06

## 2019-07-01 RX ADMIN — HYDROMORPHONE HYDROCHLORIDE 0.25 MG: 1 INJECTION, SOLUTION INTRAMUSCULAR; INTRAVENOUS; SUBCUTANEOUS at 12:50

## 2019-07-01 RX ADMIN — FENTANYL CITRATE 100 MCG: 50 INJECTION INTRAMUSCULAR; INTRAVENOUS at 10:06

## 2019-07-01 RX ADMIN — HYDROMORPHONE HYDROCHLORIDE 0.25 MG: 1 INJECTION, SOLUTION INTRAMUSCULAR; INTRAVENOUS; SUBCUTANEOUS at 11:58

## 2019-07-01 RX ADMIN — METOCLOPRAMIDE 10 MG: 5 INJECTION, SOLUTION INTRAMUSCULAR; INTRAVENOUS at 08:54

## 2019-07-01 RX ADMIN — ALBUTEROL SULFATE 2.5 MG: 2.5 SOLUTION RESPIRATORY (INHALATION) at 19:37

## 2019-07-01 RX ADMIN — SODIUM CHLORIDE, POTASSIUM CHLORIDE, SODIUM LACTATE AND CALCIUM CHLORIDE 500 ML: 600; 310; 30; 20 INJECTION, SOLUTION INTRAVENOUS at 08:16

## 2019-07-01 RX ADMIN — SODIUM CHLORIDE, POTASSIUM CHLORIDE, SODIUM LACTATE AND CALCIUM CHLORIDE 75 ML/HR: 600; 310; 30; 20 INJECTION, SOLUTION INTRAVENOUS at 15:07

## 2019-07-01 RX ADMIN — FAMOTIDINE 20 MG: 10 INJECTION INTRAVENOUS at 20:39

## 2019-07-01 RX ADMIN — DEXAMETHASONE SODIUM PHOSPHATE 8 MG: 10 INJECTION INTRAMUSCULAR; INTRAVENOUS at 10:13

## 2019-07-01 RX ADMIN — SCOPALAMINE 1 PATCH: 1 PATCH, EXTENDED RELEASE TRANSDERMAL at 08:17

## 2019-07-01 RX ADMIN — HYDROMORPHONE HYDROCHLORIDE 0.25 MG: 1 INJECTION, SOLUTION INTRAMUSCULAR; INTRAVENOUS; SUBCUTANEOUS at 12:07

## 2019-07-01 RX ADMIN — ACETAMINOPHEN 975 MG: 160 SOLUTION ORAL at 08:49

## 2019-07-01 RX ADMIN — ROCURONIUM BROMIDE 10 MG: 10 INJECTION, SOLUTION INTRAVENOUS at 10:56

## 2019-07-01 RX ADMIN — ENOXAPARIN SODIUM 40 MG: 40 INJECTION SUBCUTANEOUS at 09:54

## 2019-07-01 NOTE — OP NOTE
Surgeon: Randell Salamanca Jr., M.D.    Assistant: MARNIE eGrardo RNFA    Pre-Operative Diagnosis: Chronic dysphagia status post adjustable gastric band    Post-Operative Diagnosis: 1. Same  2. Adhesions upper abdomen 3. Abnormal gastric pouch size 4. Incisional hernia  5. Colotomy     Procedure Performed: Laparoscopic adjustable gastric band and port removal with extensive lysis of adhesions 2.  Laparoscopic repair of colotomy  3.  Incisional hernia repair    Anesthesia: GETA    Specimens: Adjustable gastric band and port inspected then discarded    EBL: less than 10 ml    Fluids:  500 ml crystalloid    Complications: None    Surgery assisted and facilitated by a certified physician assistant, who directly resulted in a decreased operative time, anesthetic time, wound exposure, and possibly of an operative wound infection, thereby decreasing patient morbidity and ultimately total expenditures.     Indications:   Patient is status post adjustable gastric band placement with chronic dysphagia who now presents for elective removal. The risks and benefits of the procedure were discussed with the patient in detail and all questions were answered.  Possibility of open, bleeding, infection, bowel injury, deep venous thrombosis, pulmonary embolism, incisional hernias, renal failure, myocardial infarction, respiratory and cardiac arrest and death were discussed. Consent was signed and witnessed.    Procedure:   Patient was identified and after informed consent was obtained the patient was taken to the operating room and placed in the supine position. Patient was given preoperative antibiotic and SCDs were placed by the nursing staff.  After adequate general anesthesia the abdomen was prepped with choroprep and draped in the usual sterile fashion. The previous incisions were marked with indelible ink. An Ioban was placed. Using a 5 mm Visiport trocar and 5 mm 0 degree laparoscope the abdomen was entered without difficulty  and a pneumoperitoneum was established with good opening pressures. General laparoscopic evaluation of the abdominal cavity revealed an injury upon entry with the trocar in the transverse colon.   A 10 mm trocar was placed at the port site incision under direct visualization and then 3  5 mm trochars were placed in the lower left abdomen under direct visualization.  These were placed on the left side secondary to extensive adhesions from previous open cholecystectomy.  The entire transverse colon was adherent to the abdominal wall.  The adhesions were taken down using sharp dissection with the scissors and with the harmonic scalpel.  Once the transverse colon was freed up attention was turned to the colon injury from the 5 mm trocar upon entry.  The colon was also adherent completely to the lap band tubing in the left lobe of the liver.  This was all completely freed up.  The colotomy was then closed in 2 layers using 2-0 silk suture.  An omental patch was placed over the repair as well.  Attention was now turned to the lap band.    The left lobe of the liver was elevated with a grasper. The adhesions overlying the buckle of the band were taken down with the electrocautery. The buckle was identified and opened up without difficulty. The adhesions overlying the band laterally were taken down with electrocautery. Careful attention was made not to injure the stomach. Sutures were removed with the scissors.  Patient had extensive adhesions around the band in the left lobe of the liver. The tubing of the band was cut with the scissors and the band was pulled from around the stomach without any difficulty.  Once the band was removed around the stomach a leak test was performed to make sure there was no evidence of erosion.  The band was not discolored.  Patient was placed in level position and the stomach submerged under saline and insufflated using the VISI G calibration tubing.  No air bubbles or leak was identified.    The lapband was taken out of the 10-11 trocar site. The stomach was inspected prior to removing the adjustable gastric band and no evidence of erosion was noted. There was a capsule noted at the previous adjustable gastric band site and this was opened up with the scissors and taken down. The band was inspected and no discoloration was noted. The gastric band was then discarded. The trocars were then removed under direct visualization. No bleeding was noted. The pneumoperitoneum was desufflated. The incisions were then infiltrated with 0.5% marcaine with epinephrine. A total of 60 ml was used throughout the case.  The incision at the port site was then carried down to the port with electrocautery. The port was Identified and the tubing was brouht up with a right angle clamp. Sutures were cut and removed. The port was removed without difficulty. The port was inspected and no abnormalities were seen. The incision was also infiltrated with the local anesthetic.  The patient was noted to have an incisional hernia at the port site.  This was closed with a 0 Vicryl suture using the suture passer.  The wound was irrigated with saline and dried. No bleeding was noted. The subcutaneous tissue was reapproximated with a 2-0 vicryl in an interrupted fashion and skin incisions closed with a 4-0 Monocryl in a subcuticular fashion. The areas were then cleaned and dried and Dermabond was placed. The patient tolerated the procedure well and left operating room in good condition. Sponges and needles were counted and reported as correct.

## 2019-07-01 NOTE — H&P
[facility]ATR[facility] BARIATRIC SURGERY  [address]  [facility] [address] [phone]  [address]  [facility] [address] [phone]  Dept: [phone]  2/15/2019        Zulema Sousa.  [id number]  [id number]  1960  female             Chief Complaint   Patient presents with   • Follow-up       band/UGI follow up         BH Post-Op Bariatric Surgery:   Zulema Sousa is status post Lapband procedure reg, performed on 8/9/13 with 0mls     HPI:   Today's weight is 103 kg (227 lb 8 oz) pounds, today's BMI is Body mass index is 39.03 kg/m². and she has a loss of 3 pounds since the last visit. The patient reports experiencing hunger, but decreased hunger and loss of appetite.      Zuleam Sousa denies abdominal pain. The patientdoes not have vomiting or regurgitation. The patientdoes have heartburn/reflux.     58-year-old female status post lap band placement with all of the fluid removed and upper GI revealing partial lap band slippage.  Patient does complaint of epigastric pain and went to the emergency room recently and underwent cardiac evaluation and was unremarkable.  Patient would like to go ahead and proceed with band removal which I agree.  Patient with history of blood clots which has been over a year ago per patient.  Patient does see Dr. Paras Hernández.        Diet and Exercise: Diet history reviewed and discussed with the patient. Weight loss/gains to date discussed with the patient. She reports eating 3 meals per day, a typical portion size of 1 cup, eating 2 snack per day, drinking 2 8-oz. glasses of water per day. The patient can tolerate solid protein.   The patient is eating protein first. The patient is limiting food volume. The patient is not taking vitamins. The patient is limiting snacking. The patient is not regular exercise. She is drinking carbonated beverages.      The following portions of the patient's history were reviewed  and updated as appropriate: allergies, current medications, past family history, past medical history, past social history, past surgical history and problem list.     Review of Systems   Constitutional: Positive for fatigue.   Gastrointestinal: Positive for constipation.   All other systems reviewed and are negative.            Vitals:     02/15/19 0916   BP: 121/73   Pulse: 85   Resp: 18   Temp: 98.6 °F (37 °C)         Physical Exam   Constitutional: She is oriented to person, place, and time. She appears well-nourished.   HENT:   Head: Normocephalic and atraumatic.   Mouth/Throat: Oropharynx is clear and moist.   Eyes: Conjunctivae and EOM are normal. Pupils are equal, round, and reactive to light. No scleral icterus.   Neck: Normal range of motion. Neck supple. No thyromegaly present.   Cardiovascular: Normal rate and regular rhythm.   Pulmonary/Chest: Effort normal and breath sounds normal.   Abdominal: Soft. Bowel sounds are normal. She exhibits no distension. There is no tenderness. There is no rebound and no guarding. No hernia.   Musculoskeletal: Normal range of motion.   Lymphadenopathy:     She has no cervical adenopathy.   Neurological: She is alert and oriented to person, place, and time. No cranial nerve deficit. Coordination normal.   Skin: Skin is warm and dry. No erythema.   Psychiatric: She has a normal mood and affect. Her behavior is normal.   Vitals reviewed.           Assessment: Post-operatively the patient status post lap band placement with upper GI revealing partial lap band slippage.  Patient has had all of the fluid removed and patient still with symptoms.  Patient agrees to go ahead and proceed with band removal.  Patient with history of blood clots and does see Dr. Paras Hernández.  We will get approval from him to make sure she is good to go for band removal.  She does take Xarelto and will stop this 2 days prior to surgery unless cardiologist as otherwise.  The risks and benefits of the  procedure were discussed with the patient in detail and all questions were answered.  Possibility of open, bleeding, infection, bowel injury, deep venous thrombosis, pulmonary embolism, incisional hernias, renal failure, myocardial infarction, respiratory and cardiac arrest and death were discussed. Consent will be signed and witnessed..           Encounter Diagnoses   Name Primary?   • Gastric band slippage Yes   • Abnormal UGI series     • Esophageal dilatation     • History of laparoscopic adjustable gastric banding     • Epigastric pain     • Obesity, Class III, BMI 40-49.9 (morbid obesity) (CMS/HCC)     • History of DVT (deep vein thrombosis)     • Obstructive sleep apnea, adult     • Gastroesophageal reflux disease, esophagitis presence not specified           Plan:        No adjustment today as patient has ideal level of restriction. RTC for n/v/d/regurg. Encouraged patient to be sure to eat plenty of dense lean protein and high fiber foods like vegetables and fresh fruits while reducing simple carbohydrates. Reviewed the importance of eating solid foods vs. soft which will contribute to weight gain. Discussed the recommended amount of water to intake daily- half of body weight in ounces. Not drinking with or right after meals.     Activity restrictions: None.   Instructions / Recommendations: dietary counseling recommended, recommended a daily protein intake of  grams, patient was advised that the lap band system works best when consuming solid foods, vitamin supplement(s) recommended, recommended exercising at least 150 minutes per week inclduing both cardio and strength training, behavior modifications recommended and instructed to call the office for concerns, questions, or problems.      The patient was instructed to follow up in after band removal     The patient was counseled regarding. Total time spent face to face was 25 minutes and 20 minutes was spent counseling

## 2019-07-01 NOTE — ANESTHESIA POSTPROCEDURE EVALUATION
Patient: Zulema Sousa    Procedure Summary     Date:  07/01/19 Room / Location:   [facility]  [facility]    Anesthesia Start:  1001 Anesthesia Stop:  1154    Procedure:  GASTRIC BANDING AND PORT REMOVAL, LYSIS OF ADHESIONS, REPAIR OF COLOTOMY, AND INCISIONAL HERNIA REPAIR LAPAROSCOPIC (N/A Abdomen) Diagnosis:       Gastric band slippage      (Gastric band slippage [K95.09])    Surgeon:  Randell Salamanca Jr., MD Provider:  Jason Appiah MD    Anesthesia Type:  general ASA Status:  3          Anesthesia Type: general  Last vitals  BP   113/70 (07/01/19 1300)   Temp   36.4 °C (97.5 °F) (07/01/19 1154)   Pulse   (!) 49 (07/01/19 1300)   Resp   16 (07/01/19 1300)     SpO2   98 % (07/01/19 1300)     Post Anesthesia Care and Evaluation    Patient location during evaluation: bedside  Patient participation: complete - patient participated  Level of consciousness: awake and alert  Pain management: adequate  Airway patency: patent  Anesthetic complications: No anesthetic complications    Cardiovascular status: acceptable  Respiratory status: acceptable  Hydration status: acceptable    Comments: /70   Pulse (!) 49   Temp 36.4 °C (97.5 °F) (Oral)   Resp 16   Wt 104 kg (229 lb 11.5 oz)   SpO2 98%   BMI 39.43 kg/m²

## 2019-07-01 NOTE — ANESTHESIA PROCEDURE NOTES
Airway  Urgency: elective    Airway not difficult    General Information and Staff    Patient location during procedure: OR  Anesthesiologist: Jason Appiah MD  CRNA: Vane Fatima CRNA    Indications and Patient Condition  Indications for airway management: airway protection    Preoxygenated: yes  Mask difficulty assessment: 1 - vent by mask    Final Airway Details  Final airway type: endotracheal airway      Successful airway: ETT  Cuffed: yes   Successful intubation technique: direct laryngoscopy  Facilitating devices/methods: intubating stylet  Endotracheal tube insertion site: oral  Blade: Flores  Blade size: 2  ETT size (mm): 7.0  Cormack-Lehane Classification: grade I - full view of glottis  Placement verified by: chest auscultation and capnometry   Measured from: lips  ETT to lips (cm): 20  Number of attempts at approach: 1    Additional Comments  Atraumatic, MOP to cuff, BSBE, no change to dentition, secured with tape

## 2019-07-01 NOTE — ANESTHESIA PREPROCEDURE EVALUATION
Anesthesia Evaluation     history of anesthetic complications: PONV  NPO Solid Status: > 8 hours             Airway   Mallampati: II  TM distance: >3 FB  Neck ROM: full  No difficulty expected  Dental - normal exam     Pulmonary - normal exam   (+) pulmonary embolism, a smoker Current, sleep apnea,     ROS comment: Pt reports occasional smoking, non today  Cardiovascular   Exercise tolerance: good (4-7 METS)    ECG reviewed  Rhythm: regular    (+) hyperlipidemia,   (-) murmur    ROS comment: Hx of cardiac tamponade, echo shows resolution.    Neuro/Psych  (+) numbness,     GI/Hepatic/Renal/Endo    (+) obesity, morbid obesity, GERD,      Musculoskeletal     (+) back pain,   Abdominal   (+) obese,    Substance History      OB/GYN          Other                      Anesthesia Plan    ASA 3     general   (  D/W R&B of GA including but not limited to: heart, lung, liver, kidney, neurologic problems, positioning injuries, dental damage, corneal abrasion and TMJ.  .)  intravenous induction   Anesthetic plan, all risks, benefits, and alternatives have been provided, discussed and informed consent has been obtained with: patient.

## 2019-07-02 VITALS
SYSTOLIC BLOOD PRESSURE: 110 MMHG | TEMPERATURE: 97.7 F | WEIGHT: 229.72 LBS | HEIGHT: 64 IN | HEART RATE: 58 BPM | DIASTOLIC BLOOD PRESSURE: 54 MMHG | RESPIRATION RATE: 16 BRPM | BODY MASS INDEX: 39.22 KG/M2 | OXYGEN SATURATION: 98 %

## 2019-07-02 LAB
ANION GAP SERPL CALCULATED.3IONS-SCNC: 10.9 MMOL/L (ref 5–15)
BASOPHILS # BLD AUTO: 0.02 10*3/MM3 (ref 0–0.2)
BASOPHILS NFR BLD AUTO: 0.2 % (ref 0–1.5)
BUN BLD-MCNC: 8 MG/DL (ref 8–23)
BUN/CREAT SERPL: 12.9 (ref 7–25)
CALCIUM SPEC-SCNC: 9.1 MG/DL (ref 8.6–10.5)
CHLORIDE SERPL-SCNC: 103 MMOL/L (ref 98–107)
CO2 SERPL-SCNC: 24.1 MMOL/L (ref 22–29)
CREAT BLD-MCNC: 0.62 MG/DL (ref 0.57–1)
DEPRECATED RDW RBC AUTO: 50.6 FL (ref 37–54)
EOSINOPHIL # BLD AUTO: 0.01 10*3/MM3 (ref 0–0.4)
EOSINOPHIL NFR BLD AUTO: 0.1 % (ref 0.3–6.2)
ERYTHROCYTE [DISTWIDTH] IN BLOOD BY AUTOMATED COUNT: 15 % (ref 12.3–15.4)
GFR SERPL CREATININE-BSD FRML MDRD: 98 ML/MIN/1.73
GLUCOSE BLD-MCNC: 99 MG/DL (ref 65–99)
HCT VFR BLD AUTO: 39.1 % (ref 34–46.6)
HGB BLD-MCNC: 12.2 G/DL (ref 12–15.9)
IMM GRANULOCYTES # BLD AUTO: 0.02 10*3/MM3 (ref 0–0.05)
IMM GRANULOCYTES NFR BLD AUTO: 0.2 % (ref 0–0.5)
LYMPHOCYTES # BLD AUTO: 1.58 10*3/MM3 (ref 0.7–3.1)
LYMPHOCYTES NFR BLD AUTO: 14.1 % (ref 19.6–45.3)
MCH RBC QN AUTO: 28.6 PG (ref 26.6–33)
MCHC RBC AUTO-ENTMCNC: 31.2 G/DL (ref 31.5–35.7)
MCV RBC AUTO: 91.6 FL (ref 79–97)
MONOCYTES # BLD AUTO: 0.89 10*3/MM3 (ref 0.1–0.9)
MONOCYTES NFR BLD AUTO: 8 % (ref 5–12)
NEUTROPHILS # BLD AUTO: 8.65 10*3/MM3 (ref 1.7–7)
NEUTROPHILS NFR BLD AUTO: 77.4 % (ref 42.7–76)
NRBC BLD AUTO-RTO: 0 /100 WBC (ref 0–0.2)
PLATELET # BLD AUTO: 227 10*3/MM3 (ref 140–450)
PMV BLD AUTO: 9.5 FL (ref 6–12)
POTASSIUM BLD-SCNC: 3.9 MMOL/L (ref 3.5–5.2)
RBC # BLD AUTO: 4.27 10*6/MM3 (ref 3.77–5.28)
SODIUM BLD-SCNC: 138 MMOL/L (ref 136–145)
WBC NRBC COR # BLD: 11.17 10*3/MM3 (ref 3.4–10.8)

## 2019-07-02 PROCEDURE — 85025 COMPLETE CBC W/AUTO DIFF WBC: CPT | Performed by: SURGERY

## 2019-07-02 PROCEDURE — 25010000002 ENOXAPARIN PER 10 MG: Performed by: SURGERY

## 2019-07-02 PROCEDURE — 94799 UNLISTED PULMONARY SVC/PX: CPT

## 2019-07-02 PROCEDURE — 25010000002 PIPERACILLIN SOD-TAZOBACTAM PER 1 G: Performed by: SURGERY

## 2019-07-02 PROCEDURE — G0378 HOSPITAL OBSERVATION PER HR: HCPCS

## 2019-07-02 PROCEDURE — 80048 BASIC METABOLIC PNL TOTAL CA: CPT | Performed by: SURGERY

## 2019-07-02 RX ORDER — HYDROMORPHONE HYDROCHLORIDE 2 MG/1
2 TABLET ORAL EVERY 4 HOURS PRN
Qty: 18 TABLET | Refills: 0 | Status: SHIPPED | OUTPATIENT
Start: 2019-07-02 | End: 2019-08-05

## 2019-07-02 RX ORDER — ONDANSETRON 4 MG/1
4 TABLET, FILM COATED ORAL EVERY 6 HOURS PRN
Qty: 10 TABLET | Refills: 0 | Status: SHIPPED | OUTPATIENT
Start: 2019-07-02 | End: 2019-08-05

## 2019-07-02 RX ADMIN — HYDROMORPHONE HYDROCHLORIDE 2 MG: 2 TABLET ORAL at 08:47

## 2019-07-02 RX ADMIN — HYDROMORPHONE HYDROCHLORIDE 2 MG: 2 TABLET ORAL at 00:06

## 2019-07-02 RX ADMIN — ALBUTEROL SULFATE 2.5 MG: 2.5 SOLUTION RESPIRATORY (INHALATION) at 07:33

## 2019-07-02 RX ADMIN — TAZOBACTAM SODIUM AND PIPERACILLIN SODIUM 3.38 G: 375; 3 INJECTION, SOLUTION INTRAVENOUS at 00:25

## 2019-07-02 RX ADMIN — HYDROMORPHONE HYDROCHLORIDE 2 MG: 2 TABLET ORAL at 03:52

## 2019-07-02 RX ADMIN — ENOXAPARIN SODIUM 40 MG: 40 INJECTION SUBCUTANEOUS at 08:47

## 2019-07-02 RX ADMIN — HYOSCYAMINE SULFATE 125 MCG: 0.12 TABLET, ORALLY DISINTEGRATING ORAL at 08:35

## 2019-07-02 RX ADMIN — FAMOTIDINE 20 MG: 10 INJECTION INTRAVENOUS at 08:47

## 2019-07-02 NOTE — PLAN OF CARE
Problem: Patient Care Overview  Goal: Plan of Care Review  Outcome: Ongoing (interventions implemented as appropriate)   07/02/19 0416   Coping/Psychosocial   Plan of Care Reviewed With patient   Plan of Care Review   Progress no change   OTHER   Outcome Summary VSS, AMBULATED PER DR. GUILLAUME'S ORDERS, MEDICATED FOR PAIN, NO OTHER ACUTE DISTRESS NOTED, SAFETY MAINTAINED, CONTINUE PLAN OF CARE     Goal: Individualization and Mutuality  Outcome: Ongoing (interventions implemented as appropriate)    Goal: Discharge Needs Assessment  Outcome: Ongoing (interventions implemented as appropriate)      Problem: Pain, Acute (Adult)  Goal: Identify Related Risk Factors and Signs and Symptoms  Outcome: Outcome(s) achieved Date Met: 07/02/19    Goal: Acceptable Pain Control/Comfort Level  Outcome: Ongoing (interventions implemented as appropriate)

## 2019-07-02 NOTE — DISCHARGE INSTRUCTIONS
GOING HOME AFTER GASTRIC SLEEVE/ GASTRIC BYPASS SURGERY  [facility] Weight Loss: Post-Operative Information/Instructions  Randell Salamanca Jr., MD  General Patient Instructions for Discharge   - Call Surgeon's office at [phone] for follow-up appointment.    - Be sure you, the patient, have a follow-up appointment to be seen within seven (7) days after discharge. If not, please call [phone] to schedule an appointment. If you are discharged on a Saturday or Sunday, please call Monday to schedule the appointment.  - Contact the Surgeon at [phone] for any questions or concerns, including temperature greater than or equal to 101F, shortness of breath, leg swelling, redness at incision sites, nausea, vomiting, chills, or problems or questions.    - Follow the Gastric Stage 1 Diet    à Clear liquids, room temperature, sugar-free, caffeine-free, non-carbonated, 70 grams of protein, No Straws.  - You may shower. No tub bath for 2 weeks.  - No lifting, pushing, pulling, or tugging >25 pounds for 3 weeks.  - Ambulate every 3 hours while awake minimum for seven (7) days, increase distance daily.  - For the next several weeks, you are at an increased risk for blood clot formation. Therefore, you should walk regularly. You should not sit for prolonged periods of time, more than 45 minutes, without getting up and walking for 5-10 minutes. This includes any car rides, including the drive home from the hospital. If driving any distance greater than 30 miles over the next two (2) weeks, stop every 30-45 minutes and walk for 5-10 minutes each time.  - Continue using Incentive Spirometer and coughing exercises at least every two (2) hours while awake for one week.  - Continue use of CPAP/BIPAP for diagnosis of sleep apnea as directed.  - No driving or operating machinery allowed while taking narcotic (prescription) pain medication, and until you feel comfortable forcefully applying the brakes if needed. (This  usually takes more than 3 days.)    - Make an appointment with your Primary Care Physician within one week post-op to look at your home medications for possible changes or discontinuity.   Medications  - The nurse will provide a list of medications for you to continue at home   - If you received a Lovenox (Enoxaparin) or Apixiban (Eliquis) prescription at pre-op visit with Surgeon, start taking the medicine the morning after discharge unless directed otherwise.    - If you were prescribed Lovenox (Enoxaparin), review the education/teaching material/video with the nurse.    - Take post op pain meds as prescribed as needed.   - Continue Foltx until finished.   - Resume use of Actigall (Ursodiol) one (1) week after surgery if patient still has gallbladder. You should have been given a prescription at your pre-op visit. Contact the office if you do not have the prescription.   - Resume bariatric vitamin regimen as instructed in pre-op education with bariatric coordinator.    - Zegerid or Prilosec OTC (or generic) by mouth once daily for four (4) weeks unless you are already taking a proton pump inhibitor as home medication. Follow dosing instructions on package.   Nausea/Vomiting:  The following are possible causes for nausea/vomiting:  - Drinking too much or too fast.  - Sinus drainage/post nasal drip for allergy sufferers (you may take Sudafed, Claritin, Tylenol Sinus/Allergy, or other decongestants and nose sprays to help with this discomfort).  - Low blood sugar (sweating, shaky, irritable, weakness, dizzy or tunnel-vision) - treatment is to sip 100% fruit juice - no sugar added until symptoms subside.  - Acid in fruit juice - (may dilute with water or avoid).  - Eating or drinking something that is not on clear liquid (stage 1) diet.  Any nausea/vomiting that prohibits you from keeping fluids down for greater than 24 hours requires a call to the surgeon's office.  Urine:  Use your urine color as a guide to  determine if you are drinking enough fluid. The darker the urine, the more fluids you need to drink. Urine should be clear to light yellow if you are getting enough fluid. If you should experience frequency, burning or pain with urination, blood in urine, contact us or your primary care physician for possible UTI (urinary tract infection), which could require antibiotics (liquid preferred).  Bowel Movements:  You may not have a bowel movement for 2-5 days after going home. You may then experience liquid, runny or loose stools for approximately 3-4 weeks following surgery. This would require you to drink even more fluids to prevent dehydration. Some patients may experience constipation, which can be treated with increased fluids, drinking warm liquids, increased activity and the use of a Fleets Enema, Milk of Magnesia, or suppositories. The first couple of bowel movements could be bloody, tarry black or dark maroon in color. This is OK as long as the stool returns to a normal color in 1-2 days. If however, you have frequent or a large amount of bloody or tarry black stools and/or become light-headed or dizzy, you may be bleeding and require urgent attention. Please call us right away.  Abdominal Incisions:  You will have small incisions. Do not scrub incisions, but allow the warm, soapy water to run over the incisions, rinse well, and pat dry. You may use any brand of anti-bacterial soap. Do not use Peroxide or Neosporin type ointments on sites, unless instructed to do so by a surgeon or nurse. Monitor daily for signs/symptoms of infection, which might include: drainage with a foul odor, pain, redness, swelling or heat at the incision sight; fever, body aches and chills. If you suspect infection or have a fever, give us a call.  Pain:  You will be given a prescription for pain medication to control your pain. If you feel the dose is too strong, you may take half the ordered dose, or you may take Tylenol adult liquid  per package instructions for minor pain. Do not take any medications that contain aspirin or aspirin products.  Do not take medications like: Motrin, Aleve, Ibuprofen, Advil, Naproxen, Celebrex, Daypro, Bextra, Meloxicam or other medications commonly used for arthritis or joint pain.  No steroids or cortisone injections. There may be pain, which should improve every few days. Pain should not suddenly get worse or more intense. Pain that suddenly changes and is constant and severe should be called in to the surgeon's office. Any sudden pain in the lower extremities with associated warmth and redness should be called in to the surgeon's office immediately. Do not rub or massage this area, as it could be a blood clot.  Diet:  Remain on the clear liquid diet (stage 1) per your  which includes 70 grams of protein each day, sugar free, non carbonated and no straws. Day 1 is the day of surgery. If you are tolerating the stage 1 diet, you may then proceed to stage 2 diet, as instructed in the . Do not progress to the stage 2 diet if you are having nausea/vomiting. Refer to the Basic Nutrition and Food Principles guide.  Medications:  The nurse will let you know which medications you will need to continue once you go home. Do not take any medications that are extended or time released if you had the gastric bypass procedure, OK to take if you had the gastric sleeve procedure. Large capsules can be opened and diluted with clear liquids. Check with your physician or pharmacist as to which pills may be crushed and which capsules may be opened and diluted safely. Continue taking Foltx as surgeon orders. If you still have your gall bladder and were prescribed Actigall (Ursodiol), you may resume this medication one week after your surgery. You will remain on Actigall (Ursodiol) for approximately 6 months. The dose is 1 pill, 2 times each day for 6 months.  Activity:  Continue your deep breathing and  coughing exercises with your Incentive Spirometer breathing device at least every 3 hours while awake (10 repetitions each time) for one week. May use CPAP. This will help to prevent respiratory problems such as pneumonia. No lifting, pulling or tugging anything over 25 pounds for 3 weeks after surgery. You may shower but no tub baths, hot tubs or swimming for 2 weeks. Moderate walking is recommended every 3 hours while awake minimum, increase distance daily. Further exercise will be discussed at the first post-op visit. No driving or operating machinery allow until off narcotic pain medication and until you feel comfortable forcefully applying the brakes (usually takes 3 or more days). For the next few weeks you are at an increased risk for blood clot formation. Therefore you should walk regularly and you should not sit for prolonged periods of time, more than 45 minutes without getting up and walking for 5-10 minutes. This includes car rides. Including riding home from the hospital. If riding a distance greater than 30 miles over the next 2 weeks stop every 30-45 minutes and walk 5-10 mintues each time. No tanning bed use for 8 weeks after surgery and in general, not recommended due to the increased risk for skin cancer. Incisions will burn/blister very badly with tanning bed use.  Illness:  Your primary care physician should treat general illness such as ear infections, sinus infections, and viral type illnesses, etc. Medications prescribed should be liquid/elixir form when possible, for the first 30 days.  General:  In general, it is recommended that you weigh yourself no more than once per week. Let the weight come off you and concentrate on more important things. Remember the weight was not gained overnight, nor will it be lost overnight. Gastric Bypass/ Gastric Sleeve weight loss will continue over a period of 12-18 months. Do not  yourself according to how others are doing after surgery, as this will  cause unnecessary discouragement.  THE ABOVE ARE GENERAL GUIDELINES TO ASSIST YOU ONCE HOME, IF YOU ARE IN DOUBT, OR YOU HAVE ANY QUESTIONS, CALL US AT THE NUMBERS LISTED BELOW.  IN THE EVENT OF SUDDEN CHEST PAIN, SHORTNESS OF BREATH, OR ANY LIFE THREATENING CONDITION, CALL 911.  Any time you are evaluated or admitted to another facility, please have someone notify the surgeon's office.  Supplements:  70 grams of protein taken EVERY DAY. Remember to drink at least 64 ounces of fluid a day, sipping slowly early on. Increase this amount during the summertime. Sipping slowly will not stretch your new stomach. Drinking too fast or gulping liquids will cause brief discomfort and early could cause staple line disruption (leak). With eating, tiny bites, then chew, chew, chew, and swallow. Lay your fork/spoon down for 2-3 minutes, and then take your next bite. Your pouch will tell you within 1-2 bites if it is going to tolerate what you are eating.   Protein Vendors:  Refer to protein vendors' handout from consult class. You can always find protein drinks at the bariatric office, grocery stores, [employer], drug [employer], [employer], health food stores, and on the Internet. Find one high in protein (15-30 grams per serving) and low carb (less than 18 grams per serving).  Now is a great time to re-read your . Please review specific instructions given to you at discharge by your physician (surgeon).  HOW/WHEN TO CONTACT US:  It is imperative that you contact us with any of the following:    Ÿ fever greater than 101 degrees  Ÿ shortness of breath  Ÿ leg swelling  Ÿ body aches  Ÿ shaking chills  Ÿ nausea and vomitting  Ÿ pain that has worsened  Ÿ redness at incision sites  Ÿ pus or foul smelling drainage from an incision or wound  Ÿ inability to keep fluids down for more than a day  Ÿ any other condition you feel needs our attention.  [facility] - Bariatric: [phone] call this number anytime 24 hours a  day / 7 days a week.  Teach-back Questions to be answered by the patient prior to discharge.   What complications would prompt you to call your doctor when you return home? _________________    What is the purpose of your prescribed medication? ________________  What are some potential side effects of the medications you will be taking at home? _______________

## 2019-07-02 NOTE — DISCHARGE SUMMARY
"Discharge Summary    Patient name: Zulema Sousa    Medical record number: [id number]    Admission date: 7/1/2019  Discharge date:      Attending physician: Dr. Randell Salamanca    Primary care physician: Marsha Young MD    Referring physician: Randell Salamanca Jr., MD  [address] [address]    Condition on discharge: Stable    Primary Diagnoses:  Chronic dysphagia    Operative Procedure:  Laproscopic band/port removal w/ ANY and repair of incisional hernia and colotomy     Zulema Sousa  is post op day one status post procedure listed. Patient denies shortness of air and lower extremity pain. Feels better than yesterday. No vomiting this am. Ambulating well and using incentive spirometer.          /54 (BP Location: Left arm, Patient Position: Sitting)   Pulse 58   Temp 97.7 °F (36.5 °C) (Oral)   Resp 16   Ht 162.6 cm (64.02\")   Wt 104 kg (229 lb 11.5 oz)   SpO2 98%   BMI 39.41 kg/m²     General:  alert, appears stated age, cooperative and no distress   Abdomen: soft, bowel sounds active, appropriate tenderness   Incision:   healing well, no drainage, no erythema, no hernia, no seroma, no swelling, no dehiscence, incision well approximated   Heart: Regular rate   Lungs: Clear to auscultation bilaterally     I reviewed the patient's new clinical results.     Lab Results (last 24 hours)     Procedure Component Value Units Date/Time    Basic Metabolic Panel [[id number]]  (Normal) Collected:  07/02/19 0336    Specimen:  Blood Updated:  07/02/19 0426     Glucose 99 mg/dL      BUN 8 mg/dL      Creatinine 0.62 mg/dL      Sodium 138 mmol/L      Potassium 3.9 mmol/L      Chloride 103 mmol/L      CO2 24.1 mmol/L      Calcium 9.1 mg/dL      eGFR Non African Amer 98 mL/min/1.73      BUN/Creatinine Ratio 12.9     Anion Gap 10.9 mmol/L     Narrative:       GFR Normal >60  Chronic Kidney Disease <60  Kidney Failure <15    CBC & Differential [[id number]] Collected:  07/02/19 0336 "    Specimen:  Blood Updated:  07/02/19 0408    Narrative:       The following orders were created for panel order CBC & Differential.  Procedure                               Abnormality         Status                     ---------                               -----------         ------                     CBC Auto Differential[[id number]]        Abnormal            Final result                 Please view results for these tests on the individual orders.    CBC Auto Differential [[id number]]  (Abnormal) Collected:  07/02/19 0336    Specimen:  Blood Updated:  07/02/19 0408     WBC 11.17 10*3/mm3      RBC 4.27 10*6/mm3      Hemoglobin 12.2 g/dL      Hematocrit 39.1 %      MCV 91.6 fL      MCH 28.6 pg      MCHC 31.2 g/dL      RDW 15.0 %      RDW-SD 50.6 fl      MPV 9.5 fL      Platelets 227 10*3/mm3      Neutrophil % 77.4 %      Lymphocyte % 14.1 %      Monocyte % 8.0 %      Eosinophil % 0.1 %      Basophil % 0.2 %      Immature Grans % 0.2 %      Neutrophils, Absolute 8.65 10*3/mm3      Lymphocytes, Absolute 1.58 10*3/mm3      Monocytes, Absolute 0.89 10*3/mm3      Eosinophils, Absolute 0.01 10*3/mm3      Basophils, Absolute 0.02 10*3/mm3      Immature Grans, Absolute 0.02 10*3/mm3      nRBC 0.0 /100 WBC              Assessment:      Doing well postoperatively.      Plan:   1. Continue Stage 1 diet  2. Continue with ambulation and Incentive spirometry  3. Plan for d/c home    Patient was seen and examined by Dr. Salamanca.    Hospital Course: The patient is a very pleasant 59 y.o. female that was admitted to the hospital with morbid obesity with co-morbidities. Patient underwent laparoscopic band/port removal with extensive lysis of adhesions and repair of incisional hernia and colotomy (see OP note) without complication. The patient was then admitted to the bariatric unit per protocol where they remained stable. POD #1 she was started on a stage 1 bariatric diet which she tolerated so she was able to be discharged home  in good condition.        Discharge medications:      Discharge Medications      New Medications      Instructions Start Date   HYDROmorphone 2 MG tablet  Commonly known as:  DILAUDID   2 mg, Oral, Every 4 Hours PRN      ondansetron 4 MG tablet  Commonly known as:  ZOFRAN   4 mg, Oral, Every 6 Hours PRN         Changes to Medications      Instructions Start Date   rivaroxaban 20 MG tablet  Commonly known as:  XARELTO  What changed:    · when to take this  · additional instructions   20 mg, Oral, Daily With Dinner         Continue These Medications      Instructions Start Date   fluticasone 50 MCG/ACT nasal spray  Commonly known as:  FLONASE   2 sprays, Nasal, Daily      omeprazole 40 MG capsule  Commonly known as:  priLOSEC   40 mg, Oral, Nightly      PREVIDENT 5000 SENSITIVE 1.1-5 % paste  Generic drug:  Sod Fluoride-Potassium Nitrate   No dose, route, or frequency recorded.         Stop These Medications    Black Pepper-Turmeric 3-500 MG capsule     Chlorhexidine Gluconate Cloth 2 % pads            Discharge instructions:  Per Bariatric manual; per our protocol      Follow-up appointment: Follow up with Dr. Salamanca in the office as scheduled.  If not already scheduled call for appointment at [phone].

## 2019-07-03 NOTE — PROGRESS NOTES
Case Management Discharge Note    Final Note: Discharged home with family.     Destination      No service has been selected for the patient.      Durable Medical Equipment      No service has been selected for the patient.      Dialysis/Infusion      No service has been selected for the patient.      Home Medical Care      No service has been selected for the patient.      Therapy      No service has been selected for the patient.      Community Resources      No service has been selected for the patient.        Transportation Services  Private: Car    Final Discharge Disposition Code: 01 - home or self-care

## 2019-07-08 ENCOUNTER — OFFICE VISIT (OUTPATIENT)
Dept: BARIATRICS/WEIGHT MGMT | Facility: CLINIC | Age: 59
End: 2019-07-08

## 2019-07-08 VITALS
HEART RATE: 68 BPM | DIASTOLIC BLOOD PRESSURE: 70 MMHG | HEIGHT: 64 IN | BODY MASS INDEX: 39.09 KG/M2 | RESPIRATION RATE: 18 BRPM | TEMPERATURE: 97.6 F | SYSTOLIC BLOOD PRESSURE: 120 MMHG | WEIGHT: 229 LBS

## 2019-07-08 DIAGNOSIS — Z98.84 HISTORY OF REMOVAL OF LAPAROSCOPIC GASTRIC BANDING DEVICE: ICD-10-CM

## 2019-07-08 DIAGNOSIS — E66.9 OBESITY, CLASS II, BMI 35-39.9: Primary | ICD-10-CM

## 2019-07-08 DIAGNOSIS — G47.33 OBSTRUCTIVE SLEEP APNEA, ADULT: ICD-10-CM

## 2019-07-08 DIAGNOSIS — E78.2 MIXED HYPERLIPIDEMIA: ICD-10-CM

## 2019-07-08 DIAGNOSIS — R10.13 EPIGASTRIC PAIN: ICD-10-CM

## 2019-07-08 PROBLEM — K95.09 GASTRIC BAND SLIPPAGE: Status: RESOLVED | Noted: 2019-02-15 | Resolved: 2019-07-08

## 2019-07-08 PROBLEM — E66.01 OBESITY, CLASS III, BMI 40-49.9 (MORBID OBESITY): Status: RESOLVED | Noted: 2018-01-05 | Resolved: 2019-07-08

## 2019-07-08 PROBLEM — E66.812 OBESITY, CLASS II, BMI 35-39.9: Status: ACTIVE | Noted: 2019-07-08

## 2019-07-08 PROBLEM — E66.813 OBESITY, CLASS III, BMI 40-49.9 (MORBID OBESITY): Status: RESOLVED | Noted: 2018-01-05 | Resolved: 2019-07-08

## 2019-07-08 PROCEDURE — 99024 POSTOP FOLLOW-UP VISIT: CPT | Performed by: NURSE PRACTITIONER

## 2019-07-08 NOTE — PROGRESS NOTES
[facility]  [address] [address]  [phone]  [address] [address]  [phone]  Dept: [phone]  7/8/2019      Zulema Sousa.  [id number]  [id number]  1960  female      Chief Complaint   Patient presents with   • Follow-up     1 week post op AGB Removal       BH Post-Op Bariatric Surgery:   Zulema Sousa is status post laparopscopic Laparoscopic Band Removal procedure, performed on 7/1/19.     HPI:   Today's weight is 104 kg (229 lb) pounds, today's BMI is Body mass index is 39.29 kg/m²., The patient reports a decreased portion size and loss of appetite.  Zulema Sousa denies nausea, vomiting, dysphagia, or heartburn. The patient c/o post-op pain that is improving. she is doing well with protein and water intake so far. Taking their vitamins, walking and using IS. Denies fevers, chills, chest pain or shortness of air.      Diet and Exercise: Diet history reviewed and discussed with the patient. Weight loss/gains to date discussed with the patient. No carbonated beverage consumption and exercising regularly- walking frequently.   Supplements: multivitamins, B-12, calcium, iron, B-1 and Vitamin D.     Review of Systems   Constitutional: Positive for appetite change. Negative for fatigue and unexpected weight change.   HENT: Negative.    Eyes: Negative.    Respiratory: Positive for shortness of breath.    Cardiovascular: Negative.  Negative for leg swelling.   Gastrointestinal: Positive for abdominal pain (post-op soreness), constipation and nausea. Negative for abdominal distention, diarrhea and vomiting.   Genitourinary: Negative for difficulty urinating, frequency and urgency.   Musculoskeletal: Negative for back pain.   Skin: Negative.    Psychiatric/Behavioral: Negative.    All other systems reviewed and are negative.      Patient Active Problem List   Diagnosis   • Gastroesophageal  reflux disease   • Edema   • Hyperlipidemia   • Arthralgia of multiple joints   • Osteoarthritis of knee   • Pericarditis   • Pulmonary embolism (CMS/HCC)   • Rheumatoid arthritis (CMS/HCC)   • Sciatica   • Unintended weight gain   • Arthritis of both knees   • Obstructive sleep apnea, adult   • Dietary counseling   • History of DVT (deep vein thrombosis)   • Tear of lateral meniscus of right knee, current   • Arthritis of right knee   • Chronic pain of right knee   • Epigastric pain   • Abnormal UGI series   • Esophageal dilatation   • Abnormal finding on radiology exam   • Mixed incontinence urge and stress   • Right elbow tendonitis   • Obesity, Class II, BMI 35-39.9   • History of removal of laparoscopic gastric banding device       The following portions of the patient's history were reviewed and updated as appropriate: allergies, current medications, past family history, past medical history, past social history, past surgical history and problem list.    Vitals:    07/08/19 1135   BP: 120/70   Pulse: 68   Resp: 18   Temp: 97.6 °F (36.4 °C)       Physical Exam   Cardiovascular: Normal rate, regular rhythm, normal heart sounds and intact distal pulses.   Pulmonary/Chest: Effort normal. No respiratory distress. She has no wheezes.   RLL diminished at base   Abdominal: Soft. Bowel sounds are normal. She exhibits no distension. There is no tenderness.   Skin: Skin is warm, dry and intact. Capillary refill takes less than 2 seconds. No ecchymosis and no rash noted. She is not diaphoretic. No erythema.   Incisions closed without drainage and appear to be healing well. Dermabond intact.        Assessment:   Post-op, the patient is doing well.     Encounter Diagnoses   Name Primary?   • Obesity, Class II, BMI 35-39.9 Yes   • History of removal of laparoscopic gastric banding device    • Obstructive sleep apnea, adult        Plan:   As patient is still reporting some shortness of breath she was instructed to continue  using her incentive spirometer at home and to continue getting up moving and walking as she has done better with this today.  We will extend her time off work by a little bit.  As her case was longer than the typical she is having muscular pain and may need a bit more time to recover.  Was encouraged to continue pushing her fluids and taking Zofran for nausea as needed in order to get adequate intake.  She is dynamically stable and looks good at this time.  Patient was encouraged to call us with any changes to incisions, fever, or worsening dizziness  Reviewed with patient the importance of following the manual for diet progression. Increase activity as tolerated. Continue increasing daily intake of protein and water.   Return to work: the patient is to return to 3 weeks from their surgery date with no restrictions unless they develop medical problems in which we will see them back in the office. They received a note in our office today with their return to work date.  Activity restrictions: no lifting, pushing or pulling over 25lbs for 3 weeks.   Recommended patient be sure to get at least 70 grams of protein per day. Discussed with the patient the recommended amount of water per day to intake. Reviewed vitamin requirements. Be sure to do routine exercise and increase activity as tolerated. No asa, nsaids or steroids for 8 weeks. Patient may use miralax as needed if necessary.     Instructions / Recommendations: dietary counseling recommended, recommended a daily protein intake of  grams, vitamin supplement(s) recommended, recommended exercising at least 150 minutes per week, behavior modifications recommended and instructed to call the office for concerns, questions, or problems.     The patient was instructed to follow up at one month follow up appt.     The patient was counseled regarding post op bariatric manual

## 2019-08-05 ENCOUNTER — OFFICE VISIT (OUTPATIENT)
Dept: BARIATRICS/WEIGHT MGMT | Facility: CLINIC | Age: 59
End: 2019-08-05

## 2019-08-05 VITALS
HEIGHT: 64 IN | TEMPERATURE: 97.8 F | SYSTOLIC BLOOD PRESSURE: 127 MMHG | BODY MASS INDEX: 40.63 KG/M2 | HEART RATE: 74 BPM | WEIGHT: 238 LBS | RESPIRATION RATE: 18 BRPM | DIASTOLIC BLOOD PRESSURE: 81 MMHG

## 2019-08-05 DIAGNOSIS — E66.01 OBESITY, CLASS III, BMI 40-49.9 (MORBID OBESITY) (HCC): Primary | ICD-10-CM

## 2019-08-05 DIAGNOSIS — Z98.84 HISTORY OF REMOVAL OF LAPAROSCOPIC GASTRIC BANDING DEVICE: ICD-10-CM

## 2019-08-05 DIAGNOSIS — K21.9 GASTROESOPHAGEAL REFLUX DISEASE, ESOPHAGITIS PRESENCE NOT SPECIFIED: ICD-10-CM

## 2019-08-05 DIAGNOSIS — K22.89 ESOPHAGEAL DILATATION: ICD-10-CM

## 2019-08-05 DIAGNOSIS — R10.13 EPIGASTRIC PAIN: ICD-10-CM

## 2019-08-05 PROBLEM — E66.813 OBESITY, CLASS III, BMI 40-49.9 (MORBID OBESITY): Status: ACTIVE | Noted: 2019-07-08

## 2019-08-05 PROCEDURE — 99024 POSTOP FOLLOW-UP VISIT: CPT | Performed by: NURSE PRACTITIONER

## 2019-08-05 NOTE — PROGRESS NOTES
[facility]  [address] [address]  [phone]  [address] [address]  [phone]  Dept: [phone]  8/5/2019      Zulema Sousa.  [id number]  [id number]  1960  female      Chief Complaint   Patient presents with   • Follow-up     1 month post op AGB Removal       BH Post-Op Bariatric Surgery:   Zulema Sousa is status post laparopscopic Laparoscopic Band Removal procedure, performed on 7/1/19.     HPI:   Today's weight is 108 kg (238 lb) pounds, today's BMI is Body mass index is 40.83 kg/m²., she has a  gain of 9 pounds since the last visit. The patient reports a decreased portion size and loss of appetite.  Zulema Sousa denies nausea, vomiting, dysphagia, or heartburn. The patient c/o post-op pain that is improving. she is doing well with protein and water intake so far. Taking their vitamins, walking and using IS. Denies fevers, chills, chest pain or shortness of air.      Diet and Exercise: Diet history reviewed and discussed with the patient. Weight loss/gains to date discussed with the patient. No carbonated beverage consumption and exercising regularly- walking frequently.   Supplements: multivitamins, B-12, calcium, iron, B-1 and Vitamin D.     Review of Systems   Constitutional: Positive for appetite change and fatigue. Negative for unexpected weight change.   HENT: Negative.    Eyes: Negative.    Respiratory: Negative.    Cardiovascular: Negative.  Negative for leg swelling.   Gastrointestinal: Positive for constipation. Negative for abdominal distention, abdominal pain, diarrhea, nausea and vomiting.   Genitourinary: Negative for difficulty urinating, frequency and urgency.   Musculoskeletal: Negative for back pain.   Skin: Negative.    Psychiatric/Behavioral: Negative.    All other systems reviewed and are negative.      Patient Active Problem List   Diagnosis   •  Gastroesophageal reflux disease   • Edema   • Hyperlipidemia   • Arthralgia of multiple joints   • Osteoarthritis of knee   • Pericarditis   • Pulmonary embolism (CMS/HCC)   • Rheumatoid arthritis (CMS/HCC)   • Sciatica   • Unintended weight gain   • Arthritis of both knees   • Obstructive sleep apnea, adult   • Dietary counseling   • History of DVT (deep vein thrombosis)   • Tear of lateral meniscus of right knee, current   • Arthritis of right knee   • Chronic pain of right knee   • Epigastric pain   • Abnormal UGI series   • Esophageal dilatation   • Abnormal finding on radiology exam   • Mixed incontinence urge and stress   • Right elbow tendonitis   • Obesity, Class III, BMI 40-49.9 (morbid obesity) (CMS/HCC)   • History of removal of laparoscopic gastric banding device       The following portions of the patient's history were reviewed and updated as appropriate: allergies, current medications, past family history, past medical history, past social history, past surgical history and problem list.    Vitals:    08/05/19 1336   BP: 127/81   Pulse: 74   Resp: 18   Temp: 97.8 °F (36.6 °C)       Physical Exam   Constitutional: She appears well-developed and well-nourished.   Neck: No thyromegaly present.   Cardiovascular: Normal rate, regular rhythm and normal heart sounds.   Pulmonary/Chest: Effort normal and breath sounds normal. No respiratory distress. She has no wheezes.   Abdominal: Soft. Bowel sounds are normal. She exhibits no distension. There is no tenderness. There is no guarding. No hernia.   Musculoskeletal: She exhibits no edema or tenderness.   Neurological: She is alert.   Skin: Skin is warm and dry. No rash noted. No erythema.   Incisions closed without drainage and appear to be healing well.    Psychiatric: She has a normal mood and affect. Her behavior is normal.   Nursing note and vitals reviewed.      Assessment:   Post-op, the patient is doing well.     Encounter Diagnoses   Name Primary?   •  Obesity, Class III, BMI 40-49.9 (morbid obesity) (CMS/HCC) Yes   • History of removal of laparoscopic gastric banding device    • Epigastric pain    • Esophageal dilatation    • Gastroesophageal reflux disease, esophagitis presence not specified        Plan:   Reviewed with patient the importance of following the manual for diet progression. Increase activity as tolerated. Continue increasing daily intake of protein and water.   Return to work: the patient is to return to 3 weeks from their surgery date with no restrictions unless they develop medical problems in which we will see them back in the office. They received a note in our office today with their return to work date.  Activity restrictions: no lifting, pushing or pulling over 25lbs for 3 weeks.   Recommended patient be sure to get at least 70 grams of protein per day. Discussed with the patient the recommended amount of water per day to intake. Reviewed vitamin requirements. Be sure to do routine exercise and increase activity as tolerated. No asa, nsaids or steroids for 8 weeks. Patient may use miralax as needed if necessary.     Instructions / Recommendations: dietary counseling recommended, recommended a daily protein intake of  grams, vitamin supplement(s) recommended, recommended exercising at least 150 minutes per week, behavior modifications recommended and instructed to call the office for concerns, questions, or problems.     The patient was instructed to follow up at one month follow up appt.     The patient was counseled regarding post op bariatric manual

## 2019-08-16 ENCOUNTER — TELEPHONE (OUTPATIENT)
Dept: BARIATRICS/WEIGHT MGMT | Facility: CLINIC | Age: 59
End: 2019-08-16

## 2019-08-16 NOTE — TELEPHONE ENCOUNTER
----- Message from Randell Salamanca Jr., MD sent at 8/16/2019 11:08 AM EDT -----  I talked to patient on the phone and went over her symptoms.  She states she is feeling much better today.  Her pain is much better and has gotten better each day.  She states that she has had several bowel movements after taking a laxative and she thinks this has helped.  She denies fever chills chest pain melena hematochezia or any other complaints.  I instructed her that if the pain worsens or she starts running fever or any other symptoms to go to the emergency room.  She will contact her office next week to let us know how she is doing.  ----- Message -----  From: Elle Foster CMA  Sent: 8/16/2019  10:34 AM  To: Randell Salamanca Jr., MD    Pt called regarding pain in left side. Had band removed 7/1/19. Pt says she's tried gas x, milk of magnesia and senna thinking it may be gas pains but still no relief. Pain started this Tuesday.

## 2019-08-26 ENCOUNTER — OFFICE VISIT (OUTPATIENT)
Dept: CARDIOLOGY | Facility: CLINIC | Age: 59
End: 2019-08-26

## 2019-08-26 VITALS
WEIGHT: 236 LBS | BODY MASS INDEX: 40.29 KG/M2 | HEIGHT: 64 IN | HEART RATE: 83 BPM | DIASTOLIC BLOOD PRESSURE: 78 MMHG | SYSTOLIC BLOOD PRESSURE: 136 MMHG

## 2019-08-26 DIAGNOSIS — I26.99 OTHER ACUTE PULMONARY EMBOLISM WITHOUT ACUTE COR PULMONALE (HCC): Primary | ICD-10-CM

## 2019-08-26 PROCEDURE — 93000 ELECTROCARDIOGRAM COMPLETE: CPT | Performed by: INTERNAL MEDICINE

## 2019-08-26 PROCEDURE — 99213 OFFICE O/P EST LOW 20 MIN: CPT | Performed by: INTERNAL MEDICINE

## 2019-08-26 NOTE — PROGRESS NOTES
Subjective:     Encounter Date:08/26/2019      Patient ID: Zulema Sousa is a 59 y.o. female.    Chief Complaint:  This is a 59-year-old woman who was previously seen by Dr. Newton Hernández.  In 2016 she suffered bilateral pulmonary emboli.  She underwent thrombectomy with Dr. Hernández, and recovered well.  However, this was complicated by cardiac tamponade.  She underwent pericardiocentesis and recovered well from that.  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    Since she is done well.  She recently underwent removal of her lap band.  She is not exercising, but has maintained a fair amount of weight loss.        The following portions of the patient's history were reviewed and updated as appropriate: allergies, current medications, past family history, past medical history, past social history, past surgical history and problem list.         REVIEW OF SYSTEMS:   All systems reviewed.  Pertinent positives identified in HPI.  All other systems are negative.      Past Medical History:   Diagnosis Date   • Acid reflux    • Back pain    • Cardiac tamponade     March 2015   • Difficulty breathing     during exertion   • Edema    • Esophagitis, reflux    • Essential hypertriglyceridemia    • GERD (gastroesophageal reflux disease)    • Health care maintenance    • Heartburn    • Hyperlipidemia    • Hypertriglyceridemia    • Increased appetite    • Morbid obesity (CMS/[facility])    • Multiple joint pain    • Obesity, Class III, BMI 40-49.9 (morbid obesity) (CMS/HCC) 1/5/2018   • Osteoarthritis of knee    • Pericarditis    • Pulmonary embolism (CMS/HCC)     Janurary 2015   • RHA (rheumatoid arthritis) (CMS/HCC)    • Sciatica    • Sleep apnea    • Unintended weight gain        Family History   Problem Relation Age of Onset   • Cancer Mother    • Obesity Mother    • Diabetes Father    • Hypertension Father    • Heart disease Father    • Obesity Father    • Heart disease Brother    • Diabetes Paternal  Grandfather    • Diabetes Paternal Aunt    • No Known Problems Maternal Grandmother    • No Known Problems Maternal Grandfather    • No Known Problems Paternal Grandmother    • Malig Hyperthermia Neg Hx        Social History     Socioeconomic History   • Marital status:      Spouse name: Jac   • Number of children: Not on file   • Years of education: Not on file   • Highest education level: Not on file   Occupational History   • Occupation:      Employer: [employer]   Tobacco Use   • Smoking status: Former Smoker     Packs/day: 0.25     Types: Cigarettes   • Smokeless tobacco: Never Used   • Tobacco comment: caffeine use   Substance and Sexual Activity   • Alcohol use: Yes     Alcohol/week: 0.6 oz     Types: 1 Shots of liquor per week     Comment: social/ occassional   • Drug use: No   • Sexual activity: Defer       Allergies   Allergen Reactions   • Hydrocodone Itching   • Nsaids Other (See Comments)     DOESN'T TAKE BECAUSE SHES ON BLOOD THINNER.       Past Surgical History:   Procedure Laterality Date   • ANKLE SURGERY      treatment of ankle fracture   • APPENDECTOMY     • CARDIAC SURGERY      cardiac tamponade   •  SECTION     • CHOLECYSTECTOMY     • COLONOSCOPY N/A 2018    Procedure: COLONOSCOPY to cecum and t.i. with polypectomy and clip x2 to ascending colon;  Surgeon: Pierre Ornelas MD;  Location: [facility] ENDOSCOPY;  Service: Gastroenterology   • DILATATION AND CURETTAGE     • ENDOSCOPY N/A 2018    Procedure: ESOPHAGOGASTRODUODENOSCOPY;  Surgeon: Pierre Ornelas MD;  Location: [facility] ENDOSCOPY;  Service: Gastroenterology   • GASTRIC BANDING REMOVAL N/A 2019    Procedure: GASTRIC BANDING AND PORT REMOVAL, LYSIS OF ADHESIONS, REPAIR OF COLOTOMY, AND INCISIONAL HERNIA REPAIR LAPAROSCOPIC;  Surgeon: Randell Salamanca Jr., MD;  Location: [facility] OR [facility];  Service: General   • LAPAROSCOPIC GASTRIC BANDING     • PULMONARY EMBOLISM SURGERY      • TUBAL ABDOMINAL LIGATION           ECG 12 Lead  Date/Time: 8/26/2019 5:27 PM  Performed by: Temi Hutchinson MD  Authorized by: Temi Hutchinson MD   Comparison: compared with previous ECG from 5/3/2019  Rhythm: sinus rhythm  Rate: normal  Conduction: conduction normal  ST Segments: ST segments normal  T Waves: T waves normal  QRS axis: normal  Other: no other findings    Clinical impression: normal ECG               Objective:         GEN: VSS, no distress,   Eyes: normal sclera, normal lids and lashes  HENT: moist mucus membranes,   Respiratory: CTAB, no rales or wheezes  CV: RRR, no murmurs, , +2 DP and 2+ carotid pulses b/l  GI: NABS, soft,  Nontender, nondistended  MSK: no edema, no scoliosis or kyphosis  Skin: no rash, warm, dry  Heme/Lymph: no bruising or bleeding  Psych: organized thought, normal behavior and affect  Neuro: Cranial nerves grossly intact, Alert and Oriented x 3.     Outpatient Encounter Medications as of 8/26/2019   Medication Sig Dispense Refill   • fluticasone (FLONASE) 50 MCG/ACT nasal spray 2 sprays into each nostril Daily. (Patient taking differently: 2 sprays into the nostril(s) as directed by provider As Needed.) 16 g 5   • omeprazole (priLOSEC) 40 MG capsule Take 40 mg by mouth Every Night.     • PREVIDENT 5000 SENSITIVE 1.1-5 % paste      • rivaroxaban (XARELTO) 20 MG tablet Take 1 tablet by mouth Daily With Dinner. (Patient taking differently: Take 20 mg by mouth Every Night. PATIENT TO STOP 3 DAYS BEFORE SURGERY) 90 tablet 3     No facility-administered encounter medications on file as of 8/26/2019.              Assessment:          Diagnosis Plan   1. Other acute pulmonary embolism without acute cor pulmonale (CMS/HCC)            Plan:       This is a 59-year-old woman who suffered bilateral PE a few years ago.  This was treated with aspiration thrombectomy and complicated by cardiac tamponade.  Overall she has recovered well.  She should continue Xarelto 20 mg indefinitely.  I  will see her back in 1 year.    Dr. Young, thank you very much for referring this kind patient to me please call with any questions or concerns       Temi Hutchinson MD  08/26/19  [facility]

## 2019-08-27 ENCOUNTER — OFFICE VISIT (OUTPATIENT)
Dept: INTERNAL MEDICINE | Facility: CLINIC | Age: 59
End: 2019-08-27

## 2019-08-27 VITALS
BODY MASS INDEX: 40.29 KG/M2 | HEART RATE: 83 BPM | TEMPERATURE: 98.3 F | RESPIRATION RATE: 16 BRPM | DIASTOLIC BLOOD PRESSURE: 72 MMHG | WEIGHT: 236 LBS | OXYGEN SATURATION: 97 % | HEIGHT: 64 IN | SYSTOLIC BLOOD PRESSURE: 120 MMHG

## 2019-08-27 DIAGNOSIS — J06.9 ACUTE URI: Primary | ICD-10-CM

## 2019-08-27 DIAGNOSIS — Z20.818 STREPTOCOCCUS EXPOSURE: ICD-10-CM

## 2019-08-27 DIAGNOSIS — R50.9 FEVER, UNSPECIFIED FEVER CAUSE: ICD-10-CM

## 2019-08-27 LAB
EXPIRATION DATE: NORMAL
EXPIRATION DATE: NORMAL
FLUAV AG NPH QL: NEGATIVE
FLUBV AG NPH QL: NEGATIVE
INTERNAL CONTROL: NORMAL
INTERNAL CONTROL: NORMAL
Lab: NORMAL
Lab: NORMAL
S PYO AG THROAT QL: NEGATIVE

## 2019-08-27 PROCEDURE — 87804 INFLUENZA ASSAY W/OPTIC: CPT | Performed by: INTERNAL MEDICINE

## 2019-08-27 PROCEDURE — 87880 STREP A ASSAY W/OPTIC: CPT | Performed by: INTERNAL MEDICINE

## 2019-08-27 PROCEDURE — 99213 OFFICE O/P EST LOW 20 MIN: CPT | Performed by: INTERNAL MEDICINE

## 2019-08-27 NOTE — PROGRESS NOTES
Zulema Sousa is a 59 y.o. female, who presents with a chief complaint of   Chief Complaint   Patient presents with   • Fever       HPI   Pt here bc of fever and not feeling well. She babysat her grandkids over the weekend.  Yesterday the kids were diagnosed with strep throat.  Her throat is dry and she is very thirsty.  She aches all over.  + cough that is sometimes productive.      The following portions of the patient's history were reviewed and updated as appropriate: allergies, current medications, past family history, past medical history, past social history, past surgical history and problem list.    Allergies: Hydrocodone and Nsaids    Review of Systems   Constitutional: Positive for fever.   HENT: Negative.    Eyes: Negative.    Respiratory: Positive for cough.    Cardiovascular: Negative.    Gastrointestinal: Negative.    Endocrine: Negative.    Genitourinary: Negative.    Musculoskeletal: Negative.    Skin: Negative.    Allergic/Immunologic: Negative.    Neurological: Negative.    Hematological: Negative.    Psychiatric/Behavioral: Negative.    All other systems reviewed and are negative.            Wt Readings from Last 3 Encounters:   08/27/19 107 kg (236 lb)   08/26/19 107 kg (236 lb)   08/05/19 108 kg (238 lb)     Temp Readings from Last 3 Encounters:   08/27/19 98.3 °F (36.8 °C)   08/05/19 97.8 °F (36.6 °C)   07/08/19 97.6 °F (36.4 °C)     BP Readings from Last 3 Encounters:   08/27/19 120/72   08/26/19 136/78   08/05/19 127/81     Pulse Readings from Last 3 Encounters:   08/27/19 83   08/26/19 83   08/05/19 74     Body mass index is 40.51 kg/m².  @LASTSAO2(3)@    Physical Exam   Constitutional: She is oriented to person, place, and time. She appears well-developed and well-nourished. No distress.   HENT:   Head: Normocephalic and atraumatic.   Right Ear: External ear normal.   Left Ear: External ear normal.   Nose: Nose normal.   Mouth/Throat: Oropharynx is clear and moist.   Eyes:  Conjunctivae and EOM are normal. Pupils are equal, round, and reactive to light.   Neck: Normal range of motion. Neck supple.   Cardiovascular: Normal rate, regular rhythm, normal heart sounds and intact distal pulses.   Pulmonary/Chest: Effort normal and breath sounds normal. No respiratory distress. She has no wheezes.   Musculoskeletal: Normal range of motion.   Normal gait   Neurological: She is alert and oriented to person, place, and time.   Skin: Skin is warm and dry.   Psychiatric: She has a normal mood and affect. Her behavior is normal. Judgment and thought content normal.   Nursing note and vitals reviewed.      Results for orders placed or performed in visit on 08/27/19   POC Rapid Strep A   Result Value Ref Range    Rapid Strep A Screen Negative Negative, VALID, INVALID, Not Performed    Internal Control Passed Passed    Lot Number [id number]     Expiration Date 32,021    POC Influenza A / B   Result Value Ref Range    Rapid Influenza A Ag Negative Negative    Rapid Influenza B Ag Negative Negative    Internal Control Passed Passed    Lot Number [id number]     Expiration Date 10/22/21            Zulema was seen today for fever.    Diagnoses and all orders for this visit:    Acute URI    Streptococcus exposure  -     POC Rapid Strep A  -     POC Influenza A / B    Fever, unspecified fever cause  -     POC Rapid Strep A  -     POC Influenza A / B      Supportive care.  Tylenol/motrin and otc meds as needed.      Outpatient Medications Prior to Visit   Medication Sig Dispense Refill   • fluticasone (FLONASE) 50 MCG/ACT nasal spray 2 sprays into each nostril Daily. (Patient taking differently: 2 sprays into the nostril(s) as directed by provider As Needed.) 16 g 5   • omeprazole (priLOSEC) 40 MG capsule Take 40 mg by mouth Every Night.     • PREVIDENT 5000 SENSITIVE 1.1-5 % paste      • rivaroxaban (XARELTO) 20 MG tablet Take 1 tablet by mouth Daily With Dinner. 90 tablet 3     No facility-administered  medications prior to visit.      No orders of the defined types were placed in this encounter.    [unfilled]  There are no discontinued medications.      Return if symptoms worsen or fail to improve.

## 2019-09-10 ENCOUNTER — OFFICE VISIT (OUTPATIENT)
Dept: OBSTETRICS AND GYNECOLOGY | Facility: CLINIC | Age: 59
End: 2019-09-10

## 2019-09-10 DIAGNOSIS — N39.41 URGE INCONTINENCE: ICD-10-CM

## 2019-09-10 PROCEDURE — 51797 INTRAABDOMINAL PRESSURE TEST: CPT | Performed by: OBSTETRICS & GYNECOLOGY

## 2019-09-10 PROCEDURE — 51741 ELECTRO-UROFLOWMETRY FIRST: CPT | Performed by: OBSTETRICS & GYNECOLOGY

## 2019-09-10 PROCEDURE — 51784 ANAL/URINARY MUSCLE STUDY: CPT | Performed by: OBSTETRICS & GYNECOLOGY

## 2019-09-10 PROCEDURE — 51729 CYSTOMETROGRAM W/VP&UP: CPT | Performed by: OBSTETRICS & GYNECOLOGY

## 2019-09-10 RX ORDER — SOLIFENACIN SUCCINATE 10 MG/1
10 TABLET, FILM COATED ORAL DAILY
Qty: 30 TABLET | Refills: 11 | Status: SHIPPED | OUTPATIENT
Start: 2019-09-10 | End: 2020-05-26 | Stop reason: SDUPTHER

## 2019-09-10 NOTE — PROGRESS NOTES
After informed consent was obtained urodynamic testing was performed on this patient.  She tolerated the procedure well.  The full study can be seen in the media tab in epic.    Impression: Normal postvoid residual, overactive bladder, unstable detrusor.  No evidence of stress urinary incontinence.  Normal capacity and compliance.    Patient originally was started on Ditropan but had lots of side effects.  We started Vesicare 10 mg here today.  She will see Dr. Ramírez back in 3 months.    Shin Evangelista MD

## 2019-10-18 ENCOUNTER — CLINICAL SUPPORT (OUTPATIENT)
Dept: ORTHOPEDIC SURGERY | Facility: CLINIC | Age: 59
End: 2019-10-18

## 2019-10-18 ENCOUNTER — OFFICE VISIT (OUTPATIENT)
Dept: SLEEP MEDICINE | Facility: HOSPITAL | Age: 59
End: 2019-10-18

## 2019-10-18 VITALS
HEIGHT: 64 IN | DIASTOLIC BLOOD PRESSURE: 65 MMHG | HEART RATE: 89 BPM | OXYGEN SATURATION: 97 % | WEIGHT: 245 LBS | SYSTOLIC BLOOD PRESSURE: 112 MMHG | BODY MASS INDEX: 41.83 KG/M2

## 2019-10-18 VITALS — WEIGHT: 240 LBS | BODY MASS INDEX: 40.97 KG/M2 | HEIGHT: 64 IN

## 2019-10-18 DIAGNOSIS — E66.01 OBESITY, MORBID, BMI 40.0-49.9 (HCC): ICD-10-CM

## 2019-10-18 DIAGNOSIS — G47.33 OBSTRUCTIVE SLEEP APNEA, ADULT: Primary | ICD-10-CM

## 2019-10-18 DIAGNOSIS — M17.11 ARTHRITIS OF RIGHT KNEE: Primary | ICD-10-CM

## 2019-10-18 PROCEDURE — 20610 DRAIN/INJ JOINT/BURSA W/O US: CPT | Performed by: NURSE PRACTITIONER

## 2019-10-18 PROCEDURE — 73562 X-RAY EXAM OF KNEE 3: CPT | Performed by: NURSE PRACTITIONER

## 2019-10-18 PROCEDURE — G0463 HOSPITAL OUTPT CLINIC VISIT: HCPCS

## 2019-10-18 PROCEDURE — 99213 OFFICE O/P EST LOW 20 MIN: CPT | Performed by: NURSE PRACTITIONER

## 2019-10-18 RX ORDER — METHYLPREDNISOLONE ACETATE 80 MG/ML
80 INJECTION, SUSPENSION INTRA-ARTICULAR; INTRALESIONAL; INTRAMUSCULAR; SOFT TISSUE
Status: COMPLETED | OUTPATIENT
Start: 2019-10-18 | End: 2019-10-18

## 2019-10-18 RX ADMIN — METHYLPREDNISOLONE ACETATE 80 MG: 80 INJECTION, SUSPENSION INTRA-ARTICULAR; INTRALESIONAL; INTRAMUSCULAR; SOFT TISSUE at 07:59

## 2019-10-18 NOTE — PROGRESS NOTES
Patient: Zulema Sousa  YOB: 1960    Chief Complaints:  right knee pain    Subjective:    History of Present Illness: Here today for knee pain. She is doing better this time and has not been so achy until recently.  The pain is a generalized joint tenderness.  It has been progressive in nature but remains intermittent.  Worsened by prolonged standing or walking and squatting activities. Has had improvement in the past with ice/heat, rest, and injections.     This problem is not new to this examiner.     Allergies:   Allergies   Allergen Reactions   • Hydrocodone Itching   • Nsaids Other (See Comments)     DOESN'T TAKE BECAUSE SHES ON BLOOD THINNER.       Medications:   Home Medications:  Current Outpatient Medications on File Prior to Visit   Medication Sig   • fluticasone (FLONASE) 50 MCG/ACT nasal spray 2 sprays into each nostril Daily. (Patient taking differently: 2 sprays into the nostril(s) as directed by provider As Needed.)   • omeprazole (priLOSEC) 40 MG capsule Take 40 mg by mouth Every Night.   • PREVIDENT 5000 SENSITIVE 1.1-5 % paste    • rivaroxaban (XARELTO) 20 MG tablet Take 1 tablet by mouth Daily With Dinner.   • solifenacin (VESICARE) 10 MG tablet Take 1 tablet by mouth Daily.     No current facility-administered medications on file prior to visit.      Current Medications:  Scheduled Meds:  Continuous Infusions:  No current facility-administered medications for this visit.   PRN Meds:.    I have reviewed the patient's medical history in detail and updated the computerized patient record.  Review and summarization of old records include:    Past Medical History:   Diagnosis Date   • Acid reflux    • Back pain    • Cardiac tamponade     March 2015   • Difficulty breathing     during exertion   • Edema    • Esophagitis, reflux    • Essential hypertriglyceridemia    • GERD (gastroesophageal reflux disease)    • Health care maintenance    • Heartburn    • Hyperlipidemia    •  Hypertriglyceridemia    • Increased appetite    • Morbid obesity (CMS/[facility])    • Multiple joint pain    • Obesity, Class III, BMI 40-49.9 (morbid obesity) (CMS/[facility]) 2018   • Osteoarthritis of knee    • Pericarditis    • Pulmonary embolism (CMS/[facility])     2015   • RHA (rheumatoid arthritis) (CMS/[facility])    • Sciatica    • Sleep apnea    • Unintended weight gain         Past Surgical History:   Procedure Laterality Date   • ANKLE SURGERY      treatment of ankle fracture   • APPENDECTOMY     • CARDIAC SURGERY      cardiac tamponade   •  SECTION     • CHOLECYSTECTOMY     • COLONOSCOPY N/A 2018    Procedure: COLONOSCOPY to cecum and t.i. with polypectomy and clip x2 to ascending colon;  Surgeon: Pierre Ornelas MD;  Location: [facility] ENDOSCOPY;  Service: Gastroenterology   • DILATATION AND CURETTAGE     • ENDOSCOPY N/A 2018    Procedure: ESOPHAGOGASTRODUODENOSCOPY;  Surgeon: Pierre Ornelas MD;  Location: [facility] ENDOSCOPY;  Service: Gastroenterology   • GASTRIC BANDING REMOVAL N/A 2019    Procedure: GASTRIC BANDING AND PORT REMOVAL, LYSIS OF ADHESIONS, REPAIR OF COLOTOMY, AND INCISIONAL HERNIA REPAIR LAPAROSCOPIC;  Surgeon: Randell Salamanca Jr., MD;  [place] [facility] [place] [facility];  Service: General   • LAPAROSCOPIC GASTRIC BANDING     • PULMONARY EMBOLISM SURGERY     • TUBAL ABDOMINAL LIGATION          Social History     Occupational History   • Occupation:      Employer: [employer]   Tobacco Use   • Smoking status: Former Smoker     Packs/day: 0.25     Types: Cigarettes   • Smokeless tobacco: Never Used   • Tobacco comment: caffeine use   Substance and Sexual Activity   • Alcohol use: Yes     Alcohol/week: 0.6 oz     Types: 1 Shots of liquor per week     Comment: social/ occassional   • Drug use: No   • Sexual activity: Defer      Social History     Social History Narrative   • Not on file        Family History   Problem Relation Age of Onset   • Cancer  "Mother    • Obesity Mother    • Diabetes Father    • Hypertension Father    • Heart disease Father    • Obesity Father    • Heart disease Brother    • Diabetes Paternal Grandfather    • Diabetes Paternal Aunt    • No Known Problems Maternal Grandmother    • No Known Problems Maternal Grandfather    • No Known Problems Paternal Grandmother    • Malig Hyperthermia Neg Hx        ROS: 14 point review of systems was performed and was negative except for documented findings in HPI and today's encounter.     Allergies:   Allergies   Allergen Reactions   • Hydrocodone Itching   • Nsaids Other (See Comments)     DOESN'T TAKE BECAUSE SHES ON BLOOD THINNER.     Constitutional:  Denies fever, shaking or chills   Eyes:  Denies change in visual acuity   HENT:  Denies nasal congestion or sore throat   Respiratory:  Denies cough or shortness of breath   Cardiovascular:  Denies chest pain or severe LE edema   GI:  Denies abdominal pain, nausea, vomiting, bloody stools or diarrhea   Musculoskeletal:  Numbness, tingling, or loss of motor function only as noted above in history of present illness.  : Denies painful urination or hematuria  Integument:  Denies rash, lesion or ulceration   Neurologic:  Denies headache or focal weakness  Endocrine:  Denies lymphadenopathy  Psych:  Denies confusion or change in mental status   Hem:  Denies active bleeding    Physical Exam: 59 y.o. female  Wt Readings from Last 3 Encounters:   10/18/19 109 kg (240 lb)   08/27/19 107 kg (236 lb)   08/26/19 107 kg (236 lb)     Ht Readings from Last 1 Encounters:   10/18/19 162.6 cm (64\")     Body mass index is 41.2 kg/m².  There were no vitals filed for this visit.  Vital signs reviewed.   Constitutional: Awake alert and oriented x3, well developed, no acute distress, non-toxic appearance.  EYES: symmetric, sclera clear  ENT:  Normocephalic, Atraumatic.   Respiratory:  No respiratory distress, No wheezing  CV: pulse regular, no palpitations or pallor.  GI:  " Abdomen soft, non-tender.   Vascular:  Intact distal pulses, No cyanosis, no signs or symptoms of DVT.  Neurologic: Sensation grossly intact to the involved extremity, No focal deficits noted.   Neck: No tenderness, Supple.  Integument: warm, dry, no ulcerations.   Psychiatric:  Oriented, no pathological affect.  Musculoskeletal:    Affected knee(s):  Painful gait with a subtle limp, positive for synovitis, swelling, joint effusion with crepitation.  Lachman negative  Posterior drawer negative  Kristin's negative  Patellofemoral grind +  Sensation grossly intact to light touch throughout the lower extremity  Skin is intact  Distal pulses are palpable  No signs or symptoms of DVT        Diagnostic Data:     Imaging was done today, images were personally viewed and discussed with the patient:    Indication: pain related symptoms,  Views: 3V AP, LAT & 40 degree PA right knee(s)   Findings: severe end-stage arthritis (bone on bone, subchondral sclerosis/cysts, osteophytes)  Comparison views: viewed last xray done in the office.     Procedure:  Large Joint Arthrocentesis: R knee  Date/Time: 10/18/2019 7:59 AM  Consent given by: patient  Site marked: site marked  Timeout: Immediately prior to procedure a time out was called to verify the correct patient, procedure, equipment, support staff and site/side marked as required   Supporting Documentation  Indications: pain and joint swelling   Procedure Details  Location: knee - R knee  Preparation: Patient was prepped and draped in the usual sterile fashion  Needle gauge: 21.  Approach: anterolateral  Medications administered: 4 mL lidocaine (cardiac); 80 mg methylPREDNISolone acetate 80 MG/ML  Patient tolerance: patient tolerated the procedure well with no immediate complications          Assessment:     ICD-10-CM ICD-9-CM   1. Arthritis of right knee M17.11 716.96           Plan: Is to proceed with injection  Follow up as indicated.  Ice, elevate, and rest as  needed.  Additional interventions include:  15 min spent face to face with patient 11 min spent counseling about:  Biomechanics of pertinent body area discussed.  Risks, benefits, alternatives, comparisons, and complications of accepted medicines, injections, recommendations, surgical procedures, and therapies explained and education provided in laymen's terms. Natural history and expected course of this patient's diagnosis discussed along with evaluation of therapies. Questions answered. When appropriate I also discussed proper use of cane, walker, trekking poles.   BMI:  The concept of BMI body mass index and its importance and implications discussed.  BMI suggested to be < 40 or as low as possible. Lifestyle measures for weight loss and how this affects orthopedic condition.  EXERCISES:  Advice on benefits of, and types of regular/moderate exercise including biomechanical forces involved as it pertains to this complaint.  RICE: Rest, ice, compression, and elevation therapy, Cryotherapy/brachy therapy, and or OTC linaments as indicated with instructions.   Cortisone Injection. See procedure note.    10/18/2019  Marcella Bar, APRN

## 2019-10-18 NOTE — PROGRESS NOTES
[facility]  Telephone: [phone] / Fax: [phone] [place]  Telephone: [phone] / Fax: [phone] [place]    PCP: Marsha Young MD    Reason for visit: JOSHUA f/u    Zulema Sousa is a 59 y.o.female  was last seen at [facility] sleep lab in  October 2018 and here today to review her progress on the device. Since last visit patient has done well on CPAP 10cm.  Sleep quality has improved in comparison to prior treatment, and excessive sleepiness/snoring is no longer an issue.  There is no complaint of aerophagia, excessive dry mouth or air leak.  Her sleep schedule is 11 PM-7 AM.  Her ESS score is 4.  She reports 1 or less nocturnal interruptions to use the restroom.  She wakes up feeling rested.  She uses a fullface mask which fits well.  She changes it approximately every 3 months.    Social history, no tobacco, drinks 1 alcoholic drink a week, 3 caffeine a day.    Review of systems negative.    DME-[employer]     Current Medications:    Current Outpatient Medications:   •  fluticasone (FLONASE) 50 MCG/ACT nasal spray, 2 sprays into each nostril Daily. (Patient taking differently: 2 sprays into the nostril(s) as directed by provider As Needed.), Disp: 16 g, Rfl: 5  •  omeprazole (priLOSEC) 40 MG capsule, Take 40 mg by mouth Every Night., Disp: , Rfl:   •  PREVIDENT 5000 SENSITIVE 1.1-5 % paste, , Disp: , Rfl:   •  rivaroxaban (XARELTO) 20 MG tablet, Take 1 tablet by mouth Daily With Dinner., Disp: 90 tablet, Rfl: 3  •  solifenacin (VESICARE) 10 MG tablet, Take 1 tablet by mouth Daily., Disp: 30 tablet, Rfl: 11  No current facility-administered medications for this visit.    also entered in Sleep Questionnaire    Patient  has a past medical history of Acid reflux, Back pain, Cardiac tamponade, Difficulty breathing, Edema, Esophagitis, reflux, Essential hypertriglyceridemia, GERD (gastroesophageal reflux disease), Health care maintenance, Heartburn, Hyperlipidemia,  "Hypertriglyceridemia, Increased appetite, Morbid obesity (CMS/[facility]), Multiple joint pain, Obesity, Class III, BMI 40-49.9 (morbid obesity) (CMS/[facility]) (1/5/2018), Osteoarthritis of knee, Pericarditis, Pulmonary embolism (CMS/[facility]), RHA (rheumatoid arthritis) (CMS/[facility]), Sciatica, Sleep apnea, and Unintended weight gain.    I have reviewed the Past Medical History, Past Surgical History, Social History and Family History.    Vital Signs /65   Pulse 89   Ht 162.6 cm (64\")   Wt 111 kg (245 lb)   SpO2 97%   BMI 42.05 kg/m²  Body mass index is 42.05 kg/m².    General Alert and oriented. No acute distress noted   Pharynx/Throat Class II Mallampati airway, large tongue, no evidence of redundant lateral pharyngeal tissue. No oral lesions. No thrush. Moist mucous membranes.   Head Normocephalic. Symmetrical. Atraumatic.    Nose No septal deviation. No drainage   Chest Wall Normal shape. Symmetric expansion with respiration. No tenderness.   Neck Trachea midline, no thyromegaly or adenopathy    Lungs Clear to auscultation bilaterally. No wheezes. No rhonchi. No rales. Respirations regular, even and unlabored.   Heart Regular rhythm and normal rate. Normal S1 and S2. No murmur   Abdomen Soft, non-tender and non-distended. Normal bowel sounds. No masses.   Extremities Moves all extremities well. No edema   Psychiatric Normal mood and affect.     Testing:  Download July 19, 2019-October 16, 2019, 76% compliance with average nightly use of 5 hours and 52 minutes on CPAP pressures of 10 cm, average AHI 4.9    Study:  3/22/17- overnight split polysomnogram study.  Diagnostic portion from 10:16 PM to 12:31 AM.  Sleep efficiency 88% with 1.98 hours total sleep time.  Sleep distribution showed decreased REM sleep at 4.6%.  Apnea hypopneas index 47.9 indicating very severe obstructive sleep apnea.  In 43 minutes of supine sleep index was even higher at 115.  In 5 minutes of REM sleep index was 130.  Oxygen saturation fell below " 89% for over 9 minutes or over 6% of total sleep time.  Arousal index is 40.  PLM index is not increased.  Snoring for 40% of sleep time.     Following above titration study from 12:31 AM to 5:48 AM.  Sleep efficiency 83%.  Rebound and slow-wave sleep to 31% rebound in rem sleep to 22%.  Apnea hypopneas index improved even with that her REM sleep.  Maximum CPAP of 16 cm with supine position.  REM sleep was achieved.  Apneas hypopneas were completely corrected even and REM sleep at 16 cm water pressure.    Impression:  1. Obstructive sleep apnea, adult    2. Obesity, morbid, BMI 40.0-49.9 (CMS/[facility])          Plan:  Dreamwear FFM will be ordered for her through [employer] She is otherwise doing well and uses the CPAP device and benefits from its use in terms of reduction of hypersomnia and snoring.  AHI appears to be within adequate range. I reviewed download report and original sleep study report with the patient.     Weight loss will be strongly beneficial to reduce the severity of sleep-disordered breathing.  Caution during activities that require prolonged concentration is strongly advised if sleepiness returns. Changing of PAP supplies regularly is important for effective use. Patient needs to change cushion on the mask or plugs on nasal pillows along with disposable filters once every month and change mask frame, tubing, headgear and Velcro straps every 6 months at the minimum.       Follow up with Dr. Franco in one year    Thank you for allowing me to participate in your patient's care.      LEDY Robbins  [facility]  Phone: [phone]      Part of this note may be an electronic transcription/translation of spoken language to printed text using the Dragon Dictation System.

## 2019-11-21 RX ORDER — OMEPRAZOLE 40 MG/1
CAPSULE, DELAYED RELEASE ORAL
Qty: 90 CAPSULE | Refills: 0 | Status: SHIPPED | OUTPATIENT
Start: 2019-11-21 | End: 2020-02-24

## 2019-12-04 ENCOUNTER — OFFICE VISIT (OUTPATIENT)
Dept: OBSTETRICS AND GYNECOLOGY | Facility: CLINIC | Age: 59
End: 2019-12-04

## 2019-12-04 VITALS
HEIGHT: 64 IN | SYSTOLIC BLOOD PRESSURE: 126 MMHG | BODY MASS INDEX: 42.25 KG/M2 | DIASTOLIC BLOOD PRESSURE: 84 MMHG | WEIGHT: 247.5 LBS

## 2019-12-04 DIAGNOSIS — Z13.9 SCREENING FOR CONDITION: Primary | ICD-10-CM

## 2019-12-04 DIAGNOSIS — N32.81 OVERACTIVE BLADDER: ICD-10-CM

## 2019-12-04 PROCEDURE — 81002 URINALYSIS NONAUTO W/O SCOPE: CPT | Performed by: OBSTETRICS & GYNECOLOGY

## 2019-12-04 PROCEDURE — 99213 OFFICE O/P EST LOW 20 MIN: CPT | Performed by: OBSTETRICS & GYNECOLOGY

## 2019-12-04 RX ORDER — INFLUENZA A VIRUS A/SINGAPORE/GP1908/2015 IVR-180A (H1N1) ANTIGEN (PROPIOLACTONE INACTIVATED), INFLUENZA A VIRUS A/SINGAPORE/INFIMH-16-0019/2016 IVR-186 (H3N2) ANTIGEN (PROPIOLACTONE INACTIVATED), INFLUENZA B VIRUS B/MARYLAND/15/2016 ANTIGEN (PROPIOLACTONE INACTIVATED), AND INFLUENZA B VIRUS B/PHUKET/3073/2013 BVR-1B ANTIGEN (PROPIOLACTONE INACTIVATED) 15; 15; 15; 15 UG/.5ML; UG/.5ML; UG/.5ML; UG/.5ML
INJECTION, SUSPENSION INTRAMUSCULAR
Refills: 0 | COMMUNITY
Start: 2019-11-12 | End: 2021-05-25

## 2019-12-04 NOTE — PROGRESS NOTES
EVALUATION AND MANAGEMENT    Patient Care Team:  Marsha Young MD as PCP - General  Marsha Young MD as PCP - Family Medicine  Marsha Young MD as Referring Physician (Internal Medicine & Pediatrics)  Angie Henson MD as Consulting Physician (Hematology and Oncology)    Patient new to practice? No  Patient new to examiner? No  Patient referred? No  -----------------------------------------------------HISTORY---------------------------------------------------    Chief Complaint:   Chief Complaint   Patient presents with   • Follow-up     pap 19     New problem to examiner? No    59 y.o.  No LMP recorded. Patient is postmenopausal.    HPI: History of Present Illness      Pt here for f/u medical treatment of OAB diagnosed with urodynamic studies.  Doing well, no major complaints.  Pt is 90% better.      PFSH:   1.    Past Medical History:   Diagnosis Date   • Acid reflux    • Back pain    • Cardiac tamponade     2015   • Difficulty breathing     during exertion   • Edema    • Esophagitis, reflux    • Essential hypertriglyceridemia    • GERD (gastroesophageal reflux disease)    • Health care maintenance    • Heartburn    • Hyperlipidemia    • Hypertriglyceridemia    • Increased appetite    • Morbid obesity (CMS/[facility])    • Multiple joint pain    • Obesity, Class III, BMI 40-49.9 (morbid obesity) (CMS/HCC) 2018   • Osteoarthritis of knee    • Pericarditis    • Pulmonary embolism (CMS/HCC)     2015   • RHA (rheumatoid arthritis) (CMS/[facility])    • Sciatica    • Sleep apnea    • Unintended weight gain      2.   Family History   Problem Relation Age of Onset   • Cancer Mother    • Obesity Mother    • Diabetes Father    • Hypertension Father    • Heart disease Father    • Obesity Father    • Heart disease Brother    • Diabetes Paternal Grandfather    • Diabetes Paternal Aunt    • No Known Problems Maternal Grandmother    • No Known Problems Maternal Grandfather     • No Known Problems Paternal Grandmother    • Malig Hyperthermia Neg Hx      3. Social History: :    Employment/occupation:  Yes   Smoker: No   Alcohol: No  Recreational drugs: No    PAST HISTORY REVIEWED:  1.   Past Surgical History:   Procedure Laterality Date   • ANKLE SURGERY      treatment of ankle fracture   • APPENDECTOMY     • CARDIAC SURGERY      cardiac tamponade   •  SECTION     • CHOLECYSTECTOMY     • COLONOSCOPY N/A 2018    Procedure: COLONOSCOPY to cecum and t.i. with polypectomy and clip x2 to ascending colon;  Surgeon: Pierre Ornelas MD;  Location: [facility] ENDOSCOPY;  Service: Gastroenterology   • DILATATION AND CURETTAGE     • ENDOSCOPY N/A 2018    Procedure: ESOPHAGOGASTRODUODENOSCOPY;  Surgeon: Pierre Ornelas MD;  Location: [facility] ENDOSCOPY;  Service: Gastroenterology   • GASTRIC BANDING REMOVAL N/A 2019    Procedure: GASTRIC BANDING AND PORT REMOVAL, LYSIS OF ADHESIONS, REPAIR OF COLOTOMY, AND INCISIONAL HERNIA REPAIR LAPAROSCOPIC;  Surgeon: Randell Salamanca Jr., MD;  Location: [facility] OR [facility];  Service: General   • LAPAROSCOPIC GASTRIC BANDING     • PULMONARY EMBOLISM SURGERY     • TUBAL ABDOMINAL LIGATION        2.   Current Outpatient Medications:   •  AFLURIA QUADRIVALENT 0.5 ML suspension prefilled syringe injection, ADM 0.5ML IM UTD, Disp: , Rfl: 0  •  fluticasone (FLONASE) 50 MCG/ACT nasal spray, 2 sprays into each nostril Daily. (Patient taking differently: 2 sprays into the nostril(s) as directed by provider As Needed.), Disp: 16 g, Rfl: 5  •  omeprazole (priLOSEC) 40 MG capsule, TAKE ONE CAPSULE BY MOUTH DAILY, Disp: 90 capsule, Rfl: 0  •  PREVIDENT 5000 SENSITIVE 1.1-5 % paste, , Disp: , Rfl:   •  rivaroxaban (XARELTO) 20 MG tablet, Take 1 tablet by mouth Daily With Dinner., Disp: 90 tablet, Rfl: 3  •  solifenacin (VESICARE) 10 MG tablet, Take 1 tablet by mouth Daily., Disp: 30 tablet, Rfl: 11  3.   Allergies   Allergen Reactions   •  "Hydrocodone Itching   • Nsaids Other (See Comments)     DOESN'T TAKE BECAUSE SHES ON BLOOD THINNER.       ROS:  Review of Systems   Constitutional: Negative.    HENT: Negative.    Eyes: Negative.    Respiratory: Negative.    Cardiovascular: Negative.    Gastrointestinal: Negative.    Endocrine: Negative.    Genitourinary: Positive for frequency.   Musculoskeletal: Negative.    Skin: Negative.    Allergic/Immunologic: Negative.    Neurological: Negative.    Hematological: Negative.    Psychiatric/Behavioral: Negative.    :    -----------------------------------------------PHYSICAL EXAM----------------------------------------------    Vital Signs: /84   Ht 162.6 cm (64.02\")   Wt 112 kg (247 lb 8 oz)   BMI 42.46 kg/m²    Flowsheet Rows      First Filed Value   Admission Height  162.6 cm (64.02\") Documented at 12/04/2019 1000   Admission Weight  112 kg (247 lb 8 oz) Documented at 12/04/2019 1000          Physical Exam   Constitutional: She is oriented to person, place, and time. She appears well-developed and well-nourished.   HENT:   Head: Normocephalic and atraumatic.   Eyes: EOM are normal.   Neck: Normal range of motion.   Cardiovascular: Normal rate.   Pulmonary/Chest: Effort normal.   Abdominal: Soft. She exhibits no distension and no mass. There is no tenderness. There is no guarding.   Genitourinary: No vaginal discharge found.   Musculoskeletal: Normal range of motion. She exhibits no edema, tenderness or deformity.   Neurological: She is alert and oriented to person, place, and time.   Skin: Skin is warm and dry. No rash noted. No erythema. No pallor.   Psychiatric: She has a normal mood and affect. Her behavior is normal. Judgment and thought content normal.   Nursing note and vitals reviewed.      -----------------------------------------------MEDICAL DECISION MAKING-----------------------------      DATA Review & labs ordered:     1.   Lab Results (last 24 hours)     Procedure Component Value Units " Date/Time    POC Urinalysis Dipstick [[id number]]  (Normal) Collected:  12/04/19 0955    Specimen:  Urine Updated:  12/04/19 0956     Color Yellow     Clarity, UA Clear     Glucose, UA Negative mg/dL      Bilirubin Negative     Ketones, UA Negative     Specific Gravity  1.005     Blood, UA Negative     pH, Urine 5.0     Protein, POC Negative mg/dL      Urobilinogen, UA Normal     Leukocytes Negative     Nitrite, UA Negative        2.   Imaging Results (Last 24 Hours)     ** No results found for the last 24 hours. **        3.   ECG/EMG Results (most recent)     None        4. Old records reviewed? Yes  5. Old records ordered?  No  6. Labs ordered?: Yes UA  7. Imaging other than ultrasound ordered?: No  8. Diagnoses and/or chronic conditions reviewed with pt:       Zulema was seen today for follow-up.    Diagnoses and all orders for this visit:    Screening for condition  -     POC Urinalysis Dipstick    Overactive bladder      9. Risk counseling done:  yes  10. Ultrasound ordered and reviewed?   No      IMPRESSION & DIAGNOSIS:      Patient Active Problem List   Diagnosis   • Gastroesophageal reflux disease   • Edema   • Hyperlipidemia   • Arthralgia of multiple joints   • Osteoarthritis of knee   • Pericarditis   • Pulmonary embolism (CMS/HCC)   • Rheumatoid arthritis (CMS/HCC)   • Sciatica   • Unintended weight gain   • Arthritis of both knees   • Obstructive sleep apnea, adult   • Dietary counseling   • History of DVT (deep vein thrombosis)   • Tear of lateral meniscus of right knee, current   • Arthritis of right knee   • Chronic pain of right knee   • Epigastric pain   • Abnormal UGI series   • Esophageal dilatation   • Abnormal finding on radiology exam   • Overactive bladder   • Right elbow tendonitis   • Obesity, Class III, BMI 40-49.9 (morbid obesity) (CMS/HCC)   • History of removal of laparoscopic gastric banding device         Established problem/s? Yes   Worsening? No  New Problem/s? No   Additional  workup planned? No      PLAN:     Continue vesicare treatment.  Instructions and precautions given.      RTO Return in about 1 year (around 12/4/2020) for Annual physical.       TIME: More than 50% of time spent in counseling and/or coordination of care.Time spent in counseling 20 min.  Counseling included the following topics overactive bladder with prognosis, differential diagnosis, risks, benefits of treatment, instructions, compliance and/or risk reduction and alternatives.       Tez Ramírez MD  10:23 AM  12/04/19

## 2020-02-03 ENCOUNTER — CLINICAL SUPPORT (OUTPATIENT)
Dept: ORTHOPEDIC SURGERY | Facility: CLINIC | Age: 60
End: 2020-02-03

## 2020-02-03 VITALS — BODY MASS INDEX: 39.27 KG/M2 | TEMPERATURE: 98 F | HEIGHT: 64 IN | WEIGHT: 230 LBS

## 2020-02-03 DIAGNOSIS — M17.11 ARTHRITIS OF RIGHT KNEE: Primary | ICD-10-CM

## 2020-02-03 PROCEDURE — 20610 DRAIN/INJ JOINT/BURSA W/O US: CPT | Performed by: NURSE PRACTITIONER

## 2020-02-03 PROCEDURE — 99213 OFFICE O/P EST LOW 20 MIN: CPT | Performed by: NURSE PRACTITIONER

## 2020-02-03 RX ORDER — METHYLPREDNISOLONE ACETATE 80 MG/ML
80 INJECTION, SUSPENSION INTRA-ARTICULAR; INTRALESIONAL; INTRAMUSCULAR; SOFT TISSUE
Status: COMPLETED | OUTPATIENT
Start: 2020-02-03 | End: 2020-02-03

## 2020-02-03 RX ADMIN — METHYLPREDNISOLONE ACETATE 80 MG: 80 INJECTION, SUSPENSION INTRA-ARTICULAR; INTRALESIONAL; INTRAMUSCULAR; SOFT TISSUE at 08:25

## 2020-02-03 NOTE — PROGRESS NOTES
Patient: Zulema Sousa  YOB: 1960    Chief Complaints:  right knee pain    Subjective:    History of Present Illness: Here today for knee pain. The pain is a generalized joint tenderness.  It has been progressive in nature but remains intermittent.  Worsened by prolonged standing or walking and squatting activities. Has had improvement in the past with ice/heat, rest, and injections.     This problem is not new to this examiner.     Allergies:   Allergies   Allergen Reactions   • Hydrocodone Itching   • Nsaids Other (See Comments)     DOESN'T TAKE BECAUSE SHES ON BLOOD THINNER.       Medications:   Home Medications:  Current Outpatient Medications on File Prior to Visit   Medication Sig   • fluticasone (FLONASE) 50 MCG/ACT nasal spray 2 sprays into each nostril Daily. (Patient taking differently: 2 sprays into the nostril(s) as directed by provider As Needed.)   • omeprazole (priLOSEC) 40 MG capsule TAKE ONE CAPSULE BY MOUTH DAILY   • PREVIDENT 5000 SENSITIVE 1.1-5 % paste    • rivaroxaban (XARELTO) 20 MG tablet Take 1 tablet by mouth Daily With Dinner.   • solifenacin (VESICARE) 10 MG tablet Take 1 tablet by mouth Daily.   • AFLURIA QUADRIVALENT 0.5 ML suspension prefilled syringe injection ADM 0.5ML IM UTD     No current facility-administered medications on file prior to visit.      Current Medications:  Scheduled Meds:  Continuous Infusions:  No current facility-administered medications for this visit.   PRN Meds:.    I have reviewed the patient's medical history in detail and updated the computerized patient record.  Review and summarization of old records include:    Past Medical History:   Diagnosis Date   • Acid reflux    • Back pain    • Cardiac tamponade     March 2015   • Difficulty breathing     during exertion   • Edema    • Esophagitis, reflux    • Essential hypertriglyceridemia    • GERD (gastroesophageal reflux disease)    • Health care maintenance    • Heartburn    • Hyperlipidemia     • Hypertriglyceridemia    • Increased appetite    • Morbid obesity (CMS/[facility])    • Multiple joint pain    • Obesity, Class III, BMI 40-49.9 (morbid obesity) (CMS/[facility]) 2018   • Osteoarthritis of knee    • Pericarditis    • Pulmonary embolism (CMS/[facility])     2015   • RHA (rheumatoid arthritis) (CMS/[facility])    • Sciatica    • Sleep apnea    • Unintended weight gain         Past Surgical History:   Procedure Laterality Date   • ANKLE SURGERY      treatment of ankle fracture   • APPENDECTOMY     • CARDIAC SURGERY      cardiac tamponade   •  SECTION     • CHOLECYSTECTOMY     • COLONOSCOPY N/A 2018    Procedure: COLONOSCOPY to cecum and t.i. with polypectomy and clip x2 to ascending colon;  Surgeon: Pierre Ornelas MD;  Location: [facility] ENDOSCOPY;  Service: Gastroenterology   • DILATATION AND CURETTAGE     • ENDOSCOPY N/A 2018    Procedure: ESOPHAGOGASTRODUODENOSCOPY;  Surgeon: Pierre Ornelas MD;  Location: [facility] ENDOSCOPY;  Service: Gastroenterology   • GASTRIC BANDING REMOVAL N/A 2019    Procedure: GASTRIC BANDING AND PORT REMOVAL, LYSIS OF ADHESIONS, REPAIR OF COLOTOMY, AND INCISIONAL HERNIA REPAIR LAPAROSCOPIC;  Surgeon: Randell Salamanca Jr., MD;  [place] [facility] [place] [facility]  Service: General   • LAPAROSCOPIC GASTRIC BANDING     • PULMONARY EMBOLISM SURGERY     • TUBAL ABDOMINAL LIGATION          Social History     Occupational History   • Occupation:      Employer: [employer]   Tobacco Use   • Smoking status: Former Smoker     Packs/day: 0.25     Types: Cigarettes   • Smokeless tobacco: Never Used   • Tobacco comment: caffeine use   Substance and Sexual Activity   • Alcohol use: Yes     Alcohol/week: 1.0 standard drinks     Types: 1 Shots of liquor per week     Comment: social/ occassional   • Drug use: No   • Sexual activity: Defer      Social History     Social History Narrative   • Not on file        Family History   Problem Relation Age  "of Onset   • Cancer Mother    • Obesity Mother    • Diabetes Father    • Hypertension Father    • Heart disease Father    • Obesity Father    • Heart disease Brother    • Diabetes Paternal Grandfather    • Diabetes Paternal Aunt    • No Known Problems Maternal Grandmother    • No Known Problems Maternal Grandfather    • No Known Problems Paternal Grandmother    • Malig Hyperthermia Neg Hx        ROS: 14 point review of systems was performed and was negative except for documented findings in HPI and today's encounter.     Allergies:   Allergies   Allergen Reactions   • Hydrocodone Itching   • Nsaids Other (See Comments)     DOESN'T TAKE BECAUSE SHES ON BLOOD THINNER.     Constitutional:  Denies fever, shaking or chills   Eyes:  Denies change in visual acuity   HENT:  Denies nasal congestion or sore throat   Respiratory:  Denies cough or shortness of breath   Cardiovascular:  Denies chest pain or severe LE edema   GI:  Denies abdominal pain, nausea, vomiting, bloody stools or diarrhea   Musculoskeletal:  Numbness, tingling, or loss of motor function only as noted above in history of present illness.  : Denies painful urination or hematuria  Integument:  Denies rash, lesion or ulceration   Neurologic:  Denies headache or focal weakness  Endocrine:  Denies lymphadenopathy  Psych:  Denies confusion or change in mental status   Hem:  Denies active bleeding    Physical Exam: 59 y.o. female  Wt Readings from Last 3 Encounters:   02/03/20 104 kg (230 lb)   12/04/19 112 kg (247 lb 8 oz)   10/18/19 111 kg (245 lb)     Ht Readings from Last 1 Encounters:   02/03/20 162.6 cm (64\")     Body mass index is 39.48 kg/m².  Vitals:    02/03/20 0850   Temp: 98 °F (36.7 °C)     Vital signs reviewed.   Constitutional: Awake alert and oriented x3, well developed, no acute distress, non-toxic appearance.  EYES: symmetric, sclera clear  ENT:  Normocephalic, Atraumatic.   Respiratory:  No respiratory distress, No wheezing  CV: pulse " regular, no palpitations or pallor.  GI:  Abdomen soft, non-tender.   Vascular:  Intact distal pulses, No cyanosis, no signs or symptoms of DVT.  Neurologic: Sensation grossly intact to the involved extremity, No focal deficits noted.   Neck: No tenderness, Supple.  Integument: warm, dry, no ulcerations.   Psychiatric:  Oriented, no pathological affect.  Musculoskeletal:    Affected knee(s):  Painful gait with a subtle limp, positive for synovitis, swelling, joint effusion with crepitation.  Lachman negative  Posterior drawer negative  Kristin's negative  Patellofemoral grind +  Sensation grossly intact to light touch throughout the lower extremity  Skin is intact  Distal pulses are palpable  No signs or symptoms of DVT        Diagnostic Data:     Imaging was done previously in the office, images were personally viewed and discussed with the patient:    Indication: pain related symptoms,  Views: 3V AP, LAT & 40 degree PA right knee(s)   Findings: severe end-stage arthritis (bone on bone, subchondral sclerosis/cysts, osteophytes)  Comparison views: viewed last xray done in the office.     Procedure:  Large Joint Arthrocentesis: R knee  Date/Time: 2/3/2020 8:25 AM  Consent given by: patient  Site marked: site marked  Timeout: Immediately prior to procedure a time out was called to verify the correct patient, procedure, equipment, support staff and site/side marked as required   Supporting Documentation  Indications: pain   Procedure Details  Location: knee - R knee  Preparation: Patient was prepped and draped in the usual sterile fashion  Needle gauge: 21.  Approach: anterolateral  Medications administered: 4 mL lidocaine (cardiac); 80 mg methylPREDNISolone acetate 80 MG/ML  Patient tolerance: patient tolerated the procedure well with no immediate complications          Assessment:     ICD-10-CM ICD-9-CM   1. Arthritis of right knee M17.11 716.96           Plan: Is to proceed with injection  Follow up as  indicated.  Ice, elevate, and rest as needed.  Additional interventions include:  15 min spent face to face with patient 11 min spent counseling about:  Biomechanics of pertinent body area discussed.  Risks, benefits, alternatives, comparisons, and complications of accepted medicines, injections, recommendations, surgical procedures, and therapies explained and education provided in laymen's terms. Natural history and expected course of this patient's diagnosis discussed along with evaluation of therapies. Questions answered. When appropriate I also discussed proper use of cane, walker, trekking poles.   BMI:  The concept of BMI body mass index and its importance and implications discussed.  BMI suggested to be < 40 or as low as possible. Lifestyle measures for weight loss and how this affects orthopedic condition.  EXERCISES:  Advice on benefits of, and types of regular/moderate exercise including biomechanical forces involved as it pertains to this complaint.  RICE: Rest, ice, compression, and elevation therapy, Cryotherapy/brachy therapy, and or OTC linaments as indicated with instructions.   Cortisone Injection. See procedure note.    2/3/2020  Marcella Bar, APRN

## 2020-02-24 RX ORDER — OMEPRAZOLE 40 MG/1
CAPSULE, DELAYED RELEASE ORAL
Qty: 90 CAPSULE | Refills: 0 | Status: SHIPPED | OUTPATIENT
Start: 2020-02-24 | End: 2020-06-01

## 2020-05-05 ENCOUNTER — CLINICAL SUPPORT (OUTPATIENT)
Dept: ORTHOPEDIC SURGERY | Facility: CLINIC | Age: 60
End: 2020-05-05

## 2020-05-05 VITALS — HEIGHT: 64 IN | WEIGHT: 271.4 LBS | TEMPERATURE: 96.7 F | BODY MASS INDEX: 46.33 KG/M2

## 2020-05-05 DIAGNOSIS — M17.11 PRIMARY OSTEOARTHRITIS OF RIGHT KNEE: ICD-10-CM

## 2020-05-05 DIAGNOSIS — G89.29 CHRONIC PAIN OF RIGHT KNEE: Primary | ICD-10-CM

## 2020-05-05 DIAGNOSIS — M25.561 CHRONIC PAIN OF RIGHT KNEE: Primary | ICD-10-CM

## 2020-05-05 PROCEDURE — 20610 DRAIN/INJ JOINT/BURSA W/O US: CPT | Performed by: NURSE PRACTITIONER

## 2020-05-05 RX ORDER — METHYLPREDNISOLONE ACETATE 80 MG/ML
80 INJECTION, SUSPENSION INTRA-ARTICULAR; INTRALESIONAL; INTRAMUSCULAR; SOFT TISSUE
Status: COMPLETED | OUTPATIENT
Start: 2020-05-05 | End: 2020-05-05

## 2020-05-05 RX ADMIN — METHYLPREDNISOLONE ACETATE 80 MG: 80 INJECTION, SUSPENSION INTRA-ARTICULAR; INTRALESIONAL; INTRAMUSCULAR; SOFT TISSUE at 09:23

## 2020-05-05 NOTE — PROGRESS NOTES
Patient Name: Zulema Sousa   YOB: 1960  Referring Primary Care Physician: Marsha Young MD  BMI: Body mass index is 46.59 kg/m².    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  Last x-rays were 10/18/2019 that are reviewed with osteoarthritis patient is not wanting surgical intervention at this time and understands the risk and benefits of a cortisone injection.  Denies any fever cough or calf pain and playing without any assistive devices.    Zulema Sousa is a 60 y.o. female who presents today for evaluation of   Chief Complaint   Patient presents with   • Right Knee - Follow-up, Pain       This problem is new to this examiner.     Subjective   Medications:   Home Medications:  Current Outpatient Medications on File Prior to Visit   Medication Sig   • AFLURIA QUADRIVALENT 0.5 ML suspension prefilled syringe injection ADM 0.5ML IM UTD   • omeprazole (priLOSEC) 40 MG capsule TAKE ONE CAPSULE BY MOUTH DAILY   • PREVIDENT 5000 SENSITIVE 1.1-5 % paste    • rivaroxaban (XARELTO) 20 MG tablet Take 1 tablet by mouth Daily With Dinner.   • solifenacin (VESICARE) 10 MG tablet Take 1 tablet by mouth Daily.   • [DISCONTINUED] fluticasone (FLONASE) 50 MCG/ACT nasal spray 2 sprays into each nostril Daily. (Patient taking differently: 2 sprays into the nostril(s) as directed by provider As Needed.)     No current facility-administered medications on file prior to visit.      Current Medications:  Scheduled Meds:  Continuous Infusions:  No current facility-administered medications for this visit.   PRN Meds:.    I have reviewed the patient's medical history in detail and updated the computerized patient record.  Review and summarization of old records includes:    Past Medical History:   Diagnosis Date   • Acid reflux    •  Back pain    • Cardiac tamponade     2015   • Difficulty breathing     during exertion   • Edema    • Esophagitis, reflux    • Essential hypertriglyceridemia    • GERD (gastroesophageal reflux disease)    • Health care maintenance    • Heartburn    • Hyperlipidemia    • Hypertriglyceridemia    • Increased appetite    • Morbid obesity (CMS/[facility])    • Multiple joint pain    • Obesity, Class III, BMI 40-49.9 (morbid obesity) (CMS/[facility]) 2018   • Osteoarthritis of knee    • Pericarditis    • Pulmonary embolism (CMS/[facility])     2015   • RHA (rheumatoid arthritis) (CMS/[facility])    • Sciatica    • Sleep apnea    • Unintended weight gain         Past Surgical History:   Procedure Laterality Date   • ANKLE SURGERY      treatment of ankle fracture   • APPENDECTOMY     • CARDIAC SURGERY      cardiac tamponade   •  SECTION     • CHOLECYSTECTOMY     • COLONOSCOPY N/A 2018    Procedure: COLONOSCOPY to cecum and t.i. with polypectomy and clip x2 to ascending colon;  Surgeon: Pierre Ornelas MD;  Location: [facility] ENDOSCOPY;  Service: Gastroenterology   • DILATATION AND CURETTAGE     • ENDOSCOPY N/A 2018    Procedure: ESOPHAGOGASTRODUODENOSCOPY;  Surgeon: Pierre Ornelas MD;  Location: [facility] ENDOSCOPY;  Service: Gastroenterology   • GASTRIC BANDING REMOVAL N/A 2019    Procedure: GASTRIC BANDING AND PORT REMOVAL, LYSIS OF ADHESIONS, REPAIR OF COLOTOMY, AND INCISIONAL HERNIA REPAIR LAPAROSCOPIC;  Surgeon: Randell Salamanca Jr., MD;  [place] [facility] [place] [facility] [place];  Service: General   • LAPAROSCOPIC GASTRIC BANDING     • PULMONARY EMBOLISM SURGERY     • TUBAL ABDOMINAL LIGATION          Social History     Occupational History   • Occupation:      Employer: [employer] [place]   Tobacco Use   • Smoking status: Former Smoker     Packs/day: 0.25     Types: Cigarettes   • Smokeless tobacco: Never Used   • Tobacco comment: caffeine use   Substance and Sexual Activity   • Alcohol  "use: Yes     Alcohol/week: 1.0 standard drinks     Types: 1 Shots of liquor per week     Comment: social/ occassional   • Drug use: No   • Sexual activity: Defer      Social History     Social History Narrative   • Not on file        Family History   Problem Relation Age of Onset   • Cancer Mother    • Obesity Mother    • Diabetes Father    • Hypertension Father    • Heart disease Father    • Obesity Father    • Heart disease Brother    • Diabetes Paternal Grandfather    • Diabetes Paternal Aunt    • No Known Problems Maternal Grandmother    • No Known Problems Maternal Grandfather    • No Known Problems Paternal Grandmother    • Malig Hyperthermia Neg Hx        ROS: 14 point review of systems was performed and all other systems were reviewed and are negative except for documented findings in HPI and today's encounter.     Allergies:   Allergies   Allergen Reactions   • Hydrocodone Itching   • Nsaids Other (See Comments)     DOESN'T TAKE BECAUSE SHES ON BLOOD THINNER.     Constitutional:  Denies fever, shaking or chills   Eyes:  Denies change in visual acuity   HENT:  Denies nasal congestion or sore throat   Respiratory:  Denies cough or shortness of breath   Cardiovascular:  Denies chest pain or severe LE edema   GI:  Denies abdominal pain, nausea, vomiting, bloody stools or diarrhea   Musculoskeletal:  Numbness, tingling, pain, or loss of motor function only as noted above in history of present illness.  : Denies painful urination or hematuria  Integument:  Denies rash, lesion or ulceration   Neurologic:  Denies headache or focal weakness  Endocrine:  Denies lymphadenopathy  Psych:  Denies confusion or change in mental status   Hem:  Denies active bleeding    OBJECTIVE:  Physical Exam:   Temp 96.7 °F (35.9 °C) (Temporal)   Ht 162.6 cm (64\")   Wt 123 kg (271 lb 6.4 oz)   BMI 46.59 kg/m²     General Appearance:    Alert, cooperative, in no acute distress                  Eyes: conjunctiva clear  ENT: external " ears and nose atraumatic  CV: no peripheral edema  Resp: normal respiratory effort  Skin: no rashes or wounds; normal turgor  Psych: mood and affect appropriate  Lymph: no nodes appreciated  Neuro: gross sensation intact  Vascular:  Palpable peripheral pulse in noted extremity  Musculoskeletal Extremities: Right knee skin is warm dry intact medial joint line pain with noted effusion calf is soft and nontender hip is nontender ambulating with any assistive devices good pulses movement and sensation.    Radiology:   Reviewed from 10/18/2019 end-stage osteoarthritis worse in the medial compartment    Assessment:     ICD-10-CM ICD-9-CM   1. Chronic pain of right knee M25.561 719.46    G89.29 338.29   2. Primary osteoarthritis of right knee M17.11 715.16        Large Joint Arthrocentesis: R knee  Date/Time: 5/5/2020 9:23 AM  Consent given by: patient  Site marked: site marked  Timeout: Immediately prior to procedure a time out was called to verify the correct patient, procedure, equipment, support staff and site/side marked as required   Supporting Documentation  Indications: pain and joint swelling   Procedure Details  Location: knee - R knee  Preparation: Patient was prepped and draped in the usual sterile fashion  Needle gauge: 21 G.  Approach: anterolateral  Medications administered: 2 mL lidocaine (cardiac); 80 mg methylPREDNISolone acetate 80 MG/ML  Patient tolerance: patient tolerated the procedure well with no immediate complications         Needs x rays next visit    Plan: Biomechanics of pertinent body area discussed.  Risks, benefits, alternatives, comparisons, and complications of accepted medicines, injections, recommendations, surgical procedures, and therapies explained and education provided in laymen's terms. Natural history and expected course of this patient's diagnosis discussed along with evaluation of therapies. Questions answered. When appropriate I also discussed proper use of cane, walker,  [employer] Opt[employer].   BMI:  The concept of BMI body mass index and its importance and implications discussed.  BMI suggested to be < 40 or as low as possible. Lifestyle measures for weight loss and how this affects orthopedic condition.  EXERCISES:  Advice on benefits of, and types of regular/moderate exercise including biomechanical forces involved as it pertains to this complaint.  MEDICATIONS:  Prescription, OTC and Monitoring of Medications per orders to address ortho complaints; Evaluation and discussion of safety, precautions, side effects, and warnings given especially of long term NSAID or steroid therapy.    RICE: Rest, ice, compression, and elevation therapy, Cryotherapy/brachy therapy, and or OTC linaments as indicated with instructions.   Cortisone Injection. See procedure note.      5/5/2020    Much of this encounter note is an electronic transcription/translation of spoken language to printed text. The electronic translation of spoken language may permit erroneous, or at times, nonsensical words or phrases to be inadvertently transcribed; Although I have reviewed the note for such errors, some may still exist

## 2020-05-05 NOTE — PATIENT INSTRUCTIONS
Joint Steroid Injection  A joint steroid injection is a procedure to relieve swelling and pain in a joint. Steroids are medicines that reduce inflammation. In this procedure, your health care provider uses a syringe and a needle to inject a steroid medicine into a painful and inflamed joint. A pain-relieving medicine (anesthetic) may be injected along with the steroid. In some cases, your health care provider may use an imaging technique such as ultrasound or fluoroscopy to guide the injection.  Joints that are often treated with steroid injections include the knee, shoulder, hip, and spine. These injections may also be used in the elbow, ankle, and joints of the hands or feet. You may have joint steroid injections as part of your treatment for inflammation caused by:  · Gout.  · Rheumatoid arthritis.  · Advanced wear-and-tear arthritis (osteoarthritis).  · Tendinitis.  · Bursitis.  Joint steroid injections may be repeated, but having them too often can damage a joint or the skin over the joint. You should not have joint steroid injections less than 6 weeks apart or more than four times a year.  Tell a health care provider about:  · Any allergies you have.  · All medicines you are taking, including vitamins, herbs, eye drops, creams, and over-the-counter medicines.  · Any problems you or family members have had with anesthetic medicines.  · Any blood disorders you have.  · Any surgeries you have had.  · Any medical conditions you have.  · Whether you are pregnant or may be pregnant.  What are the risks?  Generally, this is a safe treatment. However, problems may occur, including:  · Infection.  · Bleeding.  · Allergic reactions to medicines.  · Damage to the joint or tissues around the joint.  · Thinning of skin or loss of skin color over the joint.  · Temporary flushing of the face or chest.  · Temporary increase in pain.  · Temporary increase in blood sugar.  · Failure to relieve inflammation or pain.  What  happens before the treatment?  · You may have imaging tests of your joint.  · Ask your health care provider about:  ? Changing or stopping your regular medicines. This is especially important if you are taking diabetes medicines or blood thinners.  ? Taking medicines such as aspirin and ibuprofen. These medicines can thin your blood. Do not take these medicines unless your health care provider tells you to take them.  ? Taking over-the-counter medicines, vitamins, herbs, and supplements.  · Ask your health care provider if you can drive yourself home after the procedure.  What happens during the treatment?    · Your health care provider will position you for the injection and locate the injection site over your joint.  · The skin over the joint will be cleaned with a germ-killing soap.  · Your health care provider may:  ? Spray a numbing solution (topical anesthetic) over the injection site.  ? Inject a local anesthetic under the skin above your joint.  · The needle will be placed through your skin into your joint. Your health care provider may use imaging to guide the needle to the right spot for the injection. If imaging is used, a special contrast dye may be injected to confirm that the needle is in the correct location.  · The steroid medicine will be injected into your joint.  · Anesthetic may be injected along with the steroid. This may be a medicine that relieves pain for a short time (short-acting anesthetic) or for a longer time (long-acting anesthetic).  · The needle will be removed, and an adhesive bandage (dressing) will be placed over the injection site.  The procedure may vary among health care providers and hospitals.  What can I expect after the treatment?  · You will be able to go home after the treatment.  · It is normal to feel slight flushing for a few days after the injection.  · After the treatment, it is common to have an increase in joint pain after the anesthetic has worn off. This may  happen about an hour after a short-acting anesthetic or about 8 hours after a longer-acting anesthetic.  · You should begin to feel relief from joint pain and swelling after 24 to 48 hours.  Follow these instructions at home:  Injection site care  · Leave the adhesive dressing over your injection site in place until your health care provider says you can remove it.  · Check your injection site every day for signs of infection. Check for:  ? Redness, swelling, or pain.  ? Fluid or blood.  ? Warmth.  ? Pus or a bad smell.  Activity  · Return to your normal activities as told by your health care provider. Ask your health care provider what activities are safe for you. You may be asked to limit activities that put stress on the joint for a few days.  · Do joint exercises as told by your health care provider.  · Do not take baths, swim, or use a hot tub until your health care provider approves.  Managing pain, stiffness, and swelling    · If directed, put ice on the joint.  ? Put ice in a plastic bag.  ? Place a towel between your skin and the bag.  ? Leave the ice on for 20 minutes, 2-3 times a day.  · Raise (elevate) your joint above the level of your heart when you are sitting or lying down.  General instructions  · Take over-the-counter and prescription medicines only as told by your health care provider.  · Do not use any products that contain nicotine or tobacco, such as cigarettes, e-cigarettes, and chewing tobacco. These can delay joint healing. If you need help quitting, ask your health care provider.  · If you have diabetes, be aware that your blood sugar may be slightly elevated for several days after the injection.  · Keep all follow-up visits as told by your health care provider. This is important.  Contact a health care provider if you have:  · Chills or a fever.  · Any signs of infection at your injection site.  · Increased pain or swelling or no relief after 2 days.  Summary  · A joint steroid injection  is a treatment to relieve pain and swelling in a joint.  · Steroids are medicines that reduce inflammation. Your health care provider may add an anesthetic along with the steroid.  · You may have joint steroid injections as part of your arthritis treatment.  · Joint steroid injections may be repeated, but having them too often can damage a joint or the skin over the joint.  · Contact your health care provider if you have a fever, chills, or signs of infection or if you get no relief from joint pain or swelling.  This information is not intended to replace advice given to you by your health care provider. Make sure you discuss any questions you have with your health care provider.  Document Released: 08/20/2019 Document Revised: 08/20/2019 Document Reviewed: 08/20/2019  [employer] Interactive Patient Education © 2020 [employer]

## 2020-05-05 NOTE — PROGRESS NOTES
Large Joint Arthrocentesis: R knee  Date/Time: 5/5/2020 9:00 AM  Consent given by: patient  Site marked: site marked  Timeout: Immediately prior to procedure a time out was called to verify the correct patient, procedure, equipment, support staff and site/side marked as required   Supporting Documentation  Indications: pain   Procedure Details  Location: knee - R knee  Needle gauge: 21.  Approach: anteromedial  Medications administered: 80 mg methylPREDNISolone acetate 80 MG/ML; 2 mL lidocaine (cardiac)  Patient tolerance: patient tolerated the procedure well with no immediate complications

## 2020-05-26 ENCOUNTER — OFFICE VISIT (OUTPATIENT)
Dept: OBSTETRICS AND GYNECOLOGY | Facility: CLINIC | Age: 60
End: 2020-05-26

## 2020-05-26 VITALS
DIASTOLIC BLOOD PRESSURE: 74 MMHG | HEIGHT: 64 IN | SYSTOLIC BLOOD PRESSURE: 120 MMHG | BODY MASS INDEX: 45.41 KG/M2 | WEIGHT: 266 LBS

## 2020-05-26 DIAGNOSIS — N32.81 OVERACTIVE BLADDER: ICD-10-CM

## 2020-05-26 DIAGNOSIS — Z13.9 SCREENING FOR CONDITION: ICD-10-CM

## 2020-05-26 DIAGNOSIS — Z01.419 WELL WOMAN EXAM: ICD-10-CM

## 2020-05-26 DIAGNOSIS — Z01.419 ROUTINE GYNECOLOGICAL EXAMINATION: ICD-10-CM

## 2020-05-26 DIAGNOSIS — Z01.419 PAP SMEAR, LOW-RISK: Primary | ICD-10-CM

## 2020-05-26 PROCEDURE — 99396 PREV VISIT EST AGE 40-64: CPT | Performed by: OBSTETRICS & GYNECOLOGY

## 2020-05-26 PROCEDURE — 81002 URINALYSIS NONAUTO W/O SCOPE: CPT | Performed by: OBSTETRICS & GYNECOLOGY

## 2020-05-26 RX ORDER — SOLIFENACIN SUCCINATE 10 MG/1
10 TABLET, FILM COATED ORAL DAILY
Qty: 30 TABLET | Refills: 11 | Status: SHIPPED | OUTPATIENT
Start: 2020-05-26 | End: 2021-05-31 | Stop reason: SDUPTHER

## 2020-05-26 NOTE — PROGRESS NOTES
GYN Annual Exam     CC- Here for annual exam.     Pt new to practice? No  Pt new to me? No     HPI: History of Present Illness      Zulema Sousa is a 60 y.o.  female who presents for annual well woman exam. No LMP recorded (lmp unknown). Patient is postmenopausal.    Problems in addition to need for annual: none    MAMMOGRAM UP TO DATE IF AGE APPROPRIATE?  No    COLONOSCOPY UP TO DATE IF AGE APPROPRIATE? Yes    Fhx breast cancer? No    Fhx ovarian cancer? Yes maternal aunt    Fhx colon cancer? No    Invitae testing offered? No      PMHX:  Patient Active Problem List   Diagnosis   • Gastroesophageal reflux disease   • Edema   • Hyperlipidemia   • Arthralgia of multiple joints   • Osteoarthritis of knee   • Pericarditis   • Pulmonary embolism (CMS/HCC)   • Rheumatoid arthritis (CMS/HCC)   • Sciatica   • Unintended weight gain   • Arthritis of both knees   • Obstructive sleep apnea, adult   • Dietary counseling   • History of DVT (deep vein thrombosis)   • Tear of lateral meniscus of right knee, current   • Arthritis of right knee   • Chronic pain of right knee   • Epigastric pain   • Abnormal UGI series   • Esophageal dilatation   • Abnormal finding on radiology exam   • Overactive bladder   • Right elbow tendonitis   • Obesity, Class III, BMI 40-49.9 (morbid obesity) (CMS/HCC)   • History of removal of laparoscopic gastric banding device   ; otherwise none    OB History        2    Para   2    Term   2            AB        Living           SAB        TAB        Ectopic        Molar        Multiple        Live Births                      Past Medical History:   Diagnosis Date   • Acid reflux    • Back pain    • Cardiac tamponade     2015   • Difficulty breathing     during exertion   • Edema    • Esophagitis, reflux    • Essential hypertriglyceridemia    • GERD (gastroesophageal reflux disease)    • Health care maintenance    • Heartburn    • Hyperlipidemia    • Hypertriglyceridemia    •  Increased appetite    • Morbid obesity (CMS/[facility])    • Multiple joint pain    • Obesity, Class III, BMI 40-49.9 (morbid obesity) (CMS/[facility]) 2018   • Osteoarthritis of knee    • Pericarditis    • Pulmonary embolism (CMS/[facility])     2015   • RHA (rheumatoid arthritis) (CMS/[facility])    • Sciatica    • Sleep apnea    • Unintended weight gain        Past Surgical History:   Procedure Laterality Date   • ANKLE SURGERY      treatment of ankle fracture   • APPENDECTOMY     • CARDIAC SURGERY      cardiac tamponade   •  SECTION     • CHOLECYSTECTOMY     • COLONOSCOPY N/A 2018    Procedure: COLONOSCOPY to cecum and t.i. with polypectomy and clip x2 to ascending colon;  Surgeon: Pierre Ornelas MD;  Location: [facility] ENDOSCOPY;  Service: Gastroenterology   • DILATATION AND CURETTAGE     • ENDOSCOPY N/A 2018    Procedure: ESOPHAGOGASTRODUODENOSCOPY;  Surgeon: Pierre Ornelas MD;  Location: [facility] ENDOSCOPY;  Service: Gastroenterology   • GASTRIC BANDING REMOVAL N/A 2019    Procedure: GASTRIC BANDING AND PORT REMOVAL, LYSIS OF ADHESIONS, REPAIR OF COLOTOMY, AND INCISIONAL HERNIA REPAIR LAPAROSCOPIC;  Surgeon: Randell Salamanca Jr., MD;  [place]  [facility] [place];  Service: General   • LAPAROSCOPIC GASTRIC BANDING     • PULMONARY EMBOLISM SURGERY     • TUBAL ABDOMINAL LIGATION           Current Outpatient Medications:   •  AFLURIA QUADRIVALENT 0.5 ML suspension prefilled syringe injection, ADM 0.5ML IM UTD, Disp: , Rfl: 0  •  omeprazole (priLOSEC) 40 MG capsule, TAKE ONE CAPSULE BY MOUTH DAILY, Disp: 90 capsule, Rfl: 0  •  PREVIDENT 5000 SENSITIVE 1.1-5 % paste, , Disp: , Rfl:   •  rivaroxaban (XARELTO) 20 MG tablet, Take 1 tablet by mouth Daily With Dinner., Disp: 90 tablet, Rfl: 3  •  solifenacin (VESICARE) 10 MG tablet, Take 1 tablet by mouth Daily., Disp: 30 tablet, Rfl: 11    Allergies   Allergen Reactions   • Hydrocodone Itching   • Nsaids Other (See Comments)     DOESN'T TAKE BECAUSE SHES ON  "BLOOD THINNER.       Social History     Tobacco Use   • Smoking status: Light Tobacco Smoker     Packs/day: 0.25     Types: Cigarettes   • Smokeless tobacco: Never Used   • Tobacco comment: caffeine use   Substance Use Topics   • Alcohol use: Yes     Alcohol/week: 1.0 standard drinks     Types: 1 Shots of liquor per week     Comment: social/ occassional   • Drug use: No       Smoker: No    Family History   Problem Relation Age of Onset   • Cancer Mother    • Obesity Mother    • Diabetes Father    • Hypertension Father    • Heart disease Father    • Obesity Father    • Heart disease Brother    • Diabetes Paternal Grandfather    • Diabetes Paternal Aunt    • No Known Problems Maternal Grandmother    • No Known Problems Maternal Grandfather    • No Known Problems Paternal Grandmother    • Malig Hyperthermia Neg Hx        Review of Systems        EXAM:  /74   Ht 162.6 cm (64.02\")   Wt 121 kg (266 lb)   LMP  (LMP Unknown)   Breastfeeding No   BMI 45.64 kg/m²     Physical Exam   Constitutional: She is oriented to person, place, and time. She appears well-developed and well-nourished. No distress.   HENT:   Head: Normocephalic and atraumatic.   Eyes: EOM are normal.   Neck: Normal range of motion.   Cardiovascular: Normal rate.   Pulmonary/Chest: Effort normal.   Abdominal: Soft. She exhibits no distension and no mass. There is no tenderness. There is no guarding.   Genitourinary: Vagina normal and uterus normal. No vaginal discharge found.   Genitourinary Comments: cx wnl, no pap done   Musculoskeletal: Normal range of motion. She exhibits no edema, tenderness or deformity.   Neurological: She is alert and oriented to person, place, and time.   Skin: Skin is warm and dry. No rash noted. She is not diaphoretic. No erythema. No pallor.   Breasts wnl bilaterally   Psychiatric: She has a normal mood and affect. Her behavior is normal. Judgment and thought content normal.   Nursing note and vitals reviewed.         " As part of wellness and prevention, the following topics were discussed with the patient:  []  Nutrition  []  Physical activity/regular exercise   [x]  Healthy weight  []  Injury prevention  [x]  Substance misuse/abuse  []  Sexual behavior  []  STD prevention  []  Contaception  []  Dental health  [x]  Mental health  []  Immunization  [x]  Encouraged SBE     Counseling and guidance done:  Nutrition, physical activity, healthy weight, injury prevention, misuse of tobacco, alcohol and drugs, sexual behavior and STDs, contraception, dental health, mental health, immunizations breast cancer screening and exams.    Assessment     1) GYN annual well woman exam.   2) PAP done today? No  3) problems addressed: none       Plan       Follow up prn or one year.    Zulema was seen today for gynecologic exam.    Diagnoses and all orders for this visit:    Pap smear, low-risk  -     Cancel: Pap IG, HPV-hr    Screening for condition  -     POC Urinalysis Dipstick  -     Mammo Screening Digital Tomosynthesis Bilateral With CAD; Future    Routine gynecological examination    Well woman exam    Overactive bladder    Other orders  -     solifenacin (VESICARE) 10 MG tablet; Take 1 tablet by mouth Daily.        RTO Return in about 1 year (around 5/26/2021) for Annual physical.        Tez Ramírez MD  [unfilled]  11:29

## 2020-06-01 RX ORDER — OMEPRAZOLE 40 MG/1
CAPSULE, DELAYED RELEASE ORAL
Qty: 90 CAPSULE | Refills: 0 | Status: SHIPPED | OUTPATIENT
Start: 2020-06-01 | End: 2020-08-31

## 2020-07-09 ENCOUNTER — HOSPITAL ENCOUNTER (OUTPATIENT)
Dept: MAMMOGRAPHY | Facility: HOSPITAL | Age: 60
Discharge: HOME | End: 2020-07-09
Admitting: OBSTETRICS & GYNECOLOGY

## 2020-07-09 DIAGNOSIS — Z13.9 SCREENING FOR CONDITION: ICD-10-CM

## 2020-07-09 PROCEDURE — 77063 BREAST TOMOSYNTHESIS BI: CPT

## 2020-07-09 PROCEDURE — 77067 SCR MAMMO BI INCL CAD: CPT

## 2020-09-01 RX ORDER — OMEPRAZOLE 40 MG/1
CAPSULE, DELAYED RELEASE ORAL
Qty: 90 CAPSULE | Refills: 0 | Status: SHIPPED | OUTPATIENT
Start: 2020-09-01 | End: 2020-12-04

## 2020-09-23 RX ORDER — RIVAROXABAN 20 MG/1
TABLET, FILM COATED ORAL
Qty: 30 TABLET | Refills: 0 | Status: SHIPPED | OUTPATIENT
Start: 2020-09-23 | End: 2020-10-22

## 2020-10-09 ENCOUNTER — CLINICAL SUPPORT (OUTPATIENT)
Dept: ORTHOPEDIC SURGERY | Facility: CLINIC | Age: 60
End: 2020-10-09

## 2020-10-09 VITALS — HEIGHT: 64 IN | WEIGHT: 267 LBS | TEMPERATURE: 97.7 F | BODY MASS INDEX: 45.58 KG/M2

## 2020-10-09 DIAGNOSIS — R52 PAIN: Primary | ICD-10-CM

## 2020-10-09 DIAGNOSIS — M17.11 ARTHRITIS OF RIGHT KNEE: ICD-10-CM

## 2020-10-09 PROCEDURE — 20610 DRAIN/INJ JOINT/BURSA W/O US: CPT | Performed by: ORTHOPAEDIC SURGERY

## 2020-10-09 PROCEDURE — 99212 OFFICE O/P EST SF 10 MIN: CPT | Performed by: ORTHOPAEDIC SURGERY

## 2020-10-09 RX ORDER — METHYLPREDNISOLONE ACETATE 80 MG/ML
80 INJECTION, SUSPENSION INTRA-ARTICULAR; INTRALESIONAL; INTRAMUSCULAR; SOFT TISSUE
Status: COMPLETED | OUTPATIENT
Start: 2020-10-09 | End: 2020-10-09

## 2020-10-09 RX ADMIN — METHYLPREDNISOLONE ACETATE 80 MG: 80 INJECTION, SUSPENSION INTRA-ARTICULAR; INTRALESIONAL; INTRAMUSCULAR; SOFT TISSUE at 07:39

## 2020-10-09 NOTE — PROGRESS NOTES
Patient Name: Zulema Sousa   YOB: 1960  Referring Primary Care Physician: Marsha Young MD  BMI: Body mass index is 45.83 kg/m².    Chief Complaint:    Chief Complaint   Patient presents with   • Right Knee - Follow-up, Pain        HPI:     Zulema Sousa is a 60 y.o. female who presents today for evaluation of   Chief Complaint   Patient presents with   • Right Knee - Follow-up, Pain   .  Follows up with acute on chronic pain in her right knee.  Previous injection the beginning of May was helpful.  She reports no injury but it is tight stiff with joint line tenderness    This problem is not new to this examiner.     Subjective   Medications:   Home Medications:  Current Outpatient Medications on File Prior to Visit   Medication Sig   • AFLURIA QUADRIVALENT 0.5 ML suspension prefilled syringe injection ADM 0.5ML IM UTD   • omeprazole (priLOSEC) 40 MG capsule TAKE ONE CAPSULE BY MOUTH DAILY   • PREVIDENT 5000 SENSITIVE 1.1-5 % paste    • solifenacin (VESICARE) 10 MG tablet Take 1 tablet by mouth Daily.   • Xarelto 20 MG tablet TAKE ONE TABLET BY MOUTH DAILY WITH DINNER     No current facility-administered medications on file prior to visit.      Current Medications:  Scheduled Meds:  Continuous Infusions:No current facility-administered medications for this visit.     PRN Meds:.    I have reviewed the patient's medical history in detail and updated the computerized patient record.  Review and summarization of old records includes:    Past Medical History:   Diagnosis Date   • Acid reflux    • Back pain    • Cardiac tamponade     March 2015   • Difficulty breathing     during exertion   • Edema    • Esophagitis, reflux    • Essential hypertriglyceridemia    • GERD (gastroesophageal reflux disease)    • Health care maintenance    • Heartburn    • Hyperlipidemia    • Hypertriglyceridemia    • Increased appetite    • Morbid obesity (CMS/HCC)    • Multiple joint pain    • Obesity, Class III,  BMI 40-49.9 (morbid obesity) (CMS/[facility]) 2018   • Osteoarthritis of knee    • Pericarditis    • Pulmonary embolism (CMS/[facility])     2015   • RHA (rheumatoid arthritis) (CMS/[facility])    • Sciatica    • Sleep apnea    • Unintended weight gain         Past Surgical History:   Procedure Laterality Date   • ANKLE SURGERY      treatment of ankle fracture   • APPENDECTOMY     • CARDIAC SURGERY      cardiac tamponade   •  SECTION     • CHOLECYSTECTOMY     • COLONOSCOPY N/A 2018    Procedure: COLONOSCOPY to cecum and t.i. with polypectomy and clip x2 to ascending colon;  Surgeon: Pierre Ornelas MD;  Location: [facility] ENDOSCOPY;  Service: Gastroenterology   • DILATATION AND CURETTAGE     • ENDOSCOPY N/A 2018    Procedure: ESOPHAGOGASTRODUODENOSCOPY;  Surgeon: Pierre Ornelas MD;  Location: [facility] ENDOSCOPY;  Service: Gastroenterology   • GASTRIC BANDING REMOVAL N/A 2019    Procedure: GASTRIC BANDING AND PORT REMOVAL, LYSIS OF ADHESIONS, REPAIR OF COLOTOMY, AND INCISIONAL HERNIA REPAIR LAPAROSCOPIC;  Surgeon: Randell Salamanca Jr., MD;  Location: [facility] OR [facility];  Service: General   • LAPAROSCOPIC GASTRIC BANDING     • PULMONARY EMBOLISM SURGERY     • TUBAL ABDOMINAL LIGATION          Social History     Occupational History   • Occupation:      Employer: [employer]   Tobacco Use   • Smoking status: Light Tobacco Smoker     Packs/day: 0.25     Types: Cigarettes   • Smokeless tobacco: Never Used   • Tobacco comment: caffeine use   Substance and Sexual Activity   • Alcohol use: Yes     Alcohol/week: 1.0 standard drinks     Types: 1 Shots of liquor per week     Comment: social/ occassional   • Drug use: No   • Sexual activity: Defer      Social History     Social History Narrative   • Not on file        Family History   Problem Relation Age of Onset   • Cancer Mother    • Obesity Mother    • Diabetes Father    • Hypertension Father    • Heart disease Father    •  "Obesity Father    • Heart disease Brother    • Diabetes Paternal Grandfather    • Diabetes Paternal Aunt    • No Known Problems Maternal Grandmother    • No Known Problems Maternal Grandfather    • No Known Problems Paternal Grandmother    • Malig Hyperthermia Neg Hx    • Breast cancer Neg Hx        ROS: 14 point review of systems was performed and all other systems were reviewed and are negative except for documented findings in HPI and today's encounter.     Allergies:   Allergies   Allergen Reactions   • Hydrocodone Itching   • Nsaids Other (See Comments)     DOESN'T TAKE BECAUSE SHES ON BLOOD THINNER.     Constitutional:  Denies fever, shaking or chills   Eyes:  Denies change in visual acuity   HENT:  Denies nasal congestion or sore throat   Respiratory:  Denies cough or shortness of breath   Cardiovascular:  Denies chest pain or severe LE edema   GI:  Denies abdominal pain, nausea, vomiting, bloody stools or diarrhea   Musculoskeletal:  Numbness, tingling, pain, or loss of motor function only as noted above in history of present illness.  : Denies painful urination or hematuria  Integument:  Denies rash, lesion or ulceration   Neurologic:  Denies headache or focal weakness  Endocrine:  Denies lymphadenopathy  Psych:  Denies confusion or change in mental status   Hem:  Denies active bleeding    OBJECTIVE:  Physical Exam: 60 y.o. female  Wt Readings from Last 3 Encounters:   10/09/20 121 kg (267 lb)   05/26/20 121 kg (266 lb)   05/05/20 123 kg (271 lb 6.4 oz)     Ht Readings from Last 1 Encounters:   10/09/20 162.6 cm (64\")     Body mass index is 45.83 kg/m².  Vitals:    10/09/20 1028   Temp: 97.7 °F (36.5 °C)     Vital signs reviewed.     General Appearance:    Alert, cooperative, in no acute distress                  Eyes: conjunctiva clear  ENT: external ears and nose atraumatic  CV: no peripheral edema  Resp: normal respiratory effort  Skin: no rashes or wounds; normal turgor  Psych: mood and affect " appropriate  Lymph: no nodes appreciated  Neuro: gross sensation intact  Vascular:  Palpable peripheral pulse in noted extremity  Musculoskeletal Extremities: Crepitation synovitis swelling and stiffness noted in her right knee with joint line tenderness and no big effusion.    Radiology:   AP lateral 40 agree PA x-ray right knee taken the office today with comparison view show advanced arthritic change    Assessment:     ICD-10-CM ICD-9-CM   1. Pain  R52 780.96   2. Arthritis of right knee  M17.11 716.96        Large Joint Arthrocentesis: R knee  Date/Time: 10/9/2020 7:39 AM  Consent given by: patient  Site marked: site marked  Timeout: Immediately prior to procedure a time out was called to verify the correct patient, procedure, equipment, support staff and site/side marked as required   Supporting Documentation  Indications: pain   Procedure Details  Location: knee - R knee  Preparation: Patient was prepped and draped in the usual sterile fashion  Needle gauge: 21.  Approach: anterolateral  Medications administered: 4 mL lidocaine (cardiac); 80 mg methylPREDNISolone acetate 80 MG/ML  Patient tolerance: patient tolerated the procedure well with no immediate complications             Plan: Biomechanics of pertinent body area discussed.  Risks, benefits, alternatives, comparisons, and complications of accepted medicines, injections, recommendations, surgical procedures, and therapies explained and education provided in laymen's terms. Natural history and expected course of this patient's diagnosis discussed along with evaluation of therapies. Questions answered. When appropriate I also discussed proper use of cane, walker, trekking poles.   BMI:  The concept of BMI body mass index and its importance and implications discussed.  BMI suggested to be < 40 or as low as possible. Lifestyle measures for weight loss and how this affects orthopedic condition.  EXERCISES:  Advice on benefits of, and types of regular/moderate  exercise including biomechanical forces involved as it pertains to this complaint.  RICE: Rest, ice, compression, and elevation therapy, Cryotherapy/brachy therapy, and or OTC linaments as indicated with instructions.   Cortisone Injection. See procedure note.      10/9/2020    Much of this encounter note is an electronic transcription/translation of spoken language to printed text. The electronic translation of spoken language may permit erroneous, or at times, nonsensical words or phrases to be inadvertently transcribed; Although I have reviewed the note for such errors, some may still exist

## 2020-10-16 ENCOUNTER — APPOINTMENT (OUTPATIENT)
Dept: SLEEP MEDICINE | Facility: HOSPITAL | Age: 60
End: 2020-10-16

## 2020-10-22 RX ORDER — RIVAROXABAN 20 MG/1
TABLET, FILM COATED ORAL
Qty: 30 TABLET | Refills: 0 | Status: SHIPPED | OUTPATIENT
Start: 2020-10-22 | End: 2020-11-06

## 2020-11-06 ENCOUNTER — OFFICE VISIT (OUTPATIENT)
Dept: CARDIOLOGY | Facility: CLINIC | Age: 60
End: 2020-11-06

## 2020-11-06 VITALS
BODY MASS INDEX: 47.29 KG/M2 | DIASTOLIC BLOOD PRESSURE: 90 MMHG | SYSTOLIC BLOOD PRESSURE: 130 MMHG | HEIGHT: 64 IN | WEIGHT: 277 LBS | HEART RATE: 78 BPM

## 2020-11-06 DIAGNOSIS — I26.99 OTHER ACUTE PULMONARY EMBOLISM WITHOUT ACUTE COR PULMONALE (HCC): Primary | ICD-10-CM

## 2020-11-06 PROCEDURE — 99214 OFFICE O/P EST MOD 30 MIN: CPT | Performed by: INTERNAL MEDICINE

## 2020-11-06 PROCEDURE — 93000 ELECTROCARDIOGRAM COMPLETE: CPT | Performed by: INTERNAL MEDICINE

## 2020-11-06 NOTE — PROGRESS NOTES
Subjective:     Encounter Date: 11/06/20      Patient ID: Zulema Sousa is a 60 y.o. female.    Chief Complaint: PE  History of Present Illness  This is a 60-year-old woman who was previously seen by Dr. Newton Hernández.  In 2016 she suffered bilateral pulmonary emboli.  She underwent thrombectomy with Dr. Hernández, and recovered well.  However, this was complicated by cardiac tamponade.  She underwent pericardiocentesis and recovered well from that.  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  She was off anticoagulation for an entire year, and then had another episode of shortness of breath which was apparently related to new PE, relates to me today.  Since then she is on Xarelto 20 daily.  She feels well.  She lost about 70 pounds on keto, but has gained back 40.  She does not exercise because she has bone on bone osteoarthritis of bilateral knees.  She gets cortisone injections, but there are no plans for knee replacement.    Since she is done well.  She recently underwent removal of her lap band.  She is not exercising, but has maintained a fair amount of weight loss.  The following portions of the patient's history were reviewed and updated as appropriate: allergies, current medications, past family history, past medical history, past social history, past surgical history and problem list.         REVIEW OF SYSTEMS:   All systems reviewed.  Pertinent positives identified in HPI.  All other systems are negative.      Past Medical History:   Diagnosis Date   • Acid reflux    • Back pain    • Cardiac tamponade     March 2015   • Difficulty breathing     during exertion   • Edema    • Esophagitis, reflux    • Essential hypertriglyceridemia    • GERD (gastroesophageal reflux disease)    • Health care maintenance    • Heartburn    • Hyperlipidemia    • Hypertriglyceridemia    • Increased appetite    • Morbid obesity (CMS/[facility])    • Multiple joint pain    • Obesity, Class III, BMI 40-49.9  (morbid obesity) (CMS/[facility]) 2018   • Osteoarthritis of knee    • Pericarditis    • Pulmonary embolism (CMS/[facility])     ura2015   • RHA (rheumatoid arthritis) (CMS/[facility])    • Sciatica    • Sleep apnea    • Unintended weight gain        Family History   Problem Relation Age of Onset   • Cancer Mother    • Obesity Mother    • Diabetes Father    • Hypertension Father    • Heart disease Father    • Obesity Father    • Heart disease Brother    • Diabetes Paternal Grandfather    • Diabetes Paternal Aunt    • No Known Problems Maternal Grandmother    • No Known Problems Maternal Grandfather    • No Known Problems Paternal Grandmother    • Malig Hyperthermia Neg Hx    • Breast cancer Neg Hx        Social History     Socioeconomic History   • Marital status:      Spouse name: Jac   • Number of children: Not on file   • Years of education: Not on file   • Highest education level: Not on file   Occupational History   • Occupation:      Employer: [employer]   Tobacco Use   • Smoking status: Light Tobacco Smoker     Packs/day: 0.25     Types: Cigarettes   • Smokeless tobacco: Never Used   • Tobacco comment: caffeine use   Substance and Sexual Activity   • Alcohol use: Yes     Alcohol/week: 1.0 standard drinks     Types: 1 Shots of liquor per week     Comment: social/ occassional   • Drug use: No   • Sexual activity: Defer       Allergies   Allergen Reactions   • Hydrocodone Itching   • Nsaids Other (See Comments)     DOESN'T TAKE BECAUSE SHES ON BLOOD THINNER.       Past Surgical History:   Procedure Laterality Date   • ANKLE SURGERY      treatment of ankle fracture   • APPENDECTOMY     • CARDIAC SURGERY      cardiac tamponade   •  SECTION     • CHOLECYSTECTOMY     • COLONOSCOPY N/A 2018    Procedure: COLONOSCOPY to cecum and t.i. with polypectomy and clip x2 to ascending colon;  Surgeon: Pierre Onrelas MD;  Location: [facility] ENDOSCOPY;  Service:  Gastroenterology   • DILATATION AND CURETTAGE     • ENDOSCOPY N/A 11/27/2018    Procedure: ESOPHAGOGASTRODUODENOSCOPY;  Surgeon: Pierre Ornelas MD;  Location: [facility] ENDOSCOPY;  Service: Gastroenterology   • GASTRIC BANDING REMOVAL N/A 7/1/2019    Procedure: GASTRIC BANDING AND PORT REMOVAL, LYSIS OF ADHESIONS, REPAIR OF COLOTOMY, AND INCISIONAL HERNIA REPAIR LAPAROSCOPIC;  Surgeon: Randell Salamanca Jr., MD;  Location: [facility] OR [facility];  Service: General   • LAPAROSCOPIC GASTRIC BANDING     • PULMONARY EMBOLISM SURGERY     • TUBAL ABDOMINAL LIGATION           ECG 12 Lead    Date/Time: 11/6/2020 1:24 PM  Performed by: Temi Hutchinson MD  Authorized by: Temi Hutchinson MD   Comparison: compared with previous ECG from 8/26/2019  Similar to previous ECG  Rhythm: sinus rhythm  Rate: normal  Conduction: conduction normal  ST Segments: ST segments normal  T Waves: T waves normal  QRS axis: normal  Other findings: low voltage    Clinical impression: non-specific ECG               Objective:         GEN: VSS, no distress, obese  Eyes: normal sclera, normal lids and lashes  HENT: moist mucus membranes,   Respiratory: CTAB, no rales or wheezes  CV: RRR, no murmurs, , +2 DP and 2+ carotid pulses b/l  GI: NABS, soft,  Nontender, nondistended  MSK: Bilateral +1 edema, no scoliosis or kyphosis  Skin: no rash, warm, dry  Heme/Lymph: no bruising or bleeding  Psych: organized thought, normal behavior and affect  Neuro: Cranial nerves grossly intact, Alert and Oriented x 3.               Assessment:          Diagnosis Plan   1. Other acute pulmonary embolism without acute cor pulmonale (CMS/HCC)            Plan:       1.  This is a 60-year-old obese woman who had a provoked, massive PE in 2016 related to an ankle fracture.  She underwent aspiration thrombectomy she was complicated by tamponade.  She was then off of anticoagulation after the initial treatment.  For a year, and then had recurrent PE.  Since then she has been fully  anticoagulated.  There is good data showing that Xarelto 10 mg daily is just as effective in preventing PE is direct Xarelto 20 mg daily.  I think that is reasonable to transition her to this prophylactic dose in order to improve her overall bleeding risk profile.  She is intact anticipated to be on full anticoagulation lifelong.    Dr. Young, thank you very much for referring this kind patient to me please call with any questions or concerns.  We will see her in 1 year.       Temi Hutchinson MD  11/06/20  [facility]

## 2020-11-13 ENCOUNTER — OFFICE VISIT (OUTPATIENT)
Dept: ORTHOPEDIC SURGERY | Facility: CLINIC | Age: 60
End: 2020-11-13

## 2020-11-13 ENCOUNTER — OFFICE VISIT (OUTPATIENT)
Dept: SLEEP MEDICINE | Facility: HOSPITAL | Age: 60
End: 2020-11-13

## 2020-11-13 VITALS — TEMPERATURE: 97.7 F | BODY MASS INDEX: 47.63 KG/M2 | WEIGHT: 279 LBS | HEIGHT: 64 IN

## 2020-11-13 VITALS
HEART RATE: 92 BPM | OXYGEN SATURATION: 96 % | DIASTOLIC BLOOD PRESSURE: 80 MMHG | BODY MASS INDEX: 47.8 KG/M2 | WEIGHT: 280 LBS | SYSTOLIC BLOOD PRESSURE: 141 MMHG | HEIGHT: 64 IN

## 2020-11-13 DIAGNOSIS — M17.0 PRIMARY OSTEOARTHRITIS OF BOTH KNEES: Primary | ICD-10-CM

## 2020-11-13 DIAGNOSIS — G47.33 OBSTRUCTIVE SLEEP APNEA: Primary | ICD-10-CM

## 2020-11-13 DIAGNOSIS — E66.01 OBESITY, MORBID, BMI 40.0-49.9 (HCC): ICD-10-CM

## 2020-11-13 PROCEDURE — G0463 HOSPITAL OUTPT CLINIC VISIT: HCPCS

## 2020-11-13 PROCEDURE — 99213 OFFICE O/P EST LOW 20 MIN: CPT | Performed by: ORTHOPAEDIC SURGERY

## 2020-11-13 PROCEDURE — 20610 DRAIN/INJ JOINT/BURSA W/O US: CPT | Performed by: ORTHOPAEDIC SURGERY

## 2020-11-13 RX ORDER — METHYLPREDNISOLONE ACETATE 80 MG/ML
80 INJECTION, SUSPENSION INTRA-ARTICULAR; INTRALESIONAL; INTRAMUSCULAR; SOFT TISSUE
Status: COMPLETED | OUTPATIENT
Start: 2020-11-13 | End: 2020-11-13

## 2020-11-13 RX ADMIN — METHYLPREDNISOLONE ACETATE 80 MG: 80 INJECTION, SUSPENSION INTRA-ARTICULAR; INTRALESIONAL; INTRAMUSCULAR; SOFT TISSUE at 09:23

## 2020-11-13 NOTE — PROGRESS NOTES
Patient Name: Zulema Sousa   YOB: 1960  Referring Primary Care Physician: Marsha Young MD  BMI: Body mass index is 47.89 kg/m².    Chief Complaint:    Chief Complaint   Patient presents with   • Left Knee - Follow-up        HPI:     Zulema Sousa is a 60 y.o. female who presents today for evaluation of   Chief Complaint   Patient presents with   • Left Knee - Follow-up   .  Soledad is seen today complaining of left knee pain.  We saw about a month ago injected her in the right knee is doing quite a bit better.  They went to [place] to visit family and she did a lot of walking trying to keep up and now her left knee is hurting it stiff and painful she denies any overt trauma.  She try to get exercise and keep it loose.  BMI last month was 45.83 at 47.83 today      Subjective   Medications:   Home Medications:  Current Outpatient Medications on File Prior to Visit   Medication Sig   • AFLURIA QUADRIVALENT 0.5 ML suspension prefilled syringe injection ADM 0.5ML IM UTD   • omeprazole (priLOSEC) 40 MG capsule TAKE ONE CAPSULE BY MOUTH DAILY   • PREVIDENT 5000 SENSITIVE 1.1-5 % paste    • rivaroxaban (Xarelto) 10 MG tablet Take 1 tablet by mouth Daily.   • solifenacin (VESICARE) 10 MG tablet Take 1 tablet by mouth Daily.     No current facility-administered medications on file prior to visit.      Current Medications:  Scheduled Meds:  Continuous Infusions:No current facility-administered medications for this visit.     PRN Meds:.    I have reviewed the patient's medical history in detail and updated the computerized patient record.  Review and summarization of old records includes:    Past Medical History:   Diagnosis Date   • Acid reflux    • Back pain    • Cardiac tamponade     March 2015   • Difficulty breathing     during exertion   • Edema    • Esophagitis, reflux    • Essential hypertriglyceridemia    • GERD (gastroesophageal reflux disease)    • Health care maintenance    •  Heartburn    • Hyperlipidemia    • Hypertriglyceridemia    • Increased appetite    • Morbid obesity (CMS/[facility])    • Multiple joint pain    • Obesity, Class III, BMI 40-49.9 (morbid obesity) (CMS/[facility]) 2018   • Osteoarthritis of knee    • Pericarditis    • Pulmonary embolism (CMS/[facility])     ura2015   • RHA (rheumatoid arthritis) (CMS/[facility])    • Sciatica    • Sleep apnea    • Unintended weight gain         Past Surgical History:   Procedure Laterality Date   • ANKLE SURGERY      treatment of ankle fracture   • APPENDECTOMY     • CARDIAC SURGERY      cardiac tamponade   •  SECTION     • CHOLECYSTECTOMY     • COLONOSCOPY N/A 2018    Procedure: COLONOSCOPY to cecum and t.i. with polypectomy and clip x2 to ascending colon;  Surgeon: Pierre Ornelas MD;  Location: [facility] ENDOSCOPY;  Service: Gastroenterology   • DILATATION AND CURETTAGE     • ENDOSCOPY N/A 2018    Procedure: ESOPHAGOGASTRODUODENOSCOPY;  Surgeon: Pierre Ornelas MD;  Location: [facility] ENDOSCOPY;  Service: Gastroenterology   • GASTRIC BANDING REMOVAL N/A 2019    Procedure: GASTRIC BANDING AND PORT REMOVAL, LYSIS OF ADHESIONS, REPAIR OF COLOTOMY, AND INCISIONAL HERNIA REPAIR LAPAROSCOPIC;  Surgeon: Randell Salamanca Jr., MD;  Location: [facility] OR [facility];  Service: General   • LAPAROSCOPIC GASTRIC BANDING     • PULMONARY EMBOLISM SURGERY     • TUBAL ABDOMINAL LIGATION          Social History     Occupational History   • Occupation:      Employer: [employer]   Tobacco Use   • Smoking status: Light Tobacco Smoker     Packs/day: 0.25     Types: Cigarettes   • Smokeless tobacco: Never Used   • Tobacco comment: caffeine use   Substance and Sexual Activity   • Alcohol use: Yes     Alcohol/week: 1.0 standard drinks     Types: 1 Shots of liquor per week     Comment: social/ occassional   • Drug use: No   • Sexual activity: Defer      Social History     Social History Narrative   • Not on file       "  Family History   Problem Relation Age of Onset   • Cancer Mother    • Obesity Mother    • Diabetes Father    • Hypertension Father    • Heart disease Father    • Obesity Father    • Heart disease Brother    • Diabetes Paternal Grandfather    • Diabetes Paternal Aunt    • No Known Problems Maternal Grandmother    • No Known Problems Maternal Grandfather    • No Known Problems Paternal Grandmother    • Malig Hyperthermia Neg Hx    • Breast cancer Neg Hx        ROS: 14 point review of systems was performed and all other systems were reviewed and are negative except for documented findings in HPI and today's encounter.     Allergies:   Allergies   Allergen Reactions   • Hydrocodone Itching   • Nsaids Other (See Comments)     DOESN'T TAKE BECAUSE SHES ON BLOOD THINNER.     Constitutional:  Denies fever, shaking or chills   Eyes:  Denies change in visual acuity   HENT:  Denies nasal congestion or sore throat   Respiratory:  Denies cough or shortness of breath   Cardiovascular:  Denies chest pain or severe LE edema   GI:  Denies abdominal pain, nausea, vomiting, bloody stools or diarrhea   Musculoskeletal:  Numbness, tingling, pain, or loss of motor function only as noted above in history of present illness.  : Denies painful urination or hematuria  Integument:  Denies rash, lesion or ulceration   Neurologic:  Denies headache or focal weakness  Endocrine:  Denies lymphadenopathy  Psych:  Denies confusion or change in mental status   Hem:  Denies active bleeding    OBJECTIVE:  Physical Exam: 60 y.o. female  Wt Readings from Last 3 Encounters:   11/13/20 127 kg (279 lb)   11/13/20 127 kg (280 lb)   11/06/20 126 kg (277 lb)     Ht Readings from Last 1 Encounters:   11/13/20 162.6 cm (64\")     Body mass index is 47.89 kg/m².  Vitals:    11/13/20 0904   Temp: 97.7 °F (36.5 °C)     Vital signs reviewed.     General Appearance:    Alert, cooperative, in no acute distress                  Eyes: conjunctiva clear  ENT: external " ears and nose atraumatic  CV: no peripheral edema  Resp: normal respiratory effort  Skin: no rashes or wounds; normal turgor  Psych: mood and affect appropriate  Lymph: no nodes appreciated  Neuro: gross sensation intact  Vascular:  Palpable peripheral pulse in noted extremity  Musculoskeletal Extremities: Exam shows swelling a little bit of warmth and medial joint line tenderness with a Baker's cyst and stiffness of the left knee    Radiology:   AP lateral 40 degree PA x-ray left knee taken in the past viewed at this time shows arthritis    Assessment:     ICD-10-CM ICD-9-CM   1. Primary osteoarthritis of both knees  M17.0 715.16        Large Joint Arthrocentesis: L knee  Date/Time: 11/13/2020 9:23 AM  Consent given by: patient  Site marked: site marked  Timeout: Immediately prior to procedure a time out was called to verify the correct patient, procedure, equipment, support staff and site/side marked as required   Supporting Documentation  Indications: pain and joint swelling   Procedure Details  Location: knee - L knee  Preparation: Patient was prepped and draped in the usual sterile fashion  Needle gauge: 21.  Approach: anterolateral  Medications administered: 4 mL lidocaine (cardiac); 80 mg methylPREDNISolone acetate 80 MG/ML  Patient tolerance: patient tolerated the procedure well with no immediate complications             Plan: The diagnosis(es), natural history, pathophysiology and treatment for diagnosis(es) were discussed. Opportunity given and questions answered.  Biomechanics of pertinent body areas discussed.  When appropriate, the use of ambulatory aids discussed.  BMI:  The concept of BMI body mass index and its importance and implications discussed.    Inflammation/pain control; with cold, heat, elevation and/or liniments discussed as appropriate  Cortisone Injection. See procedure note.      11/13/2020    Much of this encounter note is an electronic transcription/translation of spoken language to  printed text. The electronic translation of spoken language may permit erroneous, or at times, nonsensical words or phrases to be inadvertently transcribed; Although I have reviewed the note for such errors, some may still exist

## 2020-11-13 NOTE — PROGRESS NOTES
[facility] SLEEP MEDICINE  [address] [facility]  3RD FLOOR  [facility] [address]  [phone]    PCP: Marsha Young MD    Reason for visit:  Sleep disorders: JOSHUA    Zulema is a 60 y.o.female who was seen in the [facility] today. Annual fu. She remains compliant with CPAP, except when her grandkids are visiting. She sleeps avg 6hrs, wakes up rested and refreshed. She sometimes feels sleepy in afternoon. She uses a ffm, fits well, no air leaks. Excessive dry mouth when she awakens. Does not use a chin strap.  [place] Sleepiness Scale is 1. Caffeine 3 per day. Alcohol 0 per week.    Zulema  reports that she has been smoking cigarettes. She has been smoking about 0.25 packs per day. She has never used smokeless tobacco.    Pertinent Positive Review of Systems of denies  Rest of Review of Systems was negative as recorded in Sleep Questionnaire.    Patient  has a past medical history of Acid reflux, Back pain, Cardiac tamponade, Difficulty breathing, Edema, Esophagitis, reflux, Essential hypertriglyceridemia, GERD (gastroesophageal reflux disease), Health care maintenance, Heartburn, Hyperlipidemia, Hypertriglyceridemia, Increased appetite, Morbid obesity (CMS/HCC), Multiple joint pain, Obesity, Class III, BMI 40-49.9 (morbid obesity) (CMS/HCC) (1/5/2018), Osteoarthritis of knee, Pericarditis, Pulmonary embolism (CMS/HCC), RHA (rheumatoid arthritis) (CMS/HCC), Sciatica, Sleep apnea, and Unintended weight gain.     Current Medications:    Current Outpatient Medications:   •  AFLURIA QUADRIVALENT 0.5 ML suspension prefilled syringe injection, ADM 0.5ML IM UTD, Disp: , Rfl: 0  •  omeprazole (priLOSEC) 40 MG capsule, TAKE ONE CAPSULE BY MOUTH DAILY, Disp: 90 capsule, Rfl: 0  •  PREVIDENT 5000 SENSITIVE 1.1-5 % paste, , Disp: , Rfl:   •  rivaroxaban (Xarelto) 10 MG tablet, Take 1 tablet by mouth Daily., Disp: 90 tablet, Rfl: 3  •  solifenacin (VESICARE) 10 MG tablet, Take 1 tablet by mouth  "Daily., Disp: 30 tablet, Rfl: 11   also entered in Sleep Questionnaire         Vital Signs: /80   Pulse 92   Ht 162.6 cm (64\")   Wt 127 kg (280 lb)   LMP  (LMP Unknown)   SpO2 96%   BMI 48.06 kg/m²     Body mass index is 48.06 kg/m².       Tongue: Normal       Dentition: good       Pharynx: Posterior pharyngeal pillars are unable to see   Mallampatti: III (soft and hard palate and base of uvula visible)        General: Alert. Cooperative. Well developed. No acute distress.             Head:  Normocephalic. Symmetrical. Atraumatic.              Nose: No septal deviation. No drainage.          Throat: No oral lesions. No thrush. Moist mucous membranes.    Chest Wall:  Normal shape. Symmetric expansion with respiration. No tenderness.             Neck:  Trachea midline.           Lungs:  Clear to auscultation bilaterally. No wheezes. No rhonchi. No rales. Respirations regular, even and unlabored.            Heart:  Regular rhythm and normal rate. Normal S1 and S2. No murmur.     Abdomen:  Soft, non-tender and non-distended. Normal bowel sounds. No masses.  Extremities:  Moves all extremities well. No edema.    Psychiatric: Normal mood and affect.    Study:  3/22/17- overnight split polysomnogram study.  Diagnostic portion from 10:16 PM to 12:31 AM.  Sleep efficiency 88% with 1.98 hours total sleep time.  Sleep distribution showed decreased REM sleep at 4.6%.  Apnea hypopneas index 47.9 indicating very severe obstructive sleep apnea.  In 43 minutes of supine sleep index was even higher at 115.  In 5 minutes of REM sleep index was 130.  Oxygen saturation fell below 89% for over 9 minutes or over 6% of total sleep time.  Arousal index is 40.  PLM index is not increased.  Snoring for 40% of sleep time.     Following above titration study from 12:31 AM to 5:48 AM.  Sleep efficiency 83%.  Rebound and slow-wave sleep to 31% rebound in rem sleep to 22%.  Apnea hypopneas index improved even with that her REM sleep. "  Maximum CPAP of 16 cm with supine position.  REM sleep was achieved.  Apneas hypopneas were completely corrected even and REM sleep at 16 cm water pressure.    Testing:  · Compliance was 75% of days with average usage 6 hours 11 minutes AHI 3.6 set pressure 10 cm.    [facility] [employer]: [employer]    Impression:  1. Obstructive sleep apnea    2. Obesity, morbid, BMI 40.0-49.9 (CMS/[facility])        Plan:  Zulema is compliant and benefits from her CPAP.  Wakes up rested and refreshed.  She will continue current use of CPAP machine.  I emphasized the underlying severity of her sleep apnea and the importance of using the device nightly.  She does not put the mask on when her grandchildren are visiting.  I encouraged her to use the device every night.    She has an excessive dry mouth.  I suggested that she get a chinstrap and use one.  She could also get a nasal mask but she prefers her current fullface.     I reiterated the importance of effective treatment of obstructive sleep apnea with PAP machine.  Cardiovascular health risks of untreated sleep apnea were again reviewed.  Patient was asked to remain cautious if there is persistent hypersomnolence. The benefit of weight loss in reducing severity of obstructive sleep apnea was discussed.  Patient would benefit from adhering to a strict diet to achieve ideal BMI.     Change of PAP supplies regularly is important for effective use.  Change of cushion on the mask or plugs on nasal pillows along with disposable filters once every month and change of mask frame, tubing, headgear and Velcro straps every 6 months at the minimum was reiterated.    This patient is compliant with PAP machine and benefits from its use.  Apnea hypopneas index is corrected/improved.  Daytime hypersomnolence has resolved.     Patient will follow up in this clinic in 1 year APRN    Thank you for allowing me to participate in your patient's care.    Electronically signed by Mukesh Franco MD, 11/13/20, 7:46 AM  EST.    Part of this note may be an electronic transcription/translation of spoken language to printed text using the Dragon Dictation System.

## 2020-11-25 RX ORDER — OMEPRAZOLE 40 MG/1
CAPSULE, DELAYED RELEASE ORAL
Qty: 90 CAPSULE | Refills: 0 | OUTPATIENT
Start: 2020-11-25

## 2020-12-04 RX ORDER — OMEPRAZOLE 40 MG/1
40 CAPSULE, DELAYED RELEASE ORAL DAILY
Qty: 90 CAPSULE | Refills: 0 | OUTPATIENT
Start: 2020-12-04

## 2020-12-04 RX ORDER — OMEPRAZOLE 40 MG/1
CAPSULE, DELAYED RELEASE ORAL
Qty: 90 CAPSULE | Refills: 0 | Status: SHIPPED | OUTPATIENT
Start: 2020-12-04 | End: 2021-02-02 | Stop reason: SDUPTHER

## 2020-12-04 NOTE — TELEPHONE ENCOUNTER
PATIENT CALLED FOR MED REFILL OF   omeprazole (priLOSEC) 40 MG capsule    SHE WILL NEED NEW PRESCRIPTION  SHE HAS APPOINTMENT ON 12/8/2020    ISAI BERNSTEIN [address] [place] [address] [address] [facility] - [phone]  - [phone]   [phone]    CALL BACK NUMBER [phone]

## 2020-12-08 ENCOUNTER — OFFICE VISIT (OUTPATIENT)
Dept: INTERNAL MEDICINE | Facility: CLINIC | Age: 60
End: 2020-12-08

## 2020-12-08 VITALS
TEMPERATURE: 97.8 F | BODY MASS INDEX: 47.8 KG/M2 | HEIGHT: 64 IN | OXYGEN SATURATION: 95 % | DIASTOLIC BLOOD PRESSURE: 74 MMHG | SYSTOLIC BLOOD PRESSURE: 132 MMHG | RESPIRATION RATE: 16 BRPM | HEART RATE: 100 BPM | WEIGHT: 280 LBS

## 2020-12-08 DIAGNOSIS — N32.81 OVERACTIVE BLADDER: ICD-10-CM

## 2020-12-08 DIAGNOSIS — Z00.00 HEALTHCARE MAINTENANCE: Primary | ICD-10-CM

## 2020-12-08 DIAGNOSIS — E78.2 MIXED HYPERLIPIDEMIA: ICD-10-CM

## 2020-12-08 DIAGNOSIS — E66.01 OBESITY, CLASS III, BMI 40-49.9 (MORBID OBESITY) (HCC): ICD-10-CM

## 2020-12-08 DIAGNOSIS — Z86.718 HISTORY OF DVT (DEEP VEIN THROMBOSIS): ICD-10-CM

## 2020-12-08 PROCEDURE — 90472 IMMUNIZATION ADMIN EACH ADD: CPT | Performed by: INTERNAL MEDICINE

## 2020-12-08 PROCEDURE — 99214 OFFICE O/P EST MOD 30 MIN: CPT | Performed by: INTERNAL MEDICINE

## 2020-12-08 PROCEDURE — 90632 HEPA VACCINE ADULT IM: CPT | Performed by: INTERNAL MEDICINE

## 2020-12-08 PROCEDURE — 90732 PPSV23 VACC 2 YRS+ SUBQ/IM: CPT | Performed by: INTERNAL MEDICINE

## 2020-12-08 PROCEDURE — 90471 IMMUNIZATION ADMIN: CPT | Performed by: INTERNAL MEDICINE

## 2020-12-08 NOTE — PROGRESS NOTES
Zulema Sousa is a 60 y.o. female, who presents with a chief complaint of   Chief Complaint   Patient presents with   • Medication Review       HPI   Pt here for f/u.  LOV > 1 year ago.     Pt back at work.  Her  has been off work then he had to have surgery bc of an infection of the hardware in his arm.      Pt had lap band out last July.  She had lost about 70 pounds with the band.  Since she has had it off she has gained about 50 pounds back.      Hx recurrent DVT - on xarelto chronically    Pt following with dr. hendrickson w/ ortho.  She has knee pain and has had to get steroid injections in her knee.  She has struggled with getting regular exercise bc of the knee pain.      oab - pt having to wear pads all the time. She has frequency.  No fever, abd pain, dysuria.  She gets up at night 1 time a night but goes multiple times all day.       leeann - on cpap. she follows with dr. Franco.      Tobacco use - she is down to 3 cigarettes a day.     The following portions of the patient's history were reviewed and updated as appropriate: allergies, current medications, past family history, past medical history, past social history, past surgical history and problem list.    Allergies: Hydrocodone and Nsaids    Review of Systems   Constitutional: Negative.    HENT: Negative.    Eyes: Negative.    Respiratory: Negative.    Cardiovascular: Negative.    Gastrointestinal: Negative.    Endocrine: Negative.    Genitourinary: Negative.    Musculoskeletal: Negative.    Skin: Negative.    Allergic/Immunologic: Negative.    Neurological: Negative.    Hematological: Negative.    Psychiatric/Behavioral: Negative.    All other systems reviewed and are negative.            Wt Readings from Last 3 Encounters:   12/08/20 127 kg (280 lb)   11/13/20 127 kg (279 lb)   11/13/20 127 kg (280 lb)     Temp Readings from Last 3 Encounters:   12/08/20 97.8 °F (36.6 °C) (Temporal)   11/13/20 97.7 °F (36.5 °C) (Temporal)   10/09/20 97.7 °F  (36.5 °C)     BP Readings from Last 3 Encounters:   12/08/20 132/74   11/13/20 141/80   11/06/20 130/90     Pulse Readings from Last 3 Encounters:   12/08/20 100   11/13/20 92   11/06/20 78     Body mass index is 48.06 kg/m².  @LASTSAO2(3)@    Physical Exam  Vitals signs and nursing note reviewed.   Constitutional:       General: She is not in acute distress.     Appearance: She is well-developed.   HENT:      Head: Normocephalic and atraumatic.      Right Ear: External ear normal.      Left Ear: External ear normal.      Nose: Nose normal.   Eyes:      Conjunctiva/sclera: Conjunctivae normal.      Pupils: Pupils are equal, round, and reactive to light.   Neck:      Musculoskeletal: Normal range of motion and neck supple.   Cardiovascular:      Rate and Rhythm: Normal rate and regular rhythm.      Heart sounds: Normal heart sounds.   Pulmonary:      Effort: Pulmonary effort is normal. No respiratory distress.      Breath sounds: Normal breath sounds. No wheezing.   Musculoskeletal: Normal range of motion.      Comments: Normal gait   Skin:     General: Skin is warm and dry.   Neurological:      Mental Status: She is alert and oriented to person, place, and time.   Psychiatric:         Behavior: Behavior normal.         Thought Content: Thought content normal.         Judgment: Judgment normal.         Results for orders placed or performed in visit on 05/26/20   POC Urinalysis Dipstick    Specimen: Urine   Result Value Ref Range    Color Yellow Yellow, Straw, Dark Yellow, Meg    Clarity, UA Clear Clear    Glucose, UA Negative Negative, 1000 mg/dL (3+) mg/dL    Bilirubin Negative Negative    Ketones, UA Negative Negative    Specific Gravity  1.005 1.005 - 1.030    Blood, UA Negative Negative    pH, Urine 5.0 5.0 - 8.0    Protein, POC Negative Negative mg/dL    Urobilinogen, UA Normal Normal    Leukocytes Negative Negative    Nitrite, UA Negative Negative           Diagnoses and all orders for this visit:    1.  Healthcare maintenance (Primary)  -     Pneumococcal Polysaccharide Vaccine 23-Valent (PPSV23) Greater Than or Equal To 3yo Subcutaneous / IM  -     Hepatitis A Vaccine Adult IM  -     Comprehensive Metabolic Panel  -     CBC & Differential  -     T4, Free  -     TSH  -     Lipid Panel With LDL / HDL Ratio  -     Hemoglobin A1c    2. History of DVT (deep vein thrombosis) - cont xarelto    3. GERD - cont PPI.    4. Mixed hyperlipidemia  -     Comprehensive Metabolic Panel  -     Lipid Panel With LDL / HDL Ratio    5. Overactive bladder - cont vesicare    6. Obesity, Class III, BMI 40-49.9 (morbid obesity) (CMS/HCC)  -     Comprehensive Metabolic Panel  -     CBC & Differential  -     T4, Free  -     TSH  -     Lipid Panel With LDL / HDL Ratio  -     Hemoglobin A1c    Continue current medications.  Encouraged healthy diet/exercise.  OV labs in  6  months.  Pt to call sooner if any other issues arise.          Outpatient Medications Prior to Visit   Medication Sig Dispense Refill   • AFLURIA QUADRIVALENT 0.5 ML suspension prefilled syringe injection ADM 0.5ML IM UTD  0   • omeprazole (priLOSEC) 40 MG capsule TAKE ONE CAPSULE BY MOUTH DAILY 90 capsule 0   • rivaroxaban (Xarelto) 10 MG tablet Take 1 tablet by mouth Daily. 90 tablet 3   • solifenacin (VESICARE) 10 MG tablet Take 1 tablet by mouth Daily. 30 tablet 11   • PREVIDENT 5000 SENSITIVE 1.1-5 % paste        No facility-administered medications prior to visit.      No orders of the defined types were placed in this encounter.    [unfilled]  Medications Discontinued During This Encounter   Medication Reason   • PREVIDENT 5000 SENSITIVE 1.1-5 % paste *Therapy completed         No follow-ups on file.

## 2020-12-09 LAB
ALBUMIN SERPL-MCNC: 4 G/DL (ref 3.8–4.8)
ALBUMIN/GLOB SERPL: 1.3 {RATIO} (ref 1.2–2.2)
ALP SERPL-CCNC: 93 IU/L (ref 39–117)
ALT SERPL-CCNC: 18 IU/L (ref 0–32)
AST SERPL-CCNC: 21 IU/L (ref 0–40)
BASOPHILS # BLD AUTO: 0 X10E3/UL (ref 0–0.2)
BASOPHILS NFR BLD AUTO: 0 %
BILIRUB SERPL-MCNC: 0.2 MG/DL (ref 0–1.2)
BUN SERPL-MCNC: 19 MG/DL (ref 8–27)
BUN/CREAT SERPL: 22 (ref 12–28)
CALCIUM SERPL-MCNC: 9.5 MG/DL (ref 8.7–10.3)
CHLORIDE SERPL-SCNC: 101 MMOL/L (ref 96–106)
CHOLEST SERPL-MCNC: 279 MG/DL (ref 100–199)
CO2 SERPL-SCNC: 24 MMOL/L (ref 20–29)
CREAT SERPL-MCNC: 0.86 MG/DL (ref 0.57–1)
EOSINOPHIL # BLD AUTO: 0.2 X10E3/UL (ref 0–0.4)
EOSINOPHIL NFR BLD AUTO: 2 %
ERYTHROCYTE [DISTWIDTH] IN BLOOD BY AUTOMATED COUNT: 14.1 % (ref 11.7–15.4)
GLOBULIN SER CALC-MCNC: 3.1 G/DL (ref 1.5–4.5)
GLUCOSE SERPL-MCNC: 92 MG/DL (ref 65–99)
HBA1C MFR BLD: 5.6 % (ref 4.8–5.6)
HCT VFR BLD AUTO: 42.2 % (ref 34–46.6)
HDLC SERPL-MCNC: 73 MG/DL
HGB BLD-MCNC: 13.7 G/DL (ref 11.1–15.9)
IMM GRANULOCYTES # BLD AUTO: 0 X10E3/UL (ref 0–0.1)
IMM GRANULOCYTES NFR BLD AUTO: 0 %
LABORATORY COMMENT REPORT: ABNORMAL
LDLC SERPL CALC-MCNC: 190 MG/DL (ref 0–99)
LDLC/HDLC SERPL: 2.6 RATIO (ref 0–3.2)
LYMPHOCYTES # BLD AUTO: 3 X10E3/UL (ref 0.7–3.1)
LYMPHOCYTES NFR BLD AUTO: 39 %
MCH RBC QN AUTO: 29.8 PG (ref 26.6–33)
MCHC RBC AUTO-ENTMCNC: 32.5 G/DL (ref 31.5–35.7)
MCV RBC AUTO: 92 FL (ref 79–97)
MONOCYTES # BLD AUTO: 0.5 X10E3/UL (ref 0.1–0.9)
MONOCYTES NFR BLD AUTO: 6 %
NEUTROPHILS # BLD AUTO: 4.1 X10E3/UL (ref 1.4–7)
NEUTROPHILS NFR BLD AUTO: 53 %
PLATELET # BLD AUTO: 313 X10E3/UL (ref 150–450)
POTASSIUM SERPL-SCNC: 4.5 MMOL/L (ref 3.5–5.2)
PROT SERPL-MCNC: 7.1 G/DL (ref 6–8.5)
RBC # BLD AUTO: 4.6 X10E6/UL (ref 3.77–5.28)
SODIUM SERPL-SCNC: 138 MMOL/L (ref 134–144)
T4 FREE SERPL-MCNC: 0.98 NG/DL (ref 0.82–1.77)
TRIGL SERPL-MCNC: 94 MG/DL (ref 0–149)
TSH SERPL DL<=0.005 MIU/L-ACNC: 3.6 UIU/ML (ref 0.45–4.5)
VLDLC SERPL CALC-MCNC: 16 MG/DL (ref 5–40)
WBC # BLD AUTO: 7.8 X10E3/UL (ref 3.4–10.8)

## 2021-01-03 ENCOUNTER — IMMUNIZATION (OUTPATIENT)
Dept: VACCINE CLINIC | Facility: HOSPITAL | Age: 61
End: 2021-01-03

## 2021-01-03 PROCEDURE — 91300 HC SARSCOV02 VAC 30MCG/0.3ML IM: CPT | Performed by: INTERNAL MEDICINE

## 2021-01-03 PROCEDURE — 0001A: CPT | Performed by: INTERNAL MEDICINE

## 2021-01-25 ENCOUNTER — IMMUNIZATION (OUTPATIENT)
Dept: VACCINE CLINIC | Facility: HOSPITAL | Age: 61
End: 2021-01-25

## 2021-01-25 PROCEDURE — 91300 HC SARSCOV02 VAC 30MCG/0.3ML IM: CPT | Performed by: INTERNAL MEDICINE

## 2021-01-25 PROCEDURE — 0002A: CPT | Performed by: INTERNAL MEDICINE

## 2021-02-02 RX ORDER — OMEPRAZOLE 40 MG/1
40 CAPSULE, DELAYED RELEASE ORAL DAILY
Qty: 90 CAPSULE | Refills: 0 | Status: SHIPPED | OUTPATIENT
Start: 2021-02-02 | End: 2021-05-31 | Stop reason: SDUPTHER

## 2021-02-23 ENCOUNTER — CLINICAL SUPPORT (OUTPATIENT)
Dept: ORTHOPEDIC SURGERY | Facility: CLINIC | Age: 61
End: 2021-02-23

## 2021-02-23 VITALS — BODY MASS INDEX: 47.8 KG/M2 | TEMPERATURE: 98 F | HEIGHT: 64 IN | WEIGHT: 280 LBS

## 2021-02-23 DIAGNOSIS — M17.0 PRIMARY OSTEOARTHRITIS OF BOTH KNEES: Primary | ICD-10-CM

## 2021-02-23 DIAGNOSIS — E66.01 OBESITY, CLASS III, BMI 40-49.9 (MORBID OBESITY) (HCC): ICD-10-CM

## 2021-02-23 DIAGNOSIS — R52 PAIN: ICD-10-CM

## 2021-02-23 PROCEDURE — 99213 OFFICE O/P EST LOW 20 MIN: CPT | Performed by: NURSE PRACTITIONER

## 2021-02-23 PROCEDURE — 73562 X-RAY EXAM OF KNEE 3: CPT | Performed by: NURSE PRACTITIONER

## 2021-02-23 PROCEDURE — 20610 DRAIN/INJ JOINT/BURSA W/O US: CPT | Performed by: NURSE PRACTITIONER

## 2021-02-23 RX ORDER — METHYLPREDNISOLONE ACETATE 80 MG/ML
80 INJECTION, SUSPENSION INTRA-ARTICULAR; INTRALESIONAL; INTRAMUSCULAR; SOFT TISSUE
Status: COMPLETED | OUTPATIENT
Start: 2021-02-23 | End: 2021-02-23

## 2021-02-23 RX ADMIN — METHYLPREDNISOLONE ACETATE 80 MG: 80 INJECTION, SUSPENSION INTRA-ARTICULAR; INTRALESIONAL; INTRAMUSCULAR; SOFT TISSUE at 09:30

## 2021-02-23 NOTE — PROGRESS NOTES
Patient Name: Zulema Sousa   YOB: 1960  Referring Primary Care Physician: Marsha Young MD  BMI: Body mass index is 48.06 kg/m².    Chief Complaint:    Chief Complaint   Patient presents with   • Left Knee - Follow-up, Pain   • Right Knee - Follow-up, Pain        HPI:     Zulema Sousa is a 60 y.o. female who presents today for evaluation of   Chief Complaint   Patient presents with   • Left Knee - Follow-up, Pain   • Right Knee - Follow-up, Pain   .  Patient presents for evaluation of bilateral knee pain.  She has history of osteoarthritis and has received cortisone injections in the past which are effective in relieving her pain.  She also takes Tylenol and uses topical ice, heat, and liniments as needed.  She would like to have total knee arthroplasty but cannot due to her current BMI of 48.06 and also has a history of DVT and pulmonary embolism.  She is on Xarelto chronically and so is unable to take anti-inflammatories.  She is inquiring about gel injections today.      Subjective   Medications:   Home Medications:  Current Outpatient Medications on File Prior to Visit   Medication Sig   • AFLURIA QUADRIVALENT 0.5 ML suspension prefilled syringe injection ADM 0.5ML IM UTD   • omeprazole (priLOSEC) 40 MG capsule TAKE ONE CAPSULE BY MOUTH DAILY   • rivaroxaban (Xarelto) 10 MG tablet Take 1 tablet by mouth Daily.   • solifenacin (VESICARE) 10 MG tablet Take 1 tablet by mouth Daily.     No current facility-administered medications on file prior to visit.      Current Medications:  Scheduled Meds:  Continuous Infusions:No current facility-administered medications for this visit.     PRN Meds:.    I have reviewed the patient's medical history in detail and updated the computerized patient record.  Review and summarization of old records includes:    Past Medical History:   Diagnosis Date   • Acid reflux    • Back pain    • Cardiac tamponade     March 2015   • Difficulty breathing      during exertion   • Edema    • Esophagitis, reflux    • Essential hypertriglyceridemia    • GERD (gastroesophageal reflux disease)    • Health care maintenance    • Heartburn    • Hyperlipidemia    • Hypertriglyceridemia    • Increased appetite    • Morbid obesity (CMS/[facility])    • Multiple joint pain    • Obesity, Class III, BMI 40-49.9 (morbid obesity) (CMS/[facility]) 2018   • Osteoarthritis of knee    • Pericarditis    • Pulmonary embolism (CMS/[facility])     ura2015   • RHA (rheumatoid arthritis) (CMS/[facility])    • Sciatica    • Sleep apnea    • Unintended weight gain         Past Surgical History:   Procedure Laterality Date   • ANKLE SURGERY      treatment of ankle fracture   • APPENDECTOMY     • CARDIAC SURGERY      cardiac tamponade   •  SECTION     • CHOLECYSTECTOMY     • COLONOSCOPY N/A 2018    Procedure: COLONOSCOPY to cecum and t.i. with polypectomy and clip x2 to ascending colon;  Surgeon: Pierre Ornelas MD;  Location: [facility] ENDOSCOPY;  Service: Gastroenterology   • DILATATION AND CURETTAGE     • ENDOSCOPY N/A 2018    Procedure: ESOPHAGOGASTRODUODENOSCOPY;  Surgeon: Pierre Ornelas MD;  Location: [facility] ENDOSCOPY;  Service: Gastroenterology   • GASTRIC BANDING REMOVAL N/A 2019    Procedure: GASTRIC BANDING AND PORT REMOVAL, LYSIS OF ADHESIONS, REPAIR OF COLOTOMY, AND INCISIONAL HERNIA REPAIR LAPAROSCOPIC;  Surgeon: Randell Salamanca Jr., MD;  Location: [facility] OR [facility];  Service: General   • LAPAROSCOPIC GASTRIC BANDING     • PULMONARY EMBOLISM SURGERY     • TUBAL ABDOMINAL LIGATION          Social History     Occupational History   • Occupation:      Employer: [employer]   Tobacco Use   • Smoking status: Light Tobacco Smoker     Packs/day: 0.25     Types: Cigarettes   • Smokeless tobacco: Never Used   • Tobacco comment: caffeine use   Substance and Sexual Activity   • Alcohol use: Yes     Alcohol/week: 1.0 standard drinks     Types: 1 Shots of  "liquor per week     Comment: social/ occassional   • Drug use: No   • Sexual activity: Defer      Social History     Social History Narrative   • Not on file        Family History   Problem Relation Age of Onset   • Cancer Mother    • Obesity Mother    • Diabetes Father    • Hypertension Father    • Heart disease Father    • Obesity Father    • Heart disease Brother    • Diabetes Paternal Grandfather    • Diabetes Paternal Aunt    • No Known Problems Maternal Grandmother    • No Known Problems Maternal Grandfather    • No Known Problems Paternal Grandmother    • Malig Hyperthermia Neg Hx    • Breast cancer Neg Hx        ROS: 14 point review of systems was performed and all other systems were reviewed and are negative except for documented findings in HPI and today's encounter.     Allergies:   Allergies   Allergen Reactions   • Hydrocodone Itching   • Nsaids Other (See Comments)     DOESN'T TAKE BECAUSE SHES ON BLOOD THINNER.     Constitutional:  Denies fever, shaking or chills   Eyes:  Denies change in visual acuity   HENT:  Denies nasal congestion or sore throat   Respiratory:  Denies cough or shortness of breath   Cardiovascular:  Denies chest pain or severe LE edema   GI:  Denies abdominal pain, nausea, vomiting, bloody stools or diarrhea   Musculoskeletal:  Numbness, tingling, pain, or loss of motor function only as noted above in history of present illness.  : Denies painful urination or hematuria  Integument:  Denies rash, lesion or ulceration   Neurologic:  Denies headache or focal weakness  Endocrine:  Denies lymphadenopathy  Psych:  Denies confusion or change in mental status   Hem:  Denies active bleeding    OBJECTIVE:  Physical Exam: 60 y.o. female  Wt Readings from Last 3 Encounters:   02/23/21 127 kg (280 lb)   12/08/20 127 kg (280 lb)   11/13/20 127 kg (279 lb)     Ht Readings from Last 1 Encounters:   02/23/21 162.6 cm (64\")     Body mass index is 48.06 kg/m².  Vitals:    02/23/21 1410   Temp: 98 " °F (36.7 °C)     Vital signs reviewed.     General Appearance:    Alert, cooperative, in no acute distress                  Eyes: conjunctiva clear  ENT: external ears and nose atraumatic  CV: no peripheral edema  Resp: normal respiratory effort  Skin: no rashes or wounds; normal turgor  Psych: mood and affect appropriate  Lymph: no nodes appreciated  Neuro: gross sensation intact  Vascular:  Palpable peripheral pulse in noted extremity  Musculoskeletal Extremities: Swelling, effusion, synovitis, medial lateral joint line tenderness to bilateral knees. Skin clean and intact.  Calves soft, nontender.    Radiology:   AP, lateral left knee, 40 degree PA obtained in office today due to pain with prior comparison shows advanced osteoarthritis of bilateral knees.    Assessment:     ICD-10-CM ICD-9-CM   1. Primary osteoarthritis of both knees  M17.0 715.16   2. Obesity, Class III, BMI 40-49.9 (morbid obesity) (CMS/[facility])  E66.01 278.01   3. Pain  R52 780.96        Large Joint Arthrocentesis: R knee  Date/Time: 2/23/2021 9:30 AM  Consent given by: patient  Site marked: site marked  Timeout: Immediately prior to procedure a time out was called to verify the correct patient, procedure, equipment, support staff and site/side marked as required   Supporting Documentation  Indications: pain   Procedure Details  Location: knee - R knee  Preparation: Patient was prepped and draped in the usual sterile fashion  Needle size: 22 G  Approach: anteromedial  Medications administered: 4 mL lidocaine (cardiac); 80 mg methylPREDNISolone acetate 80 MG/ML      Large Joint Arthrocentesis: L knee  Date/Time: 2/23/2021 9:30 AM  Consent given by: patient  Site marked: site marked  Timeout: Immediately prior to procedure a time out was called to verify the correct patient, procedure, equipment, support staff and site/side marked as required   Supporting Documentation  Indications: pain   Procedure Details  Location: knee - L knee  Preparation:  Patient was prepped and draped in the usual sterile fashion  Needle size: 22 G  Approach: anteromedial  Medications administered: 4 mL lidocaine (cardiac); 80 mg methylPREDNISolone acetate 80 MG/ML  Patient tolerance: patient tolerated the procedure well with no immediate complications             MDM/Plan:     The diagnosis(es), natural history, pathophysiology and treatment for diagnosis(es) were discussed. Opportunity given and questions answered.  Biomechanics of pertinent body areas discussed.  When appropriate, the use of ambulatory aids discussed.  MEDICATIONS:  The risks, benefits, warnings,side effects and alternatives of medications discussed.  Inflammation/pain control; with cold, heat, elevation and/or liniments discussed as appropriate  Cortisone Injection. See procedure note.  Proceeded with cortisone injections bilaterally today.  Patient inquiring about gel injections.  We discussed current evidence and recommendations on them.  Given that she is not a surgical candidate at this time she may want to consider them in the future.  Instructed her to call the office and we could order them prior to her appointments.  Offered a refresher of physical therapy but she declines at this time.  Encouraged home exercise program.  We will see her back as needed.        2/23/2021    Much of this encounter note is an electronic transcription/translation of spoken language to printed text. The electronic translation of spoken language may permit erroneous, or at times, nonsensical words or phrases to be inadvertently transcribed; Although I have reviewed the note for such errors, some may still exist

## 2021-03-16 ENCOUNTER — TELEPHONE (OUTPATIENT)
Dept: INTERNAL MEDICINE | Facility: CLINIC | Age: 61
End: 2021-03-16

## 2021-03-16 ENCOUNTER — TELEPHONE (OUTPATIENT)
Dept: CARDIOLOGY | Facility: CLINIC | Age: 61
End: 2021-03-16

## 2021-03-16 NOTE — TELEPHONE ENCOUNTER
Pt advised and verbalized understanding.  She is going to call the office and see about scheduling a f/u prior to November 2021.

## 2021-03-16 NOTE — TELEPHONE ENCOUNTER
Caller: Zulema Sousa    Relationship to patient: Self    Best call back number: [phone]    Patient is needing: PATIENT CALLING TO DISCUSS RECENT BLOOD WORK/LABS AND POTENTIAL OPTIONS. HER CARDIOLOGIST SUGGESTED SHE SHOULD PRESCRIBED ATORVASTATIN. PLEASE CALL PATIENT BACK TO DISCUSS FURTHER.

## 2021-03-16 NOTE — TELEPHONE ENCOUNTER
Pt called and wanted to know if it's okay for her to take a statin drug?  PCP did labs (Dec 2020), on chart.  , total 279.    Thank you,    Pavithra Brown, CMA

## 2021-03-19 ENCOUNTER — TELEPHONE (OUTPATIENT)
Dept: INTERNAL MEDICINE | Facility: CLINIC | Age: 61
End: 2021-03-19

## 2021-03-19 DIAGNOSIS — E78.2 MIXED HYPERLIPIDEMIA: Primary | ICD-10-CM

## 2021-03-19 RX ORDER — ATORVASTATIN CALCIUM 20 MG/1
20 TABLET, FILM COATED ORAL DAILY
Qty: 90 TABLET | Refills: 1 | Status: SHIPPED | OUTPATIENT
Start: 2021-03-19 | End: 2021-09-17 | Stop reason: SDUPTHER

## 2021-03-19 NOTE — TELEPHONE ENCOUNTER
Agree with need for statin as ascvd risk score 8.4% and .  Will send in lipitor.  Recheck labs at pt's f/u in 3 mo.  Pt to call if any cramps, myalgias or adverse effects from med.  rx sent    The 10-year ASCVD risk score (Kobe BROWN Jr., et al., 2013) is: 8.4%    Values used to calculate the score:      Age: 60 years      Sex: Female      Is Non- : No      Diabetic: No      Tobacco smoker: Yes      Systolic Blood Pressure: 132 mmHg      Is BP treated: No      HDL Cholesterol: 73 mg/dL      Total Cholesterol: 279 mg/dL

## 2021-03-19 NOTE — TELEPHONE ENCOUNTER
----- Message from Gila Galloway MA sent at 3/18/2021  7:35 AM EDT -----  Regarding: FW: Test Results Question  Contact: [phone]    ----- Message -----  From: Zulema Sousa  Sent: 3/17/2021   3:12 PM EDT  To: [facility] Young Clinical Pool  Subject: Test Results Question                            Dr. Young:   I have asked Dr. Hutchinson if she feels I need to be on a cholesterol lowering medication and after seeing my labs she feels I definitely should.  Her office asked that you prescribe this until I make an appointment to see her.     Thanks!    Zulema Sousa.

## 2021-04-02 ENCOUNTER — TELEPHONE (OUTPATIENT)
Dept: ORTHOPEDIC SURGERY | Facility: CLINIC | Age: 61
End: 2021-04-02

## 2021-04-07 ENCOUNTER — OFFICE VISIT (OUTPATIENT)
Dept: ORTHOPEDIC SURGERY | Facility: CLINIC | Age: 61
End: 2021-04-07

## 2021-04-07 ENCOUNTER — HOSPITAL ENCOUNTER (OUTPATIENT)
Dept: CARDIOLOGY | Facility: HOSPITAL | Age: 61
Discharge: HOME | End: 2021-04-07
Admitting: NURSE PRACTITIONER

## 2021-04-07 VITALS — HEIGHT: 64 IN | BODY MASS INDEX: 47.8 KG/M2 | TEMPERATURE: 97.3 F | WEIGHT: 280 LBS

## 2021-04-07 DIAGNOSIS — M79.89 LEG SWELLING: ICD-10-CM

## 2021-04-07 DIAGNOSIS — E66.01 OBESITY, CLASS III, BMI 40-49.9 (MORBID OBESITY) (HCC): ICD-10-CM

## 2021-04-07 DIAGNOSIS — Z98.890 HISTORY OF OPEN REDUCTION AND INTERNAL FIXATION (ORIF) PROCEDURE: ICD-10-CM

## 2021-04-07 DIAGNOSIS — S92.902A CLOSED FRACTURE OF LEFT FOOT, INITIAL ENCOUNTER: Primary | ICD-10-CM

## 2021-04-07 DIAGNOSIS — Z86.718 HISTORY OF DVT (DEEP VEIN THROMBOSIS): ICD-10-CM

## 2021-04-07 LAB
BH CV LOWER VASCULAR LEFT COMMON FEMORAL AUGMENT: NORMAL
BH CV LOWER VASCULAR LEFT COMMON FEMORAL COMPETENT: NORMAL
BH CV LOWER VASCULAR LEFT COMMON FEMORAL COMPRESS: NORMAL
BH CV LOWER VASCULAR LEFT COMMON FEMORAL PHASIC: NORMAL
BH CV LOWER VASCULAR LEFT COMMON FEMORAL SPONT: NORMAL
BH CV LOWER VASCULAR LEFT DISTAL FEMORAL COMPRESS: NORMAL
BH CV LOWER VASCULAR LEFT GASTRONEMIUS COMPRESS: NORMAL
BH CV LOWER VASCULAR LEFT GREATER SAPH AK COMPRESS: NORMAL
BH CV LOWER VASCULAR LEFT GREATER SAPH BK COMPRESS: NORMAL
BH CV LOWER VASCULAR LEFT LESSER SAPH COMPRESS: NORMAL
BH CV LOWER VASCULAR LEFT MID FEMORAL AUGMENT: NORMAL
BH CV LOWER VASCULAR LEFT MID FEMORAL COMPETENT: NORMAL
BH CV LOWER VASCULAR LEFT MID FEMORAL COMPRESS: NORMAL
BH CV LOWER VASCULAR LEFT MID FEMORAL PHASIC: NORMAL
BH CV LOWER VASCULAR LEFT MID FEMORAL SPONT: NORMAL
BH CV LOWER VASCULAR LEFT PERONEAL COMPRESS: NORMAL
BH CV LOWER VASCULAR LEFT POPLITEAL AUGMENT: NORMAL
BH CV LOWER VASCULAR LEFT POPLITEAL COMPETENT: NORMAL
BH CV LOWER VASCULAR LEFT POPLITEAL COMPRESS: NORMAL
BH CV LOWER VASCULAR LEFT POPLITEAL PHASIC: NORMAL
BH CV LOWER VASCULAR LEFT POPLITEAL SPONT: NORMAL
BH CV LOWER VASCULAR LEFT POSTERIOR TIBIAL COMPRESS: NORMAL
BH CV LOWER VASCULAR LEFT PROFUNDA FEMORAL COMPRESS: NORMAL
BH CV LOWER VASCULAR LEFT PROXIMAL FEMORAL COMPRESS: NORMAL
BH CV LOWER VASCULAR LEFT SAPHENOFEMORAL JUNCTION COMPRESS: NORMAL
BH CV LOWER VASCULAR RIGHT COMMON FEMORAL AUGMENT: NORMAL
BH CV LOWER VASCULAR RIGHT COMMON FEMORAL COMPETENT: NORMAL
BH CV LOWER VASCULAR RIGHT COMMON FEMORAL COMPRESS: NORMAL
BH CV LOWER VASCULAR RIGHT COMMON FEMORAL PHASIC: NORMAL
BH CV LOWER VASCULAR RIGHT COMMON FEMORAL SPONT: NORMAL

## 2021-04-07 PROCEDURE — 99214 OFFICE O/P EST MOD 30 MIN: CPT | Performed by: NURSE PRACTITIONER

## 2021-04-07 PROCEDURE — 93971 EXTREMITY STUDY: CPT

## 2021-04-07 RX ORDER — DIPHENOXYLATE HYDROCHLORIDE AND ATROPINE SULFATE 2.5; .025 MG/1; MG/1
TABLET ORAL DAILY
COMMUNITY

## 2021-04-07 NOTE — PATIENT INSTRUCTIONS
Metatarsal Fracture  A metatarsal fracture is a break in one of the five bones that connect the toes to the rest of the foot. This may also be called a forefoot fracture. A metatarsal fracture may be:  · A crack in the surface of the bone (stress fracture). This often occurs in athletes.  · A break all the way through the bone (complete fracture).  The bone that connects to the little toe (fifth metatarsal) is most commonly fractured. Ballet dancers often fracture this bone.  What are the causes?  A metatarsal fracture may be caused by:  · Sudden twisting of the foot.  · Falling onto the foot.  · Something heavy falling onto the foot.  · Overuse or repetitive exercise.  What increases the risk?  This condition is more likely to develop in people who:  · Play contact sports.  · Do ballet.  · Have a condition that causes the bones to become thin and brittle (osteoporosis).  · Have a low calcium level.  What are the signs or symptoms?  Symptoms of this condition include:  · Pain that gets worse when walking or standing.  · Pain when pressing on the foot or moving the toes.  · Swelling.  · Bruising on the top or bottom of the foot.  How is this diagnosed?  This condition may be diagnosed based on:  · Your symptoms.  · Any recent foot injuries you have had.  · A physical exam.  · An X-ray of your foot. If you have a stress fracture, it may not show up on an X-ray, and you may need other imaging tests, such as:  ? A bone scan.  ? CT scan.  ? MRI.  How is this treated?  Treatment depends on how severe your fracture is and how the pieces of the broken bone line up with each other (alignment). Treatment may involve:  · Wearing a cast, splint, or supportive boot on your foot.  · Using crutches, and not putting any weight on your foot.  · Having surgery to align broken bones (open reduction and internal fixation, ORIF).  · Physical therapy.  · Follow-up visits and X-rays to make sure you are healing.  Follow these instructions  at home:  If you have a splint or a supportive boot:  · Wear the splint or boot as told by your health care provider. Remove it only as told by your health care provider.  · Loosen the splint or boot if your toes tingle, become numb, or turn cold and blue.  · Keep the splint or boot clean.  · If your splint or boot is not waterproof:  ? Do not let it get wet.  ? Cover it with a watertight covering when you take a bath or a shower.  If you have a cast:  · Do not stick anything inside the cast to scratch your skin. Doing that increases your risk for infection.  · Check the skin around the cast every day. Tell your health care provider about any concerns.  · You may put lotion on dry skin around the edges of the cast. Do not put lotion on the skin underneath the cast.  · Keep the cast clean.  · If the cast is not waterproof:  ? Do not let it get wet.  ? Cover it with a watertight covering when you take a bath or a shower.  Activity  · Do not use your affected leg to support your body weight until your health care provider says that you can. Use crutches as directed.  · Ask your health care provider what activities are safe for you during recovery, and ask what activities you need to avoid.  · Do physical therapy exercises as directed.  Driving  · Do not drive or use heavy machinery while taking pain medicine.  · Do not drive while wearing a cast, splint, or boot on a foot that you use for driving.  Managing pain, stiffness, and swelling    · If directed, put ice on painful areas:  ? Put ice in a plastic bag.  ? Place a towel between your skin and the bag.  § If you have a removable splint or boot, remove it as told by your health care provider.  § If you have a cast, place a towel between your cast and the bag.  ? Leave the ice on for 20 minutes, 2-3 times a day.  · Move your toes often to avoid stiffness and to lessen swelling.  · Raise (elevate) your lower leg above the level of your heart while you are sitting or  lying down.  General instructions  · Do not put pressure on any part of the cast or splint until it is fully hardened. This may take several hours.  · Take over-the-counter and prescription medicines only as told by your health care provider.  · Do not use any products that contain nicotine or tobacco, such as cigarettes and e-cigarettes. These can delay bone healing. If you need help quitting, ask your health care provider.  · Do not take baths, swim, or use a hot tub until your health care provider approves. Ask your health care provider if you may take showers.  · Keep all follow-up visits as told by your health care provider. This is important.  Contact a health care provider if you have:  · Pain that gets worse or does not get better with medicine.  · A fever.  · A bad smell coming from your cast or splint.  Get help right away if you have:  · Any of the following in your toes or your foot, even after loosening your splint (if applicable):  ? Numbness.  ? Tingling.  ? Coldness.  ? Blue skin.  · Redness or swelling that gets worse.  · Pain that suddenly becomes severe.  Summary  · A metatarsal fracture is a break in one of the five bones that connect the toes to the rest of the foot.  · Treatment depends on how severe your fracture is and how the pieces of the broken bone line up with each other (alignment). This may include wearing a cast, splint, or supportive boot, or using crutches. Sometimes surgery is needed to align the bones.  · Ice and elevate your foot to help lessen the pain and swelling.  · Make sure you know what symptoms should cause you to get help right away.  This information is not intended to replace advice given to you by your health care provider. Make sure you discuss any questions you have with your health care provider.  Document Revised: 04/09/2020 Document Reviewed: 01/14/2019  Elsevier Patient Education © 2021 [employer]

## 2021-04-07 NOTE — PROGRESS NOTES
Patient Name: Zulema Sousa   YOB: 1960  Referring Primary Care Physician: Marsha Young MD  BMI: Body mass index is 48.06 kg/m².    Chief Complaint:    Chief Complaint   Patient presents with   • Left Foot - Pain        HPI: Pt presents with foot pain on Left foot. Pt had been walking a lot 3/29  1 week ago and spent a long day with grandchildren and went to [facility] and was told had a foot fracture.  Patient has history of multiple DVTs and pulmonary embolism.  She is on Xarelto.  Her doctor recently cut the dose in half.  She has had an ORIF of her left ankle by Dr. Sammy Cano in 2009.  She denies a history of osteopenia or osteoporosis and has had a bone scan in the past.  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    Zulema Sousa is a 60 y.o. female who presents today for evaluation of   Chief Complaint   Patient presents with   • Left Foot - Pain   .      Subjective   Medications:   Home Medications:  Current Outpatient Medications on File Prior to Visit   Medication Sig   • atorvastatin (Lipitor) 20 MG tablet Take 1 tablet by mouth Daily.   • multivitamin (MULTI-VITAMIN PO) Take  by mouth Daily.   • omeprazole (priLOSEC) 40 MG capsule TAKE ONE CAPSULE BY MOUTH DAILY   • rivaroxaban (Xarelto) 10 MG tablet Take 1 tablet by mouth Daily.   • solifenacin (VESICARE) 10 MG tablet Take 1 tablet by mouth Daily.   • AFLURIA QUADRIVALENT 0.5 ML suspension prefilled syringe injection ADM 0.5ML IM UTD     No current facility-administered medications on file prior to visit.     Current Medications:  Scheduled Meds:  Continuous Infusions:No current facility-administered medications for this visit.    PRN Meds:.    I have reviewed the patient's medical history in detail and updated the computerized patient record.  Review and summarization of old records includes:    Past Medical History:   Diagnosis Date   • Acid reflux    • Back pain    • Cardiac  tamponade     2015   • Difficulty breathing     during exertion   • Edema    • Esophagitis, reflux    • Essential hypertriglyceridemia    • GERD (gastroesophageal reflux disease)    • Health care maintenance    • Heartburn    • Hyperlipidemia    • Hypertriglyceridemia    • Increased appetite    • Morbid obesity (CMS/[facility])    • Multiple joint pain    • Obesity, Class III, BMI 40-49.9 (morbid obesity) (CMS/[facility]) 2018   • Osteoarthritis of knee    • Pericarditis    • Pulmonary embolism (CMS/[facility])     2015   • RHA (rheumatoid arthritis) (CMS/[facility])    • Sciatica    • Sleep apnea    • Unintended weight gain         Past Surgical History:   Procedure Laterality Date   • ANKLE SURGERY      treatment of ankle fracture   • APPENDECTOMY     • CARDIAC SURGERY      cardiac tamponade   •  SECTION     • CHOLECYSTECTOMY     • COLONOSCOPY N/A 2018    Procedure: COLONOSCOPY to cecum and t.i. with polypectomy and clip x2 to ascending colon;  Surgeon: Pierre Ornelas MD;  Location: [facility] ENDOSCOPY;  Service: Gastroenterology   • DILATATION AND CURETTAGE     • ENDOSCOPY N/A 2018    Procedure: ESOPHAGOGASTRODUODENOSCOPY;  Surgeon: Pierre Ornelas MD;  Location: [facility] ENDOSCOPY;  Service: Gastroenterology   • GASTRIC BANDING REMOVAL N/A 2019    Procedure: GASTRIC BANDING AND PORT REMOVAL, LYSIS OF ADHESIONS, REPAIR OF COLOTOMY, AND INCISIONAL HERNIA REPAIR LAPAROSCOPIC;  Surgeon: Randell Salamanca Jr., MD;  [place] [facility] [place] [facility]  Service: General   • LAPAROSCOPIC GASTRIC BANDING     • PULMONARY EMBOLISM SURGERY     • TUBAL ABDOMINAL LIGATION          Social History     Occupational History   • Occupation:      Employer: [employer]   Tobacco Use   • Smoking status: Former Smoker     Years: 10.00     Types: Cigarettes     Quit date:      Years since quittin.2   • Smokeless tobacco: Never Used   • Tobacco comment: caffeine use   Substance and  Sexual Activity   • Alcohol use: Yes     Alcohol/week: 1.0 standard drinks     Types: 1 Shots of liquor per week     Comment: social/ occassional   • Drug use: No   • Sexual activity: Defer      Social History     Social History Narrative   • Not on file        Family History   Problem Relation Age of Onset   • Cancer Mother    • Obesity Mother    • Diabetes Father    • Hypertension Father    • Heart disease Father    • Obesity Father    • Heart disease Brother    • Diabetes Paternal Grandfather    • Diabetes Paternal Aunt    • No Known Problems Maternal Grandmother    • No Known Problems Maternal Grandfather    • No Known Problems Paternal Grandmother    • Malig Hyperthermia Neg Hx    • Breast cancer Neg Hx        ROS: 14 point review of systems was performed and all other systems were reviewed and are negative except for documented findings in HPI and today's encounter.     Allergies:   Allergies   Allergen Reactions   • Hydrocodone Itching   • Nsaids Other (See Comments)     DOESN'T TAKE BECAUSE SHES ON BLOOD THINNER.     Constitutional:  Denies fever, shaking or chills   Eyes:  Denies change in visual acuity   HENT:  Denies nasal congestion or sore throat   Respiratory:  Denies cough or shortness of breath   Cardiovascular:  Denies chest pain or severe LE edema   GI:  Denies abdominal pain, nausea, vomiting, bloody stools or diarrhea   Musculoskeletal:  Numbness, tingling, pain, or loss of motor function only as noted above in history of present illness.  : Denies painful urination or hematuria  Integument:  Denies rash, lesion or ulceration   Neurologic:  Denies headache or focal weakness  Endocrine:  Denies lymphadenopathy  Psych:  Denies confusion or change in mental status   Hem:  Denies active bleeding    OBJECTIVE:  Physical Exam: 60 y.o. female  Wt Readings from Last 3 Encounters:   04/07/21 127 kg (280 lb)   04/01/21 127 kg (280 lb)   02/23/21 127 kg (280 lb)     Ht Readings from Last 1 Encounters:  "  04/07/21 162.6 cm (64\")     Body mass index is 48.06 kg/m².  Vitals:    04/07/21 1338   Temp: 97.3 °F (36.3 °C)     Vital signs reviewed.     General Appearance:    Alert, cooperative, in no acute distress                  Eyes: conjunctiva clear  ENT: external ears and nose atraumatic  CV: no peripheral edema  Resp: normal respiratory effort  Skin: no rashes or wounds; normal turgor  Psych: mood and affect appropriate  Lymph: no nodes appreciated  Neuro: gross sensation intact  Vascular:  Palpable peripheral pulse in noted extremity  Musculoskeletal Extremities: Skin is warm dry and intact with good pulses movement and sensation she has swelling in the left calf and foot no pitting edema knee is nontender hip is nontender she weightbears with discomfort in the foot in comparison her left calf is taut compared to her right she has point tenderness to the base of the left metatarsal fifth ankle has prior scars over the ORIF was performed that are nontender ankle appears stable.  She is tender to the top of her foot but primarily at the base the fifth metatarsal  Radiology: X-rays reviewed from [facility] care center patient brought the disc there is a fracture at the base of the fifth metatarsal of the left foot      Procedures  Had Reji fit her with a fracture walker boot that was large enough to accommodate her calf and small foot ambulate with a cane get a stat Doppler to rule out DVT and will have her follow-up with Dr. Cano  Off work to elevate foot and rest until recheck.  Assessment:     ICD-10-CM ICD-9-CM   1. Closed fracture of left foot, initial encounter  S92.902A 825.20   2. History of DVT (deep vein thrombosis)  Z86.718 V12.51   3. History of open reduction and internal fixation (ORIF) procedure  Z98.890 V15.29   4. Leg swelling  M79.89 729.81   5. Obesity, Class III, BMI 40-49.9 (morbid obesity) (CMS/[facility])  E66.01 278.01        MDM/Plan:   The diagnosis(es), natural history, pathophysiology and " treatment for diagnosis(es) were discussed. Opportunity given and questions answered.  Biomechanics of pertinent body areas discussed.  When appropriate, the use of ambulatory aids discussed.    The diagnosis(es), natural history, pathophysiology and treatment for diagnosis(es) were discussed. Opportunity given and questions answered.  Biomechanics of pertinent body areas discussed.  When appropriate, the use of ambulatory aids discussed.  BMI:  The concept of BMI body mass index and its importance and implications discussed.    EXERCISES:  Advice on benefits of, and types of regular/moderate exercise pertaining to orthopedic diagnosis(es).  MEDICATIONS:  The risks, benefits, warnings,side effects and alternatives of medications discussed.  Inflammation/pain control; with cold, heat, elevation and/or liniments discussed as appropriate  SPECIALTY REFERRAL  MEDICAL RECORDS reviewed from other provider(s) for past and current medical history pertinent to this complaint.      4/7/2021    Much of this encounter note is an electronic transcription/translation of spoken language to printed text. The electronic translation of spoken language may permit erroneous, or at times, nonsensical words or phrases to be inadvertently transcribed; Although I have reviewed the note for such errors, some may still exist

## 2021-04-08 ENCOUNTER — TELEPHONE (OUTPATIENT)
Dept: ORTHOPEDIC SURGERY | Facility: CLINIC | Age: 61
End: 2021-04-08

## 2021-04-08 NOTE — TELEPHONE ENCOUNTER
Spoke with pt and she was informed of [facility]'s instructions.  She will have the MRI as scheduled.

## 2021-04-08 NOTE — TELEPHONE ENCOUNTER
----- Message from LEDY Pickering sent at 4/8/2021  9:10 AM EDT -----  Normal doppler - no DVT - elevate, elevate, elevate and lets get the MRI - cim  ----- Message -----  From: Bayron Crain MD  Sent: 4/7/2021   5:18 PM EDT  To: LEDY Pickering

## 2021-04-21 ENCOUNTER — HOSPITAL ENCOUNTER (OUTPATIENT)
Dept: MRI IMAGING | Facility: HOSPITAL | Age: 61
End: 2021-04-21

## 2021-04-29 ENCOUNTER — TELEPHONE (OUTPATIENT)
Dept: ORTHOPEDIC SURGERY | Facility: CLINIC | Age: 61
End: 2021-04-29

## 2021-04-29 ENCOUNTER — HOSPITAL ENCOUNTER (OUTPATIENT)
Dept: MRI IMAGING | Facility: HOSPITAL | Age: 61
Discharge: HOME | End: 2021-04-29
Admitting: NURSE PRACTITIONER

## 2021-04-29 DIAGNOSIS — S92.902A CLOSED FRACTURE OF LEFT FOOT, INITIAL ENCOUNTER: Primary | ICD-10-CM

## 2021-04-29 DIAGNOSIS — S92.902A CLOSED FRACTURE OF LEFT FOOT, INITIAL ENCOUNTER: ICD-10-CM

## 2021-04-29 PROCEDURE — 73718 MRI LOWER EXTREMITY W/O DYE: CPT

## 2021-04-29 NOTE — TELEPHONE ENCOUNTER
----- Message from LEDY Pickering sent at 4/29/2021  2:53 PM EDT -----  Fifth metatarsal base fracture with swelling and edema -arthritis - follow up sports medicine       nondisplaced and likely incomplete fifth metatarsal base fracture with edema involving the proximal 3 to 4 cm of metatarsal. Moderate amount of adjacent and overlying soft tissue swelling and edema.        ----- Message -----  From: Interface, Rad Results [place] In  Sent: 4/29/2021   2:19 PM EDT  To: LEDY Pickering

## 2021-05-07 ENCOUNTER — OFFICE VISIT (OUTPATIENT)
Dept: SPORTS MEDICINE | Facility: CLINIC | Age: 61
End: 2021-05-07

## 2021-05-07 VITALS
OXYGEN SATURATION: 97 % | TEMPERATURE: 97.7 F | DIASTOLIC BLOOD PRESSURE: 84 MMHG | HEART RATE: 56 BPM | HEIGHT: 64 IN | WEIGHT: 280 LBS | SYSTOLIC BLOOD PRESSURE: 136 MMHG | BODY MASS INDEX: 47.8 KG/M2 | RESPIRATION RATE: 16 BRPM

## 2021-05-07 DIAGNOSIS — E55.9 VITAMIN D DEFICIENCY: ICD-10-CM

## 2021-05-07 DIAGNOSIS — S92.355A NONDISPLACED FRACTURE OF FIFTH METATARSAL BONE, LEFT FOOT, INITIAL ENCOUNTER FOR CLOSED FRACTURE: Primary | ICD-10-CM

## 2021-05-07 PROCEDURE — 99204 OFFICE O/P NEW MOD 45 MIN: CPT | Performed by: FAMILY MEDICINE

## 2021-05-07 PROCEDURE — 73630 X-RAY EXAM OF FOOT: CPT | Performed by: FAMILY MEDICINE

## 2021-05-07 RX ORDER — ERGOCALCIFEROL 1.25 MG/1
50000 CAPSULE ORAL WEEKLY
Qty: 5 CAPSULE | Refills: 3 | Status: SHIPPED | OUTPATIENT
Start: 2021-05-07 | End: 2022-02-11 | Stop reason: SDUPTHER

## 2021-05-07 NOTE — PROGRESS NOTES
"Chief Complaint  Foot Pain (Foot pain, review MRI)    Subjective          Zulema Sousa presents to [facility]  History of Present Illness  L lateral foot pain for 8 weeks.  Eventually had this evaluated on 4/1/2021 revealing a nondisplaced fracture base fifth metatarsal left.  Ppatient had recent MRI on 4/29/2021 showing nondisplaced likely incomplete fifth metatarsal fracture base with some surrounding edema soft tissue swelling.  Also mild multifocal arthritic changes in the midfoot and first MTP.  Patient has been in a tall cam boot for past 4 weeks, she reports that it is feeling somewhat better.  Most of her pain is located over the left lateral foot and the dorsal aspect of the lateral side of the foot.  Objective   Vital Signs:   /84 (BP Location: Left arm, Patient Position: Sitting, Cuff Size: Adult)   Pulse 56   Temp 97.7 °F (36.5 °C)   Resp 16   Ht 162.6 cm (64\")   Wt 127 kg (280 lb)   SpO2 97%   BMI 48.06 kg/m²     Physical Exam  Vitals reviewed.   Constitutional:       Appearance: She is well-developed.   HENT:      Head: Normocephalic and atraumatic.   Eyes:      Conjunctiva/sclera: Conjunctivae normal.      Pupils: Pupils are equal, round, and reactive to light.   Cardiovascular:      Comments:     Pulmonary:      Effort: Pulmonary effort is normal.   Musculoskeletal:      Comments: Left foot with tenderness base of the fifth metatarsal laterally and on the plantar surface.   Skin:     General: Skin is warm and dry.   Neurological:      Mental Status: She is alert and oriented to person, place, and time.   Psychiatric:         Behavior: Behavior normal.        Result Review :                MRI Foot Left Without Contrast (04/29/2021 07:55) reviewed films and report  XR Foot 3+ View Left (04/01/2021 18:44) reviewed report, films not available  Left Foot X-Ray  Indication: Pain  AP, Lateral, and Oblique views    Findings:  No fracture  No bony " lesion  Normal soft tissues  Evidence of the nondisplaced fracture base of fifth metatarsal with perhaps some very early callus formation but not to the degree I would expect at this point.  And where from previous tib-fib fracture.  Arthritic changes to the midfoot and first MTP.      No prior studies were available for comparison.  Vitamin D 25 Hydroxy (05/03/2019 10:51)  Progress Notes by Peggy Sy APRN (04/07/2021 13:00)    Assessment and Plan    Diagnoses and all orders for this visit:    1. Nondisplaced fracture of fifth metatarsal bone, left foot, initial encounter for closed fracture (Primary)  -     XR Foot 3+ View Left  -     Osteogenesis Stimulator  -     vitamin D (ERGOCALCIFEROL) 1.25 MG (07089 UT) capsule capsule; Take 1 capsule by mouth 1 (One) Time Per Week.  Dispense: 5 capsule; Refill: 3    2. Vitamin D deficiency  -     vitamin D (ERGOCALCIFEROL) 1.25 MG (36880 UT) capsule capsule; Take 1 capsule by mouth 1 (One) Time Per Week.  Dispense: 5 capsule; Refill: 3    I reviewed vitamin D level from 5/3/2019 which was low at 20.  We will keep patient in tall cam boot, start vitamin D 50,000 IUs weekly, if insurance allows will also use a bone stimulator.  Follow-up 4 weeks.    Follow Up   Return in about 4 weeks (around 6/4/2021).  Patient was given instructions and counseling regarding her condition or for health maintenance advice. Please see specific information pulled into the AVS if appropriate.

## 2021-05-25 ENCOUNTER — CLINICAL SUPPORT (OUTPATIENT)
Dept: ORTHOPEDIC SURGERY | Facility: CLINIC | Age: 61
End: 2021-05-25

## 2021-05-25 VITALS — WEIGHT: 280 LBS | HEIGHT: 64 IN | TEMPERATURE: 98 F | BODY MASS INDEX: 47.8 KG/M2

## 2021-05-25 DIAGNOSIS — R52 PAIN: ICD-10-CM

## 2021-05-25 DIAGNOSIS — M17.0 ARTHRITIS OF BOTH KNEES: Primary | ICD-10-CM

## 2021-05-25 DIAGNOSIS — M51.36 DDD (DEGENERATIVE DISC DISEASE), LUMBAR: ICD-10-CM

## 2021-05-25 DIAGNOSIS — M70.62 TROCHANTERIC BURSITIS OF LEFT HIP: ICD-10-CM

## 2021-05-25 PROCEDURE — 73501 X-RAY EXAM HIP UNI 1 VIEW: CPT | Performed by: NURSE PRACTITIONER

## 2021-05-25 PROCEDURE — 99213 OFFICE O/P EST LOW 20 MIN: CPT | Performed by: NURSE PRACTITIONER

## 2021-05-25 PROCEDURE — 20610 DRAIN/INJ JOINT/BURSA W/O US: CPT | Performed by: NURSE PRACTITIONER

## 2021-05-25 PROCEDURE — 73562 X-RAY EXAM OF KNEE 3: CPT | Performed by: NURSE PRACTITIONER

## 2021-05-25 RX ORDER — METHYLPREDNISOLONE ACETATE 80 MG/ML
80 INJECTION, SUSPENSION INTRA-ARTICULAR; INTRALESIONAL; INTRAMUSCULAR; SOFT TISSUE
Status: COMPLETED | OUTPATIENT
Start: 2021-05-25 | End: 2021-05-25

## 2021-05-25 RX ORDER — LIDOCAINE HYDROCHLORIDE 20 MG/ML
4 INJECTION, SOLUTION EPIDURAL; INFILTRATION; INTRACAUDAL; PERINEURAL
Status: COMPLETED | OUTPATIENT
Start: 2021-05-25 | End: 2021-05-25

## 2021-05-25 RX ADMIN — LIDOCAINE HYDROCHLORIDE 4 ML: 20 INJECTION, SOLUTION EPIDURAL; INFILTRATION; INTRACAUDAL; PERINEURAL at 14:49

## 2021-05-25 RX ADMIN — METHYLPREDNISOLONE ACETATE 80 MG: 80 INJECTION, SUSPENSION INTRA-ARTICULAR; INTRALESIONAL; INTRAMUSCULAR; SOFT TISSUE at 14:49

## 2021-05-25 NOTE — PROGRESS NOTES
Patient Name: Zulema Sousa   YOB: 1960  Referring Primary Care Physician: Marsha Young MD  BMI: Body mass index is 48.06 kg/m².    Chief Complaint:    Chief Complaint   Patient presents with   • Left Knee - Pain, Edema   • Right Knee - Pain, Edema        HPI:     Zulema Sousa is a 61 y.o. female who presents today for evaluation of   Chief Complaint   Patient presents with   • Left Knee - Pain, Edema   • Right Knee - Pain, Edema   .  Patient presents for follow-up of bilateral knee pain.  She reports an achy pain in bilateral knees with intermittent stiffness and swelling.  She has known history of osteoarthritis and has received cortisone injections in the past which provide adequate relief for her pain.  She also uses Tylenol and topical ice, heat, and liniments as needed for pain control.  Patient recently had a metatarsal fracture in her left foot.  She is followed by sports medicine and is completely healed and she is out of her boot.  She does report since her fracture occurred she has had pain in her left lateral hip.  It is an achy pain that radiates down to the knee.  She has been sitting a lot due to her fracture and has not been as active and she thinks this may be contributing to it.  She also endorses a history of some lower back issues for which she is to see Dr. Vaughn for.  She denies any specific injury or trauma to the hip.  She denies any groin pain.      Subjective   Medications:   Home Medications:  Current Outpatient Medications on File Prior to Visit   Medication Sig   • atorvastatin (Lipitor) 20 MG tablet Take 1 tablet by mouth Daily.   • multivitamin (MULTI-VITAMIN PO) Take  by mouth Daily.   • omeprazole (priLOSEC) 40 MG capsule TAKE ONE CAPSULE BY MOUTH DAILY   • rivaroxaban (Xarelto) 10 MG tablet Take 1 tablet by mouth Daily.   • solifenacin (VESICARE) 10 MG tablet Take 1 tablet by mouth Daily.   • vitamin D (ERGOCALCIFEROL) 1.25 MG (38112 UT) capsule  capsule Take 1 capsule by mouth 1 (One) Time Per Week.   • [DISCONTINUED] AFLURIA QUADRIVALENT 0.5 ML suspension prefilled syringe injection ADM 0.5ML IM UTD     No current facility-administered medications on file prior to visit.     Current Medications:  Scheduled Meds:  Continuous Infusions:No current facility-administered medications for this visit.    PRN Meds:.    I have reviewed the patient's medical history in detail and updated the computerized patient record.  Review and summarization of old records includes:    Past Medical History:   Diagnosis Date   • Acid reflux    • Back pain    • Cardiac tamponade     2015   • Difficulty breathing     during exertion   • Edema    • Esophagitis, reflux    • Essential hypertriglyceridemia    • GERD (gastroesophageal reflux disease)    • Health care maintenance    • Heartburn    • Hyperlipidemia    • Hypertriglyceridemia    • Increased appetite    • Morbid obesity (CMS/[facility])    • Multiple joint pain    • Obesity, Class III, BMI 40-49.9 (morbid obesity) (CMS/HCC) 2018   • Osteoarthritis of knee    • Pericarditis    • Pulmonary embolism (CMS/HCC)     2015   • RHA (rheumatoid arthritis) (CMS/[facility])    • Sciatica    • Sleep apnea    • Unintended weight gain         Past Surgical History:   Procedure Laterality Date   • ANKLE SURGERY      treatment of ankle fracture   • APPENDECTOMY     • CARDIAC SURGERY      cardiac tamponade   •  SECTION     • CHOLECYSTECTOMY     • COLONOSCOPY N/A 2018    Procedure: COLONOSCOPY to cecum and t.i. with polypectomy and clip x2 to ascending colon;  Surgeon: Pierre Ornelas MD;  Location: [facility] ENDOSCOPY;  Service: Gastroenterology   • DILATATION AND CURETTAGE     • ENDOSCOPY N/A 2018    Procedure: ESOPHAGOGASTRODUODENOSCOPY;  Surgeon: Pierre Ornelas MD;  Location: [facility] ENDOSCOPY;  Service: Gastroenterology   • GASTRIC BANDING REMOVAL N/A 2019    Procedure: GASTRIC BANDING AND PORT REMOVAL, LYSIS OF  ADHESIONS, REPAIR OF COLOTOMY, AND INCISIONAL HERNIA REPAIR LAPAROSCOPIC;  Surgeon: Randell Salamanca Jr., MD;  [place] [facility] [place] [facility]  Service: General   • LAPAROSCOPIC GASTRIC BANDING     • PULMONARY EMBOLISM SURGERY     • TUBAL ABDOMINAL LIGATION          Social History     Occupational History   • Occupation:      Employer: [employer]   Tobacco Use   • Smoking status: Former Smoker     Years: 10.00     Types: Cigarettes     Quit date:      Years since quittin.3   • Smokeless tobacco: Never Used   • Tobacco comment: caffeine use   Vaping Use   • Vaping Use: Never used   Substance and Sexual Activity   • Alcohol use: Yes     Alcohol/week: 1.0 standard drinks     Types: 1 Shots of liquor per week     Comment: social/ occassional   • Drug use: No   • Sexual activity: Defer      Social History     Social History Narrative   • Not on file        Family History   Problem Relation Age of Onset   • Cancer Mother    • Obesity Mother    • Diabetes Father    • Hypertension Father    • Heart disease Father    • Obesity Father    • Heart disease Brother    • Diabetes Paternal Grandfather    • Diabetes Paternal Aunt    • No Known Problems Maternal Grandmother    • No Known Problems Maternal Grandfather    • No Known Problems Paternal Grandmother    • Malig Hyperthermia Neg Hx    • Breast cancer Neg Hx        ROS: 14 point review of systems was performed and all other systems were reviewed and are negative except for documented findings in HPI and today's encounter.     Allergies:   Allergies   Allergen Reactions   • Nsaids Other (See Comments)     DOESN'T TAKE BECAUSE SHES ON BLOOD THINNER.     Constitutional:  Denies fever, shaking or chills   Eyes:  Denies change in visual acuity   HENT:  Denies nasal congestion or sore throat   Respiratory:  Denies cough or shortness of breath   Cardiovascular:  Denies chest pain or severe LE edema   GI:  Denies abdominal pain, nausea,  "vomiting, bloody stools or diarrhea   Musculoskeletal:  Numbness, tingling, pain, or loss of motor function only as noted above in history of present illness.  : Denies painful urination or hematuria  Integument:  Denies rash, lesion or ulceration   Neurologic:  Denies headache or focal weakness  Endocrine:  Denies lymphadenopathy  Psych:  Denies confusion or change in mental status   Hem:  Denies active bleeding    OBJECTIVE:  Physical Exam: 61 y.o. female  Wt Readings from Last 3 Encounters:   05/25/21 127 kg (280 lb)   05/07/21 127 kg (280 lb)   04/29/21 127 kg (280 lb)     Ht Readings from Last 1 Encounters:   05/25/21 162.6 cm (64\")     Body mass index is 48.06 kg/m².  Vitals:    05/25/21 1521   Temp: 98 °F (36.7 °C)     Vital signs reviewed.     General Appearance:    Alert, cooperative, in no acute distress                  Eyes: conjunctiva clear  ENT: external ears and nose atraumatic  CV: no peripheral edema  Resp: normal respiratory effort  Skin: no rashes or wounds; normal turgor  Psych: mood and affect appropriate  Lymph: no nodes appreciated  Neuro: gross sensation intact  Vascular:  Palpable peripheral pulse in noted extremity  Musculoskeletal Extremities: Bilateral knees with medial and lateral joint line tenderness, swelling, synovitis, crepitation.  Calf soft and nontender.  Skin clean and intact with no rash or lesions.  Left hip: Stinchfield negative, good internal and external rotation that does not elicit any pain.  There is point tenderness over the left greater trochanter that radiates down to her knee.  Ambulates well with normal gait without assistive device    Radiology:   AP, lateral, 40 degree PA of bilateral knees obtained in the office today due to pain with prior comparison shows advanced tricompartmental osteoarthritis of bilateral knees.  AP pelvis, lateral left hip taken the office today for pain without prior comparison shows mild to maybe moderate arthritis of the left hip but " preserved joint space    Assessment:     ICD-10-CM ICD-9-CM   1. Arthritis of both knees  M17.0 716.96   2. Pain  R52 780.96   3. Trochanteric bursitis of left hip  M70.62 726.5   4. DDD (degenerative disc disease), lumbar  M51.36 722.52           MDM/Plan:   The diagnosis(es), natural history, pathophysiology and treatment for diagnosis(es) were discussed. Opportunity given and questions answered.  Biomechanics of pertinent body areas discussed.  When appropriate, the use of ambulatory aids discussed.  We discussed treatment options for her bilateral knee arthritis and she wants to proceed with cortisone injections today.  She will continue taking Tylenol as needed for pain control as well as using topical ice, heat, and topical liniments.  As far as her hip goes, her exam does not suggest that the hip joint is the etiology of her pain as her Stinchfield is negative and she has no pain with internal and external rotation of the hip.  Her x-rays show some mild to maybe moderate arthritis of the left hip.  She is tender over left greater trochanter and has known history of lower back issues.  The pain in her hip and thigh is more of an annoyance and not incapacitating.  We discussed that it could be related to her change in gait from wearing the boot from her foot fracture.  Recommended physical therapy and she wants to hold off for now and see how she does after getting the cortisone her knees.  If she decides she wants to do some therapy she will call for referral.  If her pain worsens or is not improved with physical therapy recommended she follow-up with Dr. Vaughn for reevaluation of her back as it has been years since she saw him.      5/25/2021    Much of this encounter note is an electronic transcription/translation of spoken language to printed text. The electronic translation of spoken language may permit erroneous, or at times, nonsensical words or phrases to be inadvertently transcribed; Although I have  reviewed the note for such errors, some may still exist      KNEE Injection Procedure Note:  Large Joint Arthrocentesis: R knee  Date/Time: 5/25/2021 2:49 PM  Consent given by: patient  Site marked: site marked  Timeout: Immediately prior to procedure a time out was called to verify the correct patient, procedure, equipment, support staff and site/side marked as required   Supporting Documentation  Indications: pain   Procedure Details  Location: knee - R knee  Preparation: Patient was prepped and draped in the usual sterile fashion  Needle gauge: 21G.  Approach: anterolateral  Medications administered: 80 mg methylPREDNISolone acetate 80 MG/ML; 4 mL lidocaine PF 2% 2 %  Patient tolerance: patient tolerated the procedure well with no immediate complications    Large Joint Arthrocentesis: L knee  Date/Time: 5/25/2021 2:49 PM  Consent given by: patient  Site marked: site marked  Timeout: Immediately prior to procedure a time out was called to verify the correct patient, procedure, equipment, support staff and site/side marked as required   Supporting Documentation  Indications: pain   Procedure Details  Location: knee - L knee  Preparation: Patient was prepped and draped in the usual sterile fashion  Needle gauge: 21G.  Approach: anterolateral  Medications administered: 80 mg methylPREDNISolone acetate 80 MG/ML; 4 mL lidocaine PF 2% 2 %  Patient tolerance: patient tolerated the procedure well with no immediate complications

## 2021-05-26 ENCOUNTER — APPOINTMENT (OUTPATIENT)
Dept: GENERAL RADIOLOGY | Facility: HOSPITAL | Age: 61
End: 2021-05-26

## 2021-05-26 ENCOUNTER — HOSPITAL ENCOUNTER (EMERGENCY)
Facility: HOSPITAL | Age: 61
Discharge: HOME | End: 2021-05-26
Attending: EMERGENCY MEDICINE | Admitting: EMERGENCY MEDICINE

## 2021-05-26 ENCOUNTER — TELEPHONE (OUTPATIENT)
Dept: ORTHOPEDIC SURGERY | Facility: CLINIC | Age: 61
End: 2021-05-26

## 2021-05-26 VITALS
WEIGHT: 280 LBS | TEMPERATURE: 98 F | HEART RATE: 81 BPM | SYSTOLIC BLOOD PRESSURE: 142 MMHG | RESPIRATION RATE: 14 BRPM | OXYGEN SATURATION: 98 % | DIASTOLIC BLOOD PRESSURE: 74 MMHG | BODY MASS INDEX: 47.8 KG/M2 | HEIGHT: 64 IN

## 2021-05-26 DIAGNOSIS — S89.92XA INJURY OF LEFT KNEE, INITIAL ENCOUNTER: Primary | ICD-10-CM

## 2021-05-26 DIAGNOSIS — S83.002A PATELLAR SUBLUXATION, LEFT, INITIAL ENCOUNTER: ICD-10-CM

## 2021-05-26 PROCEDURE — 73562 X-RAY EXAM OF KNEE 3: CPT

## 2021-05-26 PROCEDURE — 99282 EMERGENCY DEPT VISIT SF MDM: CPT

## 2021-05-26 PROCEDURE — 99283 EMERGENCY DEPT VISIT LOW MDM: CPT | Performed by: PHYSICIAN ASSISTANT

## 2021-05-26 NOTE — TELEPHONE ENCOUNTER
Patient had bilateral knee injections yesterday and they were feeling good until she went down one step today and felt/heard a pop and now cannot put any weight on her left leg.

## 2021-05-27 NOTE — TELEPHONE ENCOUNTER
Patient had cortisone injections in bilateral knees on Tuesday.  She reports that yesterday her knees were feeling great.  She was walking on the stairs and felt a pop in her left knee.  She immediately started having pain and swelling and difficulty bearing weight.  Took some Tylenol and applied ice but it did not improve.  They went to the ER and had some x-rays which showed a laterally subluxed patella.  Reviewed her prior x-rays from the office on Tuesday and they appear unchanged.  She reports that her pain is improving and she is able to ambulate.  Encouraged her to continue Tylenol, ice, topical liniments as needed for pain and inflammation control.  If no improvement by next week we will bring her in and evaluate.

## 2021-06-01 RX ORDER — SOLIFENACIN SUCCINATE 10 MG/1
10 TABLET, FILM COATED ORAL DAILY
Qty: 30 TABLET | Refills: 0 | Status: SHIPPED | OUTPATIENT
Start: 2021-06-01 | End: 2021-06-09

## 2021-06-01 RX ORDER — OMEPRAZOLE 40 MG/1
40 CAPSULE, DELAYED RELEASE ORAL DAILY
Qty: 90 CAPSULE | Refills: 0 | Status: SHIPPED | OUTPATIENT
Start: 2021-06-01 | End: 2021-08-26 | Stop reason: SDUPTHER

## 2021-06-03 ENCOUNTER — TELEPHONE (OUTPATIENT)
Dept: ORTHOPEDIC SURGERY | Facility: CLINIC | Age: 61
End: 2021-06-03

## 2021-06-04 ENCOUNTER — OFFICE VISIT (OUTPATIENT)
Dept: ORTHOPEDIC SURGERY | Facility: CLINIC | Age: 61
End: 2021-06-04

## 2021-06-04 VITALS — HEIGHT: 64 IN | WEIGHT: 279 LBS | BODY MASS INDEX: 47.63 KG/M2 | TEMPERATURE: 97.3 F

## 2021-06-04 DIAGNOSIS — M17.12 ARTHRITIS OF LEFT KNEE: Primary | ICD-10-CM

## 2021-06-04 DIAGNOSIS — Z86.718 HISTORY OF DVT (DEEP VEIN THROMBOSIS): ICD-10-CM

## 2021-06-04 PROCEDURE — 99214 OFFICE O/P EST MOD 30 MIN: CPT | Performed by: ORTHOPAEDIC SURGERY

## 2021-06-04 RX ORDER — METHYLPREDNISOLONE 4 MG/1
TABLET ORAL
Qty: 21 TABLET | Refills: 0 | Status: SHIPPED | OUTPATIENT
Start: 2021-06-04 | End: 2021-08-06

## 2021-06-04 NOTE — PROGRESS NOTES
"Patient Name: Zulema Sousa   YOB: 1960  Referring Primary Care Physician: Marsha Young MD  BMI: Body mass index is 47.89 kg/m².    Chief Complaint:    Chief Complaint   Patient presents with   • Left Knee - Follow-up, Pain        HPI:     Zulema Sousa is a 61 y.o. female who presents today for evaluation of   Chief Complaint   Patient presents with   • Left Knee - Follow-up, Pain   . The patient has severe bilateral arthritis of the knees. Her BMI is approximately 48. She had injections by LEDY Khanna, bilaterally on 05/25/2021, which is approximately 2 weeks ago. She is still having pain, and she presents here today, after presenting to the emergency room. The patient states that \"I took one step out of the back door, and there was the loudest pop I have ever heard in my knees\".     Additionally, she reports her foot was fractured in 03/2021.  She is seeing a sports medicine doctors for this apparently recommended a bone healing device.  She says she does not have any pain in it but I told her to take this up with the doctors that are treating the patient has a history of DVT.       Subjective   Medications:   Home Medications:  Current Outpatient Medications on File Prior to Visit   Medication Sig   • atorvastatin (Lipitor) 20 MG tablet Take 1 tablet by mouth Daily.   • multivitamin (MULTI-VITAMIN PO) Take  by mouth Daily.   • omeprazole (priLOSEC) 40 MG capsule TAKE ONE CAPSULE BY MOUTH DAILY   • rivaroxaban (Xarelto) 10 MG tablet Take 1 tablet by mouth Daily.   • solifenacin (VESICARE) 10 MG tablet Take 1 tablet by mouth Daily.   • vitamin D (ERGOCALCIFEROL) 1.25 MG (38453 UT) capsule capsule Take 1 capsule by mouth 1 (One) Time Per Week.     No current facility-administered medications on file prior to visit.     Current Medications:  Scheduled Meds:  Continuous Infusions:No current facility-administered medications for this visit.    PRN Meds:.    I have reviewed the " patient's medical history in detail and updated the computerized patient record.  Review and summarization of old records includes:    Past Medical History:   Diagnosis Date   • Acid reflux    • Back pain    • Cardiac tamponade     2015   • Difficulty breathing     during exertion   • Edema    • Esophagitis, reflux    • Essential hypertriglyceridemia    • GERD (gastroesophageal reflux disease)    • Health care maintenance    • Heartburn    • Hyperlipidemia    • Hypertriglyceridemia    • Increased appetite    • Morbid obesity (CMS/[facility])    • Multiple joint pain    • Obesity, Class III, BMI 40-49.9 (morbid obesity) (CMS/[facility]) 2018   • Osteoarthritis of knee    • Pericarditis    • Pulmonary embolism (CMS/[facility])     2015   • RHA (rheumatoid arthritis) (CMS/[facility])    • Sciatica    • Sleep apnea    • Unintended weight gain         Past Surgical History:   Procedure Laterality Date   • ANKLE SURGERY      treatment of ankle fracture   • APPENDECTOMY     • CARDIAC SURGERY      cardiac tamponade   •  SECTION     • CHOLECYSTECTOMY     • COLONOSCOPY N/A 2018    Procedure: COLONOSCOPY to cecum and t.i. with polypectomy and clip x2 to ascending colon;  Surgeon: Pierre Ornelas MD;  Location: [facility] ENDOSCOPY;  Service: Gastroenterology   • DILATATION AND CURETTAGE     • ENDOSCOPY N/A 2018    Procedure: ESOPHAGOGASTRODUODENOSCOPY;  Surgeon: Pierre Ornelas MD;  Location: [facility] ENDOSCOPY;  Service: Gastroenterology   • GASTRIC BANDING REMOVAL N/A 2019    Procedure: GASTRIC BANDING AND PORT REMOVAL, LYSIS OF ADHESIONS, REPAIR OF COLOTOMY, AND INCISIONAL HERNIA REPAIR LAPAROSCOPIC;  Surgeon: Randell Salamanca Jr., MD;  [place] [facility] [place] [facility] [place]  Service: General   • LAPAROSCOPIC GASTRIC BANDING     • PULMONARY EMBOLISM SURGERY     • TUBAL ABDOMINAL LIGATION          Social History     Occupational History   • Occupation:      Employer: [employer]    Tobacco Use   • Smoking status: Former Smoker     Years: 10.00     Types: Cigarettes     Quit date:      Years since quittin.4   • Smokeless tobacco: Never Used   • Tobacco comment: caffeine use   Vaping Use   • Vaping Use: Never used   Substance and Sexual Activity   • Alcohol use: Yes     Alcohol/week: 1.0 standard drinks     Types: 1 Shots of liquor per week     Comment: social/ occassional   • Drug use: No   • Sexual activity: Defer      Social History     Social History Narrative   • Not on file        Family History   Problem Relation Age of Onset   • Cancer Mother    • Obesity Mother    • Diabetes Father    • Hypertension Father    • Heart disease Father    • Obesity Father    • Heart disease Brother    • Diabetes Paternal Grandfather    • Diabetes Paternal Aunt    • No Known Problems Maternal Grandmother    • No Known Problems Maternal Grandfather    • No Known Problems Paternal Grandmother    • Malig Hyperthermia Neg Hx    • Breast cancer Neg Hx        ROS: 14 point review of systems was performed and all other systems were reviewed and are negative except for documented findings in HPI and today's encounter.     Allergies:   Allergies   Allergen Reactions   • Nsaids Other (See Comments)     DOESN'T TAKE BECAUSE SHES ON BLOOD THINNER.     Constitutional:  Denies fever, shaking or chills   Eyes:  Denies change in visual acuity   HENT:  Denies nasal congestion or sore throat   Respiratory:  Denies cough or shortness of breath   Cardiovascular:  Denies chest pain or severe LE edema   GI:  Denies abdominal pain, nausea, vomiting, bloody stools or diarrhea   Musculoskeletal:  Numbness, tingling, pain, or loss of motor function only as noted above in history of present illness.  : Denies painful urination or hematuria  Integument:  Denies rash, lesion or ulceration   Neurologic:  Denies headache or focal weakness  Endocrine:  Denies lymphadenopathy  Psych:  Denies confusion or change in mental  "status   Hem:  Denies active bleeding    OBJECTIVE:  Physical Exam: 61 y.o. female  Wt Readings from Last 3 Encounters:   06/04/21 127 kg (279 lb)   05/26/21 127 kg (280 lb)   05/25/21 127 kg (280 lb)     Ht Readings from Last 1 Encounters:   06/04/21 162.6 cm (64\")     Body mass index is 47.89 kg/m².  Vitals:    06/04/21 0824   Temp: 97.3 °F (36.3 °C)     Vital signs reviewed.     General Appearance:    Alert, cooperative, in no acute distress                  Eyes: conjunctiva clear  ENT: external ears and nose atraumatic  CV: no peripheral edema  Resp: normal respiratory effort  Skin: no rashes or wounds; normal turgor  Psych: mood and affect appropriate  Lymph: no nodes appreciated  Neuro: gross sensation intact  Vascular:  Palpable peripheral pulse in noted extremity  Musculoskeletal Extremities: Examination today both knees show crepitation synovitis swelling she has lot of soft tissue is hard to tell fine detail but she appears to have a Baker's cyst her calves are soft    Radiology:   Views AP lateral 40 degree PA x-rays bilateral knees taken for pain reviewed with comparison views that show advanced arthritic changes        Assessment:     ICD-10-CM ICD-9-CM   1. Arthritis of left knee  M17.12 716.96   2. History of DVT (deep vein thrombosis)  Z86.718 V12.51        MDM/Plan:   The diagnosis(es), natural history, pathophysiology and treatment for diagnosis(es) were discussed. Opportunity given and questions answered.  Biomechanics of pertinent body areas discussed.  When appropriate, the use of ambulatory aids discussed.  Cannot take anti-inflammatories because she is blood thinners and history of DVTs and PEs she also has a high BMI and we talked about that hopefully we can turn the inflammation around with the above and see her back when is time to inject    She is advised that the cortisone injection can take some time to become effective. I discussed gel injections with the patient today, as well as why " I do not think they are necessarily effective. Additionally, she can utilize ice, heat, and litiments. She is prescribed Medrol Dosepak today to be taken orally. I will refer the patient for physical therapy.    MEDICATIONS:  The risks, benefits, warnings,side effects and alternatives of medications discussed.  Inflammation/pain control; with cold, heat, elevation and/or liniments discussed as appropriate  PT referral.    Scribed for Brice Sy MD by Giovanna Wilson.  06/04/21   10:11 EDT    I have personally performed the services described in this document as scribed by the above individual, and it is both accurate and complete.  Brice Sy MD  6/4/2021  12:29 EDT

## 2021-06-09 ENCOUNTER — OFFICE VISIT (OUTPATIENT)
Dept: OBSTETRICS AND GYNECOLOGY | Facility: CLINIC | Age: 61
End: 2021-06-09

## 2021-06-09 VITALS
BODY MASS INDEX: 48.49 KG/M2 | HEIGHT: 64 IN | SYSTOLIC BLOOD PRESSURE: 132 MMHG | WEIGHT: 284 LBS | DIASTOLIC BLOOD PRESSURE: 82 MMHG

## 2021-06-09 DIAGNOSIS — Z01.419 PAP SMEAR, LOW-RISK: Primary | ICD-10-CM

## 2021-06-09 DIAGNOSIS — Z01.419 WELL WOMAN EXAM: ICD-10-CM

## 2021-06-09 DIAGNOSIS — Z11.51 SPECIAL SCREENING EXAMINATION FOR HUMAN PAPILLOMAVIRUS (HPV): ICD-10-CM

## 2021-06-09 DIAGNOSIS — Z13.9 SCREENING FOR CONDITION: ICD-10-CM

## 2021-06-09 DIAGNOSIS — Z01.419 ROUTINE GYNECOLOGICAL EXAMINATION: ICD-10-CM

## 2021-06-09 PROCEDURE — 81002 URINALYSIS NONAUTO W/O SCOPE: CPT | Performed by: OBSTETRICS & GYNECOLOGY

## 2021-06-09 PROCEDURE — 99396 PREV VISIT EST AGE 40-64: CPT | Performed by: OBSTETRICS & GYNECOLOGY

## 2021-06-09 RX ORDER — SOLIFENACIN SUCCINATE 10 MG/1
10 TABLET, FILM COATED ORAL DAILY
Qty: 90 TABLET | Refills: 3 | Status: SHIPPED | OUTPATIENT
Start: 2021-06-09 | End: 2022-06-26 | Stop reason: SDUPTHER

## 2021-06-09 RX ORDER — CHLORAL HYDRATE 500 MG
CAPSULE ORAL
COMMUNITY
End: 2022-02-18

## 2021-06-09 NOTE — PROGRESS NOTES
GYN Annual Exam     CC- Here for annual exam.     Pt new to practice? No  Pt new to me? No     Zulema Sousa is a 61 y.o.  female who presents for annual well woman exam. No LMP recorded (lmp unknown). Patient is postmenopausal.    Problems in addition to need for annual: none    HPI: History of Present Illness    PMHX:  Patient Active Problem List   Diagnosis   • Gastroesophageal reflux disease   • Edema   • Hyperlipidemia   • Arthralgia of multiple joints   • Osteoarthritis of knee   • Pericarditis   • Pulmonary embolism (CMS/HCC)   • Rheumatoid arthritis (CMS/HCC)   • Sciatica   • Unintended weight gain   • Arthritis of both knees   • Obstructive sleep apnea, adult   • Dietary counseling   • History of DVT (deep vein thrombosis)   • Tear of lateral meniscus of right knee, current   • Arthritis of right knee   • Chronic pain of right knee   • Epigastric pain   • Abnormal UGI series   • Esophageal dilatation   • Abnormal finding on radiology exam   • Overactive bladder   • Right elbow tendonitis   • Obesity, Class III, BMI 40-49.9 (morbid obesity) (CMS/HCC)   • History of removal of laparoscopic gastric banding device   ; otherwise none    OB History        2    Para   2    Term   2            AB        Living           SAB        TAB        Ectopic        Molar        Multiple        Live Births                      Past Medical History:   Diagnosis Date   • Acid reflux    • Back pain    • Cardiac tamponade     2015   • Difficulty breathing     during exertion   • Edema    • Esophagitis, reflux    • Essential hypertriglyceridemia    • GERD (gastroesophageal reflux disease)    • Health care maintenance    • Heartburn    • Hyperlipidemia    • Hypertriglyceridemia    • Increased appetite    • Morbid obesity (CMS/HCC)    • Multiple joint pain    • Obesity, Class III, BMI 40-49.9 (morbid obesity) (CMS/HCC) 2018   • Osteoarthritis of knee    • Pericarditis    • Pulmonary embolism  (CMS/HCC)     2015   • RHA (rheumatoid arthritis) (CMS/[facility])    • Sciatica    • Sleep apnea    • Unintended weight gain        Past Surgical History:   Procedure Laterality Date   • ANKLE SURGERY      treatment of ankle fracture   • APPENDECTOMY     • CARDIAC SURGERY      cardiac tamponade   •  SECTION     • CHOLECYSTECTOMY     • COLONOSCOPY N/A 2018    Procedure: COLONOSCOPY to cecum and t.i. with polypectomy and clip x2 to ascending colon;  Surgeon: Pierre Ornelas MD;  [facility];  Service: Gastroenterology   • DILATATION AND CURETTAGE     • ENDOSCOPY N/A 2018    Procedure: ESOPHAGOGASTRODUODENOSCOPY;  Surgeon: Pierre Ornelas MD;  Location:  [facility];  Service: Gastroenterology   • GASTRIC BANDING REMOVAL N/A 2019    Procedure: GASTRIC BANDING AND PORT REMOVAL, LYSIS OF ADHESIONS, REPAIR OF COLOTOMY, AND INCISIONAL HERNIA REPAIR LAPAROSCOPIC;  Surgeon: Randell Salamanca Jr., MD;  Location:  [facility];  Service: General   • LAPAROSCOPIC GASTRIC BANDING     • PULMONARY EMBOLISM SURGERY     • TUBAL ABDOMINAL LIGATION           Current Outpatient Medications:   •  Omega-3 Fatty Acids (fish oil) 1000 MG capsule capsule, Take  by mouth Daily With Breakfast., Disp: , Rfl:   •  atorvastatin (Lipitor) 20 MG tablet, Take 1 tablet by mouth Daily., Disp: 90 tablet, Rfl: 1  •  methylPREDNISolone (MEDROL) 4 MG dose pack, Take as directed on package instructions., Disp: 21 tablet, Rfl: 0  •  multivitamin (MULTI-VITAMIN PO), Take  by mouth Daily., Disp: , Rfl:   •  omeprazole (priLOSEC) 40 MG capsule, TAKE ONE CAPSULE BY MOUTH DAILY, Disp: 90 capsule, Rfl: 0  •  rivaroxaban (Xarelto) 10 MG tablet, Take 1 tablet by mouth Daily., Disp: 90 tablet, Rfl: 3  •  solifenacin (VESICARE) 10 MG tablet, Take 1 tablet by mouth Daily., Disp: 30 tablet, Rfl: 0  •  vitamin D (ERGOCALCIFEROL) 1.25 MG (71407 UT) capsule capsule, Take 1 capsule by mouth 1 (One) Time Per Week., Disp: 5  "capsule, Rfl: 3    Allergies   Allergen Reactions   • Nsaids Other (See Comments)     DOESN'T TAKE BECAUSE SHES ON BLOOD THINNER.       Social History     Tobacco Use   • Smoking status: Former Smoker     Years: 10.     Types: Cigarettes     Quit date:      Years since quittin.4   • Smokeless tobacco: Never Used   • Tobacco comment: caffeine use   Vaping Use   • Vaping Use: Never used   Substance Use Topics   • Alcohol use: Yes     Alcohol/week: 1.0 standard drinks     Types: 1 Shots of liquor per week     Comment: social/ occassional   • Drug use: No       Zulema Sousa  reports that she quit smoking about 5 months ago. Her smoking use included cigarettes. She quit after 10. years of use. She has never used smokeless tobacco..            Family History   Problem Relation Age of Onset   • Cancer Mother    • Obesity Mother    • Diabetes Father    • Hypertension Father    • Heart disease Father    • Obesity Father    • Heart disease Brother    • Diabetes Paternal Grandfather    • Diabetes Paternal Aunt    • No Known Problems Maternal Grandmother    • No Known Problems Maternal Grandfather    • No Known Problems Paternal Grandmother    • Malig Hyperthermia Neg Hx    • Breast cancer Neg Hx        Review of Systems    Patient reports that she is not currently experiencing any symptoms of urinary incontinence.      noTESTED FOR CHLAMYDIA?    EXAM:  /82   Ht 162.6 cm (64\")   Wt 129 kg (284 lb)   LMP  (LMP Unknown)   Breastfeeding No   BMI 48.75 kg/m²     UA: clr    Physical Exam  Vitals and nursing note reviewed. Exam conducted with a chaperone present.   Constitutional:       General: She is not in acute distress.     Appearance: She is well-developed. She is not diaphoretic.   HENT:      Head: Normocephalic and atraumatic.      Nose: Nose normal.   Eyes:      Extraocular Movements: Extraocular movements intact.   Cardiovascular:      Rate and Rhythm: Normal rate.   Pulmonary:      Effort: " Pulmonary effort is normal.   Chest:      Breasts: Breasts are symmetrical.         Right: Normal. No mass, nipple discharge, skin change or tenderness.         Left: Normal. No mass, nipple discharge, skin change or tenderness.   Abdominal:      General: There is no distension.      Palpations: Abdomen is soft. There is no mass.      Tenderness: There is no abdominal tenderness. There is no guarding.   Genitourinary:     General: Normal vulva.      Pubic Area: No rash.       Vagina: Normal. No vaginal discharge.      Cervix: Normal.      Uterus: Normal.       Adnexa: Right adnexa normal and left adnexa normal.   Musculoskeletal:         General: No tenderness or deformity. Normal range of motion.      Cervical back: Normal range of motion.   Lymphadenopathy:      Upper Body:      Right upper body: No axillary adenopathy.      Left upper body: No axillary adenopathy.   Skin:     General: Skin is warm and dry.      Coloration: Skin is not pale.      Findings: No erythema or rash.   Neurological:      Mental Status: She is alert and oriented to person, place, and time.   Psychiatric:         Behavior: Behavior normal.         Thought Content: Thought content normal.         Judgment: Judgment normal.            As part of wellness and prevention, the following topics were discussed with the patient:  []  Nutrition  []  Physical activity/regular exercise   [x]  Healthy weight  []  Injury prevention  [x]  Substance misuse/abuse  []  Sexual behavior  []  STD prevention  []  Contaception  []  Dental health  [x]  Mental health  []  Immunization  [x]  Encouraged SBE     Counseling and guidance done:  Nutrition, physical activity, healthy weight, injury prevention, misuse of tobacco, alcohol and drugs, sexual behavior and STDs, contraception, dental health, mental health, immunizations breast cancer screening and exams.    Assessment     1) GYN annual well woman exam.   2) PAP done today? Yes  3) problems addressed:  none    MAMMOGRAM UP TO DATE IF AGE APPROPRIATE?  No    COLONOSCOPY UP TO DATE IF AGE APPROPRIATE? Yes    Fhx breast cancer? No    Fhx ovarian cancer? No    Fhx colon cancer? No    Invitae testing offered? No           Plan       Follow up prn or one year.    Diagnoses and all orders for this visit:    1. Pap smear, low-risk (Primary)  -     POC Urinalysis Dipstick  -     IgP, Aptima HPV    2. Routine gynecological examination  -     POC Urinalysis Dipstick  -     IgP, Aptima HPV    3. Special screening examination for human papillomavirus (HPV)  -     POC Urinalysis Dipstick  -     IgP, Aptima HPV    4. Well woman exam    5. Screening for condition  -     Mammo Screening Digital Tomosynthesis Bilateral With CAD; Future        RTO Return in about 1 year (around 6/9/2022) for Annual physical.          Tez Ramírez MD  [unfilled]  10:43 EDT

## 2021-06-11 LAB
CYTOLOGIST CVX/VAG CYTO: NORMAL
CYTOLOGY CVX/VAG DOC CYTO: NORMAL
CYTOLOGY CVX/VAG DOC THIN PREP: NORMAL
DX ICD CODE: NORMAL
HIV 1 & 2 AB SER-IMP: NORMAL
HPV I/H RISK 4 DNA CVX QL PROBE+SIG AMP: NEGATIVE
OTHER STN SPEC: NORMAL
STAT OF ADQ CVX/VAG CYTO-IMP: NORMAL

## 2021-06-18 ENCOUNTER — TELEPHONE (OUTPATIENT)
Dept: INTERNAL MEDICINE | Facility: CLINIC | Age: 61
End: 2021-06-18

## 2021-06-21 ENCOUNTER — LAB (OUTPATIENT)
Dept: INTERNAL MEDICINE | Facility: CLINIC | Age: 61
End: 2021-06-21

## 2021-06-21 DIAGNOSIS — E78.2 MIXED HYPERLIPIDEMIA: ICD-10-CM

## 2021-06-21 DIAGNOSIS — E66.01 OBESITY, CLASS III, BMI 40-49.9 (MORBID OBESITY) (HCC): ICD-10-CM

## 2021-06-21 DIAGNOSIS — E78.2 MIXED HYPERLIPIDEMIA: Primary | ICD-10-CM

## 2021-06-22 LAB
ALBUMIN SERPL-MCNC: 4.1 G/DL (ref 3.5–5.2)
ALBUMIN/GLOB SERPL: 1.8 G/DL
ALP SERPL-CCNC: 101 U/L (ref 39–117)
ALT SERPL-CCNC: 20 U/L (ref 1–33)
AST SERPL-CCNC: 16 U/L (ref 1–32)
BASOPHILS # BLD AUTO: 0.03 10*3/MM3 (ref 0–0.2)
BASOPHILS NFR BLD AUTO: 0.3 % (ref 0–1.5)
BILIRUB SERPL-MCNC: 0.2 MG/DL (ref 0–1.2)
BUN SERPL-MCNC: 21 MG/DL (ref 8–23)
BUN/CREAT SERPL: 26.3 (ref 7–25)
CALCIUM SERPL-MCNC: 9.6 MG/DL (ref 8.6–10.5)
CHLORIDE SERPL-SCNC: 105 MMOL/L (ref 98–107)
CHOLEST SERPL-MCNC: 161 MG/DL (ref 0–200)
CO2 SERPL-SCNC: 28.5 MMOL/L (ref 22–29)
CREAT SERPL-MCNC: 0.8 MG/DL (ref 0.57–1)
EOSINOPHIL # BLD AUTO: 0.16 10*3/MM3 (ref 0–0.4)
EOSINOPHIL NFR BLD AUTO: 1.6 % (ref 0.3–6.2)
ERYTHROCYTE [DISTWIDTH] IN BLOOD BY AUTOMATED COUNT: 13.7 % (ref 12.3–15.4)
GLOBULIN SER CALC-MCNC: 2.3 GM/DL
GLUCOSE SERPL-MCNC: 94 MG/DL (ref 65–99)
HBA1C MFR BLD: 6.1 % (ref 4.8–5.6)
HCT VFR BLD AUTO: 42.9 % (ref 34–46.6)
HDLC SERPL-MCNC: 72 MG/DL (ref 40–60)
HGB BLD-MCNC: 13.6 G/DL (ref 12–15.9)
IMM GRANULOCYTES # BLD AUTO: 0.01 10*3/MM3 (ref 0–0.05)
IMM GRANULOCYTES NFR BLD AUTO: 0.1 % (ref 0–0.5)
LDLC SERPL CALC-MCNC: 78 MG/DL (ref 0–100)
LDLC/HDLC SERPL: 1.08 {RATIO}
LYMPHOCYTES # BLD AUTO: 3.35 10*3/MM3 (ref 0.7–3.1)
LYMPHOCYTES NFR BLD AUTO: 34.2 % (ref 19.6–45.3)
MCH RBC QN AUTO: 29.8 PG (ref 26.6–33)
MCHC RBC AUTO-ENTMCNC: 31.7 G/DL (ref 31.5–35.7)
MCV RBC AUTO: 94.1 FL (ref 79–97)
MONOCYTES # BLD AUTO: 0.74 10*3/MM3 (ref 0.1–0.9)
MONOCYTES NFR BLD AUTO: 7.6 % (ref 5–12)
NEUTROPHILS # BLD AUTO: 5.5 10*3/MM3 (ref 1.7–7)
NEUTROPHILS NFR BLD AUTO: 56.2 % (ref 42.7–76)
NRBC BLD AUTO-RTO: 0 /100 WBC (ref 0–0.2)
PLATELET # BLD AUTO: 349 10*3/MM3 (ref 140–450)
POTASSIUM SERPL-SCNC: 4.8 MMOL/L (ref 3.5–5.2)
PROT SERPL-MCNC: 6.4 G/DL (ref 6–8.5)
RBC # BLD AUTO: 4.56 10*6/MM3 (ref 3.77–5.28)
SODIUM SERPL-SCNC: 143 MMOL/L (ref 136–145)
TRIGL SERPL-MCNC: 56 MG/DL (ref 0–150)
VLDLC SERPL CALC-MCNC: 11 MG/DL (ref 5–40)
WBC # BLD AUTO: 9.79 10*3/MM3 (ref 3.4–10.8)

## 2021-06-28 ENCOUNTER — OFFICE VISIT (OUTPATIENT)
Dept: INTERNAL MEDICINE | Facility: CLINIC | Age: 61
End: 2021-06-28

## 2021-06-28 VITALS
SYSTOLIC BLOOD PRESSURE: 125 MMHG | BODY MASS INDEX: 49.17 KG/M2 | RESPIRATION RATE: 19 BRPM | HEART RATE: 76 BPM | TEMPERATURE: 97.2 F | DIASTOLIC BLOOD PRESSURE: 76 MMHG | OXYGEN SATURATION: 96 % | WEIGHT: 288 LBS | HEIGHT: 64 IN

## 2021-06-28 DIAGNOSIS — N32.81 OVERACTIVE BLADDER: ICD-10-CM

## 2021-06-28 DIAGNOSIS — Z86.718 HISTORY OF DVT (DEEP VEIN THROMBOSIS): ICD-10-CM

## 2021-06-28 DIAGNOSIS — R73.03 PREDIABETES: Primary | ICD-10-CM

## 2021-06-28 DIAGNOSIS — E78.2 MIXED HYPERLIPIDEMIA: ICD-10-CM

## 2021-06-28 DIAGNOSIS — K21.9 GASTROESOPHAGEAL REFLUX DISEASE, UNSPECIFIED WHETHER ESOPHAGITIS PRESENT: ICD-10-CM

## 2021-06-28 DIAGNOSIS — E66.01 SEVERE OBESITY (BMI >= 40) (HCC): ICD-10-CM

## 2021-06-28 DIAGNOSIS — M17.11 PRIMARY OSTEOARTHRITIS OF RIGHT KNEE: ICD-10-CM

## 2021-06-28 PROCEDURE — 99214 OFFICE O/P EST MOD 30 MIN: CPT | Performed by: INTERNAL MEDICINE

## 2021-06-28 NOTE — PROGRESS NOTES
Zulema Sousa is a 61 y.o. female, who presents with a chief complaint of   Chief Complaint   Patient presents with   • Deep Vein Thrombosis   • Hyperlipidemia           HPI   Pt here for follow up.    History of foot fracture-Additionally saw podiatry recently for history of fracture 5th metatarsal.  She is now out of the CAM boot.  Standing long periods hurts her foot and legs.      Prediabetes  - a1c 5.6 -> 6.1  pt's activity has been down bc of the foot fracture and leg pain.    Hx recurrent DVT - on xarelto chronically     Pt following with dr. hendrickson w/ ortho.  She has knee pain and has had to get steroid injections in her knee.  She has struggled with getting regular exercise bc of the knee pain.  Unable to take NSAIDs given comorbidities. Referred to PT and recommended conservative therapy for now.   She recently finished a medrol dose pack.     oab - pt having to wear pads all the time. She has frequency.  No fever, abd pain, dysuria.  She gets up at night 1 time a night but goes multiple times all day.  on vesicare.     leeann - on cpap. she follows with dr. Franco.      Tobacco use - she is down to 3 cigarettes a day.      HLD - on lipitor.  No cramps or myalgias.    gerd - on PPI    She has had lots of stress at home bc of her 's health issues. He is having liver problems.     The following portions of the patient's history were reviewed and updated as appropriate: allergies, current medications, past family history, past medical history, past social history, past surgical history and problem list.    Allergies: Nsaids    Review of Systems   Constitutional: Negative.    HENT: Negative.    Eyes: Negative.    Respiratory: Negative.    Cardiovascular: Negative.    Gastrointestinal: Negative.    Endocrine: Negative.    Genitourinary: Negative.    Musculoskeletal: Positive for arthralgias.        Foot/knee pain   Skin: Negative.    Allergic/Immunologic: Negative.    Neurological: Negative.     Hematological: Negative.    Psychiatric/Behavioral: Negative.    All other systems reviewed and are negative.            Wt Readings from Last 3 Encounters:   06/28/21 131 kg (288 lb)   06/09/21 129 kg (284 lb)   06/04/21 127 kg (279 lb)     Temp Readings from Last 3 Encounters:   06/28/21 97.2 °F (36.2 °C) (Temporal)   06/04/21 97.3 °F (36.3 °C)   05/26/21 98 °F (36.7 °C) (Tympanic)     BP Readings from Last 3 Encounters:   06/28/21 125/76   06/09/21 132/82   05/26/21 142/74     Pulse Readings from Last 3 Encounters:   06/28/21 76   05/26/21 81   05/07/21 56     Body mass index is 49.41 kg/m².  SpO2 Readings from Last 3 Encounters:   06/28/21 96%   05/26/21 98%   05/07/21 97%          Physical Exam  Vitals and nursing note reviewed.   Constitutional:       General: She is not in acute distress.     Appearance: She is well-developed. She is obese.   HENT:      Head: Normocephalic and atraumatic.      Right Ear: External ear normal.      Left Ear: External ear normal.      Nose: Nose normal.   Eyes:      Conjunctiva/sclera: Conjunctivae normal.      Pupils: Pupils are equal, round, and reactive to light.   Cardiovascular:      Rate and Rhythm: Normal rate and regular rhythm.      Heart sounds: Normal heart sounds.   Pulmonary:      Effort: Pulmonary effort is normal. No respiratory distress.      Breath sounds: Normal breath sounds. No wheezing.   Musculoskeletal:         General: Normal range of motion.      Cervical back: Normal range of motion and neck supple.      Comments: Normal gait   Skin:     General: Skin is warm and dry.   Neurological:      Mental Status: She is alert and oriented to person, place, and time.   Psychiatric:         Behavior: Behavior normal.         Thought Content: Thought content normal.         Judgment: Judgment normal.         Results for orders placed or performed in visit on 06/21/21   Hemoglobin A1c    Specimen: Blood   Result Value Ref Range    Hemoglobin A1C 6.10 (H) 4.80 -  5.60 %   Lipid Panel With LDL / HDL Ratio    Specimen: Blood   Result Value Ref Range    Total Cholesterol 161 0 - 200 mg/dL    Triglycerides 56 0 - 150 mg/dL    HDL Cholesterol 72 (H) 40 - 60 mg/dL    VLDL Cholesterol Bird 11 5 - 40 mg/dL    LDL Chol Calc (NIH) 78 0 - 100 mg/dL    LDL/HDL RATIO 1.08    Comprehensive Metabolic Panel    Specimen: Blood   Result Value Ref Range    Glucose 94 65 - 99 mg/dL    BUN 21 8 - 23 mg/dL    Creatinine 0.80 0.57 - 1.00 mg/dL    eGFR Non African Am 73 >60 mL/min/1.73    eGFR African Am 88 >60 mL/min/1.73    BUN/Creatinine Ratio 26.3 (H) 7.0 - 25.0    Sodium 143 136 - 145 mmol/L    Potassium 4.8 3.5 - 5.2 mmol/L    Chloride 105 98 - 107 mmol/L    Total CO2 28.5 22.0 - 29.0 mmol/L    Calcium 9.6 8.6 - 10.5 mg/dL    Total Protein 6.4 6.0 - 8.5 g/dL    Albumin 4.10 3.50 - 5.20 g/dL    Globulin 2.3 gm/dL    A/G Ratio 1.8 g/dL    Total Bilirubin 0.2 0.0 - 1.2 mg/dL    Alkaline Phosphatase 101 39 - 117 U/L    AST (SGOT) 16 1 - 32 U/L    ALT (SGPT) 20 1 - 33 U/L   CBC & Differential    Specimen: Blood   Result Value Ref Range    WBC 9.79 3.40 - 10.80 10*3/mm3    RBC 4.56 3.77 - 5.28 10*6/mm3    Hemoglobin 13.6 12.0 - 15.9 g/dL    Hematocrit 42.9 34.0 - 46.6 %    MCV 94.1 79.0 - 97.0 fL    MCH 29.8 26.6 - 33.0 pg    MCHC 31.7 31.5 - 35.7 g/dL    RDW 13.7 12.3 - 15.4 %    Platelets 349 140 - 450 10*3/mm3    Neutrophil Rel % 56.2 42.7 - 76.0 %    Lymphocyte Rel % 34.2 19.6 - 45.3 %    Monocyte Rel % 7.6 5.0 - 12.0 %    Eosinophil Rel % 1.6 0.3 - 6.2 %    Basophil Rel % 0.3 0.0 - 1.5 %    Neutrophils Absolute 5.50 1.70 - 7.00 10*3/mm3    Lymphocytes Absolute 3.35 (H) 0.70 - 3.10 10*3/mm3    Monocytes Absolute 0.74 0.10 - 0.90 10*3/mm3    Eosinophils Absolute 0.16 0.00 - 0.40 10*3/mm3    Basophils Absolute 0.03 0.00 - 0.20 10*3/mm3    Immature Granulocyte Rel % 0.1 0.0 - 0.5 %    Immature Grans Absolute 0.01 0.00 - 0.05 10*3/mm3    nRBC 0.0 0.0 - 0.2 /100 WBC     Result Review :                   Assessment and Plan    Diagnoses and all orders for this visit:    1. Prediabetes (Primary)    2. Severe obesity (BMI >= 40) (CMS/[facility])    3. Mixed hyperlipidemia    4. History of DVT (deep vein thrombosis)    5. Gastroesophageal reflux disease, unspecified whether esophagitis present    6. Primary osteoarthritis of right knee    7. Overactive bladder       Reviewed current medication plan with patient today.  Continue current medications.  Encouraged healthy diet/exercise.  OV labs in  6  months.  Pt to call sooner if any other issues arise.      Encouraged covid vaccination if not already done.  Covid vaccination can be scheduled at [email or web address]    Patient's Body mass index is 49.41 kg/m². indicating that she is morbidly obese (BMI > 40 or > 35 with obesity - related health condition). Obesity-related health conditions include the following: obstructive sleep apnea, hypertension, dyslipidemias and GERD. Obesity is worsening. BMI is is above average; BMI management plan is completed. We discussed low calorie, low carb based diet program, portion control, increasing exercise and joining a fitness center or start home based exercise program..             Outpatient Medications Prior to Visit   Medication Sig Dispense Refill   • atorvastatin (Lipitor) 20 MG tablet Take 1 tablet by mouth Daily. 90 tablet 1   • methylPREDNISolone (MEDROL) 4 MG dose pack Take as directed on package instructions. 21 tablet 0   • multivitamin (MULTI-VITAMIN PO) Take  by mouth Daily.     • Omega-3 Fatty Acids (fish oil) 1000 MG capsule capsule Take  by mouth Daily With Breakfast.     • omeprazole (priLOSEC) 40 MG capsule TAKE ONE CAPSULE BY MOUTH DAILY 90 capsule 0   • rivaroxaban (Xarelto) 10 MG tablet Take 1 tablet by mouth Daily. 90 tablet 3   • solifenacin (VESICARE) 10 MG tablet Take 1 tablet by mouth Daily. 90 tablet 3   • vitamin D (ERGOCALCIFEROL) 1.25 MG (16484 UT) capsule capsule Take 1 capsule by  mouth 1 (One) Time Per Week. 5 capsule 3     No facility-administered medications prior to visit.     No orders of the defined types were placed in this encounter.    [unfilled]  There are no discontinued medications.      Return in about 6 months (around 12/28/2021) for Recheck, labs.    Patient was given instructions and counseling regarding her condition or for health maintenance advice. Please see specific information pulled into the AVS if appropriate.

## 2021-07-06 ENCOUNTER — TREATMENT (OUTPATIENT)
Dept: PHYSICAL THERAPY | Facility: CLINIC | Age: 61
End: 2021-07-06

## 2021-07-06 DIAGNOSIS — M62.89 MUSCLE TIGHTNESS: ICD-10-CM

## 2021-07-06 DIAGNOSIS — M25.662 DECREASED RANGE OF MOTION (ROM) OF LEFT KNEE: ICD-10-CM

## 2021-07-06 DIAGNOSIS — M25.561 CHRONIC PAIN OF BOTH KNEES: Primary | ICD-10-CM

## 2021-07-06 DIAGNOSIS — G89.29 CHRONIC PAIN OF BOTH KNEES: Primary | ICD-10-CM

## 2021-07-06 DIAGNOSIS — M25.562 CHRONIC PAIN OF BOTH KNEES: Primary | ICD-10-CM

## 2021-07-06 DIAGNOSIS — M25.661 DECREASED RANGE OF MOTION (ROM) OF RIGHT KNEE: ICD-10-CM

## 2021-07-06 DIAGNOSIS — R29.898 WEAKNESS OF BOTH LOWER EXTREMITIES: ICD-10-CM

## 2021-07-06 PROCEDURE — 97110 THERAPEUTIC EXERCISES: CPT | Performed by: PHYSICAL THERAPIST

## 2021-07-06 PROCEDURE — 97162 PT EVAL MOD COMPLEX 30 MIN: CPT | Performed by: PHYSICAL THERAPIST

## 2021-07-06 PROCEDURE — 97014 ELECTRIC STIMULATION THERAPY: CPT | Performed by: PHYSICAL THERAPIST

## 2021-07-06 NOTE — PATIENT INSTRUCTIONS
Access Code: [email or web address]  URL: [email or web address]  Date: 07/06/2021  Prepared by: Eda Whitehead    Exercises  Supine Hamstring Stretch with Strap - 1 x daily - 7 x weekly - 3 sets - 10 hold  Supine Short Arc Quad - 1 x daily - 7 x weekly - 10 reps  Supine Heel Slide - 1 x daily - 7 x weekly - 10 hold  Sidelying ITB Stretch off Table - 1 x daily - 7 x weekly - 3 sets - 30 hold  Full Arc Quad - 1 x daily - 7 x weekly - 10 reps  Heel Toe Raises with Counter Support - 1 x daily - 7 x weekly - 10 reps  Gastroc Stretch with Foot at Wall - 1 x daily - 7 x weekly - 3 sets - 10 hold

## 2021-07-06 NOTE — PROGRESS NOTES
"  Physical Therapy Initial Evaluation and Plan of Care    Patient: Zulema Sousa   : 1960  Diagnosis/ICD-10 Code:  Chronic pain of both knees [M25.561, M25.562, G89.29]  Referring practitioner: Brice Sy MD  Date of Initial Visit: 2021  Today's Date: 2021  Patient seen for 1 sessions           Subjective Questionnaire: LEFS:       Subjective Evaluation    History of Present Illness  Mechanism of injury: The patient presents to physical therapy with severe bilateral arthritis of the knees. Her BMI is approximately 49 kg/m^2. She had steroid injections bilaterally on 2021, but she is still having pain. The patient states that \"I took one step out of the back door, and there was the loudest pop I have ever heard in my knees\" and ended up going to the ER. Pt has step over shower and has to lift LLE into car and difficulty getting on/off socks. Pt reports \"I am not a very good surgical candidate due to my anatomy according to my doctor.\"     Additionally, she reports her foot was fractured in 2021.  She is seeing a sports medicine doctors for this apparently recommended a bone healing device.  She says she does not have any pain in it. Patient has a history of DVT.    PMH: GERD, PE, RA, sciatica of LLE, DVT and cardiac tampenade      Patient Occupation: -works from home Quality of life: excellent    Pain  Current pain rating: 3  At best pain ratin  At worst pain rating: 10  Location: from knees to hips  Quality: tight, sharp, dull ache, discomfort, pulling and radiating (radiating pain from hip to/from knee)  Relieving factors: ice and medications  Aggravating factors: movement, stairs, standing, lifting, ambulation, prolonged positioning, squatting and repetitive movement  Progression: worsening    Social Support  Lives in: one-story house (1 step to enter back door)  Lives with: spouse    Hand dominance: right    Diagnostic Tests  X-ray: abnormal " "(arthritis)    Treatments  Previous treatment: injection treatment and physical therapy  Current treatment comments: none.   Discharged from (in last 30 days) comments: none.     Patient Goals  Patient goals for therapy: decreased edema, decreased pain, improved balance, increased motion, increased strength, independence with ADLs/IADLs, return to sport/leisure activities and return to work  Patient goal: \"I would like to be able to walk and do stairs\"           Objective          Observations   Left Knee   Positive for edema.     Right Knee   Positive for edema.       Palpation   Left   Hypertonic in the distal biceps femoris, distal semimembranosus, distal semitendinosus, lateral gastrocnemius and medial gastrocnemius.   Tenderness of the lateral gastrocnemius, medial gastrocnemius, rectus femoris, vastus lateralis and vastus medialis.     Right   No palpable tenderness to the rectus femoris.   Hypertonic in the distal biceps femoris, distal semimembranosus, distal semitendinosus, lateral gastrocnemius and medial gastrocnemius. Tenderness of the lateral gastrocnemius, medial gastrocnemius, vastus lateralis and vastus medialis.     Tenderness   Left Knee   Tenderness in the inferior patella, ITB, lateral joint line, MCL (distal), MCL (proximal), medial joint line, medial patella and patellar tendon. No tenderness in the lateral patella, LCL (distal), LCL (proximal), quadriceps tendon and superior patella.     Right Knee   Tenderness in the MCL (distal), MCL (proximal), medial joint line and patellar tendon. No tenderness in the inferior patella, lateral joint line, lateral patella, LCL (distal), LCL (proximal), medial patella, popliteal fossa, quadriceps tendon and superior patella.     Neurological Testing     Sensation     Knee   Left Knee   Intact: light touch    Right Knee   Intact: light touch     Reflexes   Left   Patellar (L4): trace (1+)  Achilles (S1): absent (0)    Right   Patellar (L4): trace " "(1+)  Achilles (S1): absent (0)    Additional Neurological Details  Pt denies numbness or tingling BLE    Active Range of Motion   Left Knee   Flexion: 97 degrees with pain  Extension: 12 degrees with pain    Right Knee   Flexion: 102 degrees with pain  Extension: 3 degrees with pain    Patellar Mobility   Left Knee Hypermobile in the left medial and left lateral patellar tendon(s). Hypomobile in the left superior and left inferior patellar tendon(s).     Right Knee Hypermobile in the medial and lateral patellar tendon(s). Hypomobile in the superior and inferior patellar tendon(s).     Patellar Static Positioning   Left Knee: lateral tilt and indu  Right Knee: lateral tilt and indu    Strength/Myotome Testing     Left Knee   Flexion: 4-  Extension: 4  Quadriceps contraction: fair    Right Knee   Flexion: 4-  Extension: 4  Quadriceps contraction: fair    Tests     Left Knee   Positive medial Kristin.   Negative anterior drawer, posterior drawer, valgus stress test at 0 degrees, valgus stress test at 30 degrees, varus stress test at 0 degrees and varus stress test at 30 degrees.     Right Knee   Positive medial Kristin.   Negative anterior drawer, posterior drawer, valgus stress test at 0 degrees, valgus stress test at 30 degrees, varus stress test at 0 degrees and varus stress test at 30 degrees.     Swelling     Left Knee Girth Measurement (cm)   Joint line: 54 cm    Right Knee Girth Measurement (cm)   Joint line: 51 cm    Ambulation     Observational Gait   Decreased walking speed, stride length, left stance time, right stance time, left step length and right step length.   Left foot contact pattern: foot flat    Additional Observational Gait Details  Lateral trunk lean over each stance leg    Functional Assessment     Forward Step Up 6\"   Left Leg  Pain.     Right Leg  Pain.     Forward Step Down 6\"   Left Leg  Pain.     Right Leg  Pain.     Comments  Unable to complete SLR against gravity on LLE    "       Assessment & Plan     Assessment  Impairments: abnormal gait, abnormal muscle firing, abnormal or restricted ROM, activity intolerance, impaired balance, impaired physical strength, lacks appropriate home exercise program, pain with function and weight-bearing intolerance  Assessment details: Zulema Sousa is a 61 y.o. year-old female referred to physical therapy for B knee pain (L>R). She presents with a evolving clinical presentation.  She has comorbidities DVT and L side sciatica and personal factors of her weight and job hours that may affect her progress in the plan of care.  Signs and symptoms are consistent with physical therapy diagnosis of decreased knee ROM, impaired strength and muscle endurance with functional mobility. Patient is appropriate for skilled physical therapy in order to reduce pain and increase ease with daily mobility. During evaluation, pt educated on anatomy, goal of interventions, initial HEP, and body mechanics to promote healthy lifestyle and improve quality of life.  Prognosis: good  Functional Limitations: carrying objects, sleeping, walking, uncomfortable because of pain, sitting, standing, stooping and unable to perform repetitive tasks  Goals  Plan Goals: STG: ~4 weeks  1. Pt will be demonstrate 8-105 degrees of knee extension and flexion  2. Pt will improve LEFS score to 35/80   3. Pt will be able to walk for 10 min without increased knee pain  4. Pt will demonstrate a SLR without extensor lag BLE  5. Pt will walk into the clinic with mild antalgic gait pattern    LTG: ~8 weeks  1. Pt will be demonstrate 2-110 degrees of knee extension and flexion  2. Pt will improve LEFS score to 40/80  3. Pt will be able navigate 2 consecutive 6 inch step with reciprocal pattern  4. Pt will improve knee extensor and knee flexor strength to at least 4+/5  5. Pt will be able to get in/out of the car without using UE for leg support  6. Pt will be able to step in/out of the shower safely  and confidently      Plan  Therapy options: will be seen for skilled physical therapy services  Planned modality interventions: cryotherapy, electrical stimulation/Russian stimulation, thermotherapy (hydrocollator packs), ultrasound and dry needling  Planned therapy interventions: ADL retraining, balance/weight-bearing training, body mechanics training, flexibility, functional ROM exercises, gait training, home exercise program, joint mobilization, manual therapy, neuromuscular re-education, postural training, soft tissue mobilization, spinal/joint mobilization, strengthening, stretching, therapeutic activities and transfer training  Treatment plan discussed with: patient  Plan details: 1-2x per week for 10-12 weeks; 24 visits        Manual Therapy:    -     mins  40676;  Therapeutic Exercise:    26     mins  39543;     Neuromuscular Mary:    -    mins  88193;    Therapeutic Activity:     -     mins  80271;     Gait Training:      -     mins  22480;     Ultrasound:     -     mins  84394;    Electrical Stimulation:    15     mins  76517 ( );  Dry Needling     -     mins self-pay    Timed Treatment:   26   mins   Total Treatment:     65   mins    PT SIGNATURE: Eda Betancourt, KAELA   DATE TREATMENT INITIATED: 7/6/2021    Initial Certification  Certification Period: 10/4/2021  I certify that the therapy services are furnished while this patient is under my care.  The services outlined above are required by this patient, and will be reviewed every 90 days.     PHYSICIAN: Brice Sy MD      DATE:     Please sign and return via fax to [phone]. Thank you, [facility]iatric [facility].

## 2021-07-13 ENCOUNTER — HOSPITAL ENCOUNTER (OUTPATIENT)
Dept: MAMMOGRAPHY | Facility: HOSPITAL | Age: 61
Discharge: HOME | End: 2021-07-13
Admitting: OBSTETRICS & GYNECOLOGY

## 2021-07-13 DIAGNOSIS — Z13.9 SCREENING FOR CONDITION: ICD-10-CM

## 2021-07-13 PROCEDURE — 77063 BREAST TOMOSYNTHESIS BI: CPT

## 2021-07-13 PROCEDURE — 77067 SCR MAMMO BI INCL CAD: CPT

## 2021-07-15 ENCOUNTER — TREATMENT (OUTPATIENT)
Dept: PHYSICAL THERAPY | Facility: CLINIC | Age: 61
End: 2021-07-15

## 2021-07-15 DIAGNOSIS — M25.561 CHRONIC PAIN OF BOTH KNEES: Primary | ICD-10-CM

## 2021-07-15 DIAGNOSIS — R29.898 WEAKNESS OF BOTH LOWER EXTREMITIES: ICD-10-CM

## 2021-07-15 DIAGNOSIS — M25.662 DECREASED RANGE OF MOTION (ROM) OF LEFT KNEE: ICD-10-CM

## 2021-07-15 DIAGNOSIS — M25.661 DECREASED RANGE OF MOTION (ROM) OF RIGHT KNEE: ICD-10-CM

## 2021-07-15 DIAGNOSIS — M25.562 CHRONIC PAIN OF BOTH KNEES: Primary | ICD-10-CM

## 2021-07-15 DIAGNOSIS — M62.89 MUSCLE TIGHTNESS: ICD-10-CM

## 2021-07-15 DIAGNOSIS — G89.29 CHRONIC PAIN OF BOTH KNEES: Primary | ICD-10-CM

## 2021-07-15 PROCEDURE — 97110 THERAPEUTIC EXERCISES: CPT | Performed by: PHYSICAL THERAPIST

## 2021-07-15 PROCEDURE — 97014 ELECTRIC STIMULATION THERAPY: CPT | Performed by: PHYSICAL THERAPIST

## 2021-07-15 NOTE — PATIENT INSTRUCTIONS
Access Code: [email or web address]  URL: [email or web address]  Date: 07/15/2021  Prepared by: Hannah Joseph    Exercises  Supine Hamstring Stretch with Strap - 1 x daily - 7 x weekly - 3 sets - 10 hold  Supine Short Arc Quad - 1 x daily - 7 x weekly - 10 reps  Supine Heel Slide - 1 x daily - 7 x weekly - 10 hold  Sidelying ITB Stretch off Table - 1 x daily - 7 x weekly - 3 sets - 30 hold  Full Arc Quad - 1 x daily - 7 x weekly - 10 reps  Heel Toe Raises with Counter Support - 1 x daily - 7 x weekly - 10 reps  Gastroc Stretch with Foot at Wall - 1 x daily - 7 x weekly - 3 sets - 10 hold  Supine Quadricep Sets - 1 x daily - 7 x weekly - 1 sets - 20 reps - 5 hold  Active Straight Leg Raise with Quad Set - 1 x daily - 7 x weekly - 1 sets - 10 reps  Supine Hip Adduction Isometric with Ball - 1 x daily - 7 x weekly - 1 sets - 10 reps - 5 hold  Hooklying Clamshell with Resistance - 1 x daily - 7 x weekly - 1 sets - 10 reps - 5 hold

## 2021-07-15 NOTE — PROGRESS NOTES
Physical Therapy Daily Progress Note        Patient: Zulema Sousa   : 1960  Diagnosis/ICD-10 Code:  Chronic pain of both knees [M25.561, M25.562, G89.29]  Referring practitioner: Brice Sy MD  Date of Initial Visit: Type: THERAPY  Noted: 2021  Today's Date: 7/15/2021  Patient seen for 2 sessions         Zulema Sousa reports: her (L) knee hurts today.  She rated it a 4/10 and her (R) knee has not pain.  She said her (L) gave out over the weekend and she stumbled sideways but didn't fall.  She reported no complaints with initial HEP        Subjective     Objective   See Exercise, Manual, and Modality Logs for complete treatment.       Assessment/Plan  Pt able to progress exercises this date and updated HEP.  Continued strength deficits especially in (L) as pt unable to lift LE on to mat table, perform heelslide, or perform SLR without assistance from PTA or UE.  No complaints of increased pain.  Will continue to progress strength to provide muscular support to joints for decreased pain and improved stability as pt reported knee giving out leading to LOB but not fall.  Will focus on non-weightbearing exercises initially to limit joint compression however may progress to balance and weightbearing exercises as symptoms and strength improve.    Progress per Plan of Care           Manual Therapy:         mins  26283;  Therapeutic Exercise:    45     mins  45871;     Neuromuscular Mary:        mins  99049;    Therapeutic Activity:          mins  80301;     Gait Training:           mins  94942;     Ultrasound:          mins  27524;    Electrical Stimulation:    15     mins  62465 ( );  Dry Needling          mins self-pay    Timed Treatment:   45   mins   Total Treatment:     61   mins    Hannah Joseph PTA  Physical Therapist Assistant

## 2021-07-21 ENCOUNTER — TREATMENT (OUTPATIENT)
Dept: PHYSICAL THERAPY | Facility: CLINIC | Age: 61
End: 2021-07-21

## 2021-07-21 ENCOUNTER — TELEPHONE (OUTPATIENT)
Dept: ORTHOPEDIC SURGERY | Facility: CLINIC | Age: 61
End: 2021-07-21

## 2021-07-21 DIAGNOSIS — M25.562 CHRONIC PAIN OF BOTH KNEES: Primary | ICD-10-CM

## 2021-07-21 DIAGNOSIS — R29.898 WEAKNESS OF BOTH LOWER EXTREMITIES: ICD-10-CM

## 2021-07-21 DIAGNOSIS — M25.661 DECREASED RANGE OF MOTION (ROM) OF RIGHT KNEE: ICD-10-CM

## 2021-07-21 DIAGNOSIS — M25.561 CHRONIC PAIN OF BOTH KNEES: Primary | ICD-10-CM

## 2021-07-21 DIAGNOSIS — M25.662 DECREASED RANGE OF MOTION (ROM) OF LEFT KNEE: ICD-10-CM

## 2021-07-21 DIAGNOSIS — G89.29 CHRONIC PAIN OF BOTH KNEES: Primary | ICD-10-CM

## 2021-07-21 DIAGNOSIS — M62.89 MUSCLE TIGHTNESS: ICD-10-CM

## 2021-07-21 PROCEDURE — 97014 ELECTRIC STIMULATION THERAPY: CPT | Performed by: PHYSICAL THERAPIST

## 2021-07-21 PROCEDURE — 97110 THERAPEUTIC EXERCISES: CPT | Performed by: PHYSICAL THERAPIST

## 2021-07-21 NOTE — TELEPHONE ENCOUNTER
Caller: CLEMENCIA    Relationship: [facility] (formerly [facility]3]     Best call back number: [phone]    What form or medical record are you requesting: LETTER OF MEDICAL NECESSITY    Who is requesting this form or medical record from you: [facility]]    How would you like to receive the form or medical records (pick-up, mail, fax): FAX  If fax, what is the fax number: [phone]    Timeframe paperwork needed: ASAP    Additional notes: [employer]EN[employer] CALLED AND SAID THEY FAXED THIS OVER A COUPLE MONTHS AGO AND NEEDS IT FAXED BACK

## 2021-07-21 NOTE — PROGRESS NOTES
Physical Therapy Daily Progress Note        Patient: Zulema Sousa   : 1960  Diagnosis/ICD-10 Code:  Chronic pain of both knees [M25.561, M25.562, G89.29]  Referring practitioner: Brice Sy MD  Date of Initial Visit: Type: THERAPY  Noted: 2021  Today's Date: 2021  Patient seen for 3 sessions         Zulema Sousa reports:  she went to [facility] and did a lot of walking and she has been hurting ever since.  (L) knee pain -5/10, (R) knee no pain.        Subjective     Objective   See Exercise, Manual, and Modality Logs for complete treatment.       Assessment/Plan  Pt with gentle progression of strengthening this date and she reported fatigue/difficulty with progressions.  Improved ability to complete heelslide however continued to use minor UE assistance as well as difficulty with SLR requiring AAROM and utilizing UE or assistance for lifting (L) onto mat table/stool.  Added hooklying marches to shorten lever arm for hip flexion strengthening however pt able to complete fairly easily thus may try with LE extended and move into flexed position next session.  Will also continue to monitor quad strength and activation with exercises with possible addition of NMES if necessary.  A few exercises deferred this date d/t appointment conflict however continued with estim to provide pain relief.      Progress per Plan of Care           Manual Therapy:         mins  88502;  Therapeutic Exercise:    40     mins  67882;     Neuromuscular Mary:        mins  42275;    Therapeutic Activity:          mins  63669;     Gait Training:           mins  62207;     Ultrasound:          mins  55431;    Electrical Stimulation:    15     mins  01800 ( );  Dry Needling          mins self-pay    Timed Treatment:   40   mins   Total Treatment:     61   mins    Hannah Joseph PTA  Physical Therapist Assistant

## 2021-08-06 ENCOUNTER — CLINICAL SUPPORT (OUTPATIENT)
Dept: ORTHOPEDIC SURGERY | Facility: CLINIC | Age: 61
End: 2021-08-06

## 2021-08-06 VITALS — HEIGHT: 64 IN | WEIGHT: 280 LBS | BODY MASS INDEX: 47.8 KG/M2 | TEMPERATURE: 96.4 F

## 2021-08-06 DIAGNOSIS — M17.0 ARTHRITIS OF BOTH KNEES: Primary | ICD-10-CM

## 2021-08-06 PROCEDURE — 20610 DRAIN/INJ JOINT/BURSA W/O US: CPT | Performed by: ORTHOPAEDIC SURGERY

## 2021-08-06 RX ORDER — METHYLPREDNISOLONE ACETATE 80 MG/ML
80 INJECTION, SUSPENSION INTRA-ARTICULAR; INTRALESIONAL; INTRAMUSCULAR; SOFT TISSUE
Status: COMPLETED | OUTPATIENT
Start: 2021-08-06 | End: 2021-08-06

## 2021-08-06 RX ORDER — LIDOCAINE HYDROCHLORIDE 20 MG/ML
4 INJECTION, SOLUTION EPIDURAL; INFILTRATION; INTRACAUDAL; PERINEURAL
Status: COMPLETED | OUTPATIENT
Start: 2021-08-06 | End: 2021-08-06

## 2021-08-06 RX ADMIN — LIDOCAINE HYDROCHLORIDE 4 ML: 20 INJECTION, SOLUTION EPIDURAL; INFILTRATION; INTRACAUDAL; PERINEURAL at 08:14

## 2021-08-06 RX ADMIN — LIDOCAINE HYDROCHLORIDE 4 ML: 20 INJECTION, SOLUTION EPIDURAL; INFILTRATION; INTRACAUDAL; PERINEURAL at 08:13

## 2021-08-06 RX ADMIN — METHYLPREDNISOLONE ACETATE 80 MG: 80 INJECTION, SUSPENSION INTRA-ARTICULAR; INTRALESIONAL; INTRAMUSCULAR; SOFT TISSUE at 08:13

## 2021-08-06 RX ADMIN — METHYLPREDNISOLONE ACETATE 80 MG: 80 INJECTION, SUSPENSION INTRA-ARTICULAR; INTRALESIONAL; INTRAMUSCULAR; SOFT TISSUE at 08:14

## 2021-08-06 NOTE — PROGRESS NOTES
She is seen back today for recurrent injections of her knees. Her BMI is 48. She has DVT's, PE's and is on blood thinners, and has heart conditions, however, she does well with injections. The patient received bilateral knee injections today. She will follow up in 3 months with x-rays.      Large Joint Arthrocentesis: R knee  Date/Time: 8/6/2021 8:13 AM  Consent given by: patient  Site marked: site marked  Timeout: Immediately prior to procedure a time out was called to verify the correct patient, procedure, equipment, support staff and site/side marked as required   Supporting Documentation  Indications: pain   Procedure Details  Location: knee - R knee  Preparation: Patient was prepped and draped in the usual sterile fashion  Needle size: 22 G  Approach: lateral  Medications administered: 80 mg methylPREDNISolone acetate 80 MG/ML; 4 mL lidocaine PF 2% 2 %  Patient tolerance: patient tolerated the procedure well with no immediate complications    Large Joint Arthrocentesis: L knee  Date/Time: 8/6/2021 8:14 AM  Consent given by: patient  Site marked: site marked  Timeout: Immediately prior to procedure a time out was called to verify the correct patient, procedure, equipment, support staff and site/side marked as required   Supporting Documentation  Indications: pain   Procedure Details  Location: knee - L knee  Preparation: Patient was prepped and draped in the usual sterile fashion  Needle size: 22 G  Approach: lateral  Medications administered: 80 mg methylPREDNISolone acetate 80 MG/ML; 4 mL lidocaine PF 2% 2 %  Patient tolerance: patient tolerated the procedure well with no immediate complications          Transcribed from ambient dictation for Brice Sy MD by Giovanna Wilson.  08/06/21   08:53 EDT    I have personally performed the services described in this document as transcribed by the above individual, and it is both accurate and complete.  Brice Sy MD  8/6/2021  12:04 EDT

## 2021-08-26 RX ORDER — OMEPRAZOLE 40 MG/1
40 CAPSULE, DELAYED RELEASE ORAL DAILY
Qty: 90 CAPSULE | Refills: 0 | Status: SHIPPED | OUTPATIENT
Start: 2021-08-26 | End: 2021-11-21 | Stop reason: SDUPTHER

## 2021-09-17 DIAGNOSIS — E78.2 MIXED HYPERLIPIDEMIA: ICD-10-CM

## 2021-09-17 RX ORDER — ATORVASTATIN CALCIUM 20 MG/1
20 TABLET, FILM COATED ORAL DAILY
Qty: 90 TABLET | Refills: 1 | Status: SHIPPED | OUTPATIENT
Start: 2021-09-17 | End: 2021-10-13

## 2021-09-19 DIAGNOSIS — E78.2 MIXED HYPERLIPIDEMIA: ICD-10-CM

## 2021-09-20 RX ORDER — ATORVASTATIN CALCIUM 20 MG/1
TABLET, FILM COATED ORAL
Qty: 90 TABLET | Refills: 1 | Status: SHIPPED | OUTPATIENT
Start: 2021-09-20 | End: 2022-01-04

## 2021-10-06 DIAGNOSIS — E55.9 VITAMIN D DEFICIENCY: ICD-10-CM

## 2021-10-06 DIAGNOSIS — S92.355A NONDISPLACED FRACTURE OF FIFTH METATARSAL BONE, LEFT FOOT, INITIAL ENCOUNTER FOR CLOSED FRACTURE: ICD-10-CM

## 2021-10-06 RX ORDER — ERGOCALCIFEROL 1.25 MG/1
50000 CAPSULE ORAL WEEKLY
Qty: 5 CAPSULE | Refills: 3 | Status: CANCELLED | OUTPATIENT
Start: 2021-10-06

## 2021-10-08 DIAGNOSIS — S92.355A NONDISPLACED FRACTURE OF FIFTH METATARSAL BONE, LEFT FOOT, INITIAL ENCOUNTER FOR CLOSED FRACTURE: ICD-10-CM

## 2021-10-08 DIAGNOSIS — E55.9 VITAMIN D DEFICIENCY: ICD-10-CM

## 2021-10-11 RX ORDER — ERGOCALCIFEROL 1.25 MG/1
50000 CAPSULE ORAL WEEKLY
Qty: 5 CAPSULE | Refills: 3 | OUTPATIENT
Start: 2021-10-11

## 2021-10-13 ENCOUNTER — APPOINTMENT (OUTPATIENT)
Dept: CT IMAGING | Facility: HOSPITAL | Age: 61
End: 2021-10-13

## 2021-10-13 ENCOUNTER — APPOINTMENT (OUTPATIENT)
Dept: GENERAL RADIOLOGY | Facility: HOSPITAL | Age: 61
End: 2021-10-13

## 2021-10-13 ENCOUNTER — HOSPITAL ENCOUNTER (EMERGENCY)
Facility: HOSPITAL | Age: 61
Discharge: HOME | End: 2021-10-13
Attending: EMERGENCY MEDICINE | Admitting: EMERGENCY MEDICINE

## 2021-10-13 VITALS
TEMPERATURE: 98 F | HEART RATE: 62 BPM | WEIGHT: 280 LBS | OXYGEN SATURATION: 97 % | RESPIRATION RATE: 20 BRPM | BODY MASS INDEX: 47.8 KG/M2 | DIASTOLIC BLOOD PRESSURE: 92 MMHG | HEIGHT: 64 IN | SYSTOLIC BLOOD PRESSURE: 112 MMHG

## 2021-10-13 DIAGNOSIS — R07.89 RIGHT-SIDED CHEST WALL PAIN: Primary | ICD-10-CM

## 2021-10-13 DIAGNOSIS — R60.9 PERIPHERAL EDEMA: ICD-10-CM

## 2021-10-13 DIAGNOSIS — R06.02 SHORTNESS OF BREATH: ICD-10-CM

## 2021-10-13 LAB
ALBUMIN SERPL-MCNC: 4.2 G/DL (ref 3.5–5.2)
ALBUMIN/GLOB SERPL: 1.4 G/DL
ALP SERPL-CCNC: 93 U/L (ref 39–117)
ALT SERPL W P-5'-P-CCNC: 20 U/L (ref 1–33)
ANION GAP SERPL CALCULATED.3IONS-SCNC: 10.3 MMOL/L (ref 5–15)
AST SERPL-CCNC: 23 U/L (ref 1–32)
BASOPHILS # BLD AUTO: 0.05 10*3/MM3 (ref 0–0.2)
BASOPHILS NFR BLD AUTO: 0.5 % (ref 0–1.5)
BILIRUB SERPL-MCNC: 0.3 MG/DL (ref 0–1.2)
BUN SERPL-MCNC: 17 MG/DL (ref 8–23)
BUN/CREAT SERPL: 20 (ref 7–25)
CALCIUM SPEC-SCNC: 10.4 MG/DL (ref 8.6–10.5)
CHLORIDE SERPL-SCNC: 100 MMOL/L (ref 98–107)
CO2 SERPL-SCNC: 27.7 MMOL/L (ref 22–29)
CREAT SERPL-MCNC: 0.85 MG/DL (ref 0.57–1)
DEPRECATED RDW RBC AUTO: 51.5 FL (ref 37–54)
EOSINOPHIL # BLD AUTO: 0.18 10*3/MM3 (ref 0–0.4)
EOSINOPHIL NFR BLD AUTO: 2 % (ref 0.3–6.2)
ERYTHROCYTE [DISTWIDTH] IN BLOOD BY AUTOMATED COUNT: 14.8 % (ref 12.3–15.4)
GFR SERPL CREATININE-BSD FRML MDRD: 68 ML/MIN/1.73
GLOBULIN UR ELPH-MCNC: 3.1 GM/DL
GLUCOSE SERPL-MCNC: 96 MG/DL (ref 65–99)
HCT VFR BLD AUTO: 43.7 % (ref 34–46.6)
HGB BLD-MCNC: 13.6 G/DL (ref 12–15.9)
IMM GRANULOCYTES # BLD AUTO: 0.02 10*3/MM3 (ref 0–0.05)
IMM GRANULOCYTES NFR BLD AUTO: 0.2 % (ref 0–0.5)
LYMPHOCYTES # BLD AUTO: 3.74 10*3/MM3 (ref 0.7–3.1)
LYMPHOCYTES NFR BLD AUTO: 40.8 % (ref 19.6–45.3)
MCH RBC QN AUTO: 29.7 PG (ref 26.6–33)
MCHC RBC AUTO-ENTMCNC: 31.1 G/DL (ref 31.5–35.7)
MCV RBC AUTO: 95.4 FL (ref 79–97)
MONOCYTES # BLD AUTO: 0.62 10*3/MM3 (ref 0.1–0.9)
MONOCYTES NFR BLD AUTO: 6.8 % (ref 5–12)
NEUTROPHILS NFR BLD AUTO: 4.56 10*3/MM3 (ref 1.7–7)
NEUTROPHILS NFR BLD AUTO: 49.7 % (ref 42.7–76)
NRBC BLD AUTO-RTO: 0 /100 WBC (ref 0–0.2)
NT-PROBNP SERPL-MCNC: 293.5 PG/ML (ref 0–900)
PLATELET # BLD AUTO: 290 10*3/MM3 (ref 140–450)
PMV BLD AUTO: 9.3 FL (ref 6–12)
POTASSIUM SERPL-SCNC: 3.7 MMOL/L (ref 3.5–5.2)
PROCALCITONIN SERPL-MCNC: 0.02 NG/ML (ref 0–0.25)
PROT SERPL-MCNC: 7.3 G/DL (ref 6–8.5)
RBC # BLD AUTO: 4.58 10*6/MM3 (ref 3.77–5.28)
SARS-COV-2 RNA PNL SPEC NAA+PROBE: NOT DETECTED
SODIUM SERPL-SCNC: 138 MMOL/L (ref 136–145)
TROPONIN T SERPL-MCNC: <0.01 NG/ML (ref 0–0.03)
WBC # BLD AUTO: 9.17 10*3/MM3 (ref 3.4–10.8)

## 2021-10-13 PROCEDURE — 80053 COMPREHEN METABOLIC PANEL: CPT | Performed by: EMERGENCY MEDICINE

## 2021-10-13 PROCEDURE — 87635 SARS-COV-2 COVID-19 AMP PRB: CPT | Performed by: EMERGENCY MEDICINE

## 2021-10-13 PROCEDURE — 71045 X-RAY EXAM CHEST 1 VIEW: CPT

## 2021-10-13 PROCEDURE — 93005 ELECTROCARDIOGRAM TRACING: CPT | Performed by: EMERGENCY MEDICINE

## 2021-10-13 PROCEDURE — 0 IOPAMIDOL PER 1 ML: Performed by: EMERGENCY MEDICINE

## 2021-10-13 PROCEDURE — 71275 CT ANGIOGRAPHY CHEST: CPT

## 2021-10-13 PROCEDURE — 99283 EMERGENCY DEPT VISIT LOW MDM: CPT

## 2021-10-13 PROCEDURE — 84484 ASSAY OF TROPONIN QUANT: CPT | Performed by: EMERGENCY MEDICINE

## 2021-10-13 PROCEDURE — 84145 PROCALCITONIN (PCT): CPT | Performed by: EMERGENCY MEDICINE

## 2021-10-13 PROCEDURE — 83880 ASSAY OF NATRIURETIC PEPTIDE: CPT | Performed by: EMERGENCY MEDICINE

## 2021-10-13 PROCEDURE — 99283 EMERGENCY DEPT VISIT LOW MDM: CPT | Performed by: EMERGENCY MEDICINE

## 2021-10-13 PROCEDURE — 93010 ELECTROCARDIOGRAM REPORT: CPT | Performed by: INTERNAL MEDICINE

## 2021-10-13 PROCEDURE — 85025 COMPLETE CBC W/AUTO DIFF WBC: CPT | Performed by: EMERGENCY MEDICINE

## 2021-10-13 RX ORDER — SODIUM CHLORIDE 0.9 % (FLUSH) 0.9 %
10 SYRINGE (ML) INJECTION AS NEEDED
Status: DISCONTINUED | OUTPATIENT
Start: 2021-10-13 | End: 2021-10-13 | Stop reason: HOSPADM

## 2021-10-13 RX ADMIN — IOPAMIDOL 100 ML: 755 INJECTION, SOLUTION INTRAVENOUS at 20:39

## 2021-10-13 NOTE — ED PROVIDER NOTES
"Subjective   History of Present Illness  History of Present Illness    Chief complaint: Chest pain, shortness of breath, leg swelling    Location: Right-sided chest, bilateral legs    Quality/Severity: Moderate \"grabbing\" pain    Timing/Duration: Present for the past 4 5 days    Modifying Factors: Leg swelling was worse after working several days in a row    Narrative: This patient presents to us after being sent here from a local urgent care center for further work-up.  She complains of some right-sided grabbing chest pain that is worse with inspiration.  She has had a mild cough but no fevers.  She also reports some bilateral swelling in lower legs.  She tells me that all of this has been going on for the past for 5 days and she thinks some of it was brought on by the fact that she has worked several days in a row recently with more hours than normal in a seated position.  She borrowed one of her 's \"water pills\" on Monday to take 1 of those and see if that helps with her leg swelling.  Additionally, she has a history of PEs and reportedly a past medical history of cardiac tamponade.  She does take Xarelto anticoagulation daily.  Her cardiologist is Dr. Hutchinson.    Associated Symptoms: as above    Review of Systems   Constitutional: Negative for activity change, diaphoresis and fever.   HENT: Positive for sore throat. Negative for facial swelling, trouble swallowing and voice change.    Respiratory: Positive for cough and shortness of breath.    Cardiovascular: Positive for chest pain and leg swelling.   Gastrointestinal: Negative for abdominal pain, diarrhea and vomiting.   Genitourinary: Negative for dysuria and flank pain.   Skin: Negative for color change and wound.   Neurological: Negative for syncope and headaches.   All other systems reviewed and are negative.      Past Medical History:   Diagnosis Date   • Acid reflux    • Back pain    • Cardiac tamponade     March 2015   • Difficulty breathing     " during exertion   • Edema    • Esophagitis, reflux    • Essential hypertriglyceridemia    • GERD (gastroesophageal reflux disease)    • Health care maintenance    • Heartburn    • Hyperlipidemia    • Hypertriglyceridemia    • Increased appetite    • Morbid obesity ([facility])    • Multiple joint pain    • Obesity, Class III, BMI 40-49.9 (morbid obesity) ([facility]) 2018   • Osteoarthritis of knee    • Pericarditis    • Pulmonary embolism ([facility])     ura2015   • RHA (rheumatoid arthritis) ([facility])    • Sciatica    • Sleep apnea    • Unintended weight gain        Allergies   Allergen Reactions   • Nsaids Other (See Comments)     DOESN'T TAKE BECAUSE SHES ON BLOOD THINNER.       Past Surgical History:   Procedure Laterality Date   • ANKLE SURGERY      treatment of ankle fracture   • APPENDECTOMY     • CARDIAC SURGERY      cardiac tamponade   •  SECTION     • CHOLECYSTECTOMY     • COLONOSCOPY N/A 2018    Procedure: COLONOSCOPY to cecum and t.i. with polypectomy and clip x2 to ascending colon;  Surgeon: Pierre Ornelas MD;  Location: [facility] ENDOSCOPY;  Service: Gastroenterology   • DILATATION AND CURETTAGE     • ENDOSCOPY N/A 2018    Procedure: ESOPHAGOGASTRODUODENOSCOPY;  Surgeon: Pierre Ornelas MD;  Location: [facility] ENDOSCOPY;  Service: Gastroenterology   • GASTRIC BANDING REMOVAL N/A 2019    Procedure: GASTRIC BANDING AND PORT REMOVAL, LYSIS OF ADHESIONS, REPAIR OF COLOTOMY, AND INCISIONAL HERNIA REPAIR LAPAROSCOPIC;  Surgeon: Randell Salamanca Jr., MD;  [place] [facility] [place] [facility] [place]  Service: General   • LAPAROSCOPIC GASTRIC BANDING     • PULMONARY EMBOLISM SURGERY     • TUBAL ABDOMINAL LIGATION         Family History   Problem Relation Age of Onset   • Cancer Mother    • Obesity Mother    • Diabetes Father    • Hypertension Father    • Heart disease Father    • Obesity Father    • Heart disease Brother    • Diabetes Paternal Grandfather    • Diabetes Paternal Aunt    • No Known Problems Maternal  Grandmother    • No Known Problems Maternal Grandfather    • No Known Problems Paternal Grandmother    • Malig Hyperthermia Neg Hx    • Breast cancer Neg Hx        Social History     Socioeconomic History   • Marital status:      Spouse name: Jac   Tobacco Use   • Smoking status: Current Every Day Smoker     Packs/day: 0.50     Years: 10.00     Pack years: 5.00     Types: Cigarettes     Last attempt to quit:      Years since quittin.7   • Smokeless tobacco: Current User   • Tobacco comment: caffeine use   Vaping Use   • Vaping Use: Never used   Substance and Sexual Activity   • Alcohol use: Yes     Alcohol/week: 1.0 standard drink     Types: 1 Shots of liquor per week     Comment: social/ occassional   • Drug use: No   • Sexual activity: Defer     ED Triage Vitals [10/13/21 1835]   Temp Heart Rate Resp BP SpO2   98 °F (36.7 °C) 83 16 154/57 95 %      Temp src Heart Rate Source Patient Position BP Location FiO2 (%)   Oral Monitor Sitting Right arm --     Objective   Physical Exam  Vitals and nursing note reviewed.   Constitutional:       General: She is not in acute distress.     Appearance: She is well-developed. She is not toxic-appearing or diaphoretic.   HENT:      Head: Normocephalic and atraumatic.   Eyes:      General:         Right eye: No discharge.         Left eye: No discharge.      Pupils: Pupils are equal, round, and reactive to light.   Cardiovascular:      Rate and Rhythm: Normal rate and regular rhythm.      Pulses: Normal pulses.      Heart sounds: Normal heart sounds. No murmur heard.      Pulmonary:      Effort: Pulmonary effort is normal. No tachypnea, accessory muscle usage or respiratory distress.      Breath sounds: No decreased breath sounds, wheezing, rhonchi or rales.      Comments: Breath sounds are clear bilaterally.  Normal work of breathing noted.  Abdominal:      Palpations: Abdomen is soft. There is no mass.      Tenderness: There is no abdominal tenderness. There is  no rebound.   Musculoskeletal:         General: No deformity. Normal range of motion.      Cervical back: Normal range of motion and neck supple.      Right lower leg: Tenderness present. Edema present.      Left lower leg: Tenderness present. Edema present.      Comments: 2+ edema is noted to bilateral legs.  Left leg is little bit larger than the right leg.   Skin:     General: Skin is warm and dry.      Findings: No erythema or rash.   Neurological:      General: No focal deficit present.      Mental Status: She is alert and oriented to person, place, and time.   Psychiatric:         Mood and Affect: Mood normal. Mood is not anxious.         Behavior: Behavior normal.         Thought Content: Thought content normal.         Judgment: Judgment normal.       EKG           EKG time/Interp time: 1846/1850  Rhythm/Rate: Sinus rhythm, 71 bpm  P waves and MO: P waves are present, 157 ms  QRS, axis: 85 ms, normal axis  ST and T waves: No ST segment elevations.    Independently interpreted by me contemporaneously with treatment    Results for orders placed or performed during the hospital encounter of 10/13/21   COVID-19,[facility] IN-HOUSE,Nasal Swab No Transport Media 3-4 HR TAT - Swab, Nasal Cavity    Specimen: Nasal Cavity; Swab   Result Value Ref Range    COVID19 Not Detected Not Detected - Ref. Range   Comprehensive Metabolic Panel    Specimen: Blood   Result Value Ref Range    Glucose 96 65 - 99 mg/dL    BUN 17 8 - 23 mg/dL    Creatinine 0.85 0.57 - 1.00 mg/dL    Sodium 138 136 - 145 mmol/L    Potassium 3.7 3.5 - 5.2 mmol/L    Chloride 100 98 - 107 mmol/L    CO2 27.7 22.0 - 29.0 mmol/L    Calcium 10.4 8.6 - 10.5 mg/dL    Total Protein 7.3 6.0 - 8.5 g/dL    Albumin 4.20 3.50 - 5.20 g/dL    ALT (SGPT) 20 1 - 33 U/L    AST (SGOT) 23 1 - 32 U/L    Alkaline Phosphatase 93 39 - 117 U/L    Total Bilirubin 0.3 0.0 - 1.2 mg/dL    eGFR Non African Amer 68 >60 mL/min/1.73    Globulin 3.1 gm/dL    A/G Ratio 1.4 g/dL     BUN/Creatinine Ratio 20.0 7.0 - 25.0    Anion Gap 10.3 5.0 - 15.0 mmol/L   Troponin    Specimen: Blood   Result Value Ref Range    Troponin T <0.010 0.000 - 0.030 ng/mL   BNP    Specimen: Blood   Result Value Ref Range    proBNP 293.5 0.0 - 900.0 pg/mL   Procalcitonin    Specimen: Blood   Result Value Ref Range    Procalcitonin 0.02 0.00 - 0.25 ng/mL   CBC Auto Differential    Specimen: Blood   Result Value Ref Range    WBC 9.17 3.40 - 10.80 10*3/mm3    RBC 4.58 3.77 - 5.28 10*6/mm3    Hemoglobin 13.6 12.0 - 15.9 g/dL    Hematocrit 43.7 34.0 - 46.6 %    MCV 95.4 79.0 - 97.0 fL    MCH 29.7 26.6 - 33.0 pg    MCHC 31.1 (L) 31.5 - 35.7 g/dL    RDW 14.8 12.3 - 15.4 %    RDW-SD 51.5 37.0 - 54.0 fl    MPV 9.3 6.0 - 12.0 fL    Platelets 290 140 - 450 10*3/mm3    Neutrophil % 49.7 42.7 - 76.0 %    Lymphocyte % 40.8 19.6 - 45.3 %    Monocyte % 6.8 5.0 - 12.0 %    Eosinophil % 2.0 0.3 - 6.2 %    Basophil % 0.5 0.0 - 1.5 %    Immature Grans % 0.2 0.0 - 0.5 %    Neutrophils, Absolute 4.56 1.70 - 7.00 10*3/mm3    Lymphocytes, Absolute 3.74 (H) 0.70 - 3.10 10*3/mm3    Monocytes, Absolute 0.62 0.10 - 0.90 10*3/mm3    Eosinophils, Absolute 0.18 0.00 - 0.40 10*3/mm3    Basophils, Absolute 0.05 0.00 - 0.20 10*3/mm3    Immature Grans, Absolute 0.02 0.00 - 0.05 10*3/mm3    nRBC 0.0 0.0 - 0.2 /100 WBC   ECG 12 Lead   Result Value Ref Range    QT Interval 402 ms     RADIOLOGY        Study: Chest x-ray    Findings: No gross acute disease, given limitations.    Interpreted contemporaneously with treatment by Dr. Mendieta, independently viewed by me    RADIOLOGY        Study: CTA chest    Findings: 1. Negative for pulmonary embolus.  2. No acute findings in the chest. The lungs are clear. There is coronary artery calcification  3. Small hiatal hernia    Interpreted contemporaneously with treatment by Dr. Mendieta, independently viewed by me    Procedures           ED Course  ED Course as of 10/13/21 2136   Wed Oct 13, 2021   2133 I have reviewed  "the EKG and labs and radiology reports from today's visit and everything looks to be reassuring here.  There is no evidence of ACS or PE or dissection or pulmonary edema at all.  Patient certainly does have some peripheral edema and I advised her to wear compression stockings and keep her legs elevated as much as possible.  I encouraged her to follow-up with her PCP for further evaluation within the next week.  I reviewed with her the usual \"return to ER\" instructions for any worsening signs or symptoms as well. [GENARO]      ED Course User Index  [GENARO] Sina Patton MD                                           MDM  Number of Diagnoses or Management Options     Amount and/or Complexity of Data Reviewed  Clinical lab tests: reviewed and ordered  Tests in the radiology section of CPT®: reviewed and ordered  Tests in the medicine section of CPT®: reviewed  Decide to obtain previous medical records or to obtain history from someone other than the patient: yes  Review and summarize past medical records: yes  Independent visualization of images, tracings, or specimens: yes    Risk of Complications, Morbidity, and/or Mortality  Presenting problems: moderate  Diagnostic procedures: moderate  Management options: moderate        Final diagnoses:   Right-sided chest wall pain   Peripheral edema   Shortness of breath       ED Disposition  ED Disposition     ED Disposition Condition Comment    Discharge Stable           Marsha Young MD  [address] [facility] [address]  [facility] [address] [phone]    Call in 1 day  Call to schedule a follow-up appointment with your PCP for repeat evaluation         Medication List      No changes were made to your prescriptions during this visit.          Sina Patton MD  10/13/21 2136    "

## 2021-10-14 LAB — QT INTERVAL: 402 MS

## 2021-10-14 NOTE — DISCHARGE INSTRUCTIONS
Continue taking your regular medications as directed.  Please return to the emergency room for any worsening pain, fevers, weakness, dizziness, nausea, difficulties breathing or any other concerns.

## 2021-11-01 ENCOUNTER — APPOINTMENT (OUTPATIENT)
Dept: SLEEP MEDICINE | Facility: HOSPITAL | Age: 61
End: 2021-11-01

## 2021-11-05 ENCOUNTER — APPOINTMENT (OUTPATIENT)
Dept: VACCINE CLINIC | Facility: HOSPITAL | Age: 61
End: 2021-11-05

## 2021-11-08 ENCOUNTER — OFFICE VISIT (OUTPATIENT)
Dept: ORTHOPEDIC SURGERY | Facility: CLINIC | Age: 61
End: 2021-11-08

## 2021-11-08 VITALS — WEIGHT: 280 LBS | HEIGHT: 64 IN | TEMPERATURE: 96.9 F | BODY MASS INDEX: 47.8 KG/M2

## 2021-11-08 DIAGNOSIS — M17.0 ARTHRITIS OF BOTH KNEES: Primary | ICD-10-CM

## 2021-11-08 PROCEDURE — 20610 DRAIN/INJ JOINT/BURSA W/O US: CPT | Performed by: ORTHOPAEDIC SURGERY

## 2021-11-08 RX ORDER — METHYLPREDNISOLONE ACETATE 80 MG/ML
80 INJECTION, SUSPENSION INTRA-ARTICULAR; INTRALESIONAL; INTRAMUSCULAR; SOFT TISSUE
Status: COMPLETED | OUTPATIENT
Start: 2021-11-08 | End: 2021-11-08

## 2021-11-08 RX ADMIN — METHYLPREDNISOLONE ACETATE 80 MG: 80 INJECTION, SUSPENSION INTRA-ARTICULAR; INTRALESIONAL; INTRAMUSCULAR; SOFT TISSUE at 09:51

## 2021-11-08 RX ADMIN — METHYLPREDNISOLONE ACETATE 80 MG: 80 INJECTION, SUSPENSION INTRA-ARTICULAR; INTRALESIONAL; INTRAMUSCULAR; SOFT TISSUE at 09:52

## 2021-11-08 NOTE — PROGRESS NOTES
She is seen back today for recurrent injections of her knees. Her BMI is 48. She has DVT's, PE's and is on blood thinners, and has heart conditions, however, she does well with injections. The patient received bilateral knee injections today.       Large Joint Arthrocentesis: R knee  Date/Time: 11/8/2021 9:51 AM  Consent given by: patient  Site marked: site marked  Timeout: Immediately prior to procedure a time out was called to verify the correct patient, procedure, equipment, support staff and site/side marked as required   Supporting Documentation  Indications: pain   Procedure Details  Location: knee - R knee  Preparation: Patient was prepped and draped in the usual sterile fashion  Needle gauge: 21.  Approach: lateral  Medications administered: 1 mL lidocaine (cardiac); 80 mg methylPREDNISolone acetate 80 MG/ML  Patient tolerance: patient tolerated the procedure well with no immediate complications    Large Joint Arthrocentesis: L knee  Date/Time: 11/8/2021 9:52 AM  Consent given by: patient  Site marked: site marked  Timeout: Immediately prior to procedure a time out was called to verify the correct patient, procedure, equipment, support staff and site/side marked as required   Supporting Documentation  Indications: pain   Procedure Details  Location: knee - L knee  Preparation: Patient was prepped and draped in the usual sterile fashion  Needle gauge: 21.  Approach: lateral  Medications administered: 1 mL lidocaine (cardiac); 80 mg methylPREDNISolone acetate 80 MG/ML  Patient tolerance: patient tolerated the procedure well with no immediate complications

## 2021-11-13 ENCOUNTER — IMMUNIZATION (OUTPATIENT)
Dept: VACCINE CLINIC | Facility: HOSPITAL | Age: 61
End: 2021-11-13

## 2021-11-13 PROCEDURE — 0004A ADM SARSCOV2 30MCG/0.3ML BOOSTER: CPT | Performed by: INTERNAL MEDICINE

## 2021-11-13 PROCEDURE — 91300 HC SARSCOV02 VAC 30MCG/0.3ML IM: CPT | Performed by: INTERNAL MEDICINE

## 2021-11-22 RX ORDER — OMEPRAZOLE 40 MG/1
40 CAPSULE, DELAYED RELEASE ORAL DAILY
Qty: 90 CAPSULE | Refills: 0 | Status: SHIPPED | OUTPATIENT
Start: 2021-11-22 | End: 2022-01-22 | Stop reason: SDUPTHER

## 2021-12-06 ENCOUNTER — LAB (OUTPATIENT)
Dept: INTERNAL MEDICINE | Facility: CLINIC | Age: 61
End: 2021-12-06

## 2021-12-06 DIAGNOSIS — E66.01 SEVERE OBESITY (BMI >= 40) (HCC): ICD-10-CM

## 2021-12-06 DIAGNOSIS — R73.03 PREDIABETES: ICD-10-CM

## 2021-12-06 DIAGNOSIS — E78.2 MIXED HYPERLIPIDEMIA: ICD-10-CM

## 2021-12-07 LAB
ALBUMIN SERPL-MCNC: 3.6 G/DL (ref 3.8–4.8)
ALBUMIN/GLOB SERPL: 1.3 {RATIO} (ref 1.2–2.2)
ALP SERPL-CCNC: 95 IU/L (ref 44–121)
ALT SERPL-CCNC: 16 IU/L (ref 0–32)
AST SERPL-CCNC: 19 IU/L (ref 0–40)
BASOPHILS # BLD AUTO: 0 X10E3/UL (ref 0–0.2)
BASOPHILS NFR BLD AUTO: 0 %
BILIRUB SERPL-MCNC: 0.2 MG/DL (ref 0–1.2)
BUN SERPL-MCNC: 18 MG/DL (ref 8–27)
BUN/CREAT SERPL: 23 (ref 12–28)
CALCIUM SERPL-MCNC: 9.1 MG/DL (ref 8.7–10.3)
CHLORIDE SERPL-SCNC: 105 MMOL/L (ref 96–106)
CHOLEST SERPL-MCNC: 150 MG/DL (ref 100–199)
CO2 SERPL-SCNC: 24 MMOL/L (ref 20–29)
CREAT SERPL-MCNC: 0.8 MG/DL (ref 0.57–1)
EOSINOPHIL # BLD AUTO: 0.1 X10E3/UL (ref 0–0.4)
EOSINOPHIL NFR BLD AUTO: 1 %
ERYTHROCYTE [DISTWIDTH] IN BLOOD BY AUTOMATED COUNT: 13.1 % (ref 11.7–15.4)
GLOBULIN SER CALC-MCNC: 2.8 G/DL (ref 1.5–4.5)
GLUCOSE SERPL-MCNC: 88 MG/DL (ref 65–99)
HBA1C MFR BLD: 6 % (ref 4.8–5.6)
HCT VFR BLD AUTO: 39 % (ref 34–46.6)
HDLC SERPL-MCNC: 63 MG/DL
HGB BLD-MCNC: 12.5 G/DL (ref 11.1–15.9)
IMM GRANULOCYTES # BLD AUTO: 0 X10E3/UL (ref 0–0.1)
IMM GRANULOCYTES NFR BLD AUTO: 0 %
LDLC SERPL CALC-MCNC: 75 MG/DL (ref 0–99)
LDLC/HDLC SERPL: 1.2 RATIO (ref 0–3.2)
LYMPHOCYTES # BLD AUTO: 2.5 X10E3/UL (ref 0.7–3.1)
LYMPHOCYTES NFR BLD AUTO: 30 %
MCH RBC QN AUTO: 29.2 PG (ref 26.6–33)
MCHC RBC AUTO-ENTMCNC: 32.1 G/DL (ref 31.5–35.7)
MCV RBC AUTO: 91 FL (ref 79–97)
MONOCYTES # BLD AUTO: 0.5 X10E3/UL (ref 0.1–0.9)
MONOCYTES NFR BLD AUTO: 6 %
NEUTROPHILS # BLD AUTO: 5.3 X10E3/UL (ref 1.4–7)
NEUTROPHILS NFR BLD AUTO: 63 %
PLATELET # BLD AUTO: 284 X10E3/UL (ref 150–450)
POTASSIUM SERPL-SCNC: 4.5 MMOL/L (ref 3.5–5.2)
PROT SERPL-MCNC: 6.4 G/DL (ref 6–8.5)
RBC # BLD AUTO: 4.28 X10E6/UL (ref 3.77–5.28)
SODIUM SERPL-SCNC: 143 MMOL/L (ref 134–144)
T4 FREE SERPL-MCNC: 1.16 NG/DL (ref 0.82–1.77)
TRIGL SERPL-MCNC: 60 MG/DL (ref 0–149)
TSH SERPL DL<=0.005 MIU/L-ACNC: 4.3 UIU/ML (ref 0.45–4.5)
VLDLC SERPL CALC-MCNC: 12 MG/DL (ref 5–40)
WBC # BLD AUTO: 8.4 X10E3/UL (ref 3.4–10.8)

## 2022-01-04 ENCOUNTER — OFFICE VISIT (OUTPATIENT)
Dept: INTERNAL MEDICINE | Facility: CLINIC | Age: 62
End: 2022-01-04

## 2022-01-04 VITALS
BODY MASS INDEX: 49.73 KG/M2 | DIASTOLIC BLOOD PRESSURE: 80 MMHG | TEMPERATURE: 98.7 F | RESPIRATION RATE: 20 BRPM | HEART RATE: 82 BPM | OXYGEN SATURATION: 98 % | SYSTOLIC BLOOD PRESSURE: 130 MMHG | HEIGHT: 64 IN | WEIGHT: 291.3 LBS

## 2022-01-04 DIAGNOSIS — E66.01 SEVERE OBESITY (BMI >= 40): Primary | ICD-10-CM

## 2022-01-04 DIAGNOSIS — R73.03 PREDIABETES: ICD-10-CM

## 2022-01-04 DIAGNOSIS — K21.9 GASTROESOPHAGEAL REFLUX DISEASE, UNSPECIFIED WHETHER ESOPHAGITIS PRESENT: ICD-10-CM

## 2022-01-04 DIAGNOSIS — E78.2 MIXED HYPERLIPIDEMIA: ICD-10-CM

## 2022-01-04 DIAGNOSIS — Z86.718 HISTORY OF DVT (DEEP VEIN THROMBOSIS): ICD-10-CM

## 2022-01-04 PROCEDURE — 99214 OFFICE O/P EST MOD 30 MIN: CPT | Performed by: INTERNAL MEDICINE

## 2022-01-04 RX ORDER — ROSUVASTATIN CALCIUM 5 MG/1
5 TABLET, COATED ORAL DAILY
Qty: 90 TABLET | Refills: 1 | Status: SHIPPED | OUTPATIENT
Start: 2022-01-04 | End: 2022-08-31 | Stop reason: SDUPTHER

## 2022-01-04 NOTE — PROGRESS NOTES
Zulema Sousa is a 61 y.o. female, who presents with a chief complaint of   Chief Complaint   Patient presents with   • Hyperlipidemia           HPI   Pt here for follow up.     Pt has started a new diet. She is on a weight loss program run by a chiropractor in [place]. It is a strict diet where she cuts out caffeine, sugar, dairy, etc.  She is drinking water only and taking supplemental pills.     Prediabetes  - a1c 5.6 -> 6.1->6.0  pt's activity has been down bc of the foot fracture and leg pain.     Hx recurrent DVT - on xarelto chronically    HLD - pt having more aches.  She wondered if her statin could have caused this. Cholesterol 279 ->150 with statin.     Chronic knee pain - Pt following with dr. madhavi phillips/ ortho.  She has knee pain and has had to get steroid injections in her knee.  She has struggled with getting regular exercise bc of the knee pain.  Unable to take NSAIDs given comorbidities. Referred to PT and recommended conservative therapy for now.        oab - pt having to wear pads all the time. She has frequency.  No fever, abd pain, dysuria.  She gets up at night 1 time a night but goes multiple times all day.  on vesicare.     leeann - on cpap. she follows with dr. Franco.      Tobacco use - she is down to 3 cigarettes a day.      gerd - on PPI    The following portions of the patient's history were reviewed and updated as appropriate: allergies, current medications, past family history, past medical history, past social history, past surgical history and problem list.    Allergies: Nsaids    Review of Systems   Constitutional: Negative.    HENT: Negative.    Eyes: Negative.    Respiratory: Negative.    Cardiovascular: Negative.    Gastrointestinal: Negative.    Endocrine: Negative.    Genitourinary: Negative.    Musculoskeletal: Negative.    Skin: Negative.    Allergic/Immunologic: Negative.    Neurological: Negative.    Hematological: Negative.    Psychiatric/Behavioral: Negative.    All other  systems reviewed and are negative.            Wt Readings from Last 3 Encounters:   01/04/22 132 kg (291 lb 4.8 oz)   11/08/21 127 kg (280 lb)   10/13/21 127 kg (280 lb)     Temp Readings from Last 3 Encounters:   01/04/22 98.7 °F (37.1 °C) (Temporal)   11/08/21 96.9 °F (36.1 °C) (Temporal)   10/13/21 98 °F (36.7 °C) (Oral)     BP Readings from Last 3 Encounters:   01/04/22 130/80   10/13/21 112/92   10/13/21 137/80     Pulse Readings from Last 3 Encounters:   01/04/22 82   10/13/21 62   10/13/21 79     Body mass index is 49.98 kg/m².  SpO2 Readings from Last 3 Encounters:   01/04/22 98%   10/13/21 97%   10/13/21 98%          Physical Exam  Vitals and nursing note reviewed.   Constitutional:       General: She is not in acute distress.     Appearance: She is well-developed.   HENT:      Head: Normocephalic and atraumatic.      Right Ear: External ear normal.      Left Ear: External ear normal.      Nose: Nose normal.   Eyes:      Conjunctiva/sclera: Conjunctivae normal.      Pupils: Pupils are equal, round, and reactive to light.   Cardiovascular:      Rate and Rhythm: Normal rate and regular rhythm.      Heart sounds: Normal heart sounds.   Pulmonary:      Effort: Pulmonary effort is normal. No respiratory distress.      Breath sounds: Normal breath sounds. No wheezing.   Musculoskeletal:         General: Normal range of motion.      Cervical back: Normal range of motion and neck supple.      Comments: Normal gait   Skin:     General: Skin is warm and dry.   Neurological:      General: No focal deficit present.      Mental Status: She is alert and oriented to person, place, and time.   Psychiatric:         Mood and Affect: Mood normal.         Behavior: Behavior normal.         Thought Content: Thought content normal.         Judgment: Judgment normal.         Results for orders placed or performed in visit on 12/06/21   Hemoglobin A1c    Specimen: Blood   Result Value Ref Range    Hemoglobin A1C 6.0 (H) 4.8 - 5.6  %   Lipid Panel With LDL / HDL Ratio    Specimen: Blood   Result Value Ref Range    Total Cholesterol 150 100 - 199 mg/dL    Triglycerides 60 0 - 149 mg/dL    HDL Cholesterol 63 >39 mg/dL    VLDL Cholesterol Bird 12 5 - 40 mg/dL    LDL Chol Calc (NIH) 75 0 - 99 mg/dL    LDL/HDL RATIO 1.2 0.0 - 3.2 ratio   TSH    Specimen: Blood   Result Value Ref Range    TSH 4.300 0.450 - 4.500 uIU/mL   T4, Free    Specimen: Blood   Result Value Ref Range    Free T4 1.16 0.82 - 1.77 ng/dL   Comprehensive Metabolic Panel    Specimen: Blood   Result Value Ref Range    Glucose 88 65 - 99 mg/dL    BUN 18 8 - 27 mg/dL    Creatinine 0.80 0.57 - 1.00 mg/dL    eGFR Non African Am 79 >59 mL/min/1.73    eGFR African Am 91 >59 mL/min/1.73    BUN/Creatinine Ratio 23 12 - 28    Sodium 143 134 - 144 mmol/L    Potassium 4.5 3.5 - 5.2 mmol/L    Chloride 105 96 - 106 mmol/L    Total CO2 24 20 - 29 mmol/L    Calcium 9.1 8.7 - 10.3 mg/dL    Total Protein 6.4 6.0 - 8.5 g/dL    Albumin 3.6 (L) 3.8 - 4.8 g/dL    Globulin 2.8 1.5 - 4.5 g/dL    A/G Ratio 1.3 1.2 - 2.2    Total Bilirubin 0.2 0.0 - 1.2 mg/dL    Alkaline Phosphatase 95 44 - 121 IU/L    AST (SGOT) 19 0 - 40 IU/L    ALT (SGPT) 16 0 - 32 IU/L   CBC & Differential    Specimen: Blood   Result Value Ref Range    WBC 8.4 3.4 - 10.8 x10E3/uL    RBC 4.28 3.77 - 5.28 x10E6/uL    Hemoglobin 12.5 11.1 - 15.9 g/dL    Hematocrit 39.0 34.0 - 46.6 %    MCV 91 79 - 97 fL    MCH 29.2 26.6 - 33.0 pg    MCHC 32.1 31.5 - 35.7 g/dL    RDW 13.1 11.7 - 15.4 %    Platelets 284 150 - 450 x10E3/uL    Neutrophil Rel % 63 Not Estab. %    Lymphocyte Rel % 30 Not Estab. %    Monocyte Rel % 6 Not Estab. %    Eosinophil Rel % 1 Not Estab. %    Basophil Rel % 0 Not Estab. %    Neutrophils Absolute 5.3 1.4 - 7.0 x10E3/uL    Lymphocytes Absolute 2.5 0.7 - 3.1 x10E3/uL    Monocytes Absolute 0.5 0.1 - 0.9 x10E3/uL    Eosinophils Absolute 0.1 0.0 - 0.4 x10E3/uL    Basophils Absolute 0.0 0.0 - 0.2 x10E3/uL    Immature Granulocyte  Rel % 0 Not Estab. %    Immature Grans Absolute 0.0 0.0 - 0.1 x10E3/uL     Result Review :                  Assessment and Plan    Diagnoses and all orders for this visit:    1. Severe obesity (BMI >= 40) (HCC) (Primary) - pt has started new diet plan.    2. Mixed hyperlipidemia- stop lipitor and change to crestor  -     rosuvastatin (Crestor) 5 MG tablet; Take 1 tablet by mouth Daily.  Dispense: 90 tablet; Refill: 1    3. Prediabetes - a1c stable at 6.0    4. History of DVT (deep vein thrombosis) - on xarelto    5. Gastroesophageal reflux disease, unspecified whether esophagitis present - on PPI                  Outpatient Medications Prior to Visit   Medication Sig Dispense Refill   • multivitamin (MULTI-VITAMIN PO) Take  by mouth Daily.     • Omega-3 Fatty Acids (fish oil) 1000 MG capsule capsule Take  by mouth Daily With Breakfast.     • omeprazole (priLOSEC) 40 MG capsule TAKE ONE CAPSULE BY MOUTH DAILY 90 capsule 0   • rivaroxaban (Xarelto) 10 MG tablet Take 1 tablet by mouth Daily. 90 tablet 0   • solifenacin (VESICARE) 10 MG tablet Take 1 tablet by mouth Daily. 90 tablet 3   • vitamin D (ERGOCALCIFEROL) 1.25 MG (18739 UT) capsule capsule Take 1 capsule by mouth 1 (One) Time Per Week. 5 capsule 3   • atorvastatin (LIPITOR) 20 MG tablet TAKE ONE TABLET BY MOUTH DAILY 90 tablet 1     No facility-administered medications prior to visit.     New Medications Ordered This Visit   Medications   • rosuvastatin (Crestor) 5 MG tablet     Sig: Take 1 tablet by mouth Daily.     Dispense:  90 tablet     Refill:  1     [unfilled]  Medications Discontinued During This Encounter   Medication Reason   • atorvastatin (LIPITOR) 20 MG tablet          Return in about 6 months (around 7/4/2022) for Recheck, labs.    Patient was given instructions and counseling regarding her condition or for health maintenance advice. Please see specific information pulled into the AVS if appropriate.

## 2022-01-22 RX ORDER — OMEPRAZOLE 40 MG/1
40 CAPSULE, DELAYED RELEASE ORAL DAILY
Qty: 90 CAPSULE | Refills: 0 | Status: SHIPPED | OUTPATIENT
Start: 2022-01-22 | End: 2022-04-16 | Stop reason: SDUPTHER

## 2022-02-09 ENCOUNTER — OFFICE VISIT (OUTPATIENT)
Dept: ORTHOPEDIC SURGERY | Facility: CLINIC | Age: 62
End: 2022-02-09

## 2022-02-09 VITALS — BODY MASS INDEX: 47.8 KG/M2 | WEIGHT: 280 LBS | TEMPERATURE: 98.4 F | RESPIRATION RATE: 18 BRPM | HEIGHT: 64 IN

## 2022-02-09 DIAGNOSIS — M17.0 ARTHRITIS OF BOTH KNEES: ICD-10-CM

## 2022-02-09 DIAGNOSIS — M25.561 PAIN IN BOTH KNEES, UNSPECIFIED CHRONICITY: Primary | ICD-10-CM

## 2022-02-09 DIAGNOSIS — M25.562 PAIN IN BOTH KNEES, UNSPECIFIED CHRONICITY: Primary | ICD-10-CM

## 2022-02-09 PROCEDURE — 73562 X-RAY EXAM OF KNEE 3: CPT | Performed by: ORTHOPAEDIC SURGERY

## 2022-02-09 PROCEDURE — 20610 DRAIN/INJ JOINT/BURSA W/O US: CPT | Performed by: ORTHOPAEDIC SURGERY

## 2022-02-09 RX ORDER — TRIAMCINOLONE ACETONIDE 40 MG/ML
80 INJECTION, SUSPENSION INTRA-ARTICULAR; INTRAMUSCULAR
Status: COMPLETED | OUTPATIENT
Start: 2022-02-09 | End: 2022-02-09

## 2022-02-09 RX ADMIN — TRIAMCINOLONE ACETONIDE 80 MG: 40 INJECTION, SUSPENSION INTRA-ARTICULAR; INTRAMUSCULAR at 09:13

## 2022-02-09 NOTE — PROGRESS NOTES
Patient follows up today for recurrent bilateral knee injections which were performed.  AP lateral 40 degree PA x-ray bilateral knees performed in the office today for pain with comparison view show advanced arthritic change*  Large Joint Arthrocentesis: R knee  Date/Time: 2/9/2022 9:13 AM  Consent given by: patient  Site marked: site marked  Timeout: Immediately prior to procedure a time out was called to verify the correct patient, procedure, equipment, support staff and site/side marked as required   Supporting Documentation  Indications: pain   Procedure Details  Location: knee - R knee  Preparation: Patient was prepped and draped in the usual sterile fashion  Needle gauge: 21G.  Approach: lateral  Medications administered: 4 mL lidocaine (cardiac); 80 mg triamcinolone acetonide 40 MG/ML  Patient tolerance: patient tolerated the procedure well with no immediate complications    Large Joint Arthrocentesis: L knee  Date/Time: 2/9/2022 9:13 AM  Consent given by: patient  Site marked: site marked  Timeout: Immediately prior to procedure a time out was called to verify the correct patient, procedure, equipment, support staff and site/side marked as required   Supporting Documentation  Indications: pain   Procedure Details  Location: knee - L knee  Preparation: Patient was prepped and draped in the usual sterile fashion  Needle gauge: 21G.  Approach: lateral  Medications administered: 4 mL lidocaine (cardiac); 80 mg triamcinolone acetonide 40 MG/ML  Patient tolerance: patient tolerated the procedure well with no immediate complications

## 2022-02-11 DIAGNOSIS — E55.9 VITAMIN D DEFICIENCY: ICD-10-CM

## 2022-02-11 DIAGNOSIS — S92.355A NONDISPLACED FRACTURE OF FIFTH METATARSAL BONE, LEFT FOOT, INITIAL ENCOUNTER FOR CLOSED FRACTURE: ICD-10-CM

## 2022-02-11 RX ORDER — ERGOCALCIFEROL 1.25 MG/1
50000 CAPSULE ORAL WEEKLY
Qty: 5 CAPSULE | Refills: 3 | Status: SHIPPED | OUTPATIENT
Start: 2022-02-11 | End: 2022-07-18 | Stop reason: SDUPTHER

## 2022-02-18 ENCOUNTER — OFFICE VISIT (OUTPATIENT)
Dept: CARDIOLOGY | Facility: CLINIC | Age: 62
End: 2022-02-18

## 2022-02-18 VITALS
HEART RATE: 88 BPM | BODY MASS INDEX: 47.63 KG/M2 | SYSTOLIC BLOOD PRESSURE: 132 MMHG | DIASTOLIC BLOOD PRESSURE: 80 MMHG | WEIGHT: 279 LBS | HEIGHT: 64 IN

## 2022-02-18 DIAGNOSIS — I26.99 OTHER ACUTE PULMONARY EMBOLISM WITHOUT ACUTE COR PULMONALE: Primary | ICD-10-CM

## 2022-02-18 PROCEDURE — 99214 OFFICE O/P EST MOD 30 MIN: CPT | Performed by: INTERNAL MEDICINE

## 2022-02-18 NOTE — PROGRESS NOTES
Subjective:     Encounter Date: 02/18/22      Patient ID: Zulema Sousa is a 61 y.o. female.    Chief Complaint: PE  History of Present Illness  This is a 61-year-old woman who was previously seen by Dr. Newton Hernández.  In 2016 she suffered bilateral pulmonary emboli.  She underwent thrombectomy with Dr. Hernández, and recovered well.  However, this was complicated by cardiac tamponade.  She underwent pericardiocentesis and recovered well from that.  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    Today she returns for follow-up.  She was recently started on a statin due to elevated LDL.  On Lipitor her LDL is now at goal 75    Since she is done well.  She recently underwent removal of her lap band.  She is not exercising, but has maintained a fair amount of weight loss.  The following portions of the patient's history were reviewed and updated as appropriate: allergies, current medications, past family history, past medical history, past social history, past surgical history and problem list.         REVIEW OF SYSTEMS:   All systems reviewed.  Pertinent positives identified in HPI.  All other systems are negative.      Past Medical History:   Diagnosis Date   • Acid reflux    • Back pain    • Cardiac tamponade     March 2015   • Difficulty breathing     during exertion   • Edema    • Esophagitis, reflux    • Essential hypertriglyceridemia    • GERD (gastroesophageal reflux disease)    • Health care maintenance    • Heartburn    • Hyperlipidemia    • Hypertriglyceridemia    • Increased appetite    • Morbid obesity ([facility])    • Multiple joint pain    • Obesity, Class III, BMI 40-49.9 (morbid obesity) ([facility]) 1/5/2018   • Osteoarthritis of knee    • Pericarditis    • Pulmonary embolism ([facility])     Janurary 2015   • RHA (rheumatoid arthritis) ([facility])    • Sciatica    •  Sleep apnea    • Unintended weight gain        Family History   Problem Relation Age of Onset   • Cancer Mother    • Obesity Mother    • Diabetes Father    • Hypertension Father    • Heart disease Father    • Obesity Father    • Heart disease Brother    • Diabetes Paternal Grandfather    • Diabetes Paternal Aunt    • No Known Problems Maternal Grandmother    • No Known Problems Maternal Grandfather    • No Known Problems Paternal Grandmother    • Malig Hyperthermia Neg Hx    • Breast cancer Neg Hx        Social History     Socioeconomic History   • Marital status:      Spouse name: Jac   Tobacco Use   • Smoking status: Current Every Day Smoker     Packs/day: 0.50     Years: 10.00     Pack years: 5.00     Types: Cigarettes     Last attempt to quit:      Years since quittin.1   • Smokeless tobacco: Current User   • Tobacco comment: caffeine use   Vaping Use   • Vaping Use: Never used   Substance and Sexual Activity   • Alcohol use: Yes     Alcohol/week: 1.0 standard drink     Types: 1 Shots of liquor per week     Comment: social/ occassional   • Drug use: No   • Sexual activity: Defer       Allergies   Allergen Reactions   • Nsaids Other (See Comments)     DOESN'T TAKE BECAUSE SHES ON BLOOD THINNER.       Past Surgical History:   Procedure Laterality Date   • ANKLE SURGERY      treatment of ankle fracture   • APPENDECTOMY     • CARDIAC SURGERY      cardiac tamponade   •  SECTION     • CHOLECYSTECTOMY     • COLONOSCOPY N/A 2018    Procedure: COLONOSCOPY to cecum and t.i. with polypectomy and clip x2 to ascending colon;  Surgeon: Pierre Ornelas MD;  Location: [facility] ENDOSCOPY;  Service: Gastroenterology   • DILATATION AND CURETTAGE     • ENDOSCOPY N/A 2018    Procedure: ESOPHAGOGASTRODUODENOSCOPY;  Surgeon: Pierre Ornelas MD;  Location: [facility] ENDOSCOPY;  Service: Gastroenterology   • GASTRIC BANDING REMOVAL N/A 2019    Procedure: GASTRIC BANDING AND PORT REMOVAL, LYSIS OF  ADHESIONS, REPAIR OF COLOTOMY, AND INCISIONAL HERNIA REPAIR LAPAROSCOPIC;  Surgeon: Randell Salamanca Jr., MD;  [place] [facility] [place] [facility] [place]: General   • LAPAROSCOPIC GASTRIC BANDING     • PULMONARY EMBOLISM SURGERY     • TUBAL ABDOMINAL LIGATION         Procedures       Objective:         GEN: VSS, no distress, obese  Eyes: normal sclera, normal lids and lashes  HENT: moist mucus membranes,   Respiratory: CTAB, no rales or wheezes  CV: RRR, no murmurs, , +2 DP and 2+ carotid pulses b/l  GI: NABS, soft,  Nontender, nondistended  MSK: Bilateral +1 edema, no scoliosis or kyphosis  Skin: no rash, warm, dry  Heme/Lymph: no bruising or bleeding  Psych: organized thought, normal behavior and affect  Neuro: Cranial nerves grossly intact, Alert and Oriented x 3.               Assessment:          Diagnosis Plan   1. Other acute pulmonary embolism without acute cor pulmonale (HCC)            Plan:       1.  PE, remote history x2: Xarelto 10 lifelong anticoagulation  2. HLD: On lipitor, planning to to switch to crestor due to side effects of myalgias. Currently at goal.     Dr. Young, thank you very much for referring this kind patient to me please call with any questions or concerns.  We will see her in 1 year.       Temi Hutchinson MD  02/18/22  [facility]

## 2022-04-18 RX ORDER — OMEPRAZOLE 40 MG/1
40 CAPSULE, DELAYED RELEASE ORAL DAILY
Qty: 90 CAPSULE | Refills: 0 | Status: SHIPPED | OUTPATIENT
Start: 2022-04-18 | End: 2022-05-06 | Stop reason: SDUPTHER

## 2022-05-06 ENCOUNTER — OFFICE VISIT (OUTPATIENT)
Dept: INTERNAL MEDICINE | Facility: CLINIC | Age: 62
End: 2022-05-06

## 2022-05-06 VITALS
HEART RATE: 76 BPM | DIASTOLIC BLOOD PRESSURE: 78 MMHG | TEMPERATURE: 96.9 F | HEIGHT: 64 IN | RESPIRATION RATE: 17 BRPM | SYSTOLIC BLOOD PRESSURE: 106 MMHG | BODY MASS INDEX: 47.8 KG/M2 | WEIGHT: 280 LBS | OXYGEN SATURATION: 98 %

## 2022-05-06 DIAGNOSIS — R73.03 PREDIABETES: Primary | ICD-10-CM

## 2022-05-06 DIAGNOSIS — R60.0 BILATERAL LEG EDEMA: ICD-10-CM

## 2022-05-06 DIAGNOSIS — E78.2 MIXED HYPERLIPIDEMIA: ICD-10-CM

## 2022-05-06 DIAGNOSIS — K59.09 CHRONIC CONSTIPATION: ICD-10-CM

## 2022-05-06 PROCEDURE — 99214 OFFICE O/P EST MOD 30 MIN: CPT | Performed by: INTERNAL MEDICINE

## 2022-05-06 RX ORDER — OMEPRAZOLE 40 MG/1
40 CAPSULE, DELAYED RELEASE ORAL DAILY
Qty: 90 CAPSULE | Refills: 3 | Status: SHIPPED | OUTPATIENT
Start: 2022-05-06

## 2022-05-06 RX ORDER — ROSUVASTATIN CALCIUM 5 MG/1
5 TABLET, COATED ORAL DAILY
Qty: 90 TABLET | Refills: 3 | Status: SHIPPED | OUTPATIENT
Start: 2022-05-06 | End: 2022-07-05 | Stop reason: SDUPTHER

## 2022-05-06 NOTE — PROGRESS NOTES
Zulema Sousa is a 62 y.o. female, who presents with a chief complaint of   Chief Complaint   Patient presents with   • Edema     Having swelling on bilateral legs. Says she has been elevating them. Says that they were very tight. Have been wearing compression stockings to help with swelling as well           HPI   Pt here bc of LE edema.  She has been elevating her legs and the is helped some. She wears compression socks at work but not when home.  Legs better today vs yesterday.      Pt had been constipated for days.  She finally had a bm yesterday and felt much better.         The following portions of the patient's history were reviewed and updated as appropriate: allergies, current medications, past family history, past medical history, past social history, past surgical history and problem list.    Allergies: Nsaids    Review of Systems   Constitutional: Negative.    HENT: Negative.    Eyes: Negative.    Respiratory: Negative.    Cardiovascular: Negative.    Gastrointestinal: Negative.    Endocrine: Negative.    Genitourinary: Negative.    Musculoskeletal: Negative.    Skin: Negative.    Allergic/Immunologic: Negative.    Neurological: Negative.    Hematological: Negative.    Psychiatric/Behavioral: Negative.    All other systems reviewed and are negative.            Wt Readings from Last 3 Encounters:   05/06/22 127 kg (280 lb)   02/18/22 127 kg (279 lb)   02/09/22 127 kg (280 lb)     Temp Readings from Last 3 Encounters:   05/06/22 96.9 °F (36.1 °C) (Tympanic)   02/09/22 98.4 °F (36.9 °C)   01/04/22 98.7 °F (37.1 °C) (Temporal)     BP Readings from Last 3 Encounters:   05/06/22 106/78   02/18/22 132/80   01/04/22 130/80     Pulse Readings from Last 3 Encounters:   05/06/22 76   02/18/22 88   01/04/22 82     Body mass index is 48.06 kg/m².  SpO2 Readings from Last 3 Encounters:   05/06/22 98%   01/04/22 98%   10/13/21 97%          Physical Exam  Vitals and nursing note reviewed.   Constitutional:        General: She is not in acute distress.     Appearance: She is well-developed.   HENT:      Head: Normocephalic and atraumatic.      Right Ear: External ear normal.      Left Ear: External ear normal.      Nose: Nose normal.   Eyes:      Conjunctiva/sclera: Conjunctivae normal.      Pupils: Pupils are equal, round, and reactive to light.   Cardiovascular:      Rate and Rhythm: Normal rate and regular rhythm.      Heart sounds: Normal heart sounds.   Pulmonary:      Effort: Pulmonary effort is normal. No respiratory distress.      Breath sounds: Normal breath sounds. No wheezing.   Musculoskeletal:         General: Normal range of motion.      Cervical back: Normal range of motion and neck supple.      Right lower leg: Edema present.      Left lower leg: Edema present.      Comments: Normal gait   Skin:     General: Skin is warm and dry.   Neurological:      Mental Status: She is alert and oriented to person, place, and time.   Psychiatric:         Behavior: Behavior normal.         Thought Content: Thought content normal.         Judgment: Judgment normal.         Results for orders placed or performed in visit on 12/06/21   Hemoglobin A1c    Specimen: Blood   Result Value Ref Range    Hemoglobin A1C 6.0 (H) 4.8 - 5.6 %   Lipid Panel With LDL / HDL Ratio    Specimen: Blood   Result Value Ref Range    Total Cholesterol 150 100 - 199 mg/dL    Triglycerides 60 0 - 149 mg/dL    HDL Cholesterol 63 >39 mg/dL    VLDL Cholesterol Bird 12 5 - 40 mg/dL    LDL Chol Calc (NIH) 75 0 - 99 mg/dL    LDL/HDL RATIO 1.2 0.0 - 3.2 ratio   TSH    Specimen: Blood   Result Value Ref Range    TSH 4.300 0.450 - 4.500 uIU/mL   T4, Free    Specimen: Blood   Result Value Ref Range    Free T4 1.16 0.82 - 1.77 ng/dL   Comprehensive Metabolic Panel    Specimen: Blood   Result Value Ref Range    Glucose 88 65 - 99 mg/dL    BUN 18 8 - 27 mg/dL    Creatinine 0.80 0.57 - 1.00 mg/dL    eGFR Non African Am 79 >59 mL/min/1.73    eGFR African Am 91 >59  mL/min/1.73    BUN/Creatinine Ratio 23 12 - 28    Sodium 143 134 - 144 mmol/L    Potassium 4.5 3.5 - 5.2 mmol/L    Chloride 105 96 - 106 mmol/L    Total CO2 24 20 - 29 mmol/L    Calcium 9.1 8.7 - 10.3 mg/dL    Total Protein 6.4 6.0 - 8.5 g/dL    Albumin 3.6 (L) 3.8 - 4.8 g/dL    Globulin 2.8 1.5 - 4.5 g/dL    A/G Ratio 1.3 1.2 - 2.2    Total Bilirubin 0.2 0.0 - 1.2 mg/dL    Alkaline Phosphatase 95 44 - 121 IU/L    AST (SGOT) 19 0 - 40 IU/L    ALT (SGPT) 16 0 - 32 IU/L   CBC & Differential    Specimen: Blood   Result Value Ref Range    WBC 8.4 3.4 - 10.8 x10E3/uL    RBC 4.28 3.77 - 5.28 x10E6/uL    Hemoglobin 12.5 11.1 - 15.9 g/dL    Hematocrit 39.0 34.0 - 46.6 %    MCV 91 79 - 97 fL    MCH 29.2 26.6 - 33.0 pg    MCHC 32.1 31.5 - 35.7 g/dL    RDW 13.1 11.7 - 15.4 %    Platelets 284 150 - 450 x10E3/uL    Neutrophil Rel % 63 Not Estab. %    Lymphocyte Rel % 30 Not Estab. %    Monocyte Rel % 6 Not Estab. %    Eosinophil Rel % 1 Not Estab. %    Basophil Rel % 0 Not Estab. %    Neutrophils Absolute 5.3 1.4 - 7.0 x10E3/uL    Lymphocytes Absolute 2.5 0.7 - 3.1 x10E3/uL    Monocytes Absolute 0.5 0.1 - 0.9 x10E3/uL    Eosinophils Absolute 0.1 0.0 - 0.4 x10E3/uL    Basophils Absolute 0.0 0.0 - 0.2 x10E3/uL    Immature Granulocyte Rel % 0 Not Estab. %    Immature Grans Absolute 0.0 0.0 - 0.1 x10E3/uL     Result Review :                  Assessment and Plan    Diagnoses and all orders for this visit:    1. Prediabetes (Primary)  -     CBC & Differential; Future  -     Comprehensive Metabolic Panel; Future  -     Hemoglobin A1c; Future  -     Lipid Panel With LDL / HDL Ratio; Future    2. Mixed hyperlipidemia  -     Comprehensive Metabolic Panel; Future  -     Lipid Panel With LDL / HDL Ratio; Future  -     rosuvastatin (Crestor) 5 MG tablet; Take 1 tablet by mouth Daily.  Dispense: 90 tablet; Refill: 3    3. Bilateral leg edema - compression hose daily.  Lasix 20mg prn swelling    4. Chronic constipation - discussed maintenance  regimen with miralax or low dose magnesium    Other orders  -     omeprazole (priLOSEC) 40 MG capsule; Take 1 capsule by mouth Daily.  Dispense: 90 capsule; Refill: 3                    Outpatient Medications Prior to Visit   Medication Sig Dispense Refill   • multivitamin (THERAGRAN) tablet tablet Take  by mouth Daily.     • rivaroxaban (Xarelto) 10 MG tablet Take 1 tablet by mouth Daily. 90 tablet 0   • rosuvastatin (Crestor) 5 MG tablet Take 1 tablet by mouth Daily. 90 tablet 1   • Sodium Fluoride (Denta 5000 Plus) 1.1 % cream Use daily as directed. 51 g 3   • solifenacin (VESICARE) 10 MG tablet Take 1 tablet by mouth Daily. 90 tablet 3   • vitamin D (ERGOCALCIFEROL) 1.25 MG (17236 UT) capsule capsule Take 1 capsule by mouth 1 (One) Time Per Week. 5 capsule 3   • omeprazole (priLOSEC) 40 MG capsule Take 1 capsule by mouth Daily. 90 capsule 0     No facility-administered medications prior to visit.     New Medications Ordered This Visit   Medications   • rosuvastatin (Crestor) 5 MG tablet     Sig: Take 1 tablet by mouth Daily.     Dispense:  90 tablet     Refill:  3   • omeprazole (priLOSEC) 40 MG capsule     Sig: Take 1 capsule by mouth Daily.     Dispense:  90 capsule     Refill:  3     [unfilled]  Medications Discontinued During This Encounter   Medication Reason   • omeprazole (priLOSEC) 40 MG capsule Reorder         Return for Next scheduled follow up.    Patient was given instructions and counseling regarding her condition or for health maintenance advice. Please see specific information pulled into the AVS if appropriate.

## 2022-05-11 ENCOUNTER — CLINICAL SUPPORT (OUTPATIENT)
Dept: ORTHOPEDIC SURGERY | Facility: CLINIC | Age: 62
End: 2022-05-11

## 2022-05-11 VITALS — BODY MASS INDEX: 48.49 KG/M2 | HEIGHT: 64 IN | TEMPERATURE: 97.5 F | RESPIRATION RATE: 18 BRPM | WEIGHT: 284 LBS

## 2022-05-11 DIAGNOSIS — M17.0 PRIMARY OSTEOARTHRITIS OF BOTH KNEES: Primary | ICD-10-CM

## 2022-05-11 PROCEDURE — 20610 DRAIN/INJ JOINT/BURSA W/O US: CPT | Performed by: NURSE PRACTITIONER

## 2022-05-11 RX ORDER — METHYLPREDNISOLONE ACETATE 80 MG/ML
80 INJECTION, SUSPENSION INTRA-ARTICULAR; INTRALESIONAL; INTRAMUSCULAR; SOFT TISSUE
Status: COMPLETED | OUTPATIENT
Start: 2022-05-11 | End: 2022-05-11

## 2022-05-11 RX ADMIN — METHYLPREDNISOLONE ACETATE 80 MG: 80 INJECTION, SUSPENSION INTRA-ARTICULAR; INTRALESIONAL; INTRAMUSCULAR; SOFT TISSUE at 13:15

## 2022-05-11 NOTE — PROGRESS NOTES
5/11/2022    Zulema Sousa is here today for worsening knee pain. Pt has undergone injection of the knee in the past with good resolution of symptoms. Pt is requesting a repeat injection to both knees.    KNEE Injection Procedure Note:    Large Joint Arthrocentesis: L knee  Date/Time: 5/11/2022 1:15 PM  Consent given by: patient  Site marked: site marked  Timeout: Immediately prior to procedure a time out was called to verify the correct patient, procedure, equipment, support staff and site/side marked as required   Supporting Documentation  Indications: pain and joint swelling   Procedure Details  Location: knee - L knee  Preparation: Patient was prepped and draped in the usual sterile fashion  Needle gauge: 21 G.  Approach: anterolateral  Medications administered: 2 mL lidocaine (cardiac); 80 mg methylPREDNISolone acetate 80 MG/ML  Patient tolerance: patient tolerated the procedure well with no immediate complications    Large Joint Arthrocentesis: R knee  Date/Time: 5/11/2022 1:15 PM  Consent given by: patient  Site marked: site marked  Timeout: Immediately prior to procedure a time out was called to verify the correct patient, procedure, equipment, support staff and site/side marked as required   Supporting Documentation  Indications: pain and joint swelling   Procedure Details  Location: knee - R knee  Preparation: Patient was prepped and draped in the usual sterile fashion  Needle gauge: 21 G.  Approach: anterolateral  Medications administered: 2 mL lidocaine (cardiac); 80 mg methylPREDNISolone acetate 80 MG/ML  Patient tolerance: patient tolerated the procedure well with no immediate complications        Reviewed from 2-9-22 tricompartmental DJD   At the conclusion of the injection I discussed the importance of continued quad strengthening exercises on a daily basis. I will see the patient back if the symptoms should fail to improve or worsen.    Peggy Sy, APRN  5/11/2022

## 2022-06-27 RX ORDER — SOLIFENACIN SUCCINATE 10 MG/1
10 TABLET, FILM COATED ORAL DAILY
Qty: 90 TABLET | Refills: 3 | Status: SHIPPED | OUTPATIENT
Start: 2022-06-27

## 2022-07-05 ENCOUNTER — OFFICE VISIT (OUTPATIENT)
Dept: BARIATRICS/WEIGHT MGMT | Facility: CLINIC | Age: 62
End: 2022-07-05

## 2022-07-05 VITALS
TEMPERATURE: 97.4 F | RESPIRATION RATE: 18 BRPM | DIASTOLIC BLOOD PRESSURE: 84 MMHG | SYSTOLIC BLOOD PRESSURE: 141 MMHG | HEART RATE: 85 BPM | BODY MASS INDEX: 47.97 KG/M2 | HEIGHT: 64 IN | WEIGHT: 281 LBS

## 2022-07-05 DIAGNOSIS — Z86.718 HISTORY OF DVT (DEEP VEIN THROMBOSIS): ICD-10-CM

## 2022-07-05 DIAGNOSIS — M25.50 ARTHRALGIA OF MULTIPLE JOINTS: ICD-10-CM

## 2022-07-05 DIAGNOSIS — E66.01 OBESITY, CLASS III, BMI 40-49.9 (MORBID OBESITY): ICD-10-CM

## 2022-07-05 DIAGNOSIS — Z98.84 HISTORY OF REMOVAL OF LAPAROSCOPIC GASTRIC BANDING DEVICE: ICD-10-CM

## 2022-07-05 DIAGNOSIS — R63.5 UNINTENDED WEIGHT GAIN: ICD-10-CM

## 2022-07-05 DIAGNOSIS — I30.0 IDIOPATHIC PERICARDITIS, UNSPECIFIED CHRONICITY: ICD-10-CM

## 2022-07-05 DIAGNOSIS — Z71.3 DIETARY COUNSELING: Primary | ICD-10-CM

## 2022-07-05 PROCEDURE — 99214 OFFICE O/P EST MOD 30 MIN: CPT | Performed by: SURGERY

## 2022-07-05 NOTE — PATIENT INSTRUCTIONS
Diet Manual    You were given a manual that discusses in detail good eating tips and habits. Please read and keep with you for future. Metamucil one Tablespoon in 8 oz of water then follow with another 8 oz of water in the morning or evening Patient was given information packet on weight loss medication.  This was discussed with patient in detail including the side effects.  Patient was instructed to stop the medication if any of the side effects occur.

## 2022-07-05 NOTE — PROGRESS NOTES
[facility] BARIATR[facility] BARIATRIC SURGERY  [address] [facility] [address]  [facility] [address]  [phone]  [address] [facility] [address]  [facility] [address]  [phone]  Dept: [phone]  7/5/2022      Zulema Sousa.  [id number]  [id number]  1960  female      Chief Complaint   Patient presents with   • Follow-up     AGB removal follow up        Post-Op Bariatric Surgery:   Zulema Sousa is status post Laparoscopic Band Removal procedure, performed on 7/1/19 by me who is interested in getting back on track with her weight loss.    HPI:   Today's weight is 127 kg (281 lb) pounds, today's BMI is Body mass index is 48.21 kg/m²..  Patient has gained 43 pounds since last visit with band removal.[unfilled] denies fever, chills, chest pain, SOA, melena, hematochezia, hematemesis, dysuria, frequency, hematuria, jaundice.    62-year-old female status post lap band removal July 1, 2019 who has gained fair amount of weight back.  She has multiple joint pain and needs to lose weight to help her with her pain and possibly needing surgery.  She has had pericarditis and history of DVT in the past.  She has tried multiple diets without long term success.  She talked to her PCP and was referred to us for further evaluation.  She did go through her daily routine and has 2 cups of coffee in the morning and a protein with vegetable for lunch and a protein with carbs in the evening.  She will have a snack of yogurt and some other things at different times throughout the day.  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  She will occasionally drink some diet sodas but also does unsweetened tea.      Supplements: Yes    Review of Systems   Constitutional: Positive for fatigue.   Gastrointestinal: Positive for constipation.   Musculoskeletal: Positive for arthralgias and back pain.   All other systems reviewed and are negative.      Patient  Active Problem List   Diagnosis   • Gastroesophageal reflux disease   • Edema   • Hyperlipidemia   • Arthralgia of multiple joints   • Osteoarthritis of knee   • Pericarditis   • Pulmonary embolism (HCC)   • Rheumatoid arthritis ([facility])   • Sciatica   • Unintended weight gain   • Arthritis of both knees   • Obstructive sleep apnea, adult   • Dietary counseling   • History of DVT (deep vein thrombosis)   • Tear of lateral meniscus of right knee, current   • Arthritis of right knee   • Chronic pain of right knee   • Epigastric pain   • Abnormal UGI series   • Esophageal dilatation   • Abnormal finding on radiology exam   • Overactive bladder   • Right elbow tendonitis   • Obesity, Class III, BMI 40-49.9 (morbid obesity) ([facility])   • History of removal of laparoscopic gastric banding device       Past Medical History:   Diagnosis Date   • Acid reflux    • Back pain    • Cardiac tamponade     2015   • Difficulty breathing     during exertion   • Edema    • Esophagitis, reflux    • Essential hypertriglyceridemia    • GERD (gastroesophageal reflux disease)    • Health care maintenance    • Heartburn    • Hyperlipidemia    • Hypertriglyceridemia    • Increased appetite    • Morbid obesity ([facility])    • Multiple joint pain    • Obesity, Class III, BMI 40-49.9 (morbid obesity) ([facility]) 2018   • Osteoarthritis of knee    • Pericarditis    • Pulmonary embolism ([facility])     ura2015   • RHA (rheumatoid arthritis) ([facility])    • Sciatica    • Sleep apnea    • Unintended weight gain        Past Surgical History:   Procedure Laterality Date   • ANKLE SURGERY      treatment of ankle fracture   • APPENDECTOMY     • CARDIAC SURGERY      cardiac tamponade   •  SECTION     • CHOLECYSTECTOMY     • COLONOSCOPY N/A 2018    Procedure: COLONOSCOPY to cecum and t.i. with polypectomy and clip x2 to ascending colon;  Surgeon: Pierre Ornelas MD;  Location: [facility] ENDOSCOPY;  Service: Gastroenterology   • DILATATION AND CURETTAGE      • ENDOSCOPY N/A 2018    Procedure: ESOPHAGOGASTRODUODENOSCOPY;  Surgeon: Pierre Ornelas MD;  Location: [facility] ENDOSCOPY;  Service: Gastroenterology   • GASTRIC BANDING REMOVAL N/A 2019    Procedure: GASTRIC BANDING AND PORT REMOVAL, LYSIS OF ADHESIONS, REPAIR OF COLOTOMY, AND INCISIONAL HERNIA REPAIR LAPAROSCOPIC;  Surgeon: Randell Salamanca Jr., MD;  [place] [facility] [place] [facility]  Service: General   • LAPAROSCOPIC GASTRIC BANDING     • PULMONARY EMBOLISM SURGERY     • TUBAL ABDOMINAL LIGATION         Allergies   Allergen Reactions   • Nsaids Other (See Comments)     DOESN'T TAKE BECAUSE SHES ON BLOOD THINNER.         Current Outpatient Medications:   •  multivitamin (THERAGRAN) tablet tablet, Take  by mouth Daily., Disp: , Rfl:   •  omeprazole (priLOSEC) 40 MG capsule, Take 1 capsule by mouth Daily., Disp: 90 capsule, Rfl: 3  •  rivaroxaban (XARELTO) 10 MG tablet, Take 1 tablet by mouth Daily., Disp: 90 tablet, Rfl: 3  •  rosuvastatin (Crestor) 5 MG tablet, Take 1 tablet by mouth Daily., Disp: 90 tablet, Rfl: 1  •  Sodium Fluoride (Sodium Fluoride 5000 Plus) 1.1 % cream, Use daily as directed to brush teeth, Disp: 51 g, Rfl: 3  •  solifenacin (VESICARE) 10 MG tablet, Take 1 tablet by mouth Daily., Disp: 90 tablet, Rfl: 3  •  vitamin D (ERGOCALCIFEROL) 1.25 MG (06363 UT) capsule capsule, Take 1 capsule by mouth 1 (One) Time Per Week., Disp: 5 capsule, Rfl: 3    Social History     Socioeconomic History   • Marital status:      Spouse name: Jac   Tobacco Use   • Smoking status: Current Every Day Smoker     Packs/day: 0.50     Years: 10.00     Pack years: 5.00     Types: Cigarettes     Last attempt to quit:      Years since quittin.5   • Smokeless tobacco: Never Used   • Tobacco comment: caffeine use   Vaping Use   • Vaping Use: Never used   Substance and Sexual Activity   • Alcohol use: Yes     Alcohol/week: 1.0 standard drink     Types: 1 Shots of liquor per week     Comment: social/  occassional   • Drug use: No   • Sexual activity: Defer       Family History   Problem Relation Age of Onset   • Cancer Mother    • Obesity Mother    • Diabetes Father    • Hypertension Father    • Heart disease Father    • Obesity Father    • Heart disease Brother    • Diabetes Paternal Grandfather    • Diabetes Paternal Aunt    • No Known Problems Maternal Grandmother    • No Known Problems Maternal Grandfather    • No Known Problems Paternal Grandmother    • Malig Hyperthermia Neg Hx    • Breast cancer Neg Hx        The following portions of the patient's history were reviewed and updated as appropriate: allergies, current medications, past family history, past medical history, past social history, past surgical history and problem list.    Vitals:    07/05/22 1354   BP: 141/84   Pulse: 85   Resp: 18   Temp: 97.4 °F (36.3 °C)       Physical Exam  Constitutional:       Appearance: Normal appearance.   HENT:      Head: Normocephalic and atraumatic.   Eyes:      Conjunctiva/sclera: Conjunctivae normal.   Pulmonary:      Effort: Pulmonary effort is normal.   Neurological:      Mental Status: She is alert and oriented to person, place, and time.   Psychiatric:         Mood and Affect: Mood normal.         Behavior: Behavior normal.         Thought Content: Thought content normal.         Judgment: Judgment normal.           Assessment:   Zulema Sousa has severe obesity with multiple co-morbidities who would like to transfer her bariatric care to us and participate in our bariatric program.      Encounter Diagnoses   Name Primary?   • Dietary counseling Yes   • History of removal of laparoscopic gastric banding device    • Obesity, Class III, BMI 40-49.9 (morbid obesity) ([facility])    • Unintended weight gain    • History of DVT (deep vein thrombosis)    • Idiopathic pericarditis, unspecified chronicity    • Arthralgia of multiple joints          Discussion/Summary/Plan:     62-year-old female status post lap band  removal by me July 2019 who would like to lose weight.  She has been on multiple diets but cannot keep the weight off.  She did like having the band which restricted her portion size but also felt like she had more control.  We had a long discussion over the different diets and also clean eating concentrating on lean protein vegetables and fruit.  Also discussed exercise routine.  Also discussed the HMR program at [facility] but also doing swimming exercises at the [facility].  We did discuss the meal replacement shakes which I think she would like to do to lose a fair amount of weight to reset and then get back on track with clean eating concentrating on meals.  Also discussed meeting with a dietitian.  We then went over all the different weight loss medications and I gave her handout on Saxenda for her to review.  Also discussed revisional surgery which would be increased risk because of her adhesions in which she had a colotomy done at last procedure with band removal secondary to scar tissue from open cholecystectomy.  Also with her heart history as well as DVT.    Recommended patient be sure to eat at least three meals per day all with high lean protein, vegetables and fruit. Be sure to limit/cut back on daily simple carbohydrate intake. Discussed with the patient the recommended amount of water per day to intake. Reviewed vitamin requirements. Be sure to do routine exercise including both cardio and strength training. Recommended patient to see our dietician to go into more detail regarding diet changes.    Instructions / Recommendations: dietary counseling recommended, recommended a daily protein intake of  grams, vitamin supplements recommended, recommended exercising at least 150 minutes per week, behavior modifications recommended and instructed to call the office for concerns, questions, or problems.    The patient was instructed to follow up in 1 to 3 months.     The patient was counseled  regarding diet, exercise and the surgical procedures available Dietician appointment was highly recommended as well as attending support groups.  Total time of encounter was over 35 minutes counseling the patient and going over the procedure.  Dietary changes as well as exercise were also discussed.  Time was also spent before the encounter to review their history and any documentation provided..

## 2022-07-12 ENCOUNTER — OFFICE VISIT (OUTPATIENT)
Dept: INTERNAL MEDICINE | Facility: CLINIC | Age: 62
End: 2022-07-12

## 2022-07-12 VITALS
SYSTOLIC BLOOD PRESSURE: 100 MMHG | BODY MASS INDEX: 46.61 KG/M2 | TEMPERATURE: 97.5 F | OXYGEN SATURATION: 95 % | HEIGHT: 64 IN | HEART RATE: 75 BPM | DIASTOLIC BLOOD PRESSURE: 80 MMHG | WEIGHT: 273 LBS

## 2022-07-12 DIAGNOSIS — K21.9 GASTROESOPHAGEAL REFLUX DISEASE, UNSPECIFIED WHETHER ESOPHAGITIS PRESENT: ICD-10-CM

## 2022-07-12 DIAGNOSIS — R60.0 BILATERAL LEG EDEMA: ICD-10-CM

## 2022-07-12 DIAGNOSIS — R73.03 PREDIABETES: Primary | ICD-10-CM

## 2022-07-12 DIAGNOSIS — Z86.718 HISTORY OF DVT (DEEP VEIN THROMBOSIS): ICD-10-CM

## 2022-07-12 DIAGNOSIS — E78.2 MIXED HYPERLIPIDEMIA: ICD-10-CM

## 2022-07-12 DIAGNOSIS — E66.01 SEVERE OBESITY (BMI >= 40): ICD-10-CM

## 2022-07-12 PROCEDURE — 99214 OFFICE O/P EST MOD 30 MIN: CPT | Performed by: INTERNAL MEDICINE

## 2022-07-12 RX ORDER — SEMAGLUTIDE 1.34 MG/ML
INJECTION, SOLUTION SUBCUTANEOUS
Qty: 1.5 ML | Refills: 1 | Status: SHIPPED | OUTPATIENT
Start: 2022-07-12 | End: 2022-07-12 | Stop reason: SDUPTHER

## 2022-07-12 RX ORDER — SEMAGLUTIDE 1.34 MG/ML
INJECTION, SOLUTION SUBCUTANEOUS
Qty: 1.5 ML | Refills: 1 | Status: SHIPPED | OUTPATIENT
Start: 2022-07-12 | End: 2022-10-12 | Stop reason: SDUPTHER

## 2022-07-12 NOTE — PROGRESS NOTES
Zulema Sousa is a 62 y.o. female, who presents with a chief complaint of   Chief Complaint   Patient presents with   • Hyperlipidemia     Ov f/u           HPI   Pt here for follow up.      Obesity - she is s/p lap band removal.  She went back to see dr. Salamanca and is on a meal replacement shake.  Weight is going down.     Prediabetes - a1c 6.0 -> 5.7.  Pt interested in ozempic to help with sugars.      HLD - pt on Crestor and doing well.  No cramps or myalgias.    The following portions of the patient's history were reviewed and updated as appropriate: allergies, current medications, past family history, past medical history, past social history, past surgical history and problem list.    Allergies: Nsaids    Review of Systems   Constitutional: Negative.    HENT: Negative.    Eyes: Negative.    Respiratory: Negative.    Cardiovascular: Negative.    Gastrointestinal: Negative.    Endocrine: Negative.    Genitourinary: Negative.    Musculoskeletal: Negative.    Skin: Negative.    Allergic/Immunologic: Negative.    Neurological: Negative.    Hematological: Negative.    Psychiatric/Behavioral: Negative.    All other systems reviewed and are negative.            Wt Readings from Last 3 Encounters:   07/12/22 124 kg (273 lb)   07/05/22 127 kg (281 lb)   05/11/22 129 kg (284 lb)     Temp Readings from Last 3 Encounters:   07/12/22 97.5 °F (36.4 °C)   07/05/22 97.4 °F (36.3 °C)   05/11/22 97.5 °F (36.4 °C)     BP Readings from Last 3 Encounters:   07/12/22 100/80   07/05/22 141/84   05/06/22 106/78     Pulse Readings from Last 3 Encounters:   07/12/22 75   07/05/22 85   05/06/22 76     Body mass index is 46.84 kg/m².  SpO2 Readings from Last 3 Encounters:   07/12/22 95%   05/06/22 98%   01/04/22 98%          Physical Exam  Vitals and nursing note reviewed.   Constitutional:       General: She is not in acute distress.     Appearance: She is well-developed. She is obese.   HENT:      Head: Normocephalic and  atraumatic.      Right Ear: External ear normal.      Left Ear: External ear normal.      Nose: Nose normal.   Eyes:      Conjunctiva/sclera: Conjunctivae normal.      Pupils: Pupils are equal, round, and reactive to light.   Cardiovascular:      Rate and Rhythm: Normal rate and regular rhythm.      Heart sounds: Normal heart sounds.   Pulmonary:      Effort: Pulmonary effort is normal. No respiratory distress.      Breath sounds: Normal breath sounds. No wheezing.   Musculoskeletal:         General: Normal range of motion.      Cervical back: Normal range of motion and neck supple.      Comments: Normal gait   Skin:     General: Skin is warm and dry.   Neurological:      Mental Status: She is alert and oriented to person, place, and time.   Psychiatric:         Behavior: Behavior normal.         Thought Content: Thought content normal.         Judgment: Judgment normal.         Results for orders placed or performed in visit on 07/05/22   Comprehensive Metabolic Panel    Specimen: Blood   Result Value Ref Range    Glucose 96 65 - 99 mg/dL    BUN 16 8 - 27 mg/dL    Creatinine 0.80 0.57 - 1.00 mg/dL    EGFR Result 83 >59 mL/min/1.73    BUN/Creatinine Ratio 20 12 - 28    Sodium 145 (H) 134 - 144 mmol/L    Potassium 4.6 3.5 - 5.2 mmol/L    Chloride 106 96 - 106 mmol/L    Total CO2 27 20 - 29 mmol/L    Calcium 9.4 8.7 - 10.3 mg/dL    Total Protein 6.6 6.0 - 8.5 g/dL    Albumin 3.8 3.8 - 4.8 g/dL    Globulin 2.8 1.5 - 4.5 g/dL    A/G Ratio 1.4 1.2 - 2.2    Total Bilirubin <0.2 0.0 - 1.2 mg/dL    Alkaline Phosphatase 97 44 - 121 IU/L    AST (SGOT) 17 0 - 40 IU/L    ALT (SGPT) 14 0 - 32 IU/L   Hemoglobin A1c    Specimen: Blood   Result Value Ref Range    Hemoglobin A1C 5.7 (H) 4.8 - 5.6 %   Lipid Panel With LDL / HDL Ratio    Specimen: Blood   Result Value Ref Range    Total Cholesterol 182 100 - 199 mg/dL    Triglycerides 95 0 - 149 mg/dL    HDL Cholesterol 64 >39 mg/dL    VLDL Cholesterol Bird 17 5 - 40 mg/dL    LDL Chol  Calc (NIH) 101 (H) 0 - 99 mg/dL    LDL/HDL RATIO 1.6 0.0 - 3.2 ratio   CBC & Differential    Specimen: Blood   Result Value Ref Range    WBC 8.3 3.4 - 10.8 x10E3/uL    RBC 4.31 3.77 - 5.28 x10E6/uL    Hemoglobin 13.1 11.1 - 15.9 g/dL    Hematocrit 40.4 34.0 - 46.6 %    MCV 94 79 - 97 fL    MCH 30.4 26.6 - 33.0 pg    MCHC 32.4 31.5 - 35.7 g/dL    RDW 13.3 11.7 - 15.4 %    Platelets 273 150 - 450 x10E3/uL    Neutrophil Rel % 57 Not Estab. %    Lymphocyte Rel % 35 Not Estab. %    Monocyte Rel % 7 Not Estab. %    Eosinophil Rel % 1 Not Estab. %    Basophil Rel % 0 Not Estab. %    Neutrophils Absolute 4.7 1.4 - 7.0 x10E3/uL    Lymphocytes Absolute 2.9 0.7 - 3.1 x10E3/uL    Monocytes Absolute 0.6 0.1 - 0.9 x10E3/uL    Eosinophils Absolute 0.1 0.0 - 0.4 x10E3/uL    Basophils Absolute 0.0 0.0 - 0.2 x10E3/uL    Immature Granulocyte Rel % 0 Not Estab. %    Immature Grans Absolute 0.0 0.0 - 0.1 x10E3/uL     Result Review :                  Assessment and Plan    Diagnoses and all orders for this visit:    1. Prediabetes (Primary)  -     Discontinue: Semaglutide,0.25 or 0.5MG/DOS, (Ozempic, 0.25 or 0.5 MG/DOSE,) 2 MG/1.5ML solution pen-injector; 0.25mg SQ q 7 days x 4 weeks then increase to 0.5mg sq q 7 days  Dispense: 1.5 mL; Refill: 1  -     Semaglutide,0.25 or 0.5MG/DOS, (Ozempic, 0.25 or 0.5 MG/DOSE,) 2 MG/1.5ML solution pen-injector; Inject 0.25mg under the skin as directed every 7 days x 4 weeks then increase Injection to 0.5mg under the skin every 7 days  Dispense: 1.5 mL; Refill: 1    2. Severe obesity (BMI >= 40) (HCC)    3. Bilateral leg edema    4. Mixed hyperlipidemia - cont statin    5. Gastroesophageal reflux disease, unspecified whether esophagitis present    6. History of DVT (deep vein thrombosis)         Class 3 Severe Obesity (BMI >=40). Obesity-related health conditions include the following: hypertension, diabetes mellitus and dyslipidemias. Obesity is improving with treatment. BMI is is above average; no  BMI management plan is appropriate. We discussed portion control, increasing exercise, consulting a Bariatric surgeon and pharmacologic options including ozempic.             Outpatient Medications Prior to Visit   Medication Sig Dispense Refill   • multivitamin (THERAGRAN) tablet tablet Take  by mouth Daily.     • omeprazole (priLOSEC) 40 MG capsule Take 1 capsule by mouth Daily. 90 capsule 3   • rivaroxaban (XARELTO) 10 MG tablet Take 1 tablet by mouth Daily. 90 tablet 3   • rosuvastatin (Crestor) 5 MG tablet Take 1 tablet by mouth Daily. 90 tablet 1   • Sodium Fluoride (Sodium Fluoride 5000 Plus) 1.1 % cream Use daily as directed to brush teeth 51 g 3   • solifenacin (VESICARE) 10 MG tablet Take 1 tablet by mouth Daily. 90 tablet 3   • vitamin D (ERGOCALCIFEROL) 1.25 MG (01855 UT) capsule capsule Take 1 capsule by mouth 1 (One) Time Per Week. 5 capsule 3     No facility-administered medications prior to visit.     New Medications Ordered This Visit   Medications   • Semaglutide,0.25 or 0.5MG/DOS, (Ozempic, 0.25 or 0.5 MG/DOSE,) 2 MG/1.5ML solution pen-injector     Sig: Inject 0.25mg under the skin as directed every 7 days x 4 weeks then increase Injection to 0.5mg under the skin every 7 days     Dispense:  1.5 mL     Refill:  1     Please fill at [facility] not [facility]     [unfilled]  Medications Discontinued During This Encounter   Medication Reason   • Semaglutide,0.25 or 0.5MG/DOS, (Ozempic, 0.25 or 0.5 MG/DOSE,) 2 MG/1.5ML solution pen-injector Duplicate order         No follow-ups on file.    Patient was given instructions and counseling regarding her condition or for health maintenance advice. Please see specific information pulled into the AVS if appropriate.

## 2022-07-18 DIAGNOSIS — S92.355A NONDISPLACED FRACTURE OF FIFTH METATARSAL BONE, LEFT FOOT, INITIAL ENCOUNTER FOR CLOSED FRACTURE: ICD-10-CM

## 2022-07-18 DIAGNOSIS — E55.9 VITAMIN D DEFICIENCY: ICD-10-CM

## 2022-07-19 RX ORDER — ERGOCALCIFEROL 1.25 MG/1
50000 CAPSULE ORAL WEEKLY
Qty: 5 CAPSULE | Refills: 2 | Status: SHIPPED | OUTPATIENT
Start: 2022-07-19 | End: 2023-02-24

## 2022-07-26 ENCOUNTER — TELEPHONE (OUTPATIENT)
Dept: PEDIATRICS | Facility: OTHER | Age: 62
End: 2022-07-26

## 2022-07-26 NOTE — TELEPHONE ENCOUNTER
Caller:     Relationship:     Best call back number:[phone]  What is the best time to reach you: GENERAL BUSINESS HOURS   Who are you requesting to speak with (clinical staff, provider,  specific staff member): CLINICAL STAFF    Do you know the name of the person who called: JASMINE WITH [employer]  What was the call regarding: JASMINE WITH [employer] CALLED AND STATED PATIENT NEEDS PRIOR-AUTH FOR MEDICATION OZEMPIC 2MG, 1.5 ML.PLEASE CALL WITH REF# [id number].     Do you require a callback:YES

## 2022-07-27 NOTE — TELEPHONE ENCOUNTER
PA for this has already been started. It had to be a paper PA and I have already faxed the forms to [employer]. Awaiting reply.

## 2022-08-01 ENCOUNTER — TELEPHONE (OUTPATIENT)
Dept: INTERNAL MEDICINE | Facility: CLINIC | Age: 62
End: 2022-08-01

## 2022-08-01 NOTE — TELEPHONE ENCOUNTER
Caller: Zulema Sousa    Relationship: Self    Best call back number: [phone]    What is the best time to reach you: ANYTIME    Who are you requesting to speak with (clinical staff, provider,  specific staff member):CLINICAL STAFF    Do you know the name of the person who called: SELF    What was the call regarding: PATIENT CALLED AND STATED SHE TRIED TO  HER OZEMPIC BUT INSURANCE DENIED IT DUE TO HEMOGLOBIN HAS TO BE OVER 7. PATIENT STATED SHE IS PICKING UP A 30 DAY TRIAL FROM  PHARMACY BUT WILL A PRESCRIPTION AFTER 30 DAYS OR SAMPLES WILL BE NEEDED.  PLEASE CALL.     Do you require a callback: YES

## 2022-08-01 NOTE — TELEPHONE ENCOUNTER
What do you want to do since it was denied and her A1C had to be over 7 to be able to get it approved?

## 2022-08-03 NOTE — TELEPHONE ENCOUNTER
I called the pharmacy plan and spoke with Elena. She informed me that all GLP-1 medications would not be covered. They said that they do no cover prediabetes and are only indicated for type 2 diabetic patients.

## 2022-08-12 ENCOUNTER — CLINICAL SUPPORT (OUTPATIENT)
Dept: ORTHOPEDIC SURGERY | Facility: CLINIC | Age: 62
End: 2022-08-12

## 2022-08-12 VITALS — HEIGHT: 64 IN | TEMPERATURE: 97.5 F | BODY MASS INDEX: 45.75 KG/M2 | WEIGHT: 268 LBS

## 2022-08-12 DIAGNOSIS — M17.0 PRIMARY OSTEOARTHRITIS OF BOTH KNEES: Primary | ICD-10-CM

## 2022-08-12 PROCEDURE — 20610 DRAIN/INJ JOINT/BURSA W/O US: CPT | Performed by: NURSE PRACTITIONER

## 2022-08-12 RX ORDER — METHYLPREDNISOLONE ACETATE 80 MG/ML
80 INJECTION, SUSPENSION INTRA-ARTICULAR; INTRALESIONAL; INTRAMUSCULAR; SOFT TISSUE
Status: COMPLETED | OUTPATIENT
Start: 2022-08-12 | End: 2022-08-12

## 2022-08-12 RX ADMIN — METHYLPREDNISOLONE ACETATE 80 MG: 80 INJECTION, SUSPENSION INTRA-ARTICULAR; INTRALESIONAL; INTRAMUSCULAR; SOFT TISSUE at 11:30

## 2022-08-12 NOTE — PROGRESS NOTES
Zulema Sousa is here today for worsening Bilateral knee pain. Knee injection was discussed with the patient in detail, including indication, risks, benefits, and alternatives. Verbal consent was given for the procedure. Injection site was aseptically prepared with Betadine and alcohol swabs.       KNEE Injection Procedure Note:    Large Joint Arthrocentesis: R knee  Date/Time: 8/12/2022 11:30 AM  Consent given by: patient  Site marked: site marked  Timeout: Immediately prior to procedure a time out was called to verify the correct patient, procedure, equipment, support staff and site/side marked as required   Supporting Documentation  Indications: pain   Procedure Details  Location: knee - R knee  Preparation: Patient was prepped and draped in the usual sterile fashion  Needle gauge: 21.  Approach: anterolateral  Medications administered: 80 mg methylPREDNISolone acetate 80 MG/ML; 2 mL lidocaine (cardiac)  Patient tolerance: patient tolerated the procedure well with no immediate complications    Large Joint Arthrocentesis: L knee  Date/Time: 8/12/2022 11:30 AM  Consent given by: patient  Site marked: site marked  Timeout: Immediately prior to procedure a time out was called to verify the correct patient, procedure, equipment, support staff and site/side marked as required   Supporting Documentation  Indications: pain   Procedure Details  Location: knee - L knee  Preparation: Patient was prepped and draped in the usual sterile fashion  Needle gauge: 21.  Approach: anterolateral  Medications administered: 80 mg methylPREDNISolone acetate 80 MG/ML; 2 mL lidocaine (cardiac)  Patient tolerance: patient tolerated the procedure well with no immediate complications        Zulema Sousa tolerated the procedure well. A Bandage was applied to the injection site. At the conclusion of the injection I discussed the importance of continued quad strengthening exercises on a daily basis. I will see the patient back if the  symptoms should fail to improve or worsen.    Beryl Hartley, APRN  8/12/2022      Much of this encounter note is an electronic transcription/translation of spoken language to printed text. The electronic translation of spoken language may permit erroneous, or at times, nonsensical words or phrases to be inadvertently transcribed; Although I have reviewed the note for such errors, some may still exist

## 2022-08-24 ENCOUNTER — TELEPHONE (OUTPATIENT)
Dept: INTERNAL MEDICINE | Facility: CLINIC | Age: 62
End: 2022-08-24

## 2022-08-24 NOTE — TELEPHONE ENCOUNTER
----- Message from Ginna Barnes MA sent at 8/24/2022  5:13 PM EDT -----  Regarding: FW: Ozempic pre-authorization      ----- Message -----  From: Zulema Sousa  Sent: 8/18/2022  12:02 PM EDT  To: [facility] Clinical Pool  Subject: Ozempic pre-authorization                        I was able to get a 1-month trial of the Ozempic from the [facility] pharmacy but My insurance company said it is only FDA approved for type 1 diabetes.  Do you possibly have samples after this trial runs out?   I have used it for 3 weeks so far.  I would like to continue it for a bit longer as I am losing weight.  Last weight 262.5.  Thank you.

## 2022-08-31 DIAGNOSIS — E78.2 MIXED HYPERLIPIDEMIA: ICD-10-CM

## 2022-08-31 RX ORDER — ROSUVASTATIN CALCIUM 5 MG/1
5 TABLET, COATED ORAL DAILY
Qty: 90 TABLET | Refills: 1 | Status: CANCELLED | OUTPATIENT
Start: 2022-08-31

## 2022-08-31 RX ORDER — ROSUVASTATIN CALCIUM 5 MG/1
5 TABLET, COATED ORAL DAILY
Qty: 90 TABLET | Refills: 1 | Status: SHIPPED | OUTPATIENT
Start: 2022-08-31

## 2022-08-31 NOTE — TELEPHONE ENCOUNTER
Rx Refill Note  Requested Prescriptions     Pending Prescriptions Disp Refills    rosuvastatin (Crestor) 5 MG tablet 90 tablet 1     Sig: Take 1 tablet by mouth Daily.      Last office visit with prescribing clinician: 7/12/2022      Next office visit with prescribing clinician: Visit date not found            SAHRA HAWKINS MA  08/31/22, 12:13 EDT

## 2022-09-09 ENCOUNTER — TELEPHONE (OUTPATIENT)
Dept: INTERNAL MEDICINE | Facility: CLINIC | Age: 62
End: 2022-09-09

## 2022-09-09 NOTE — TELEPHONE ENCOUNTER
----- Message from Ginna Barnes MA sent at 9/9/2022  1:50 PM EDT -----  Regarding: FW: Samples      ----- Message -----  From: Zulema Sousa  Sent: 9/9/2022  11:39 AM EDT  To: Usman Young Clinical Pool  Subject: Umair                                          Ginna, I am doing well on the Ozempic.  It looks like I have enough in the pen for 1 dose to take on Monday.  I was wondering what Dr. Young suggests after that.  Insurance won’t cover any of it.  Thanks for your help!

## 2022-10-12 DIAGNOSIS — R73.03 PREDIABETES: ICD-10-CM

## 2022-10-12 RX ORDER — SEMAGLUTIDE 1.34 MG/ML
INJECTION, SOLUTION SUBCUTANEOUS
Qty: 1.5 ML | Refills: 1 | COMMUNITY
Start: 2022-10-12

## 2022-10-12 RX ORDER — SEMAGLUTIDE 1.34 MG/ML
INJECTION, SOLUTION SUBCUTANEOUS
Qty: 1.5 ML | Refills: 1 | COMMUNITY
Start: 2022-10-12 | End: 2022-10-13 | Stop reason: SDUPTHER

## 2022-10-12 NOTE — TELEPHONE ENCOUNTER
This is a sample    Rx Refill Note  Requested Prescriptions     Pending Prescriptions Disp Refills   • Semaglutide,0.25 or 0.5MG/DOS, (Ozempic, 0.25 or 0.5 MG/DOSE,) 2 MG/1.5ML solution pen-injector 1.5 mL 1     Sig: Inject 0.25mg under the skin as directed every 7 days x 4 weeks then increase Injection to 0.5mg under the skin every 7 days      Last office visit with prescribing clinician: 7/12/2022      Next office visit with prescribing clinician: Visit date not found            Tsering Coats MA  10/12/22, 12:02 EDT

## 2022-10-13 DIAGNOSIS — R73.03 PREDIABETES: ICD-10-CM

## 2022-10-13 RX ORDER — SEMAGLUTIDE 1.34 MG/ML
INJECTION, SOLUTION SUBCUTANEOUS
Qty: 1.5 ML | Refills: 1 | COMMUNITY
Start: 2022-10-13 | End: 2022-10-13 | Stop reason: SDUPTHER

## 2022-10-13 RX ORDER — SEMAGLUTIDE 1.34 MG/ML
INJECTION, SOLUTION SUBCUTANEOUS
Qty: 1.5 ML | Refills: 1 | Status: SHIPPED | OUTPATIENT
Start: 2022-10-13 | End: 2022-12-05

## 2022-10-13 NOTE — TELEPHONE ENCOUNTER
Rx Refill Note  Requested Prescriptions     Pending Prescriptions Disp Refills   • Semaglutide,0.25 or 0.5MG/DOS, (Ozempic, 0.25 or 0.5 MG/DOSE,) 2 MG/1.5ML solution pen-injector 1.5 mL 1     Sig: Inject 0.25mg under the skin as directed every 7 days x 4 weeks then increase Injection to 0.5mg under the skin every 7 days      Last office visit with prescribing clinician: 7/12/2022      Next office visit with prescribing clinician: Visit date not found            Tsering Coats MA  10/13/22, 11:37 EDT

## 2022-11-14 ENCOUNTER — CLINICAL SUPPORT (OUTPATIENT)
Dept: ORTHOPEDIC SURGERY | Facility: CLINIC | Age: 62
End: 2022-11-14

## 2022-11-14 VITALS — BODY MASS INDEX: 45.75 KG/M2 | HEIGHT: 64 IN | TEMPERATURE: 97.6 F | WEIGHT: 268 LBS

## 2022-11-14 DIAGNOSIS — M17.0 PRIMARY OSTEOARTHRITIS OF BOTH KNEES: ICD-10-CM

## 2022-11-14 DIAGNOSIS — R52 PAIN: Primary | ICD-10-CM

## 2022-11-14 PROCEDURE — 20610 DRAIN/INJ JOINT/BURSA W/O US: CPT | Performed by: NURSE PRACTITIONER

## 2022-11-14 PROCEDURE — 73562 X-RAY EXAM OF KNEE 3: CPT | Performed by: NURSE PRACTITIONER

## 2022-11-14 RX ORDER — METHYLPREDNISOLONE ACETATE 80 MG/ML
INJECTION, SUSPENSION INTRA-ARTICULAR; INTRALESIONAL; INTRAMUSCULAR; SOFT TISSUE
Status: COMPLETED | OUTPATIENT
Start: 2022-11-14 | End: 2022-11-14

## 2022-11-14 RX ORDER — LIDOCAINE HYDROCHLORIDE 10 MG/ML
2 INJECTION, SOLUTION EPIDURAL; INFILTRATION; INTRACAUDAL; PERINEURAL
Status: COMPLETED | OUTPATIENT
Start: 2022-11-14 | End: 2022-11-14

## 2022-11-14 RX ADMIN — METHYLPREDNISOLONE ACETATE: 80 INJECTION, SUSPENSION INTRA-ARTICULAR; INTRALESIONAL; INTRAMUSCULAR; SOFT TISSUE at 16:35

## 2022-11-14 RX ADMIN — LIDOCAINE HYDROCHLORIDE 2 ML: 10 INJECTION, SOLUTION EPIDURAL; INFILTRATION; INTRACAUDAL; PERINEURAL at 16:36

## 2022-11-14 RX ADMIN — LIDOCAINE HYDROCHLORIDE 2 ML: 10 INJECTION, SOLUTION EPIDURAL; INFILTRATION; INTRACAUDAL; PERINEURAL at 16:35

## 2022-11-14 RX ADMIN — METHYLPREDNISOLONE ACETATE: 80 INJECTION, SUSPENSION INTRA-ARTICULAR; INTRALESIONAL; INTRAMUSCULAR; SOFT TISSUE at 16:36

## 2022-11-14 NOTE — PROGRESS NOTES
Zulema Sousa is here today for worsening Bilateral knee pain. Knee injection was discussed with the patient in detail, including indication, risks, benefits, and alternatives. Verbal consent was given for the procedure. Injection site was aseptically prepared.    Radiology:   AP, lateral, 40 degree PA of the bilateral knee obtained in the office today due to pain, with comparison views shows advanced tricompartmental degenerative changes, most significantly  lateral and patellofemoral compartment, with osteophyte formation and subchondral scelrosis      KNEE Injection Procedure Note:    Large Joint Arthrocentesis: R knee  Date/Time: 11/14/2022 4:35 PM  Consent given by: patient  Site marked: site marked  Timeout: Immediately prior to procedure a time out was called to verify the correct patient, procedure, equipment, support staff and site/side marked as required   Supporting Documentation  Indications: pain   Procedure Details  Location: knee - R knee  Preparation: Patient was prepped and draped in the usual sterile fashion  Needle gauge: 21g.  Approach: anterolateral  Medications administered: methylPREDNISolone acetate 80 MG/ML; 2 mL lidocaine PF 1% 1 %  Patient tolerance: patient tolerated the procedure well with no immediate complications    Large Joint Arthrocentesis: L knee  Date/Time: 11/14/2022 4:36 PM  Consent given by: patient  Site marked: site marked  Timeout: Immediately prior to procedure a time out was called to verify the correct patient, procedure, equipment, support staff and site/side marked as required   Supporting Documentation  Indications: pain   Procedure Details  Location: knee - L knee  Preparation: Patient was prepped and draped in the usual sterile fashion  Needle gauge: 21g.  Approach: anterolateral  Medications administered: methylPREDNISolone acetate 80 MG/ML; 2 mL lidocaine PF 1% 1 %  Patient tolerance: patient tolerated the procedure well with no immediate  complications                Zulema Sousa tolerated the procedure well. A Bandage was applied to the injection site. At the conclusion of the injection I discussed the importance of continued quad strengthening exercises on a daily basis. I will see the patient back if the symptoms should fail to improve or worsen.    Beryl Hartley, APRN  11/14/2022    Dictated Utilizing Dragon Dictation

## 2022-12-05 ENCOUNTER — OFFICE VISIT (OUTPATIENT)
Dept: INTERNAL MEDICINE | Facility: CLINIC | Age: 62
End: 2022-12-05

## 2022-12-05 VITALS
TEMPERATURE: 97.7 F | BODY MASS INDEX: 43.71 KG/M2 | SYSTOLIC BLOOD PRESSURE: 122 MMHG | HEART RATE: 70 BPM | HEIGHT: 64 IN | WEIGHT: 256 LBS | OXYGEN SATURATION: 98 % | DIASTOLIC BLOOD PRESSURE: 82 MMHG

## 2022-12-05 DIAGNOSIS — Z00.00 HEALTHCARE MAINTENANCE: ICD-10-CM

## 2022-12-05 DIAGNOSIS — Z12.31 ENCOUNTER FOR SCREENING MAMMOGRAM FOR MALIGNANT NEOPLASM OF BREAST: ICD-10-CM

## 2022-12-05 DIAGNOSIS — J40 BRONCHITIS: ICD-10-CM

## 2022-12-05 DIAGNOSIS — R05.1 ACUTE COUGH: Primary | ICD-10-CM

## 2022-12-05 PROCEDURE — 99214 OFFICE O/P EST MOD 30 MIN: CPT | Performed by: INTERNAL MEDICINE

## 2022-12-05 RX ORDER — DEXTROMETHORPHAN HYDROBROMIDE AND PROMETHAZINE HYDROCHLORIDE 15; 6.25 MG/5ML; MG/5ML
5 SYRUP ORAL 4 TIMES DAILY PRN
Qty: 118 ML | Refills: 0 | Status: SHIPPED | OUTPATIENT
Start: 2022-12-05 | End: 2022-12-08 | Stop reason: SDUPTHER

## 2022-12-05 RX ORDER — AMOXICILLIN 500 MG/1
1000 CAPSULE ORAL 2 TIMES DAILY
Qty: 40 CAPSULE | Refills: 0 | Status: SHIPPED | OUTPATIENT
Start: 2022-12-05 | End: 2022-12-15

## 2022-12-05 NOTE — PROGRESS NOTES
"Chief Complaint  Cough (Cough x 2 weeks)    Subjective        Zulema Sousa presents to [facility]  History of Present Illness     61 y/o female here with complaint of cough x2 weeks. Her  was diagnosed with influenza approx two weeks ago and now diagnosed with pneumonia. Initially had body aches, cough, and congestion. Patient did receive her flu vaccine this year. She has tried Mucinex and Tessalon perles at home. Asking for cough syrup today.  She isnt short of breath but just cant stop coughing.    Objective   Vital Signs:  /82 (BP Location: Left arm)   Pulse 70   Temp 97.7 °F (36.5 °C)   Ht 162.6 cm (64.02\")   Wt 116 kg (256 lb)   SpO2 98%   BMI 43.92 kg/m²   Estimated body mass index is 43.92 kg/m² as calculated from the following:    Height as of this encounter: 162.6 cm (64.02\").    Weight as of this encounter: 116 kg (256 lb).    Physical Exam  Vitals and nursing note reviewed.   Constitutional:       General: She is not in acute distress.     Appearance: Normal appearance. She is normal weight.   HENT:      Head: Normocephalic and atraumatic.      Right Ear: Tympanic membrane, ear canal and external ear normal.      Left Ear: Tympanic membrane, ear canal and external ear normal.      Nose: Nose normal.      Mouth/Throat:      Mouth: Mucous membranes are moist.      Pharynx: No oropharyngeal exudate.   Eyes:      Pupils: Pupils are equal, round, and reactive to light.   Cardiovascular:      Rate and Rhythm: Normal rate and regular rhythm.      Pulses: Normal pulses.      Heart sounds: Normal heart sounds. No murmur heard.    No friction rub. No gallop.   Pulmonary:      Effort: Pulmonary effort is normal. No respiratory distress.      Breath sounds: Normal breath sounds. No wheezing or rales.   Abdominal:      General: Abdomen is flat. Bowel sounds are normal. There is no distension.      Palpations: Abdomen is soft.      Tenderness: There is no abdominal " tenderness.   Musculoskeletal:         General: Normal range of motion.      Cervical back: Normal range of motion.   Lymphadenopathy:      Cervical: No cervical adenopathy.   Skin:     General: Skin is warm.      Capillary Refill: Capillary refill takes less than 2 seconds.   Neurological:      General: No focal deficit present.      Mental Status: She is alert and oriented to person, place, and time. Mental status is at baseline.   Psychiatric:         Mood and Affect: Mood normal.         Behavior: Behavior normal.         Thought Content: Thought content normal.         Judgment: Judgment normal.        Result Review :                Assessment and Plan   Diagnoses and all orders for this visit:    1. Acute cough (Primary)  -     promethazine-dextromethorphan (PROMETHAZINE-DM) 6.25-15 MG/5ML syrup; Take 5 mL by mouth 4 (Four) Times a Day As Needed for Cough.  Dispense: 118 mL; Refill: 0    2. Healthcare maintenance  -     Mammo Screening Digital Tomosynthesis Bilateral With CAD; Future    3. Encounter for screening mammogram for malignant neoplasm of breast  -     Mammo Screening Digital Tomosynthesis Bilateral With CAD; Future    4. Bronchitis  -     amoxicillin (AMOXIL) 500 MG capsule; Take 2 capsules by mouth 2 (Two) Times a Day for 10 days.  Dispense: 40 capsule; Refill: 0             Follow Up   Return if symptoms worsen or fail to improve.  Patient was given instructions and counseling regarding her condition or for health maintenance advice. Please see specific information pulled into the AVS if appropriate.      Malcolm Zazueta MD  Internal Medicine/Pediatrics, PGY-2    I have seen and examined the patient independently.  I have reviewed the resident's findings as noted above and added to the note where appropriate.  Patient with additional risk factors for complications from flu including obesity and history of DVT requiring anticoagulation with Xarelto.  We will treat cough with cough syrup.  We will  treat bronchitis with Amoxil.  She is also due for her mammogram.  We reviewed her health maintenance and orders for mammogram were placed today.  She is due for her pneumonia shot and COVID booster.  I recommend that she consider those as soon as she has well from this illness.  Agree with above.    Marsha Young MD  Attending Physician  Internal Medicine and Pediatrics

## 2022-12-07 DIAGNOSIS — E55.9 VITAMIN D DEFICIENCY: ICD-10-CM

## 2022-12-07 DIAGNOSIS — S92.355A NONDISPLACED FRACTURE OF FIFTH METATARSAL BONE, LEFT FOOT, INITIAL ENCOUNTER FOR CLOSED FRACTURE: ICD-10-CM

## 2022-12-08 DIAGNOSIS — R05.1 ACUTE COUGH: ICD-10-CM

## 2022-12-08 RX ORDER — ERGOCALCIFEROL 1.25 MG/1
50000 CAPSULE ORAL WEEKLY
Qty: 5 CAPSULE | Refills: 2 | OUTPATIENT
Start: 2022-12-08

## 2022-12-10 NOTE — TELEPHONE ENCOUNTER
Rx Refill Note  Requested Prescriptions     Pending Prescriptions Disp Refills    promethazine-dextromethorphan (PROMETHAZINE-DM) 6.25-15 MG/5ML syrup 118 mL 0     Sig: Take 5 mL by mouth 4 (Four) Times a Day As Needed for Cough.      Last office visit with prescribing clinician: 12/5/2022   Last telemedicine visit with prescribing clinician: 12/9/2022   Next office visit with prescribing clinician: 12/9/2022                         Would you like a call back once the refill request has been completed: [] Yes [] No    If the office needs to give you a call back, can they leave a voicemail: [] Yes [] No    Ginna Barnes MA  12/10/22, 10:57 EST

## 2022-12-12 RX ORDER — DEXTROMETHORPHAN HYDROBROMIDE AND PROMETHAZINE HYDROCHLORIDE 15; 6.25 MG/5ML; MG/5ML
5 SYRUP ORAL 4 TIMES DAILY PRN
Qty: 118 ML | Refills: 0 | Status: SHIPPED | OUTPATIENT
Start: 2022-12-12 | End: 2023-02-24

## 2022-12-21 DIAGNOSIS — S92.355A NONDISPLACED FRACTURE OF FIFTH METATARSAL BONE, LEFT FOOT, INITIAL ENCOUNTER FOR CLOSED FRACTURE: ICD-10-CM

## 2022-12-21 DIAGNOSIS — E55.9 VITAMIN D DEFICIENCY: ICD-10-CM

## 2022-12-22 RX ORDER — ERGOCALCIFEROL 1.25 MG/1
50000 CAPSULE ORAL WEEKLY
Qty: 5 CAPSULE | Refills: 2 | OUTPATIENT
Start: 2022-12-22

## 2022-12-28 DIAGNOSIS — E55.9 VITAMIN D DEFICIENCY: ICD-10-CM

## 2022-12-28 DIAGNOSIS — S92.355A NONDISPLACED FRACTURE OF FIFTH METATARSAL BONE, LEFT FOOT, INITIAL ENCOUNTER FOR CLOSED FRACTURE: ICD-10-CM

## 2022-12-28 RX ORDER — ERGOCALCIFEROL 1.25 MG/1
50000 CAPSULE ORAL WEEKLY
Qty: 5 CAPSULE | Refills: 2 | OUTPATIENT
Start: 2022-12-28

## 2023-01-02 DIAGNOSIS — S92.355A NONDISPLACED FRACTURE OF FIFTH METATARSAL BONE, LEFT FOOT, INITIAL ENCOUNTER FOR CLOSED FRACTURE: ICD-10-CM

## 2023-01-02 DIAGNOSIS — E55.9 VITAMIN D DEFICIENCY: ICD-10-CM

## 2023-01-02 RX ORDER — ERGOCALCIFEROL 1.25 MG/1
50000 CAPSULE ORAL WEEKLY
Qty: 5 CAPSULE | Refills: 2 | Status: CANCELLED | OUTPATIENT
Start: 2023-01-02

## 2023-01-03 RX ORDER — ERGOCALCIFEROL 1.25 MG/1
50000 CAPSULE ORAL WEEKLY
Qty: 5 CAPSULE | Refills: 2 | OUTPATIENT
Start: 2023-01-03

## 2023-01-16 ENCOUNTER — HOSPITAL ENCOUNTER (OUTPATIENT)
Dept: MAMMOGRAPHY | Facility: HOSPITAL | Age: 63
End: 2023-01-16
Payer: COMMERCIAL

## 2023-02-03 ENCOUNTER — TELEPHONE (OUTPATIENT)
Dept: INTERNAL MEDICINE | Facility: CLINIC | Age: 63
End: 2023-02-03
Payer: COMMERCIAL

## 2023-02-03 ENCOUNTER — HOSPITAL ENCOUNTER (OUTPATIENT)
Dept: MAMMOGRAPHY | Facility: HOSPITAL | Age: 63
Discharge: HOME | End: 2023-02-03
Admitting: INTERNAL MEDICINE
Payer: COMMERCIAL

## 2023-02-03 DIAGNOSIS — Z00.00 HEALTHCARE MAINTENANCE: ICD-10-CM

## 2023-02-03 DIAGNOSIS — Z12.31 ENCOUNTER FOR SCREENING MAMMOGRAM FOR MALIGNANT NEOPLASM OF BREAST: ICD-10-CM

## 2023-02-03 PROCEDURE — 77067 SCR MAMMO BI INCL CAD: CPT

## 2023-02-03 PROCEDURE — 77063 BREAST TOMOSYNTHESIS BI: CPT

## 2023-02-03 NOTE — TELEPHONE ENCOUNTER
----- Message from Marsha Young MD sent at 2/3/2023 10:25 AM EST -----  Mammogram normal.  Repeat 1 year

## 2023-02-20 ENCOUNTER — CLINICAL SUPPORT (OUTPATIENT)
Dept: ORTHOPEDIC SURGERY | Facility: CLINIC | Age: 63
End: 2023-02-20
Payer: COMMERCIAL

## 2023-02-20 VITALS — HEIGHT: 64 IN | BODY MASS INDEX: 43.71 KG/M2 | TEMPERATURE: 98.6 F | WEIGHT: 256 LBS

## 2023-02-20 DIAGNOSIS — M17.0 PRIMARY OSTEOARTHRITIS OF BOTH KNEES: Primary | ICD-10-CM

## 2023-02-20 DIAGNOSIS — E66.01 OBESITY, MORBID, BMI 40.0-49.9: ICD-10-CM

## 2023-02-20 PROCEDURE — 99213 OFFICE O/P EST LOW 20 MIN: CPT | Performed by: NURSE PRACTITIONER

## 2023-02-20 PROCEDURE — 20610 DRAIN/INJ JOINT/BURSA W/O US: CPT | Performed by: NURSE PRACTITIONER

## 2023-02-20 RX ORDER — LIDOCAINE HYDROCHLORIDE 10 MG/ML
2 INJECTION, SOLUTION EPIDURAL; INFILTRATION; INTRACAUDAL; PERINEURAL
Status: COMPLETED | OUTPATIENT
Start: 2023-02-20 | End: 2023-02-20

## 2023-02-20 RX ORDER — METHYLPREDNISOLONE ACETATE 80 MG/ML
80 INJECTION, SUSPENSION INTRA-ARTICULAR; INTRALESIONAL; INTRAMUSCULAR; SOFT TISSUE
Status: COMPLETED | OUTPATIENT
Start: 2023-02-20 | End: 2023-02-20

## 2023-02-20 RX ADMIN — LIDOCAINE HYDROCHLORIDE 2 ML: 10 INJECTION, SOLUTION EPIDURAL; INFILTRATION; INTRACAUDAL; PERINEURAL at 15:45

## 2023-02-20 RX ADMIN — LIDOCAINE HYDROCHLORIDE 2 ML: 10 INJECTION, SOLUTION EPIDURAL; INFILTRATION; INTRACAUDAL; PERINEURAL at 15:46

## 2023-02-20 RX ADMIN — METHYLPREDNISOLONE ACETATE 80 MG: 80 INJECTION, SUSPENSION INTRA-ARTICULAR; INTRALESIONAL; INTRAMUSCULAR; SOFT TISSUE at 15:45

## 2023-02-20 RX ADMIN — METHYLPREDNISOLONE ACETATE 80 MG: 80 INJECTION, SUSPENSION INTRA-ARTICULAR; INTRALESIONAL; INTRAMUSCULAR; SOFT TISSUE at 15:46

## 2023-02-20 NOTE — PROGRESS NOTES
Zulema Sousa is here today for worsening Bilateral knee pain. Knee injection was discussed with the patient in detail, including indication, risks, benefits, and alternatives. Verbal consent was given for the procedure. Injection site was aseptically prepared.       KNEE Injection Procedure Note:    Large Joint Arthrocentesis: R knee  Date/Time: 2/20/2023 3:45 PM  Consent given by: patient  Site marked: site marked  Timeout: Immediately prior to procedure a time out was called to verify the correct patient, procedure, equipment, support staff and site/side marked as required   Supporting Documentation  Indications: pain   Procedure Details  Location: knee - R knee  Preparation: Patient was prepped and draped in the usual sterile fashion  Needle gauge: 21g.  Approach: anterolateral  Medications administered: 80 mg methylPREDNISolone acetate 80 MG/ML; 2 mL lidocaine PF 1% 1 %  Patient tolerance: patient tolerated the procedure well with no immediate complications    Large Joint Arthrocentesis: L knee  Date/Time: 2/20/2023 3:46 PM  Consent given by: patient  Site marked: site marked  Timeout: Immediately prior to procedure a time out was called to verify the correct patient, procedure, equipment, support staff and site/side marked as required   Supporting Documentation  Indications: pain   Procedure Details  Location: knee - L knee  Preparation: Patient was prepped and draped in the usual sterile fashion  Needle gauge: 21g.  Approach: anterolateral  Medications administered: 80 mg methylPREDNISolone acetate 80 MG/ML; 2 mL lidocaine PF 1% 1 %  Patient tolerance: patient tolerated the procedure well with no immediate complications        Zulema Sousa tolerated the procedure well. A Bandage was applied to the injection site. At the conclusion of the injection I discussed the importance of continued quad strengthening exercises on a daily basis. I will see the patient back if the symptoms should fail to improve or  worsen.    -Patient is struggling with getting weight off.  She has done keto the past and lost a significant amount.  However she is struggling to lose any more weight at this point.  She also has stopped smoking she states her last cigarette was January 10, 2023.  She has not had any since that day.  Her other physicians have also recently reduced her Xarelto dosage.  I discussed with the patient that her BMI is too high to do a surgery on her at this point, she states Dr. Sy states based off her anatomy he would be unlikely to do surgery on him for knee replacement at all.  However she does want to get some weight off and help lose the weight to reduce pressure on his knees.  I recommend we refer her to the R program and see if they can help her with her weight loss goals.  She verbalized understanding is in agreement this plan.    Beryl Hartley, APRN  2/20/2023    Dictated Utilizing Dragon Dictation

## 2023-02-24 ENCOUNTER — OFFICE VISIT (OUTPATIENT)
Dept: CARDIOLOGY | Facility: CLINIC | Age: 63
End: 2023-02-24
Payer: COMMERCIAL

## 2023-02-24 VITALS
WEIGHT: 271 LBS | BODY MASS INDEX: 46.26 KG/M2 | HEIGHT: 64 IN | HEART RATE: 72 BPM | DIASTOLIC BLOOD PRESSURE: 88 MMHG | SYSTOLIC BLOOD PRESSURE: 138 MMHG

## 2023-02-24 DIAGNOSIS — I26.99 PULMONARY EMBOLISM, OTHER, UNSPECIFIED CHRONICITY, UNSPECIFIED WHETHER ACUTE COR PULMONALE PRESENT: Primary | ICD-10-CM

## 2023-02-24 PROCEDURE — 99214 OFFICE O/P EST MOD 30 MIN: CPT | Performed by: INTERNAL MEDICINE

## 2023-02-24 NOTE — PROGRESS NOTES
Subjective:     Encounter Date: 02/24/23      Patient ID: Zulema Sousa is a 62 y.o. female.    Chief Complaint: PE  Hyperlipidemia    This is a 62-year-old woman who was previously seen by Dr. Newton Hernández.  In 2016 she suffered bilateral pulmonary emboli.  She underwent thrombectomy with Dr. Hernández, and recovered well.  However, this was complicated by cardiac tamponade.  She underwent pericardiocentesis and recovered well from that.  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  Today she returns for follow-up.  She is really focused on her weight and feeling down about her inability to lose weight.  She has had a lap band in the past which failed and had to be removed.  She had success on keto and Ozempic but was unable to continue those.  She has no angina or dyspnea.  No lower extremity edema  The following portions of the patient's history were reviewed and updated as appropriate: allergies, current medications, past family history, past medical history, past social history, past surgical history and problem list.         REVIEW OF SYSTEMS:   All systems reviewed.  Pertinent positives identified in HPI.  All other systems are negative.      Past Medical History:   Diagnosis Date   • Acid reflux    • Back pain    • Cardiac tamponade     March 2015   • Cholelithiasis    • Deep vein thrombosis (HCC)    • Difficulty breathing     during exertion   • Edema    • Esophagitis, reflux    • Essential hypertriglyceridemia    • GERD (gastroesophageal reflux disease)    • Health care maintenance    • Heartburn    • Hyperlipidemia    • Hypertriglyceridemia    • Increased appetite    • Morbid obesity (HCC)    • Multiple joint pain    • Obesity, Class III, BMI 40-49.9 (morbid obesity) ([facility]) 01/05/2018   • Osteoarthritis of knee    • Pericarditis    • Pulmonary  embolism (HCC)     2015   • RHA (rheumatoid arthritis) (HCC)    • Sciatica    • Sleep apnea    • Unintended weight gain        Family History   Problem Relation Age of Onset   • Cancer Mother    • Obesity Mother    • Diabetes Father    • Hypertension Father    • Heart disease Father    • Obesity Father    • Arthritis Father    • COPD Father    • Depression Father    • Heart disease Brother    • Diabetes Paternal Grandfather    • Diabetes Paternal Aunt    • No Known Problems Maternal Grandmother    • No Known Problems Maternal Grandfather    • No Known Problems Paternal Grandmother    • Arthritis Sister    • COPD Sister    • Diabetes Paternal Aunt    • Heart disease Brother    • Malig Hyperthermia Neg Hx    • Breast cancer Neg Hx        Social History     Socioeconomic History   • Marital status:      Spouse name: Jac   Tobacco Use   • Smoking status: Former     Packs/day: 0.25     Years: 10.00     Pack years: 2.50     Types: Cigarettes     Quit date: 2015     Years since quittin.1   • Smokeless tobacco: Never   • Tobacco comments:     caffeine use   Vaping Use   • Vaping Use: Never used   Substance and Sexual Activity   • Alcohol use: Not Currently     Alcohol/week: 0.0 - 2.0 standard drinks     Comment: Very rarely   • Drug use: No   • Sexual activity: Yes     Partners: Male     Birth control/protection: Surgical, Post-menopausal       Allergies   Allergen Reactions   • Nsaids Other (See Comments)     DOESN'T TAKE BECAUSE SHES ON BLOOD THINNER.       Past Surgical History:   Procedure Laterality Date   • ANKLE SURGERY      treatment of ankle fracture   • APPENDECTOMY     • BARIATRIC SURGERY     • CARDIAC SURGERY      cardiac tamponade   •  SECTION     • CHOLECYSTECTOMY     • COLONOSCOPY N/A 2018    Procedure: COLONOSCOPY to cecum and t.i. with polypectomy and clip x2 to ascending colon;  Surgeon: Pierre Ornelas MD;  Location: [facility] ENDOSCOPY;  Service: Gastroenterology    • DILATATION AND CURETTAGE     • ENDOSCOPY N/A 11/27/2018    Procedure: ESOPHAGOGASTRODUODENOSCOPY;  Surgeon: Pierre Ornelas MD;  Location: [facility] ENDOSCOPY;  Service: Gastroenterology   • FRACTURE SURGERY     • GASTRIC BANDING REMOVAL N/A 07/01/2019    Procedure: GASTRIC BANDING AND PORT REMOVAL, LYSIS OF ADHESIONS, REPAIR OF COLOTOMY, AND INCISIONAL HERNIA REPAIR LAPAROSCOPIC;  Surgeon: Randell Salamanca Jr., MD;  [place] [facility] [place] [facility]  Service: General   • LAPAROSCOPIC GASTRIC BANDING     • PULMONARY EMBOLISM SURGERY     • TUBAL ABDOMINAL LIGATION         Procedures       Objective:         GEN: VSS, no distress, obese  Eyes: normal sclera, normal lids and lashes  HENT: moist mucus membranes,   Respiratory: CTAB, no rales or wheezes  CV: RRR, no murmurs, , +2 DP and 2+ carotid pulses b/l  GI: NABS, soft,  Nontender, nondistended  MSK: Bilateral +1 edema, no scoliosis or kyphosis  Skin: no rash, warm, dry  Heme/Lymph: no bruising or bleeding  Psych: organized thought, normal behavior and affect  Neuro: Cranial nerves grossly intact, Alert and Oriented x 3.               Assessment:          Diagnosis Plan   1. Pulmonary embolism, other, unspecified chronicity, unspecified whether acute cor pulmonale present (HCC)               Plan:       1.  PE, remote history x2: Xarelto 10 lifelong anticoagulation  2. HLD: Tolerating Crestor.  3.  Obesity: Had to have her Lap-Band removed.  Has gained a substantial amount of weight.  Tried to offer moral support.    Dr. Young, thank you very much for referring this kind patient to me please call with any questions or concerns.  We will see her in 1 year.       Temi Hutchinson MD  02/24/23  [facility]

## 2023-03-22 ENCOUNTER — TELEPHONE (OUTPATIENT)
Dept: INTERNAL MEDICINE | Facility: CLINIC | Age: 63
End: 2023-03-22
Payer: COMMERCIAL

## 2023-03-22 NOTE — TELEPHONE ENCOUNTER
----- Message from Ginna Barnes MA sent at 3/22/2023  4:30 PM EDT -----  Regarding: FW: Wegovy  Contact: [phone]  Is this something that you would consider sending in for her?  ----- Message -----  From: Zulema Sousa  Sent: 3/20/2023  11:03 AM EDT  To: [facility]  Subject: Wegovy                                           It is my understanding that Wegovy is now approved for weight loss.  [facility] requires preauthorization.  I am wondering if you would consider trying to get this approved.  Thanks so much.

## 2023-03-24 ENCOUNTER — OFFICE VISIT (OUTPATIENT)
Dept: INTERNAL MEDICINE | Facility: CLINIC | Age: 63
End: 2023-03-24
Payer: COMMERCIAL

## 2023-03-24 VITALS
SYSTOLIC BLOOD PRESSURE: 112 MMHG | WEIGHT: 272 LBS | RESPIRATION RATE: 18 BRPM | DIASTOLIC BLOOD PRESSURE: 78 MMHG | OXYGEN SATURATION: 96 % | TEMPERATURE: 97.5 F | HEIGHT: 64 IN | BODY MASS INDEX: 46.44 KG/M2 | HEART RATE: 75 BPM

## 2023-03-24 DIAGNOSIS — R73.03 PREDIABETES: Primary | ICD-10-CM

## 2023-03-24 DIAGNOSIS — E66.01 SEVERE OBESITY (BMI >= 40): ICD-10-CM

## 2023-03-24 DIAGNOSIS — E78.2 MIXED HYPERLIPIDEMIA: ICD-10-CM

## 2023-03-24 NOTE — PROGRESS NOTES
Zulema Sousa is a 62 y.o. female, who presents with a chief complaint of   Chief Complaint   Patient presents with   • Weight Loss     Wanting to discuss starting wegovy            HPI   Pt here for follow up.  she quit smoking January 10th. She has been smoking about 1ppd off and on for years.    She was laid off from work recently.      Obesity - she is s/p lap band removal.  She went back to see dr. Salamanca and is on a meal replacement shake.  Weight is going down.      Prediabetes - a1c 6.0 -> 5.7.  Pt interested in semaglutide to help with sugars.  She has tried ozempic in the past but had insurance issues getting it covered. Weight up to 272lbs.     HLD - pt on Crestor and doing well.  No cramps or myalgias.      The following portions of the patient's history were reviewed and updated as appropriate: allergies, current medications, past family history, past medical history, past social history, past surgical history and problem list.    Allergies: Nsaids    Review of Systems   Constitutional: Negative.    HENT: Negative.    Eyes: Negative.    Respiratory: Negative.    Cardiovascular: Negative.    Gastrointestinal: Negative.    Endocrine: Negative.    Genitourinary: Negative.    Musculoskeletal: Negative.    Skin: Negative.    Allergic/Immunologic: Negative.    Neurological: Negative.    Hematological: Negative.    Psychiatric/Behavioral: Negative.    All other systems reviewed and are negative.            Wt Readings from Last 3 Encounters:   03/24/23 123 kg (272 lb)   02/24/23 123 kg (271 lb)   02/20/23 116 kg (256 lb)     Temp Readings from Last 3 Encounters:   03/24/23 97.5 °F (36.4 °C) (Infrared)   02/20/23 98.6 °F (37 °C) (Temporal)   12/05/22 97.7 °F (36.5 °C)     BP Readings from Last 3 Encounters:   03/24/23 112/78   02/24/23 138/88   12/05/22 122/82     Pulse Readings from Last 3 Encounters:   03/24/23 75   02/24/23 72   12/05/22 70     Body mass index is 46.69 kg/m².  SpO2 Readings from Last  3 Encounters:   03/24/23 96%   12/05/22 98%   07/12/22 95%          Physical Exam  Vitals and nursing note reviewed.   Constitutional:       General: She is not in acute distress.     Appearance: She is well-developed. She is obese.   HENT:      Head: Normocephalic and atraumatic.      Right Ear: External ear normal.      Left Ear: External ear normal.      Nose: Nose normal.   Eyes:      Conjunctiva/sclera: Conjunctivae normal.      Pupils: Pupils are equal, round, and reactive to light.   Cardiovascular:      Rate and Rhythm: Normal rate and regular rhythm.      Heart sounds: Normal heart sounds.   Pulmonary:      Effort: Pulmonary effort is normal. No respiratory distress.      Breath sounds: Normal breath sounds. No wheezing.   Musculoskeletal:         General: Normal range of motion.      Cervical back: Normal range of motion and neck supple.      Comments: Normal gait   Skin:     General: Skin is warm and dry.   Neurological:      General: No focal deficit present.      Mental Status: She is alert and oriented to person, place, and time.   Psychiatric:         Mood and Affect: Mood normal.         Behavior: Behavior normal.         Thought Content: Thought content normal.         Judgment: Judgment normal.         Results for orders placed or performed in visit on 07/05/22   Comprehensive Metabolic Panel    Specimen: Blood   Result Value Ref Range    Glucose 96 65 - 99 mg/dL    BUN 16 8 - 27 mg/dL    Creatinine 0.80 0.57 - 1.00 mg/dL    EGFR Result 83 >59 mL/min/1.73    BUN/Creatinine Ratio 20 12 - 28    Sodium 145 (H) 134 - 144 mmol/L    Potassium 4.6 3.5 - 5.2 mmol/L    Chloride 106 96 - 106 mmol/L    Total CO2 27 20 - 29 mmol/L    Calcium 9.4 8.7 - 10.3 mg/dL    Total Protein 6.6 6.0 - 8.5 g/dL    Albumin 3.8 3.8 - 4.8 g/dL    Globulin 2.8 1.5 - 4.5 g/dL    A/G Ratio 1.4 1.2 - 2.2    Total Bilirubin <0.2 0.0 - 1.2 mg/dL    Alkaline Phosphatase 97 44 - 121 IU/L    AST (SGOT) 17 0 - 40 IU/L    ALT (SGPT) 14 0  - 32 IU/L   Hemoglobin A1c    Specimen: Blood   Result Value Ref Range    Hemoglobin A1C 5.7 (H) 4.8 - 5.6 %   Lipid Panel With LDL / HDL Ratio    Specimen: Blood   Result Value Ref Range    Total Cholesterol 182 100 - 199 mg/dL    Triglycerides 95 0 - 149 mg/dL    HDL Cholesterol 64 >39 mg/dL    VLDL Cholesterol Bird 17 5 - 40 mg/dL    LDL Chol Calc (NIH) 101 (H) 0 - 99 mg/dL    LDL/HDL RATIO 1.6 0.0 - 3.2 ratio   CBC & Differential    Specimen: Blood   Result Value Ref Range    WBC 8.3 3.4 - 10.8 x10E3/uL    RBC 4.31 3.77 - 5.28 x10E6/uL    Hemoglobin 13.1 11.1 - 15.9 g/dL    Hematocrit 40.4 34.0 - 46.6 %    MCV 94 79 - 97 fL    MCH 30.4 26.6 - 33.0 pg    MCHC 32.4 31.5 - 35.7 g/dL    RDW 13.3 11.7 - 15.4 %    Platelets 273 150 - 450 x10E3/uL    Neutrophil Rel % 57 Not Estab. %    Lymphocyte Rel % 35 Not Estab. %    Monocyte Rel % 7 Not Estab. %    Eosinophil Rel % 1 Not Estab. %    Basophil Rel % 0 Not Estab. %    Neutrophils Absolute 4.7 1.4 - 7.0 x10E3/uL    Lymphocytes Absolute 2.9 0.7 - 3.1 x10E3/uL    Monocytes Absolute 0.6 0.1 - 0.9 x10E3/uL    Eosinophils Absolute 0.1 0.0 - 0.4 x10E3/uL    Basophils Absolute 0.0 0.0 - 0.2 x10E3/uL    Immature Granulocyte Rel % 0 Not Estab. %    Immature Grans Absolute 0.0 0.0 - 0.1 x10E3/uL     Result Review :                  Assessment and Plan    Diagnoses and all orders for this visit:    1. Prediabetes (Primary)  -     CBC & Differential  -     Comprehensive Metabolic Panel  -     Lipid Panel With LDL / HDL Ratio  -     Hemoglobin A1c  -     TSH  -     T4, Free  -     Semaglutide-Weight Management 0.25 MG/0.5ML solution auto-injector; Inject 0.25 mg under the skin into the appropriate area as directed Every 7 (Seven) Days.  Dispense: 2 mL; Refill: 0    2. Mixed hyperlipidemia  -     Comprehensive Metabolic Panel  -     Lipid Panel With LDL / HDL Ratio    3. Severe obesity (BMI >= 40) (HCC)  -     CBC & Differential  -     Comprehensive Metabolic Panel  -     Lipid  Panel With LDL / HDL Ratio  -     Hemoglobin A1c  -     TSH  -     T4, Free  -     Semaglutide-Weight Management 0.25 MG/0.5ML solution auto-injector; Inject 0.25 mg under the skin into the appropriate area as directed Every 7 (Seven) Days.  Dispense: 2 mL; Refill: 0         Class 3 Severe Obesity (BMI >=40). Obesity-related health conditions include the following: hypertension, diabetes mellitus, dyslipidemias and GERD. Obesity is worsening. BMI is is above average; no BMI management plan is appropriate. We discussed portion control, increasing exercise and pharmacologic options including semaglutide.             Outpatient Medications Prior to Visit   Medication Sig Dispense Refill   • multivitamin (THERAGRAN) tablet tablet Take  by mouth Daily.     • omeprazole (priLOSEC) 40 MG capsule Take 1 capsule by mouth Daily. 90 capsule 3   • rivaroxaban (XARELTO) 10 MG tablet Take 1 tablet by mouth Daily. 90 tablet 3   • rosuvastatin (Crestor) 5 MG tablet Take 1 tablet by mouth Daily. 90 tablet 1   • Sodium Fluoride (Sodium Fluoride 5000 Plus) 1.1 % cream Use daily as directed to brush teeth 51 g 2   • solifenacin (VESICARE) 10 MG tablet Take 1 tablet by mouth Daily. 90 tablet 3     No facility-administered medications prior to visit.     New Medications Ordered This Visit   Medications   • Semaglutide-Weight Management 0.25 MG/0.5ML solution auto-injector     Sig: Inject 0.25 mg under the skin into the appropriate area as directed Every 7 (Seven) Days.     Dispense:  2 mL     Refill:  0     [unfilled]  There are no discontinued medications.      Return in about 3 months (around 6/24/2023) for Recheck, labs.    Patient was given instructions and counseling regarding her condition or for health maintenance advice. Please see specific information pulled into the AVS if appropriate.

## 2023-03-25 LAB
ALBUMIN SERPL-MCNC: 3.9 G/DL (ref 3.5–5.2)
ALBUMIN/GLOB SERPL: 1.3 G/DL
ALP SERPL-CCNC: 89 U/L (ref 39–117)
ALT SERPL-CCNC: 16 U/L (ref 1–33)
AST SERPL-CCNC: 19 U/L (ref 1–32)
BASOPHILS # BLD AUTO: 0.02 10*3/MM3 (ref 0–0.2)
BASOPHILS NFR BLD AUTO: 0.3 % (ref 0–1.5)
BILIRUB SERPL-MCNC: 0.3 MG/DL (ref 0–1.2)
BUN SERPL-MCNC: 17 MG/DL (ref 8–23)
BUN/CREAT SERPL: 21.5 (ref 7–25)
CALCIUM SERPL-MCNC: 9.9 MG/DL (ref 8.6–10.5)
CHLORIDE SERPL-SCNC: 103 MMOL/L (ref 98–107)
CHOLEST SERPL-MCNC: 174 MG/DL (ref 0–200)
CO2 SERPL-SCNC: 28.9 MMOL/L (ref 22–29)
CREAT SERPL-MCNC: 0.79 MG/DL (ref 0.57–1)
EGFRCR SERPLBLD CKD-EPI 2021: 84.2 ML/MIN/1.73
EOSINOPHIL # BLD AUTO: 0.11 10*3/MM3 (ref 0–0.4)
EOSINOPHIL NFR BLD AUTO: 1.7 % (ref 0.3–6.2)
ERYTHROCYTE [DISTWIDTH] IN BLOOD BY AUTOMATED COUNT: 12.9 % (ref 12.3–15.4)
GLOBULIN SER CALC-MCNC: 2.9 GM/DL
GLUCOSE SERPL-MCNC: 96 MG/DL (ref 65–99)
HBA1C MFR BLD: 5.8 % (ref 4.8–5.6)
HCT VFR BLD AUTO: 40.6 % (ref 34–46.6)
HDLC SERPL-MCNC: 77 MG/DL (ref 40–60)
HGB BLD-MCNC: 13 G/DL (ref 12–15.9)
IMM GRANULOCYTES # BLD AUTO: 0.01 10*3/MM3 (ref 0–0.05)
IMM GRANULOCYTES NFR BLD AUTO: 0.2 % (ref 0–0.5)
LDLC SERPL CALC-MCNC: 85 MG/DL (ref 0–100)
LDLC/HDLC SERPL: 1.09 {RATIO}
LYMPHOCYTES # BLD AUTO: 2.3 10*3/MM3 (ref 0.7–3.1)
LYMPHOCYTES NFR BLD AUTO: 35.3 % (ref 19.6–45.3)
MCH RBC QN AUTO: 29.2 PG (ref 26.6–33)
MCHC RBC AUTO-ENTMCNC: 32 G/DL (ref 31.5–35.7)
MCV RBC AUTO: 91.2 FL (ref 79–97)
MONOCYTES # BLD AUTO: 0.47 10*3/MM3 (ref 0.1–0.9)
MONOCYTES NFR BLD AUTO: 7.2 % (ref 5–12)
NEUTROPHILS # BLD AUTO: 3.6 10*3/MM3 (ref 1.7–7)
NEUTROPHILS NFR BLD AUTO: 55.3 % (ref 42.7–76)
NRBC BLD AUTO-RTO: 0 /100 WBC (ref 0–0.2)
PLATELET # BLD AUTO: 301 10*3/MM3 (ref 140–450)
POTASSIUM SERPL-SCNC: 4.4 MMOL/L (ref 3.5–5.2)
PROT SERPL-MCNC: 6.8 G/DL (ref 6–8.5)
RBC # BLD AUTO: 4.45 10*6/MM3 (ref 3.77–5.28)
SODIUM SERPL-SCNC: 142 MMOL/L (ref 136–145)
T4 FREE SERPL-MCNC: 1.22 NG/DL (ref 0.93–1.7)
TRIGL SERPL-MCNC: 64 MG/DL (ref 0–150)
TSH SERPL DL<=0.005 MIU/L-ACNC: 3.75 UIU/ML (ref 0.27–4.2)
VLDLC SERPL CALC-MCNC: 12 MG/DL (ref 5–40)
WBC # BLD AUTO: 6.51 10*3/MM3 (ref 3.4–10.8)

## 2023-04-07 DIAGNOSIS — E78.2 MIXED HYPERLIPIDEMIA: ICD-10-CM

## 2023-04-07 RX ORDER — ROSUVASTATIN CALCIUM 5 MG/1
5 TABLET, COATED ORAL DAILY
Qty: 90 TABLET | Refills: 1 | Status: SHIPPED | OUTPATIENT
Start: 2023-04-07

## 2023-05-11 ENCOUNTER — TELEPHONE (OUTPATIENT)
Dept: ORTHOPEDIC SURGERY | Facility: CLINIC | Age: 63
End: 2023-05-11

## 2023-06-05 ENCOUNTER — CLINICAL SUPPORT (OUTPATIENT)
Dept: ORTHOPEDIC SURGERY | Facility: CLINIC | Age: 63
End: 2023-06-05
Payer: COMMERCIAL

## 2023-06-05 VITALS — TEMPERATURE: 97.5 F | WEIGHT: 272 LBS | BODY MASS INDEX: 46.44 KG/M2 | HEIGHT: 64 IN

## 2023-06-05 DIAGNOSIS — M17.0 PRIMARY OSTEOARTHRITIS OF BOTH KNEES: ICD-10-CM

## 2023-06-05 DIAGNOSIS — R52 PAIN: Primary | ICD-10-CM

## 2023-06-05 RX ORDER — LIDOCAINE HYDROCHLORIDE 10 MG/ML
1 INJECTION, SOLUTION EPIDURAL; INFILTRATION; INTRACAUDAL; PERINEURAL
Status: COMPLETED | OUTPATIENT
Start: 2023-06-05 | End: 2023-06-05

## 2023-06-05 RX ORDER — METHYLPREDNISOLONE ACETATE 80 MG/ML
80 INJECTION, SUSPENSION INTRA-ARTICULAR; INTRALESIONAL; INTRAMUSCULAR; SOFT TISSUE
Status: COMPLETED | OUTPATIENT
Start: 2023-06-05 | End: 2023-06-05

## 2023-06-05 RX ADMIN — LIDOCAINE HYDROCHLORIDE 1 ML: 10 INJECTION, SOLUTION EPIDURAL; INFILTRATION; INTRACAUDAL; PERINEURAL at 16:05

## 2023-06-05 RX ADMIN — METHYLPREDNISOLONE ACETATE 80 MG: 80 INJECTION, SUSPENSION INTRA-ARTICULAR; INTRALESIONAL; INTRAMUSCULAR; SOFT TISSUE at 16:05

## 2023-06-05 RX ADMIN — LIDOCAINE HYDROCHLORIDE 1 ML: 10 INJECTION, SOLUTION EPIDURAL; INFILTRATION; INTRACAUDAL; PERINEURAL at 16:06

## 2023-06-05 RX ADMIN — METHYLPREDNISOLONE ACETATE 80 MG: 80 INJECTION, SUSPENSION INTRA-ARTICULAR; INTRALESIONAL; INTRAMUSCULAR; SOFT TISSUE at 16:06

## 2023-06-05 NOTE — PROGRESS NOTES
Zulema Sousa is here today for worsening Bilateral knee pain. Knee injection was discussed with the patient in detail, including indication, risks, benefits, and alternatives. Verbal consent was given for the procedure. Injection site was aseptically prepared.    Radiology:   AP, lateral, 40 degree PA of the bilateral knee obtained in the office today due to pain, with comparison views shows advanced tricompartmental degenerative changes, most significantly  lateral and patellofemoral with osteophyte formation and subchondral sclerosis compartment      KNEE Injection Procedure Note:    Large Joint Arthrocentesis: R knee  Date/Time: 6/5/2023 4:05 PM  Consent given by: patient  Site marked: site marked  Timeout: Immediately prior to procedure a time out was called to verify the correct patient, procedure, equipment, support staff and site/side marked as required   Supporting Documentation  Indications: pain, joint swelling and diagnostic evaluation   Procedure Details  Location: knee - R knee  Preparation: Patient was prepped and draped in the usual sterile fashion  Needle gauge: 21G.  Medications administered: 80 mg methylPREDNISolone acetate 80 MG/ML; 1 mL lidocaine PF 1% 1 %  Patient tolerance: patient tolerated the procedure well with no immediate complications      Large Joint Arthrocentesis: L knee  Date/Time: 6/5/2023 4:06 PM  Consent given by: patient  Site marked: site marked  Timeout: Immediately prior to procedure a time out was called to verify the correct patient, procedure, equipment, support staff and site/side marked as required   Supporting Documentation  Indications: pain   Procedure Details  Location: knee - L knee  Preparation: Patient was prepped and draped in the usual sterile fashion  Needle gauge: 21G.  Medications administered: 1 mL lidocaine PF 1% 1 %; 80 mg methylPREDNISolone acetate 80 MG/ML  Patient tolerance: patient tolerated the procedure well with no immediate  complications    Zulema Sousa tolerated the procedure well. A Bandage was applied to the injection site. At the conclusion of the injection I discussed the importance of continued quad strengthening exercises on a daily basis. I will see the patient back if the symptoms should fail to improve or worsen.    Beryl Hartley, APRN  6/5/2023    Dictated Utilizing Dragon Dictation

## 2023-06-07 ENCOUNTER — TELEPHONE (OUTPATIENT)
Dept: INTERNAL MEDICINE | Facility: CLINIC | Age: 63
End: 2023-06-07
Payer: COMMERCIAL

## 2023-06-12 RX ORDER — OMEPRAZOLE 40 MG/1
40 CAPSULE, DELAYED RELEASE ORAL DAILY
Qty: 90 CAPSULE | Refills: 3 | Status: SHIPPED | OUTPATIENT
Start: 2023-06-12

## 2023-06-12 NOTE — TELEPHONE ENCOUNTER
Rx Refill Note  Requested Prescriptions     Pending Prescriptions Disp Refills    omeprazole (priLOSEC) 40 MG capsule 90 capsule 3     Sig: Take 1 capsule by mouth Daily.      Last office visit with prescribing clinician: 3/24/2023   Last telemedicine visit with prescribing clinician: Visit date not found   Next office visit with prescribing clinician: 6/23/2023                         Would you like a call back once the refill request has been completed: [] Yes [] No    If the office needs to give you a call back, can they leave a voicemail: [] Yes [] No    Ginna Barnes MA  06/12/23, 09:14 EDT

## 2023-06-17 LAB
ALBUMIN SERPL-MCNC: 4.4 G/DL (ref 3.5–5.2)
ALBUMIN/GLOB SERPL: 1.7 G/DL
ALP SERPL-CCNC: 89 U/L (ref 39–117)
ALT SERPL-CCNC: 21 U/L (ref 1–33)
AST SERPL-CCNC: 20 U/L (ref 1–32)
BASOPHILS # BLD AUTO: 0.01 10*3/MM3 (ref 0–0.2)
BASOPHILS NFR BLD AUTO: 0.1 % (ref 0–1.5)
BILIRUB SERPL-MCNC: 0.3 MG/DL (ref 0–1.2)
BUN SERPL-MCNC: 22 MG/DL (ref 8–23)
BUN/CREAT SERPL: 28.9 (ref 7–25)
CALCIUM SERPL-MCNC: 9.7 MG/DL (ref 8.6–10.5)
CHLORIDE SERPL-SCNC: 106 MMOL/L (ref 98–107)
CHOLEST SERPL-MCNC: 178 MG/DL (ref 0–200)
CO2 SERPL-SCNC: 26 MMOL/L (ref 22–29)
CREAT SERPL-MCNC: 0.76 MG/DL (ref 0.57–1)
EGFRCR SERPLBLD CKD-EPI 2021: 87.6 ML/MIN/1.73
EOSINOPHIL # BLD AUTO: 0.09 10*3/MM3 (ref 0–0.4)
EOSINOPHIL NFR BLD AUTO: 1.2 % (ref 0.3–6.2)
ERYTHROCYTE [DISTWIDTH] IN BLOOD BY AUTOMATED COUNT: 13.4 % (ref 12.3–15.4)
GLOBULIN SER CALC-MCNC: 2.6 GM/DL
GLUCOSE SERPL-MCNC: 93 MG/DL (ref 65–99)
HBA1C MFR BLD: 5.5 % (ref 4.8–5.6)
HCT VFR BLD AUTO: 39.2 % (ref 34–46.6)
HDLC SERPL-MCNC: 79 MG/DL (ref 40–60)
HGB BLD-MCNC: 12.8 G/DL (ref 12–15.9)
IMM GRANULOCYTES # BLD AUTO: 0.02 10*3/MM3 (ref 0–0.05)
IMM GRANULOCYTES NFR BLD AUTO: 0.3 % (ref 0–0.5)
LDLC SERPL CALC-MCNC: 89 MG/DL (ref 0–100)
LDLC/HDLC SERPL: 1.13 {RATIO}
LYMPHOCYTES # BLD AUTO: 2.92 10*3/MM3 (ref 0.7–3.1)
LYMPHOCYTES NFR BLD AUTO: 37.7 % (ref 19.6–45.3)
MCH RBC QN AUTO: 29.8 PG (ref 26.6–33)
MCHC RBC AUTO-ENTMCNC: 32.7 G/DL (ref 31.5–35.7)
MCV RBC AUTO: 91.4 FL (ref 79–97)
MONOCYTES # BLD AUTO: 0.64 10*3/MM3 (ref 0.1–0.9)
MONOCYTES NFR BLD AUTO: 8.3 % (ref 5–12)
NEUTROPHILS # BLD AUTO: 4.06 10*3/MM3 (ref 1.7–7)
NEUTROPHILS NFR BLD AUTO: 52.4 % (ref 42.7–76)
NRBC BLD AUTO-RTO: 0 /100 WBC (ref 0–0.2)
PLATELET # BLD AUTO: 294 10*3/MM3 (ref 140–450)
POTASSIUM SERPL-SCNC: 4.3 MMOL/L (ref 3.5–5.2)
PROT SERPL-MCNC: 7 G/DL (ref 6–8.5)
RBC # BLD AUTO: 4.29 10*6/MM3 (ref 3.77–5.28)
SODIUM SERPL-SCNC: 145 MMOL/L (ref 136–145)
TRIGL SERPL-MCNC: 47 MG/DL (ref 0–150)
VLDLC SERPL CALC-MCNC: 10 MG/DL (ref 5–40)
WBC # BLD AUTO: 7.74 10*3/MM3 (ref 3.4–10.8)

## 2023-07-17 ENCOUNTER — TELEPHONE (OUTPATIENT)
Dept: ORTHOPEDIC SURGERY | Facility: CLINIC | Age: 63
End: 2023-07-17

## 2023-07-17 NOTE — TELEPHONE ENCOUNTER
Provider: MAKAYLA  Caller: DELIA RADER  Relationship to Patient: PATIENT  Pharmacy: N/A  Phone Number: [phone]  Reason for Call: PATIENT WAS SEEN AT [facility] 7.16.23 FOR LT ARM PAIN AND NEEDS TO BE SEEN BY LORI BEFORE FIRST AVAILABLE (7.26.23) SINCE SHE HAS ALREADY LOST TIME FOR HER JOB  When was the patient last seen: NA

## 2023-07-19 ENCOUNTER — TELEPHONE (OUTPATIENT)
Dept: ORTHOPEDIC SURGERY | Facility: CLINIC | Age: 63
End: 2023-07-19

## 2023-07-19 NOTE — TELEPHONE ENCOUNTER
Caller: Zulema Sousa    Relationship to patient: Self    Best call back number: [phone]    Patient is needing: PATIENT WOULD LIKE WORK EXCUSE LETTER TO BE  E-MAILED AT [email or web address] OR UPLOADED IN MY CHART IF FIRST OPTION IS NOT AVAILABLE.

## 2023-07-24 RX ORDER — SOLIFENACIN SUCCINATE 10 MG/1
10 TABLET, FILM COATED ORAL DAILY
Qty: 90 TABLET | Refills: 3 | Status: CANCELLED | OUTPATIENT
Start: 2023-07-24

## 2023-07-27 ENCOUNTER — TREATMENT (OUTPATIENT)
Dept: PHYSICAL THERAPY | Facility: CLINIC | Age: 63
End: 2023-07-27
Payer: COMMERCIAL

## 2023-07-27 DIAGNOSIS — M54.12 CERVICAL RADICULITIS: ICD-10-CM

## 2023-07-27 DIAGNOSIS — R68.89 DECREASED FUNCTIONAL ACTIVITY TOLERANCE: ICD-10-CM

## 2023-07-27 DIAGNOSIS — M47.812 NECK ARTHRITIS: Primary | ICD-10-CM

## 2023-07-27 PROCEDURE — 97140 MANUAL THERAPY 1/> REGIONS: CPT | Performed by: PHYSICAL THERAPIST

## 2023-07-27 PROCEDURE — 97161 PT EVAL LOW COMPLEX 20 MIN: CPT | Performed by: PHYSICAL THERAPIST

## 2023-07-27 PROCEDURE — 97530 THERAPEUTIC ACTIVITIES: CPT | Performed by: PHYSICAL THERAPIST

## 2023-07-27 PROCEDURE — 97014 ELECTRIC STIMULATION THERAPY: CPT | Performed by: PHYSICAL THERAPIST

## 2023-07-28 ENCOUNTER — TREATMENT (OUTPATIENT)
Dept: PHYSICAL THERAPY | Facility: CLINIC | Age: 63
End: 2023-07-28
Payer: COMMERCIAL

## 2023-07-28 DIAGNOSIS — R68.89 DECREASED FUNCTIONAL ACTIVITY TOLERANCE: ICD-10-CM

## 2023-07-28 DIAGNOSIS — M54.12 CERVICAL RADICULITIS: ICD-10-CM

## 2023-07-28 DIAGNOSIS — M47.812 NECK ARTHRITIS: Primary | ICD-10-CM

## 2023-07-28 PROCEDURE — 97014 ELECTRIC STIMULATION THERAPY: CPT | Performed by: PHYSICAL THERAPIST

## 2023-07-28 PROCEDURE — 97140 MANUAL THERAPY 1/> REGIONS: CPT | Performed by: PHYSICAL THERAPIST

## 2023-07-31 ENCOUNTER — TREATMENT (OUTPATIENT)
Dept: PHYSICAL THERAPY | Facility: CLINIC | Age: 63
End: 2023-07-31
Payer: COMMERCIAL

## 2023-07-31 ENCOUNTER — OFFICE VISIT (OUTPATIENT)
Dept: INTERNAL MEDICINE | Facility: CLINIC | Age: 63
End: 2023-07-31
Payer: COMMERCIAL

## 2023-07-31 VITALS
OXYGEN SATURATION: 97 % | BODY MASS INDEX: 47.43 KG/M2 | HEIGHT: 64 IN | WEIGHT: 277.8 LBS | DIASTOLIC BLOOD PRESSURE: 82 MMHG | TEMPERATURE: 97.5 F | SYSTOLIC BLOOD PRESSURE: 130 MMHG | HEART RATE: 83 BPM

## 2023-07-31 DIAGNOSIS — R68.89 DECREASED FUNCTIONAL ACTIVITY TOLERANCE: ICD-10-CM

## 2023-07-31 DIAGNOSIS — M54.12 CERVICAL RADICULOPATHY: Primary | ICD-10-CM

## 2023-07-31 DIAGNOSIS — R73.03 PREDIABETES: ICD-10-CM

## 2023-07-31 DIAGNOSIS — M79.602 LEFT ARM PAIN: ICD-10-CM

## 2023-07-31 DIAGNOSIS — M47.812 NECK ARTHRITIS: Primary | ICD-10-CM

## 2023-07-31 DIAGNOSIS — M54.12 CERVICAL RADICULITIS: ICD-10-CM

## 2023-07-31 PROCEDURE — 97140 MANUAL THERAPY 1/> REGIONS: CPT | Performed by: PHYSICAL THERAPIST

## 2023-07-31 PROCEDURE — 99214 OFFICE O/P EST MOD 30 MIN: CPT | Performed by: INTERNAL MEDICINE

## 2023-07-31 RX ORDER — SOLIFENACIN SUCCINATE 10 MG/1
10 TABLET, FILM COATED ORAL DAILY
Qty: 90 TABLET | Refills: 3 | OUTPATIENT
Start: 2023-07-31

## 2023-07-31 RX ORDER — CYCLOBENZAPRINE HCL 5 MG
5 TABLET ORAL 3 TIMES DAILY PRN
Qty: 30 TABLET | Refills: 0 | Status: SHIPPED | OUTPATIENT
Start: 2023-07-31

## 2023-07-31 RX ORDER — SEMAGLUTIDE 0.68 MG/ML
0.5 INJECTION, SOLUTION SUBCUTANEOUS WEEKLY
Qty: 9 ML | Refills: 1 | Status: SHIPPED | OUTPATIENT
Start: 2023-07-31

## 2023-07-31 RX ORDER — HYDROCODONE BITARTRATE AND ACETAMINOPHEN 5; 325 MG/1; MG/1
1 TABLET ORAL 2 TIMES DAILY PRN
Qty: 20 TABLET | Refills: 0 | Status: SHIPPED | OUTPATIENT
Start: 2023-07-31

## 2023-07-31 RX ORDER — CYCLOBENZAPRINE HCL 5 MG
5 TABLET ORAL 3 TIMES DAILY PRN
COMMUNITY
End: 2023-07-31 | Stop reason: SDUPTHER

## 2023-08-04 ENCOUNTER — OFFICE VISIT (OUTPATIENT)
Dept: ORTHOPEDIC SURGERY | Facility: CLINIC | Age: 63
End: 2023-08-04
Payer: COMMERCIAL

## 2023-08-04 VITALS — TEMPERATURE: 97.5 F | WEIGHT: 278 LBS | HEIGHT: 64 IN | BODY MASS INDEX: 47.46 KG/M2

## 2023-08-04 DIAGNOSIS — M54.12 CERVICAL RADICULOPATHY AT C7: Primary | ICD-10-CM

## 2023-08-04 PROCEDURE — 99214 OFFICE O/P EST MOD 30 MIN: CPT | Performed by: NURSE PRACTITIONER

## 2023-08-08 DIAGNOSIS — M79.602 LEFT ARM PAIN: ICD-10-CM

## 2023-08-08 RX ORDER — HYDROCODONE BITARTRATE AND ACETAMINOPHEN 5; 325 MG/1; MG/1
1 TABLET ORAL 2 TIMES DAILY PRN
Qty: 60 TABLET | Refills: 0 | Status: SHIPPED | OUTPATIENT
Start: 2023-08-08

## 2023-08-14 DIAGNOSIS — M54.12 CERVICAL RADICULOPATHY AT C7: Primary | ICD-10-CM

## 2023-08-15 ENCOUNTER — TELEPHONE (OUTPATIENT)
Dept: ORTHOPEDIC SURGERY | Facility: CLINIC | Age: 63
End: 2023-08-15
Payer: COMMERCIAL

## 2023-08-15 DIAGNOSIS — M54.12 CERVICAL RADICULOPATHY AT C7: ICD-10-CM

## 2023-08-16 ENCOUNTER — TELEPHONE (OUTPATIENT)
Dept: ORTHOPEDIC SURGERY | Facility: CLINIC | Age: 63
End: 2023-08-16
Payer: COMMERCIAL

## 2023-08-16 DIAGNOSIS — M54.12 CERVICAL RADICULOPATHY AT C7: Primary | ICD-10-CM

## 2023-08-16 NOTE — TELEPHONE ENCOUNTER
I tried calling and left voicemail to call back for results.  Let her know the MRI shows multiple levels of arthritis but no significant central canal compression meaning no threat to the spinal cord.  She does have several levels of nerve root impingement and I think she is most symptomatic from the impingement between the sixth and seventh bone.  Recommend that she finish out the physical therapy and if she wants, we can refer her to pain management at [facility] to to consider injections in the form of epidurals but they would require clearance to come off of her anticoagulation.

## 2023-08-23 ENCOUNTER — TELEPHONE (OUTPATIENT)
Dept: PAIN MEDICINE | Facility: CLINIC | Age: 63
End: 2023-08-23
Payer: COMMERCIAL

## 2023-08-24 ENCOUNTER — OFFICE VISIT (OUTPATIENT)
Dept: PAIN MEDICINE | Facility: CLINIC | Age: 63
End: 2023-08-24
Payer: COMMERCIAL

## 2023-08-24 VITALS
BODY MASS INDEX: 48.18 KG/M2 | TEMPERATURE: 97.7 F | HEIGHT: 64 IN | WEIGHT: 282.2 LBS | RESPIRATION RATE: 18 BRPM | OXYGEN SATURATION: 95 % | HEART RATE: 84 BPM | DIASTOLIC BLOOD PRESSURE: 90 MMHG | SYSTOLIC BLOOD PRESSURE: 134 MMHG

## 2023-08-24 DIAGNOSIS — M47.812 ARTHROPATHY OF CERVICAL FACET JOINT: ICD-10-CM

## 2023-08-24 DIAGNOSIS — M48.02 CERVICAL SPINAL STENOSIS: ICD-10-CM

## 2023-08-24 DIAGNOSIS — M54.12 CERVICAL RADICULOPATHY: Primary | ICD-10-CM

## 2023-08-24 DIAGNOSIS — M50.30 DDD (DEGENERATIVE DISC DISEASE), CERVICAL: ICD-10-CM

## 2023-08-24 RX ORDER — GABAPENTIN 300 MG/1
300 CAPSULE ORAL 3 TIMES DAILY
Qty: 60 CAPSULE | Refills: 0 | Status: SHIPPED | OUTPATIENT
Start: 2023-08-24

## 2023-08-24 NOTE — PROGRESS NOTES
"CHIEF COMPLAINT  neck pain     Subjective   Zulema Sousa is a 63 y.o. female.   She presents to the office for initial evaluation of neck and upper extremity pain. She was referred here by LEDY Pennington.  This patient states that she initially woke up with a \"stiff neck\" 1 July and went to a chiropractor with initial improvement.  She states that with one of the manipulations she then had severe pain following that session radiating from the left side of the cervical spine into the left upper extremity terminating at the hand with concomitant numbness and paresthesias affecting different parts of the arm.  The patient states that she is also begun to note slight weakness affecting the left upper extremity and also having some pain radiating into the right shoulder.  She finds that her arm pain is significantly greater than her neck pain.  Pain is described as a sharp and stabbing sensation which will occasionally shoot down the arm.    Denies any history of cervical or lumbar spine surgery.    The patient has not had any type of interventional pain procedures for the neck however she has undergone previous LESI's at [facility] for lumbar spine pain.    She has completed 3 formalized courses of physical therapy and is going to initiate another trial of PT for cervical traction and TENS unit.  Patient has utilized ice and heat therapy with short-term relief.    He is currently anticoagulated on Xarelto with history of pulmonary emboli x2 however the last occurrence was in 2017.  Patient also has history of cardiac tamponade.    Pain today 5/10 VAS in severity.    Neck Pain   This is a new problem. The current episode started more than 1 month ago. The problem occurs constantly. The problem has been gradually worsening. The pain is associated with a sleep position. The pain is present in the left side. The quality of the pain is described as burning, shooting and stabbing. The pain is at a severity of 5/10. The pain " is moderate. Nothing aggravates the symptoms. The pain is Same all the time. Associated symptoms include numbness, tingling and weakness (left arm). She has tried heat, home exercises, ice and NSAIDs for the symptoms. The treatment provided no relief.      PEG Assessment   What number best describes your pain on average in the past week?7  What number best describes how, during the past week, pain has interfered with your enjoyment of life?5  What number best describes how, during the past week, pain has interfered with your general activity?  5        Current Outpatient Medications:     HYDROcodone-acetaminophen (Norco) 5-325 MG per tablet, Take 1 tablet by mouth 2 (Two) Times a Day As Needed for Moderate Pain or Severe Pain., Disp: 60 tablet, Rfl: 0    multivitamin (THERAGRAN) tablet tablet, Take  by mouth Daily., Disp: , Rfl:     omeprazole (priLOSEC) 40 MG capsule, Take 1 capsule by mouth Daily., Disp: 90 capsule, Rfl: 3    rivaroxaban (Xarelto) 10 MG tablet, Take 1 tablet by mouth Daily., Disp: 90 tablet, Rfl: 2    rosuvastatin (Crestor) 5 MG tablet, Take 1 tablet by mouth Daily., Disp: 90 tablet, Rfl: 1    Semaglutide,0.25 or 0.5MG/DOS, (Ozempic, 0.25 or 0.5 MG/DOSE,) 2 MG/3ML solution pen-injector, Inject 0.5 mg under the skin into the appropriate area as directed 1 (One) Time Per Week., Disp: 9 mL, Rfl: 1    Sodium Fluoride (Sodium Fluoride 5000 Plus) 1.1 % cream, Use daily as directed to brush teeth, Disp: 51 g, Rfl: 2    solifenacin (VESICARE) 10 MG tablet, Take 1 tablet by mouth Daily., Disp: 90 tablet, Rfl: 3    gabapentin (NEURONTIN) 300 MG capsule, Take 1 capsule by mouth 3 (Three) Times a Day., Disp: 60 capsule, Rfl: 0    The following portions of the patient's history were reviewed and updated as appropriate: allergies, current medications, past family history, past medical history, past social history, past surgical history, and problem list.      REVIEW OF PERTINENT MEDICAL  "DATA                Review of Systems   Gastrointestinal:  Positive for constipation. Negative for diarrhea.   Genitourinary:  Negative for difficulty urinating.   Musculoskeletal:  Positive for neck pain.   Neurological:  Positive for tingling, weakness (left arm) and numbness.   Psychiatric/Behavioral:  Positive for sleep disturbance. Negative for suicidal ideas. The patient is not nervous/anxious.      I have reviewed and confirmed the accuracy of the ROS as documented by the MA/LPN/RN RAMON Cr    Vitals:    08/24/23 0811   BP: 134/90   Pulse: 84   Resp: 18   Temp: 97.7 øF (36.5 øC)   SpO2: 95%   Weight: 128 kg (282 lb 3.2 oz)   Height: 162.6 cm (64.02\")   PainSc:   5   PainLoc: Neck         Objective       Physical Exam  Vitals and nursing note reviewed.   Constitutional:       Appearance: Normal appearance. She is obese.   HENT:      Head: Normocephalic.   Pulmonary:      Effort: Pulmonary effort is normal.   Musculoskeletal:      Cervical back: Spasms and tenderness present. Pain with movement (Spurling positive on left), spinous process tenderness and muscular tenderness present. Decreased range of motion.        Back:    Skin:     General: Skin is warm and dry.   Neurological:      General: No focal deficit present.      Mental Status: She is alert and oriented to person, place, and time.      Cranial Nerves: Cranial nerves 2-12 are intact.      Sensory: Sensation is intact.      Motor: Motor function is intact.      Gait: Gait is intact.   Psychiatric:         Mood and Affect: Mood normal.         Behavior: Behavior normal.         Thought Content: Thought content normal.         Judgment: Judgment normal.       Assessment & Plan   Diagnoses and all orders for this visit:    1. Cervical radiculopathy (Primary)  -     gabapentin (NEURONTIN) 300 MG capsule; Take 1 capsule by mouth 3 (Three) Times a Day.  Dispense: 60 capsule; Refill: 0    2. DDD (degenerative disc disease), cervical    3. " Arthropathy of cervical facet joint    4. Cervical spinal stenosis        --- Zulema Sousa reports a pain score of 5.  Given her pain assessment as noted, treatment options were discussed and the following options were decided upon as a follow-up plan to address the patient's pain: educational materials on pain management, prescription for non-opiod analgesics, steroid injections, and use of non-medical modalities (ice, heat, stretching and/or behavior modifications).    --- Based on this patient's physical exam and MRI findings I would recommend proceeding with #1 diagnostic C6/7 PIPE with a left paramedian approach to address the left upper extremity radicular symptoms.  The risk and benefits of the procedure been discussed with the patient and she would like to proceed.  --- The patient is anticoagulated with Xarelto therefore I will obtain approval for her to hold Xarelto 72 hours prior to the injection and she would resume it 24 hours afterwards.  Once that approval has been obtained we would then contact the patient for scheduling.  --- Follow-up 4 weeks after injective therapy  --- I will institute a trial of gabapentin 300 mg p.o. twice daily if tolerated for the radicular symptoms.      Pain / Disability Scale    The scale used for measurement of pain and/or disability for this patient was the [place] back pain disability scale.  The score was 50 on 08/24/2023      --------    Anticoagulation risks for procedures....   - there are risks to discontinuation of anticoagulants for neuraxial procedures and surgery.  Risks include but are not limited to deep vein thromboses, pulmonary emboli, myocardial infarction, and stroke    - Neuraxial access with a needle is relatively contraindicated while anticoagulated because of the risk of hemorrhage and/or epidural hematoma, which can be a neurosurgical emergency to evacuate bleeding.  Left unchecked, bleeding can cause nerve damage leading to paralysis and/or  death.  --------          Risk of serious complications from epidural injections:  best estimates of incidence (updated September 2021)  - These statistics are derived from several academic publications.    - The disclaimer is that this may not be a completely exhaustive list, and this does not address common side effects from procedures such as postanesthesia care unit nausea, procedural site soreness, numbness from local anesthetic numbing medication, etc.    - This list does comprehensively hope to address reasonable potential serious complications from these interventional neuraxial procedures, and thankfully all are quite rare.    - Safety from interventional pain procedures is the product of appropriate skill and training, and this includes the entire medical and nursing team understanding of these procedures and the process, fellowship trained and board certified interventional pain specialists, and use of appropriate imaging modalities to confirm the appropriate application of the techniques  -Overall incidence of any serious complication that includes temporary and reversible complications has been estimated at 0.11%.  Overall incidence of serious complications requiring additional medical treatment is estimated at <1 in  20,000 (0.02%)   -Incidence of an infection after a procedure is estimated at <1:50,000, and risk of a severe infection such as epidural abscess estimated at less than 1 in 500,000  -Risk of severe bleeding, epidural hematoma, is somewhere between 1 and 150,000-220,000  - Temporary numbness or short-term paralysis after injection is very rare.  At this time I have not been able to find a definitive number on reversible nerve problems after injection.  There have been 114 total claims of paralysis after epidural injection in the [place] over the past 30 years.  In context, there are about 9 million epidural injections performed in the [place] every year.  - Risk of death after  "procedure is so incredibly rare and cannot be estimated.  There are statistics about maternal mortality when epidurals are used in the obstetric setting, but these numbers are not relevant to interventional pain  -Risk of bone loss or osteoporosis from epidural steroid injections has not been shown to be an independent risk factor.  This was noted from research from the journal Pain Physician in 2012.  In our practice, we still want to be careful not to give too many steroids too often and avoid high annual total doses of steroids  -Inpatient see you have significant depression or phylicia are other psychiatric comorbidities, there is a less than 1% chance of the spacings having mood problems after an epidural steroid injection.  We have to weigh that risk, which is somewhere between 0.01% and 0.1% with the risk of worsening of the psychiatric comorbidities because of depressive symptoms associated with chronic pain or severe pain.  There has been one case report of a person without any psychiatric history having what was called \"steroid phylicia\" after having epidural steroid injection.  -With regards to blood glucose levels, we have long been concerned about hyperglycemia after giving steroids.  There has not been shown to be any association with increasing blood sugar or increasing risk of diabetes in patients who do not already have diabetes.  There is evidence that patients with diabetes may have increased blood sugar levels for 1 to 2 days after an epidural steroid injection, and this may be a  point increase, but it resolves within 24 hours.  There is no association with increasing hemoglobin A1c with epidural steroid injections which is reassuring regarding short or even long-term detrimental effects even in patients with diabetes  -Steroids given in the epidural space do not seem to affect the adrenal glands in the same way that steroids do that are given intravenously or in muscular injections or an pill " form to the GI tract.  Adrenal gland suppression from epidural steroids is shown to be quite rare, and temporary when it does occur.  -Risk of dural puncture, which is a leak of spinal fluid, is estimated somewhere between 0.1% and 1%.  Again this data includes epidural injections being placed in the obstetric setting as well as interventional pain setting, and it is the opinion of this practice that with the assistance of image guidance in a controlled setting that the risk of a dural puncture is lower than it would be as compared to using nonimage guided techniques placing an epidural in a woman in labor.     OMAIRA REPORT    As part of the patient's treatment plan, I am prescribing controlled substances. The patient has been made aware of appropriate use of such medications, including potential risk of somnolence, limited ability to drive and/or work safely, and the potential for dependence or overdose. It has also bee made clear that these medications are for use by this patient only, without concomitant use of alcohol or other substances unless prescribed.     Patient has completed prescribing agreement detailing terms of continued prescribing of controlled substances, including monitoring OMAIRA reports, urine drug screening, and pill counts if necessary. The patient is aware that inappropriate use will results in cessation of prescribing such medications.    OMAIRA report has been reviewed and scanned into the patient's chart.    As the clinician, I personally reviewed the OMAIRA from 8/24/23 while the patient was in the office today.    History and physical exam exhibit continued safe and appropriate use of controlled substances.       Dictated utilizing Dragon dictation.

## 2023-08-25 ENCOUNTER — TELEPHONE (OUTPATIENT)
Dept: OBSTETRICS AND GYNECOLOGY | Facility: CLINIC | Age: 63
End: 2023-08-25

## 2023-08-25 NOTE — TELEPHONE ENCOUNTER
Caller: SousaZulema    Relationship: Self    Best call back number: [phone]     Requested Prescriptions: solifenacin (VESICARE) 10 MG tablet [[id number]] (Order [id number])   Requested Prescriptions      No prescriptions requested or ordered in this encounter        Pharmacy where request should be sent:  [facility]     Last office visit with prescribing clinician: Visit date not found   Last telemedicine visit with prescribing clinician: Visit date not found   Next office visit with prescribing clinician: UMM BENAVIDES, APRN 09/22- ANNUAL LAST AE 2021    Additional details provided by patient: PT CALLING TO REQUEST REFILL, SHE IS HAS BEEN OUT OF THIS MEDICATION AND STATES SHE'S HAD ISSUES WITH GETTING UP THROUGHOUT THE NIGHT, TROUBLE SLEEPING, PLEASE ADVISE, ALL VISITS HAVE BEEN SCHEDULED     Does the patient have less than a 3 day supply:  [x] Yes  [] No    Would you like a call back once the refill request has been completed: [x] Yes [] No    If the office needs to give you a call back, can they leave a voicemail: [x] Yes [] No    Dianne Keith Rep   08/25/23 10:39 EDT

## 2023-08-28 RX ORDER — SOLIFENACIN SUCCINATE 10 MG/1
10 TABLET, FILM COATED ORAL DAILY
Qty: 90 TABLET | Refills: 3 | Status: SHIPPED | OUTPATIENT
Start: 2023-08-28

## 2023-08-29 ENCOUNTER — PREP FOR SURGERY (OUTPATIENT)
Dept: SURGERY | Facility: SURGERY CENTER | Age: 63
End: 2023-08-29
Payer: COMMERCIAL

## 2023-08-29 ENCOUNTER — TELEPHONE (OUTPATIENT)
Dept: PAIN MEDICINE | Facility: CLINIC | Age: 63
End: 2023-08-29
Payer: COMMERCIAL

## 2023-08-29 DIAGNOSIS — M54.12 CERVICAL RADICULOPATHY: Primary | ICD-10-CM

## 2023-08-29 NOTE — TELEPHONE ENCOUNTER
----- Message from Temi Hutchinson MD sent at 8/25/2023 10:14 AM EDT -----      Regarding: RE: Xarelto Clearance  That's fine.   Thanks    ----- Message -----  From: Maxwell Davis PA  Sent: 8/24/2023   5:23 PM EDT  To: Temi Hutchinson MD  Subject: Xarelto Clearance                                Dr Hutchinson,    I had the pleasure of seeing your patient, Zulema Sousa, in my pain clinic this morning for cervical spine and arm pain.    We are planning to proceed with a cervical epidural steroid injection.  Zulema will need clearance to hold her Xarelto for 72 hours prior to the injection and resume it 24 hours afterwards.    Please advise if this is approved.    Thank you!    Maxwell Davis PA-C

## 2023-09-05 ENCOUNTER — CLINICAL SUPPORT (OUTPATIENT)
Dept: ORTHOPEDIC SURGERY | Facility: CLINIC | Age: 63
End: 2023-09-05
Payer: COMMERCIAL

## 2023-09-05 VITALS — TEMPERATURE: 97.3 F | BODY MASS INDEX: 40.63 KG/M2 | HEIGHT: 64 IN | WEIGHT: 238 LBS

## 2023-09-05 DIAGNOSIS — M25.561 PAIN IN BOTH KNEES, UNSPECIFIED CHRONICITY: ICD-10-CM

## 2023-09-05 DIAGNOSIS — M17.0 PRIMARY OSTEOARTHRITIS OF BOTH KNEES: Primary | ICD-10-CM

## 2023-09-05 DIAGNOSIS — M25.562 PAIN IN BOTH KNEES, UNSPECIFIED CHRONICITY: ICD-10-CM

## 2023-09-05 RX ORDER — METHYLPREDNISOLONE ACETATE 80 MG/ML
1 INJECTION, SUSPENSION INTRA-ARTICULAR; INTRALESIONAL; INTRAMUSCULAR; SOFT TISSUE
Status: COMPLETED | OUTPATIENT
Start: 2023-09-05 | End: 2023-09-05

## 2023-09-05 RX ORDER — LIDOCAINE HYDROCHLORIDE 10 MG/ML
2 INJECTION, SOLUTION EPIDURAL; INFILTRATION; INTRACAUDAL; PERINEURAL
Status: COMPLETED | OUTPATIENT
Start: 2023-09-05 | End: 2023-09-05

## 2023-09-05 RX ADMIN — LIDOCAINE HYDROCHLORIDE 2 ML: 10 INJECTION, SOLUTION EPIDURAL; INFILTRATION; INTRACAUDAL; PERINEURAL at 15:43

## 2023-09-05 RX ADMIN — METHYLPREDNISOLONE ACETATE 1 ML: 80 INJECTION, SUSPENSION INTRA-ARTICULAR; INTRALESIONAL; INTRAMUSCULAR; SOFT TISSUE at 15:43

## 2023-09-05 NOTE — PROGRESS NOTES
[facility]  Follow Up Visit      Patient Name: Zulema Sousa  : 1960  Primary Care Physician: Marsha Young MD        Chief Complaint: Bilateral knee pain    HPI:   Zulema Sousa is a 63 y.o. year old who presents today for bilateral knee pain.  Patient has a history of chronic bilateral knee pain and known degenerative changes.  She gets intermittent injections with good relief in her symptoms.  This is a patient of Dr. Sy, I am seeing her today for injections as his nurse practitioner is out of the office.    Patient reports that she still gets reasonable relief with the injections she was last seen approximately 3 months ago with no significant changes in the interim.    ROS:  ROS negative except as listed in the HPI.    Allergies:   Allergies   Allergen Reactions    Nsaids Other (See Comments)     DOESN'T TAKE BECAUSE SHES ON BLOOD THINNER.       Medications:  Current Outpatient Medications on File Prior to Visit   Medication Sig    gabapentin (NEURONTIN) 300 MG capsule Take 1 capsule by mouth 3 (Three) Times a Day.    HYDROcodone-acetaminophen (Norco) 5-325 MG per tablet Take 1 tablet by mouth 2 (Two) Times a Day As Needed for Moderate Pain or Severe Pain.    multivitamin (THERAGRAN) tablet tablet Take  by mouth Daily.    omeprazole (priLOSEC) 40 MG capsule Take 1 capsule by mouth Daily.    rivaroxaban (Xarelto) 10 MG tablet Take 1 tablet by mouth Daily.    rosuvastatin (Crestor) 5 MG tablet Take 1 tablet by mouth Daily.    Semaglutide,0.25 or 0.5MG/DOS, (Ozempic, 0.25 or 0.5 MG/DOSE,) 2 MG/3ML solution pen-injector Inject 0.5 mg under the skin into the appropriate area as directed 1 (One) Time Per Week.    Sodium Fluoride (Sodium Fluoride 5000 Plus) 1.1 % cream Use daily as directed to brush teeth    solifenacin (VESICARE) 10 MG tablet Take 1 tablet by mouth Daily.     No current facility-administered medications on file prior to visit.       Physical Exam:   63 y.o.  female  Body mass index is 40.85 kg/m²., 108 kg (238 lb)  Vitals:    09/05/23 1554   Temp: 97.3 °F (36.3 °C)     General: Alert, cooperative, appears well and in no observable distress. Appears stated age and BMI as listed above.  Respiratory: Normal respiratory effort.  Skin: Warm & well perfused; appropriate skin turgor.  Psych: Appropriate mood & affect.    MSK Exam:  Skin is clean dry and intact.  No deformities.  No redness or wounds in either knee.  Range of motion is limited by pain.     Radiology:    No new images were needed at the visit today.     Procedure:   See Procedure Note: The potential risks and benefits of performing a diagnostic and therapeutic injection were discussed with the patient prior to procedure. Risks include, but are not limited to infection, swelling, transient increase in pain, bleeding, bruising. Patient was advised that injections are a diagnostic and therapeutic tool meaning they may not alleviate symptoms at all, or may only provide partial or temporary relief. Injection precautions and aftercare discussed. and Patient is currently on anticoagulation and/or a blood thinning agent which poses an increased risk of more significant bleeding or bruising following an injection. While joint injections are generally well tolerated even on AC, the patient was counseled on this increased risk and advised to monitor for signs of bleeding with proper follow up instructions. Injection precautions and aftercare discussed.    [facility] [place] Data/Labs: N/A    Assessment & Plan:    ICD-10-CM ICD-9-CM   1. Primary osteoarthritis of both knees  M17.0 715.16   2. Pain in both knees, unspecified chronicity  M25.561 719.46    M25.562      No orders of the defined types were placed in this encounter.    Orders Placed This Encounter   Procedures    Large Joint Arthrocentesis: R knee    Large Joint Arthrocentesis: L knee     This is a 63-year-old female with chronic bilateral knee pain and known degenerative  changes.  She gets intermittent injections with good relief in her symptoms.  Plan is to proceed with injections again today which she tolerated well she will follow-up with LEDY Chopra in 3 months or sooner if needed.    Return in about 3 months (around 12/5/2023).    Patient encouraged to call with questions or concerns prior to follow up.  Recommend ICE and/or HEAT PRN as discussed.  Will discuss with attending physician as needed.  Consider additional referrals, work up and/or advanced imaging as indicated or if patient fails to respond to conservative care.      Large Joint Arthrocentesis: R knee  Date/Time: 9/5/2023 3:43 PM  Consent given by: patient  Site marked: site marked  Timeout: Immediately prior to procedure a time out was called to verify the correct patient, procedure, equipment, support staff and site/side marked as required   Supporting Documentation  Indications: pain, joint swelling and diagnostic evaluation   Procedure Details  Location: knee - R knee  Preparation: Patient was prepped and draped in the usual sterile fashion  Needle gauge: 21G.  Medications administered: 1 mL methylPREDNISolone acetate 80 MG/ML; 2 mL lidocaine PF 1% 1 %  Patient tolerance: patient tolerated the procedure well with no immediate complications      Large Joint Arthrocentesis: L knee  Date/Time: 9/5/2023 3:43 PM  Consent given by: patient  Site marked: site marked  Timeout: Immediately prior to procedure a time out was called to verify the correct patient, procedure, equipment, support staff and site/side marked as required   Supporting Documentation  Indications: pain   Procedure Details  Location: knee - L knee  Preparation: Patient was prepped and draped in the usual sterile fashion  Needle gauge: 21G.  Medications administered: 1 mL methylPREDNISolone acetate 80 MG/ML; 2 mL lidocaine PF 1% 1 %  Patient tolerance: patient tolerated the procedure well with no immediate complications      Cayetano SINGH  Holland, APRN

## 2023-09-06 ENCOUNTER — HOSPITAL ENCOUNTER (OUTPATIENT)
Dept: PHYSICAL THERAPY | Facility: HOSPITAL | Age: 63
Setting detail: THERAPIES SERIES
Discharge: HOME | End: 2023-09-06
Payer: COMMERCIAL

## 2023-09-06 DIAGNOSIS — M54.12 CERVICAL RADICULOPATHY AT C7: Primary | ICD-10-CM

## 2023-09-06 PROCEDURE — 97012 MECHANICAL TRACTION THERAPY: CPT | Performed by: PHYSICAL THERAPIST

## 2023-09-06 PROCEDURE — 97161 PT EVAL LOW COMPLEX 20 MIN: CPT | Performed by: PHYSICAL THERAPIST

## 2023-09-07 ENCOUNTER — TELEPHONE (OUTPATIENT)
Dept: INTERNAL MEDICINE | Facility: CLINIC | Age: 63
End: 2023-09-07
Payer: COMMERCIAL

## 2023-09-07 NOTE — TELEPHONE ENCOUNTER
I left her a voicemail that we need to change her 12/22 apt Dr. Young will not be in the office that day.

## 2023-09-07 NOTE — THERAPY EVALUATION
Outpatient Physical Therapy Ortho Initial Evaluation  [facility]     Patient Name: Zulema Sousa  : 1960  MRN: [id number]  Today's Date: 2023      Visit Date: 2023    Patient Active Problem List   Diagnosis    Gastroesophageal reflux disease    Edema    Hyperlipidemia    Arthralgia of multiple joints    Osteoarthritis of knee    Pericarditis    Pulmonary embolism    Rheumatoid arthritis    Sciatica    Unintended weight gain    Arthritis of both knees    Obstructive sleep apnea, adult    Dietary counseling    History of DVT (deep vein thrombosis)    Tear of lateral meniscus of right knee, current    Arthritis of right knee    Chronic pain of right knee    Epigastric pain    Abnormal UGI series    Esophageal dilatation    Abnormal finding on radiology exam    Overactive bladder    Right elbow tendonitis    Obesity, Class III, BMI 40-49.9 (morbid obesity)    History of removal of laparoscopic gastric banding device    Cervical radiculopathy    DDD (degenerative disc disease), cervical    Arthropathy of cervical facet joint    Cervical spinal stenosis        Past Medical History:   Diagnosis Date    Acid reflux     Back pain     Cardiac tamponade     2015    Cholelithiasis     Deep vein thrombosis     Difficulty breathing     during exertion    Edema     Esophagitis, reflux     Essential hypertriglyceridemia     GERD (gastroesophageal reflux disease)     Health care maintenance     Heartburn     Hyperlipidemia     Hypertriglyceridemia     Increased appetite     Morbid obesity     Multiple joint pain     Obesity, Class III, BMI 40-49.9 (morbid obesity) 2018    Osteoarthritis of knee     Pericarditis     Pulmonary embolism     Janurary     RHA (rheumatoid arthritis)     Sciatica     Sleep apnea     Unintended weight gain         Past Surgical History:   Procedure Laterality Date    ANKLE SURGERY      treatment of ankle fracture    APPENDECTOMY      CARDIAC SURGERY      cardiac  tamponade    CHOLECYSTECTOMY      COLONOSCOPY N/A 11/27/2018    Procedure: COLONOSCOPY to cecum and t.i. with polypectomy and clip x2 to ascending colon;  Surgeon: Pierre Ornelas MD;  Location: [facility] ENDOSCOPY;  Service: Gastroenterology    DILATATION AND CURETTAGE      ENDOSCOPY N/A 11/27/2018    Procedure: ESOPHAGOGASTRODUODENOSCOPY;  Surgeon: Pierre Ornelas MD;  Location: [facility] ENDOSCOPY;  Service: Gastroenterology    FRACTURE SURGERY      GASTRIC BANDING REMOVAL N/A 07/01/2019    Procedure: GASTRIC BANDING AND PORT REMOVAL, LYSIS OF ADHESIONS, REPAIR OF COLOTOMY, AND INCISIONAL HERNIA REPAIR LAPAROSCOPIC;  Surgeon: Randell Salamanca Jr., MD;  [place]  [facility] [place] [facility] [place]: General    LAPAROSCOPIC GASTRIC BANDING      PULMONARY EMBOLISM SURGERY      TUBAL ABDOMINAL LIGATION         Visit Dx:     ICD-10-CM ICD-9-CM   1. Cervical radiculopathy at C7  M54.12 723.4          Patient History       Row Name 09/06/23 0630             History    Chief Complaint Difficulty with daily activities;Pain;Tinglings  -      Type of Pain Neck pain;Upper Extremity / Arm  left  -      Brief Description of Current Complaint Pt reports she developed neck and left UE pain on 7/11. She says it started as stiffness. SHe was seen by her chiropractor who did a lot of work on her left UE and she says her pain was worse after and the next day she could not lift her arm. She started therapy at our [place] office, but the hours did not work for her schedule. She states the manual therapy/traction was helping her pain. She has decided to come here for her care. She has also had an MRI which showed multilevel DDD, stenosis.  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 Pt has some discomfort with desk type work  -GC         Daily Activities    Primary Language English  -GC      Are you able to read Yes  -GC      Are you able to write Yes  -GC      How does patient learn best? Listening  -GC      Teaching needs identified Home Exercise Program;Management of Condition  -GC      Patient is concerned about/has problems with Performing home management (household chores, shopping, care of dependents);Performing job responsibilities/community activities (work, school,  -GC      Does patient have problems with the following? None  -GC      Barriers to learning None  -GC      Pt Participated in POC and Goals Yes  -GC         Safety    Are you being hurt, hit, or frightened by anyone at home or in your life? No  -GC                User Key  (r) = Recorded By, (t) = Taken By, (c) = Cosigned By      Initials Name Provider Type    GC Justus Tuttle, PT Physical Therapist                     PT Ortho       Row Name 09/06/23 0630       Posture/Observations    Posture/Observations Comments Pt  -GC       DTR- Upper Quarter Clearing    Biceps (C5/6) Right:;2- Normal response  -GC    Brachioradialis (C6) Right:;2- Normal response  -GC    Triceps (C7) Right:;2- Normal response  -GC       Sensory Screen for Light Touch- Upper Quarter Clearing    C4 (posterior shoulder) Right:;Intact  -GC    C5 (lateral upper arm) Right:;Intact  -GC    C6 (tip of thumb) Right:;Intact  -GC    C7 (tip of 3rd finger) Right:;Intact  -GC    C8 (tip of 5th finger) Right:;Intact  -GC    T1 (medial lower arm) Right:;Intact  -GC       Cervical Palpation    Levator Scapula Left:;Tender;Guarded/taut  -GC    Upper Traps Left:;Tender;Guarded/taut  -GC       Cervical/Thoracic Special Tests    Cervical Compression (Forarminal Compression vs. Facet Pain)  Negative  -GC    Cervical Distraction (Foraminal Compression vs. Facet Pain) Negative  -GC    Sharp-Ioana (AA instability) Negative  -GC    Vertebral Artery Test (VBI Sign) Bilateral:;Negative  -GC       Head/Neck/Trunk    Neck Extension AROM 75% range  -GC    Neck Flexion AROM WFL  -GC    Neck Lt Lateral Flexion AROM 75% range  -GC    Neck Rt Lateral Flexion AROM 75% range  -GC    Neck Lt Rotation AROM 75% range  -GC    Neck Rt Rotation AROM WFL  -GC       MMT (Manual Muscle Testing)    General MMT Comments scaption strength is 4+/5  -GC       MMT Left Upper Ext    Lt Shoulder Flexion MMT, Gross Movement (4+/5) good plus  -GC    Lt Shoulder Extension MMT, Gross Movement (5/5) normal  -GC    Lt Shoulder ABduction MMT, Gross Movement (4+/5) good plus  -GC    Lt Shoulder ADduction MMT, Gross Movement (5/5) normal  -GC    Lt Shoulder Internal Rotation MMT, Gross Movement (5/5) normal  -GC    Lt Shoulder External Rotation MMT, Gross Movement (4+/5) good plus  -GC    Lt Elbow Flexion MMT, Gross Movement (5/5) normal  -GC    Lt Elbow Extension MMT, Gross Movement (5/5) normal  -GC    Lt Forearm Supination MMT, Gross Movement (5/5) normal  -GC    Lt Forearm Pronation MMT, Gross Movement (5/5) normal  -GC    Lt Wrist Flexion MMT, Gross Movement (5/5) normal  -GC    Lt Wrist Extension MMT, Gross Movement (5/5) normal  -GC              User Key  (r) = Recorded By, (t) = Taken By, (c) = Cosigned By      Initials Name Provider Type    GC Justus Tuttle, PT Physical Therapist                                Therapy Education  Given: HEP, Symptoms/condition management, Pain management, Posture/body mechanics  Program: New  How Provided: Verbal, Demonstration  Provided to: Patient  Level of Understanding: Teach back education performed, Verbalized, Demonstrated      PT OP Goals       Row Name 09/06/23 0630          PT Short Term Goals    STG Date to Achieve 09/20/23  -     STG 1 Increase upper trap and levator flexibility to WFL  with testing.  -     STG 2 Increase cervical AROM to WFL al planes with testing.  -     STG 3 Pt will be independent with her HEP issued by this therapist.  -        Long Term Goals    LTG Date to Achieve 10/04/23  -     LTG 1 Decrease cervical and UE pain to 0/10 with activity.  -     LTG 2 Increase left shoulder girdle strength to 5/5 all planes with testing.  -     LTG 3 Pt will be independent with all ADLs without pain.  -        Time Calculation    PT Goal Re-Cert Due Date 10/04/23  -               User Key  (r) = Recorded By, (t) = Taken By, (c) = Cosigned By      Initials Name Provider Type     Justus Tuttle, PT Physical Therapist                     PT Assessment/Plan       Row Name 09/06/23 0630          PT Assessment    Functional Limitations Limitations in functional capacity and performance;Limitations in community activities;Performance in work activities  -     Impairments Range of motion;Pain;Muscle strength;Impaired flexibility  -     Assessment Comments Pt presents with a several month history of cervical and left UE pain and symptoms. She rates this pain up to 2/10 and has been getting good pain relief with previous therapy. She still has some decrease in cervical ROM, decreased upper trap and levator flexibility, decreased left shoulder girdle strength, and decreased function secondary to the above.  -     Rehab Potential Good  -     Patient/caregiver participated in establishment of treatment plan and goals Yes  -     Patient would benefit from skilled therapy intervention Yes  -        PT Plan    PT Frequency 1x/week;2x/week  -     Predicted Duration of Therapy Intervention (PT) 4 weeks  -     Planned CPT's? PT EVAL LOW COMPLEXITY: 37881;PT THER PROC EA 15 MIN: 46939;PT MANUAL THERAPY EA 15 MIN: 39048;PT TRACTION CERVICAL: 00354;PT HOT OR COLD PACK TREAT MCARE  -               User Key  (r) = Recorded By, (t) = Taken By, (c) = Cosigned By      Initials Name  Provider Type     Justus Tuttle, KAELA Physical Therapist                     Modalities       Row Name 09/06/23 0630             Moist Heat    MH Applied Yes  -GC      Location cervical spine with pt supine  -GC      PT Moist Heat Minutes 10  -GC      MH Prior to Rx No  -GC         Traction 22212    Traction Type Cervical  -GC      PT Traction Rx Minutes 20  -GC      Duration Intermittent  -GC      Position Supine  -GC      Weight 17  -GC      Hold 120  -GC      Relax 30  -GC      Progression 1  -GC      Regression 3  -GC                User Key  (r) = Recorded By, (t) = Taken By, (c) = Cosigned By      Initials Name Provider Type     Justus Tuttle, PT Physical Therapist                                    Outcome Measure Options: Neck Disability Index (NDI)  Neck Disability Index  Section 1 - Pain Intensity: The pain comes and goes and is moderate.  Section 2 - Personal Care: I can look after myself normally without causing extra pain.  Section 3 - Lifting: Pain prevents me from lifting heavy weights, but I can manage light to medium weights if they are conveniently positioned.  Section 4 - Work: I can only do my usual work, but no more  Section 5 - Headaches: I have no headaches at all.  Section 6 - Concentration: I can concentrate fully when I want to with slight difficulty.  Section 7 - Sleeping: My sleep is slightly disturbed (less than 1 hour sleepless).  Section 8 - Driving: I can drive as long as I want with slight neck pain.  Section 9 - Reading: I can read as much as I want with slight neck pain.  Section 10 - Recreation: I am able to engage in most but not all recreational activities because of pain in my neck.  Neck Disability Index Score: 12      Time Calculation:     Start Time: 0630  Stop Time: 0726  Time Calculation (min): 56 min  Untimed Charges  PT Traction Rx Minutes: 20  PT Moist Heat Minutes: 10  Total Minutes  Untimed Charges Total Minutes: 30   Total Minutes: 30     Therapy Charges for  Today       Code Description Service Date Service Provider Modifiers Qty    56629645761 HC PT EVAL LOW COMPLEXITY 2 9/6/2023 Justus Tuttle, PT GP 1    04316402226 HC PT-TRACTION MECHANICAL 9/6/2023 Justus Tuttle, PT  1            PT G-Codes  Outcome Measure Options: Neck Disability Index (NDI)  Neck Disability Index Score: 12         Justus Tuttle, PT  9/7/2023

## 2023-09-11 ENCOUNTER — HOSPITAL ENCOUNTER (OUTPATIENT)
Dept: PHYSICAL THERAPY | Facility: HOSPITAL | Age: 63
Setting detail: THERAPIES SERIES
Discharge: HOME | End: 2023-09-11
Payer: COMMERCIAL

## 2023-09-11 DIAGNOSIS — M54.12 CERVICAL RADICULOPATHY AT C7: Primary | ICD-10-CM

## 2023-09-11 PROCEDURE — 97140 MANUAL THERAPY 1/> REGIONS: CPT | Performed by: PHYSICAL THERAPIST

## 2023-09-11 PROCEDURE — 97012 MECHANICAL TRACTION THERAPY: CPT | Performed by: PHYSICAL THERAPIST

## 2023-09-11 NOTE — THERAPY TREATMENT NOTE
Outpatient Physical Therapy Ortho Treatment Note  [facility]     Patient Name: Zulema Sousa  : 1960  MRN: [id number]  Today's Date: 2023      Visit Date: 2023    Visit Dx:    ICD-10-CM ICD-9-CM   1. Cervical radiculopathy at C7  M54.12 723.4       Patient Active Problem List   Diagnosis    Gastroesophageal reflux disease    Edema    Hyperlipidemia    Arthralgia of multiple joints    Osteoarthritis of knee    Pericarditis    Pulmonary embolism    Rheumatoid arthritis    Sciatica    Unintended weight gain    Arthritis of both knees    Obstructive sleep apnea, adult    Dietary counseling    History of DVT (deep vein thrombosis)    Tear of lateral meniscus of right knee, current    Arthritis of right knee    Chronic pain of right knee    Epigastric pain    Abnormal UGI series    Esophageal dilatation    Abnormal finding on radiology exam    Overactive bladder    Right elbow tendonitis    Obesity, Class III, BMI 40-49.9 (morbid obesity)    History of removal of laparoscopic gastric banding device    Cervical radiculopathy    DDD (degenerative disc disease), cervical    Arthropathy of cervical facet joint    Cervical spinal stenosis        Past Medical History:   Diagnosis Date    Acid reflux     Back pain     Cardiac tamponade     2015    Cholelithiasis     Deep vein thrombosis     Difficulty breathing     during exertion    Edema     Esophagitis, reflux     Essential hypertriglyceridemia     GERD (gastroesophageal reflux disease)     Health care maintenance     Heartburn     Hyperlipidemia     Hypertriglyceridemia     Increased appetite     Morbid obesity     Multiple joint pain     Obesity, Class III, BMI 40-49.9 (morbid obesity) 2018    Osteoarthritis of knee     Pericarditis     Pulmonary embolism     ura2015    RHA (rheumatoid arthritis)     Sciatica     Sleep apnea     Unintended weight gain         Past Surgical History:   Procedure Laterality Date    ANKLE SURGERY       treatment of ankle fracture    APPENDECTOMY      CARDIAC SURGERY      cardiac tamponade    CHOLECYSTECTOMY      COLONOSCOPY N/A 11/27/2018    Procedure: COLONOSCOPY to cecum and t.i. with polypectomy and clip x2 to ascending colon;  Surgeon: Pierre Ornelas MD;  Location: [facility] ENDOSCOPY;  Service: Gastroenterology    DILATATION AND CURETTAGE      ENDOSCOPY N/A 11/27/2018    Procedure: ESOPHAGOGASTRODUODENOSCOPY;  Surgeon: Pierre Ornelas MD;  Location: [facility] ENDOSCOPY;  Service: Gastroenterology    FRACTURE SURGERY      GASTRIC BANDING REMOVAL N/A 07/01/2019    Procedure: GASTRIC BANDING AND PORT REMOVAL, LYSIS OF ADHESIONS, REPAIR OF COLOTOMY, AND INCISIONAL HERNIA REPAIR LAPAROSCOPIC;  Surgeon: Randell Salamanca Jr., MD;  Location: [facility] [place] [facility] [place];  Service: General    LAPAROSCOPIC GASTRIC BANDING      PULMONARY EMBOLISM SURGERY      TUBAL ABDOMINAL LIGATION                          PT Assessment/Plan       Row Name 09/11/23 0630          PT Assessment    Assessment Comments Pt tolerated manual therapy well and is tolerating the mechanical traction well.  -GC        PT Plan    PT Plan Comments Will re-ck again later this week with possible addition of extension with manual therapy.  -               User Key  (r) = Recorded By, (t) = Taken By, (c) = Cosigned By      Initials Name Provider Type     Justus Tuttle, PT Physical Therapist                     Modalities       Row Name 09/11/23 0630             Subjective Comments    Subjective Comments Pt states she has some tingling in her left UE this morning. She states she did okay with the traction.  -GC         Moist Heat    MH Applied Yes  -GC      Location cervical spine with pt supine  -GC      PT Moist Heat Minutes 10  -GC      MH Prior to Rx No  -GC         Traction 70157    Traction Type Cervical  -GC      PT Traction Rx Minutes 20  -GC      Position Supine  -GC      Weight 18  -GC      Hold 120  -GC      Relax 30  -GC      Progression 1  -GC       Regression 3  -GC         Functional Testing    Outcome Measure Options Neck Disability Index (NDI)  -                User Key  (r) = Recorded By, (t) = Taken By, (c) = Cosigned By      Initials Name Provider Type     Justus Tuttle, KAELA Physical Therapist                   OP Exercises       Row Name 09/11/23 0630             Subjective Comments    Subjective Comments Pt states she has some tingling in her left UE this morning. She states she did okay with the traction.  -                User Key  (r) = Recorded By, (t) = Taken By, (c) = Cosigned By      Initials Name Provider Type     Justus Tuttle PT Physical Therapist                             Manual Rx (last 36 hours)       Manual Treatments       Row Name 09/11/23 0630             Manual Rx 1    Manual Rx 1 Location cervical spine  -      Manual Rx 1 Type manual cervical distraction  -      Manual Rx 1 Duration 10x30 secs  -         Manual Rx 2    Manual Rx 2 Location cervical spine  -GC      Manual Rx 2 Type manual cervical distraction with retraction  -      Manual Rx 2 Duration 10x5 secs  -         Manual Rx 3    Manual Rx 3 Location cervical spine  -      Manual Rx 3 Type manual cervical distraction with retraction and rotation  -      Manual Rx 3 Duration 10x  -GC                User Key  (r) = Recorded By, (t) = Taken By, (c) = Cosigned By      Initials Name Provider Type     Justus Tuttle, K[facility]ELA Physical Therapist                             Outcome Measure Options: Neck Disability Index (NDI)         Time Calculation:   Start Time: 0630  Stop Time: 0721  Time Calculation (min): 51 min  Untimed Charges  PT Traction Rx Minutes: 20  PT Moist Heat Minutes: 10  Total Minutes  Untimed Charges Total Minutes: 30   Total Minutes: 30  Therapy Charges for Today       Code Description Service Date Service Provider Modifiers Qty    78698119410  PT MANUAL THERAPY EA 15 MIN 9/11/2023 Justus Tuttle, PT GP 1    [id number]  PT-TRACTION  MECHANICAL 9/11/2023 Justus Tuttle, PT  1            PT G-Codes  Outcome Measure Options: Neck Disability Index (NDI)         Justus Tuttle, PT  9/11/2023

## 2023-09-14 ENCOUNTER — HOSPITAL ENCOUNTER (OUTPATIENT)
Dept: PHYSICAL THERAPY | Facility: HOSPITAL | Age: 63
Setting detail: THERAPIES SERIES
Discharge: HOME | End: 2023-09-14
Payer: COMMERCIAL

## 2023-09-14 DIAGNOSIS — M54.12 CERVICAL RADICULOPATHY AT C7: Primary | ICD-10-CM

## 2023-09-14 PROCEDURE — 97012 MECHANICAL TRACTION THERAPY: CPT | Performed by: PHYSICAL THERAPIST

## 2023-09-14 PROCEDURE — 97140 MANUAL THERAPY 1/> REGIONS: CPT | Performed by: PHYSICAL THERAPIST

## 2023-09-14 NOTE — THERAPY TREATMENT NOTE
Outpatient Physical Therapy Ortho Treatment Note  [facility]     Patient Name: Zulema Sousa  : 1960  MRN: [id number]  Today's Date: 2023      Visit Date: 2023    Visit Dx:    ICD-10-CM ICD-9-CM   1. Cervical radiculopathy at C7  M54.12 723.4       Patient Active Problem List   Diagnosis    Gastroesophageal reflux disease    Edema    Hyperlipidemia    Arthralgia of multiple joints    Osteoarthritis of knee    Pericarditis    Pulmonary embolism    Rheumatoid arthritis    Sciatica    Unintended weight gain    Arthritis of both knees    Obstructive sleep apnea, adult    Dietary counseling    History of DVT (deep vein thrombosis)    Tear of lateral meniscus of right knee, current    Arthritis of right knee    Chronic pain of right knee    Epigastric pain    Abnormal UGI series    Esophageal dilatation    Abnormal finding on radiology exam    Overactive bladder    Right elbow tendonitis    Obesity, Class III, BMI 40-49.9 (morbid obesity)    History of removal of laparoscopic gastric banding device    Cervical radiculopathy    DDD (degenerative disc disease), cervical    Arthropathy of cervical facet joint    Cervical spinal stenosis        Past Medical History:   Diagnosis Date    Acid reflux     Back pain     Cardiac tamponade     2015    Cholelithiasis     Deep vein thrombosis     Difficulty breathing     during exertion    Edema     Esophagitis, reflux     Essential hypertriglyceridemia     GERD (gastroesophageal reflux disease)     Health care maintenance     Heartburn     Hyperlipidemia     Hypertriglyceridemia     Increased appetite     Morbid obesity     Multiple joint pain     Obesity, Class III, BMI 40-49.9 (morbid obesity) 2018    Osteoarthritis of knee     Pericarditis     Pulmonary embolism     ura2015    RHA (rheumatoid arthritis)     Sciatica     Sleep apnea     Unintended weight gain         Past Surgical History:   Procedure Laterality Date    ANKLE SURGERY       treatment of ankle fracture    APPENDECTOMY      CARDIAC SURGERY      cardiac tamponade    CHOLECYSTECTOMY      COLONOSCOPY N/A 11/27/2018    Procedure: COLONOSCOPY to cecum and t.i. with polypectomy and clip x2 to ascending colon;  Surgeon: Pierre Ornelas MD;  Location: [facility] ENDOSCOPY;  Service: Gastroenterology    DILATATION AND CURETTAGE      ENDOSCOPY N/A 11/27/2018    Procedure: ESOPHAGOGASTRODUODENOSCOPY;  Surgeon: Pierre Ornelas MD;  Location: [facility] ENDOSCOPY;  Service: Gastroenterology    FRACTURE SURGERY      GASTRIC BANDING REMOVAL N/A 07/01/2019    Procedure: GASTRIC BANDING AND PORT REMOVAL, LYSIS OF ADHESIONS, REPAIR OF COLOTOMY, AND INCISIONAL HERNIA REPAIR LAPAROSCOPIC;  Surgeon: Randell Salamanca Jr., MD;  Location: [facility] OR [facility] [place];  Service: General    LAPAROSCOPIC GASTRIC BANDING      PULMONARY EMBOLISM SURGERY      TUBAL ABDOMINAL LIGATION                          PT Assessment/Plan       Row Name 09/14/23 0630          PT Assessment    Assessment Comments Decreased traction pull in an attempt to limit her post-treatment soreness.  -GC        PT Plan    PT Plan Comments Will continue therapy 1-2x weekly.  -               User Key  (r) = Recorded By, (t) = Taken By, (c) = Cosigned By      Initials Name Provider Type    GC Justus Tuttle, PT Physical Therapist                     Modalities       Row Name 09/14/23 0630             Subjective    Subjective Comments Pt states she has some soreness further donw her back later in the day after treatment.  -GC         Moist Heat    MH Applied Yes  -GC      Location cervical spine with pt supine  -GC      PT Moist Heat Minutes 10  -GC      MH Prior to Rx No  -GC         Traction 15892    Traction Type Cervical  -GC      PT Traction Rx Minutes 20  -GC      Position Supine  -GC      Weight 15  -GC      Hold 120  -GC      Relax 30  -GC      Progression 1  -GC      Regression 3  -GC         Functional Testing    Outcome Measure Options Neck  Disability Index (NDI)  -                User Key  (r) = Recorded By, (t) = Taken By, (c) = Cosigned By      Initials Name Provider Type     Justus Tuttle, PT Physical Therapist                   OP Exercises       Row Name 09/14/23 0630             Subjective    Subjective Comments Pt states she has some soreness further donw her back later in the day after treatment.  -                User Key  (r) = Recorded By, (t) = Taken By, (c) = Cosigned By      Initials Name Provider Type     Justus Tuttle, PT Physical Therapist                             Manual Rx (last 36 hours)       Manual Treatments       Row Name 09/14/23 0630             Manual Rx 1    Manual Rx 1 Location cervical spine  -GC      Manual Rx 1 Type manual cervical distraction  -      Manual Rx 1 Duration 10x30 secs  -GC         Manual Rx 2    Manual Rx 2 Location cervical spine  -GC      Manual Rx 2 Type manual cervical distraction with retraction  -      Manual Rx 2 Duration 10x5 secs  -GC         Manual Rx 3    Manual Rx 3 Location cervical spine  -GC      Manual Rx 3 Type manual cervical distraction with retraction and rotation  -      Manual Rx 3 Duration 10x  -                User Key  (r) = Recorded By, (t) = Taken By, (c) = Cosigned By      Initials Name Provider Type     Justus Tuttle, PT Physical Therapist                             Outcome Measure Options: Neck Disability Index (NDI)         Time Calculation:   Start Time: 0630  Stop Time: 0722  Time Calculation (min): 52 min  Untimed Charges  PT Traction Rx Minutes: 20  PT Moist Heat Minutes: 10  Total Minutes  Untimed Charges Total Minutes: 30   Total Minutes: 30  Therapy Charges for Today       Code Description Service Date Service Provider Modifiers Qty    69036150453 HC PT MANUAL THERAPY EA 15 MIN 9/14/2023 Justus Tuttle, PT GP 1    54756559926 HC PT-TRACTION MECHANICAL 9/14/2023 Justus Tuttle, PT  1            PT G-Codes  Outcome Measure Options: Neck  Disability Index (NDI)         Justus Tuttle, PT  9/14/2023

## 2023-09-19 ENCOUNTER — HOSPITAL ENCOUNTER (OUTPATIENT)
Dept: PHYSICAL THERAPY | Facility: HOSPITAL | Age: 63
Setting detail: THERAPIES SERIES
Discharge: HOME | End: 2023-09-19
Payer: COMMERCIAL

## 2023-09-19 DIAGNOSIS — M54.12 CERVICAL RADICULOPATHY AT C7: Primary | ICD-10-CM

## 2023-09-19 PROCEDURE — 97140 MANUAL THERAPY 1/> REGIONS: CPT | Performed by: PHYSICAL THERAPIST

## 2023-09-19 PROCEDURE — 97012 MECHANICAL TRACTION THERAPY: CPT | Performed by: PHYSICAL THERAPIST

## 2023-09-19 NOTE — THERAPY TREATMENT NOTE
Outpatient Physical Therapy Ortho Treatment Note  [facility]     Patient Name: Zulema Sousa  : 1960  MRN: [id number]  Today's Date: 2023      Visit Date: 2023    Visit Dx:    ICD-10-CM ICD-9-CM   1. Cervical radiculopathy at C7  M54.12 723.4       Patient Active Problem List   Diagnosis    Gastroesophageal reflux disease    Edema    Hyperlipidemia    Arthralgia of multiple joints    Osteoarthritis of knee    Pericarditis    Pulmonary embolism    Rheumatoid arthritis    Sciatica    Unintended weight gain    Arthritis of both knees    Obstructive sleep apnea, adult    Dietary counseling    History of DVT (deep vein thrombosis)    Tear of lateral meniscus of right knee, current    Arthritis of right knee    Chronic pain of right knee    Epigastric pain    Abnormal UGI series    Esophageal dilatation    Abnormal finding on radiology exam    Overactive bladder    Right elbow tendonitis    Obesity, Class III, BMI 40-49.9 (morbid obesity)    History of removal of laparoscopic gastric banding device    Cervical radiculopathy    DDD (degenerative disc disease), cervical    Arthropathy of cervical facet joint    Cervical spinal stenosis        Past Medical History:   Diagnosis Date    Acid reflux     Back pain     Cardiac tamponade     2015    Cholelithiasis     Deep vein thrombosis     Difficulty breathing     during exertion    Edema     Esophagitis, reflux     Essential hypertriglyceridemia     GERD (gastroesophageal reflux disease)     Health care maintenance     Heartburn     Hyperlipidemia     Hypertriglyceridemia     Increased appetite     Morbid obesity     Multiple joint pain     Obesity, Class III, BMI 40-49.9 (morbid obesity) 2018    Osteoarthritis of knee     Pericarditis     Pulmonary embolism     ura2015    RHA (rheumatoid arthritis)     Sciatica     Sleep apnea     Unintended weight gain         Past Surgical History:   Procedure Laterality Date    ANKLE SURGERY       treatment of ankle fracture    APPENDECTOMY      CARDIAC SURGERY      cardiac tamponade    CHOLECYSTECTOMY      COLONOSCOPY N/A 11/27/2018    Procedure: COLONOSCOPY to cecum and t.i. with polypectomy and clip x2 to ascending colon;  Surgeon: Pierre Ornelas MD;  Location: [facility] ENDOSCOPY;  Service: Gastroenterology    DILATATION AND CURETTAGE      ENDOSCOPY N/A 11/27/2018    Procedure: ESOPHAGOGASTRODUODENOSCOPY;  Surgeon: Pierre Ornelas MD;  Location: [facility] ENDOSCOPY;  Service: Gastroenterology    FRACTURE SURGERY      GASTRIC BANDING REMOVAL N/A 07/01/2019    Procedure: GASTRIC BANDING AND PORT REMOVAL, LYSIS OF ADHESIONS, REPAIR OF COLOTOMY, AND INCISIONAL HERNIA REPAIR LAPAROSCOPIC;  Surgeon: Randell Salamanca Jr., MD;  [place] [facility] [place] [facility] [place]: General    LAPAROSCOPIC GASTRIC BANDING      PULMONARY EMBOLISM SURGERY      TUBAL ABDOMINAL LIGATION                          PT Assessment/Plan       Row Name 09/19/23 0635          PT Assessment    Assessment Comments Pt is doing well with decreasing c/o pain and good tolerance to her treatment progression.  -GC        PT Plan    PT Plan Comments Will re-ck pt again later this week.  -               User Key  (r) = Recorded By, (t) = Taken By, (c) = Cosigned By      Initials Name Provider Type    GC Justus Tuttle, PT Physical Therapist                     Modalities       Row Name 09/19/23 0621             Subjective    Subjective Comments Pt states her neck is doingokay and that the treatment has not made her sore.  -GC         Moist Heat    MH Applied Yes  -GC      Location cervical spine with pt supine  -GC      PT Moist Heat Minutes 10  -GC      MH Prior to Rx No  -GC         Traction 10026    Traction Type Cervical  -GC      PT Traction Rx Minutes 20  -GC      Position Supine  -GC      Weight 20  -GC      Hold 120  -GC      Relax 30  -GC      Progression 1  -GC      Regression 3  -GC         Functional Testing    Outcome Measure Options Neck  Disability Index (NDI)  -                User Key  (r) = Recorded By, (t) = Taken By, (c) = Cosigned By      Initials Name Provider Type     Justus Tuttle, KAELA Physical Therapist                   OP Exercises       Row Name 09/19/23 0635             Subjective    Subjective Comments Pt states her neck is doingokay and that the treatment has not made her sore.  -                User Key  (r) = Recorded By, (t) = Taken By, (c) = Cosigned By      Initials Name Provider Type     Justus Tuttle PT Physical Therapist                             Manual Rx (last 36 hours)       Manual Treatments       Row Name 09/19/23 0635             Manual Rx 1    Manual Rx 1 Location cervical spine  -GC      Manual Rx 1 Type manual cervical distraction  -      Manual Rx 1 Duration 10x30 secs  -         Manual Rx 2    Manual Rx 2 Location cervical spine  -GC      Manual Rx 2 Type manual cervical distraction with retraction  -      Manual Rx 2 Duration 10x5 secs  -GC         Manual Rx 3    Manual Rx 3 Location cervical spine  -GC      Manual Rx 3 Type manual cervical distraction with retraction and extension  -      Manual Rx 3 Duration 10x  -         Manual Rx 4    Manual Rx 4 Location cervical spine  -GC      Manual Rx 4 Type manual cervical distraction with retraction and extension and rotation  -      Manual Rx 4 Duration 10X  -                User Key  (r) = Recorded By, (t) = Taken By, (c) = Cosigned By      Initials Name Provider Type     Justus Tuttle PT Physical Therapist                             Outcome Measure Options: Neck Disability Index (NDI)         Time Calculation:   Start Time: 0635  Stop Time: 0723  Time Calculation (min): 48 min  Untimed Charges  PT Traction Rx Minutes: 20  PT Moist Heat Minutes: 10  Total Minutes  Untimed Charges Total Minutes: 30   Total Minutes: 30  Therapy Charges for Today       Code Description Service Date Service Provider Modifiers Qty    19252918122  PT MANUAL  THERAPY EA 15 MIN 9/19/2023 Justus Tuttle, PT GP 1    76937139211 HC PT-TRACTION MECHANICAL 9/19/2023 Justus Tuttle, PT  1            PT G-Codes  Outcome Measure Options: Neck Disability Index (NDI)         Justus Tuttle, PT  9/19/2023

## 2023-09-21 ENCOUNTER — HOSPITAL ENCOUNTER (OUTPATIENT)
Dept: PHYSICAL THERAPY | Facility: HOSPITAL | Age: 63
Setting detail: THERAPIES SERIES
Discharge: HOME | End: 2023-09-21
Payer: COMMERCIAL

## 2023-09-21 DIAGNOSIS — M54.12 CERVICAL RADICULOPATHY AT C7: Primary | ICD-10-CM

## 2023-09-21 PROCEDURE — 97012 MECHANICAL TRACTION THERAPY: CPT | Performed by: PHYSICAL THERAPIST

## 2023-09-21 PROCEDURE — 97140 MANUAL THERAPY 1/> REGIONS: CPT | Performed by: PHYSICAL THERAPIST

## 2023-09-21 NOTE — THERAPY TREATMENT NOTE
Outpatient Physical Therapy Ortho Treatment Note  [facility]     Patient Name: Zulema Sousa  : 1960  MRN: [id number]  Today's Date: 2023      Visit Date: 2023    Visit Dx:    ICD-10-CM ICD-9-CM   1. Cervical radiculopathy at C7  M54.12 723.4       Patient Active Problem List   Diagnosis    Gastroesophageal reflux disease    Edema    Hyperlipidemia    Arthralgia of multiple joints    Osteoarthritis of knee    Pericarditis    Pulmonary embolism    Rheumatoid arthritis    Sciatica    Unintended weight gain    Arthritis of both knees    Obstructive sleep apnea, adult    Dietary counseling    History of DVT (deep vein thrombosis)    Tear of lateral meniscus of right knee, current    Arthritis of right knee    Chronic pain of right knee    Epigastric pain    Abnormal UGI series    Esophageal dilatation    Abnormal finding on radiology exam    Overactive bladder    Right elbow tendonitis    Obesity, Class III, BMI 40-49.9 (morbid obesity)    History of removal of laparoscopic gastric banding device    Cervical radiculopathy    DDD (degenerative disc disease), cervical    Arthropathy of cervical facet joint    Cervical spinal stenosis        Past Medical History:   Diagnosis Date    Acid reflux     Back pain     Cardiac tamponade     2015    Cholelithiasis     Deep vein thrombosis     Difficulty breathing     during exertion    Edema     Esophagitis, reflux     Essential hypertriglyceridemia     GERD (gastroesophageal reflux disease)     Health care maintenance     Heartburn     Hyperlipidemia     Hypertriglyceridemia     Increased appetite     Morbid obesity     Multiple joint pain     Obesity, Class III, BMI 40-49.9 (morbid obesity) 2018    Osteoarthritis of knee     Pericarditis     Pulmonary embolism     ura2015    RHA (rheumatoid arthritis)     Sciatica     Sleep apnea     Unintended weight gain         Past Surgical History:   Procedure Laterality Date    ANKLE SURGERY       treatment of ankle fracture    APPENDECTOMY      CARDIAC SURGERY      cardiac tamponade    CHOLECYSTECTOMY      COLONOSCOPY N/A 11/27/2018    Procedure: COLONOSCOPY to cecum and t.i. with polypectomy and clip x2 to ascending colon;  Surgeon: Pierre Ornelas MD;  Location: [facility] ENDOSCOPY;  Service: Gastroenterology    DILATATION AND CURETTAGE      ENDOSCOPY N/A 11/27/2018    Procedure: ESOPHAGOGASTRODUODENOSCOPY;  Surgeon: Pierre Ornelas MD;  Location: [facility] ENDOSCOPY;  Service: Gastroenterology    FRACTURE SURGERY      GASTRIC BANDING REMOVAL N/A 07/01/2019    Procedure: GASTRIC BANDING AND PORT REMOVAL, LYSIS OF ADHESIONS, REPAIR OF COLOTOMY, AND INCISIONAL HERNIA REPAIR LAPAROSCOPIC;  Surgeon: Randell Salamanca Jr., MD;  [place] [facility] [place] [facility] [place]  Service: General    LAPAROSCOPIC GASTRIC BANDING      PULMONARY EMBOLISM SURGERY      TUBAL ABDOMINAL LIGATION                          PT Assessment/Plan       Row Name 09/21/23 0630          PT Assessment    Assessment Comments Pt is doing well with decreasing c/o pain and increased range noted with her stretching.  -GC        PT Plan    PT Plan Comments WIll continue therapy 1-2x weekly.  -               User Key  (r) = Recorded By, (t) = Taken By, (c) = Cosigned By      Initials Name Provider Type    GC Justus Tuttle, PT Physical Therapist                     Modalities       Row Name 09/21/23 0630             Subjective    Subjective Comments Pt states her neck feels pretty good.  -GC         Moist Heat    MH Applied Yes  -GC      Location cervical spine with pt supine  -GC      PT Moist Heat Minutes 10  -GC      MH Prior to Rx No  -GC         Traction 33526    Traction Type Cervical  -GC      PT Traction Rx Minutes 20  -GC      Position Supine  -GC      Weight 20  -GC      Hold 120  -GC      Relax 30  -GC      Progression 1  -GC      Regression 3  -GC         Functional Testing    Outcome Measure Options Neck Disability Index (NDI)  -GC                 User Key  (r) = Recorded By, (t) = Taken By, (c) = Cosigned By      Initials Name Provider Type     Justus Tuttle, PT Physical Therapist                   OP Exercises       Row Name 09/21/23 0630             Subjective    Subjective Comments Pt states her neck feels pretty good.  -                User Key  (r) = Recorded By, (t) = Taken By, (c) = Cosigned By      Initials Name Provider Type     Justus Tuttle, PT Physical Therapist                             Manual Rx (last 36 hours)       Manual Treatments       Row Name 09/21/23 0630             Manual Rx 1    Manual Rx 1 Location cervical spine  -GC      Manual Rx 1 Type manual cervical distraction  -      Manual Rx 1 Duration 10x30 secs  -GC         Manual Rx 2    Manual Rx 2 Location cervical spine  -GC      Manual Rx 2 Type manual cervical distraction with retraction  -      Manual Rx 2 Duration 10x5 secs  -GC         Manual Rx 3    Manual Rx 3 Location cervical spine  -GC      Manual Rx 3 Type manual cervical distraction with retraction and extension  -      Manual Rx 3 Duration 10x  -         Manual Rx 4    Manual Rx 4 Location cervical spine  -GC      Manual Rx 4 Type manual cervical distraction with retraction and extension and rotation  -      Manual Rx 4 Duration 10X  -GC                User Key  (r) = Recorded By, (t) = Taken By, (c) = Cosigned By      Initials Name Provider Type     Justus Tuttle, PT Physical Therapist                             Outcome Measure Options: Neck Disability Index (NDI)         Time Calculation:   Start Time: 0630  Stop Time: 0719  Time Calculation (min): 49 min  Untimed Charges  PT Traction Rx Minutes: 20  PT Moist Heat Minutes: 10  Total Minutes  Untimed Charges Total Minutes: 30   Total Minutes: 30  Therapy Charges for Today       Code Description Service Date Service Provider Modifiers Qty    25006070180  PT MANUAL THERAPY EA 15 MIN 9/21/2023 Justus Tuttle, PT GP 1    [id number]  PT-TRACTION  MECHANICAL 9/21/2023 Justus Tuttle, PT  1            PT G-Codes  Outcome Measure Options: Neck Disability Index (NDI)         Justus Tuttle, PT  9/21/2023

## 2023-10-03 DIAGNOSIS — E78.2 MIXED HYPERLIPIDEMIA: ICD-10-CM

## 2023-10-03 RX ORDER — ROSUVASTATIN CALCIUM 5 MG/1
5 TABLET, COATED ORAL DAILY
Qty: 90 TABLET | Refills: 1 | Status: SHIPPED | OUTPATIENT
Start: 2023-10-03

## 2023-10-03 NOTE — TELEPHONE ENCOUNTER
Rx Refill Note  Requested Prescriptions     Pending Prescriptions Disp Refills    rosuvastatin (Crestor) 5 MG tablet 90 tablet 1     Sig: Take 1 tablet by mouth Daily.      Last office visit with prescribing clinician: 7/31/2023   Last telemedicine visit with prescribing clinician: Visit date not found   Next office visit with prescribing clinician: 12/27/2023                         Would you like a call back once the refill request has been completed: [] Yes [] No    If the office needs to give you a call back, can they leave a voicemail: [] Yes [] No    Ginna Barnes MA  10/03/23, 17:14 EDT

## 2023-10-05 ENCOUNTER — HOSPITAL ENCOUNTER (OUTPATIENT)
Dept: PHYSICAL THERAPY | Facility: HOSPITAL | Age: 63
Setting detail: THERAPIES SERIES
Discharge: HOME | End: 2023-10-05
Payer: COMMERCIAL

## 2023-10-05 DIAGNOSIS — M54.12 CERVICAL RADICULOPATHY AT C7: Primary | ICD-10-CM

## 2023-10-05 PROCEDURE — 97012 MECHANICAL TRACTION THERAPY: CPT | Performed by: PHYSICAL THERAPIST

## 2023-10-05 PROCEDURE — 97140 MANUAL THERAPY 1/> REGIONS: CPT | Performed by: PHYSICAL THERAPIST

## 2023-10-05 NOTE — THERAPY TREATMENT NOTE
Outpatient Physical Therapy Ortho Treatment Note  [facility]     Patient Name: Zulema Sousa  : 1960  MRN: [id number]  Today's Date: 10/5/2023      Visit Date: 10/05/2023    Visit Dx:    ICD-10-CM ICD-9-CM   1. Cervical radiculopathy at C7  M54.12 723.4       Patient Active Problem List   Diagnosis    Gastroesophageal reflux disease    Edema    Hyperlipidemia    Arthralgia of multiple joints    Osteoarthritis of knee    Pericarditis    Pulmonary embolism    Rheumatoid arthritis    Sciatica    Unintended weight gain    Arthritis of both knees    Obstructive sleep apnea, adult    Dietary counseling    History of DVT (deep vein thrombosis)    Tear of lateral meniscus of right knee, current    Arthritis of right knee    Chronic pain of right knee    Epigastric pain    Abnormal UGI series    Esophageal dilatation    Abnormal finding on radiology exam    Overactive bladder    Right elbow tendonitis    Obesity, Class III, BMI 40-49.9 (morbid obesity)    History of removal of laparoscopic gastric banding device    Cervical radiculopathy    DDD (degenerative disc disease), cervical    Arthropathy of cervical facet joint    Cervical spinal stenosis        Past Medical History:   Diagnosis Date    Acid reflux     Back pain     Cardiac tamponade     2015    Cholelithiasis     Deep vein thrombosis     Difficulty breathing     during exertion    Edema     Esophagitis, reflux     Essential hypertriglyceridemia     GERD (gastroesophageal reflux disease)     Health care maintenance     Heartburn     Hyperlipidemia     Hypertriglyceridemia     Increased appetite     Morbid obesity     Multiple joint pain     Obesity, Class III, BMI 40-49.9 (morbid obesity) 2018    Osteoarthritis of knee     Pericarditis     Pulmonary embolism     ura2015    RHA (rheumatoid arthritis)     Sciatica     Sleep apnea     Unintended weight gain         Past Surgical History:   Procedure Laterality Date    ANKLE SURGERY       treatment of ankle fracture    APPENDECTOMY      CARDIAC SURGERY      cardiac tamponade    CHOLECYSTECTOMY      COLONOSCOPY N/A 11/27/2018    Procedure: COLONOSCOPY to cecum and t.i. with polypectomy and clip x2 to ascending colon;  Surgeon: Pierre Ornelas MD;  Location: [facility] ENDOSCOPY;  Service: Gastroenterology    DILATATION AND CURETTAGE      ENDOSCOPY N/A 11/27/2018    Procedure: ESOPHAGOGASTRODUODENOSCOPY;  Surgeon: Pierre Ornelas MD;  Location: [facility] ENDOSCOPY;  Service: Gastroenterology    FRACTURE SURGERY      GASTRIC BANDING REMOVAL N/A 07/01/2019    Procedure: GASTRIC BANDING AND PORT REMOVAL, LYSIS OF ADHESIONS, REPAIR OF COLOTOMY, AND INCISIONAL HERNIA REPAIR LAPAROSCOPIC;  Surgeon: Randell Salamanca Jr., MD;  Location: [facility] OR [facility] [place];  Service: General    LAPAROSCOPIC GASTRIC BANDING      PULMONARY EMBOLISM SURGERY      TUBAL ABDOMINAL LIGATION                          PT Assessment/Plan       Row Name 10/05/23 0630          PT Assessment    Assessment Comments Pt is doing well with reported decreased pain adn UE symptoms and good cervical range noted with her stretches.  -GC        PT Plan    PT Plan Comments Will followup againnext week and will wean from therapy as symptoms decrease.  -GC               User Key  (r) = Recorded By, (t) = Taken By, (c) = Cosigned By      Initials Name Provider Type    GC Justus Tuttle, PT Physical Therapist                     Modalities       Row Name 10/05/23 8191             Subjective    Subjective Comments Pt states her neck is feeling better and she denies any UE symptoms.  -GC         Moist Heat    MH Applied Yes  -GC      Location cervical spine with pt supine  -GC      PT Moist Heat Minutes 10  -GC      MH Prior to Rx No  -GC         Traction 05245    Traction Type Cervical  -GC      PT Traction Rx Minutes 20  -GC      Position Supine  -GC      Weight 20  -GC      Hold 120  -GC      Relax 30  -GC      Progression 1  -GC      Regression 3  -GC          Functional Testing    Outcome Measure Options Neck Disability Index (NDI)  -                User Key  (r) = Recorded By, (t) = Taken By, (c) = Cosigned By      Initials Name Provider Type    Justus Crooks, PT Physical Therapist                   OP Exercises       Row Name 10/05/23 0630             Subjective    Subjective Comments Pt states her neck is feeling better and she denies any UE symptoms.  -                User Key  (r) = Recorded By, (t) = Taken By, (c) = Cosigned By      Initials Name Provider Type    Justus Crooks, PT Physical Therapist                             Manual Rx (last 36 hours)       Manual Treatments       Row Name 10/05/23 0630             Manual Rx 1    Manual Rx 1 Location cervical spine  -GC      Manual Rx 1 Type manual cervical distraction  -GC      Manual Rx 1 Duration 10x30 secs  -GC         Manual Rx 2    Manual Rx 2 Location cervical spine  -GC      Manual Rx 2 Type manual cervical distraction with retraction  -GC      Manual Rx 2 Duration 10x5 secs  -GC         Manual Rx 3    Manual Rx 3 Location cervical spine  -GC      Manual Rx 3 Type manual cervical distraction with retraction and extension  -GC      Manual Rx 3 Duration 10x  -GC         Manual Rx 4    Manual Rx 4 Location cervical spine  -GC      Manual Rx 4 Type manual cervical distraction with retraction and extension and rotation  -GC      Manual Rx 4 Duration 10X  -GC         Manual Rx 5    Manual Rx 5 Location cervical spine  -GC      Manual Rx 5 Type SOR  -GC      Manual Rx 5 Duration 5 min  -GC                User Key  (r) = Recorded By, (t) = Taken By, (c) = Cosigned By      Initials Name Provider Type    Justus Crooks, PT Physical Therapist                             Outcome Measure Options: Neck Disability Index (NDI)         Time Calculation:   Start Time: 0630  Stop Time: 0721  Time Calculation (min): 51 min  Untimed Charges  PT Traction Rx Minutes: 20  PT Moist Heat Minutes: 10  Total  Minutes  Untimed Charges Total Minutes: 30   Total Minutes: 30  Therapy Charges for Today       Code Description Service Date Service Provider Modifiers Qty    42720878107 HC PT MANUAL THERAPY EA 15 MIN 10/5/2023 Justus uTttle, PT GP 1    30235514235 HC PT-TRACTION MECHANICAL 10/5/2023 Justus Tuttle, PT  1            PT G-Codes  Outcome Measure Options: Neck Disability Index (NDI)         Justus Tuttle, PT  10/5/2023

## 2023-10-10 ENCOUNTER — HOSPITAL ENCOUNTER (OUTPATIENT)
Dept: PHYSICAL THERAPY | Facility: HOSPITAL | Age: 63
Setting detail: THERAPIES SERIES
Discharge: HOME | End: 2023-10-10
Payer: COMMERCIAL

## 2023-10-10 DIAGNOSIS — M47.812 NECK ARTHRITIS: ICD-10-CM

## 2023-10-10 DIAGNOSIS — M54.12 CERVICAL RADICULOPATHY AT C7: Primary | ICD-10-CM

## 2023-10-10 NOTE — THERAPY TREATMENT NOTE
Outpatient Physical Therapy Ortho Progress Note  [facility] Lieberman     Patient Name: Zulema Sousa  : 1960  MRN: [id number]  Today's Date: 10/10/2023      Visit Date: 10/10/2023    Visit Dx:    ICD-10-CM ICD-9-CM   1. Cervical radiculopathy at C7  M54.12 723.4   2. Neck arthritis  M47.812 721.0       Patient Active Problem List   Diagnosis    Gastroesophageal reflux disease    Edema    Hyperlipidemia    Arthralgia of multiple joints    Osteoarthritis of knee    Pericarditis    Pulmonary embolism    Rheumatoid arthritis    Sciatica    Unintended weight gain    Arthritis of both knees    Obstructive sleep apnea, adult    Dietary counseling    History of DVT (deep vein thrombosis)    Tear of lateral meniscus of right knee, current    Arthritis of right knee    Chronic pain of right knee    Epigastric pain    Abnormal UGI series    Esophageal dilatation    Abnormal finding on radiology exam    Overactive bladder    Right elbow tendonitis    Obesity, Class III, BMI 40-49.9 (morbid obesity)    History of removal of laparoscopic gastric banding device    Cervical radiculopathy    DDD (degenerative disc disease), cervical    Arthropathy of cervical facet joint    Cervical spinal stenosis        Past Medical History:   Diagnosis Date    Acid reflux     Back pain     Cardiac tamponade     2015    Cholelithiasis     Deep vein thrombosis     Difficulty breathing     during exertion    Edema     Esophagitis, reflux     Essential hypertriglyceridemia     GERD (gastroesophageal reflux disease)     Health care maintenance     Heartburn     Hyperlipidemia     Hypertriglyceridemia     Increased appetite     Morbid obesity     Multiple joint pain     Obesity, Class III, BMI 40-49.9 (morbid obesity) 2018    Osteoarthritis of knee     Pericarditis     Pulmonary embolism     ura2015    RHA (rheumatoid arthritis)     Sciatica     Sleep apnea     Unintended weight gain         Past Surgical History:   Procedure  Laterality Date    ANKLE SURGERY      treatment of ankle fracture    APPENDECTOMY      CARDIAC SURGERY      cardiac tamponade    CHOLECYSTECTOMY      COLONOSCOPY N/A 11/27/2018    Procedure: COLONOSCOPY to cecum and t.i. with polypectomy and clip x2 to ascending colon;  Surgeon: Pierre Ornelas MD;  Location: [facility] ENDOSCOPY;  Service: Gastroenterology    DILATATION AND CURETTAGE      ENDOSCOPY N/A 11/27/2018    Procedure: ESOPHAGOGASTRODUODENOSCOPY;  Surgeon: Pierre Ornelas MD;  Location: [facility] ENDOSCOPY;  Service: Gastroenterology    FRACTURE SURGERY      GASTRIC BANDING REMOVAL N/A 07/01/2019    Procedure: GASTRIC BANDING AND PORT REMOVAL, LYSIS OF ADHESIONS, REPAIR OF COLOTOMY, AND INCISIONAL HERNIA REPAIR LAPAROSCOPIC;  Surgeon: Randell Salamanca Jr., MD;  Location: [facility] OR [facility] [place];  Service: General    LAPAROSCOPIC GASTRIC BANDING      PULMONARY EMBOLISM SURGERY      TUBAL ABDOMINAL LIGATION          PT Ortho       Row Name 10/10/23 0630       Subjective    Subjective Comments Pt states she feels good and denies any real pain.  -GC       Head/Neck/Trunk    Neck Extension AROM WFL  -GC    Neck Flexion AROM WFL  -GC    Neck Lt Lateral Flexion AROM WFL  -GC    Neck Rt Lateral Flexion AROM WFL  -GC    Neck Lt Rotation AROM WFL  -GC    Neck Rt Rotation AROM WFL  -GC       Upper Extremity Flexibility    Scalenes Bilateral:;WNL  -GC    SCM Bilateral:;WNL  -GC    Upper Trapezius Bilateral:;WNL  -GC    Levator Scapula Bilateral:;WNL  -GC              User Key  (r) = Recorded By, (t) = Taken By, (c) = Cosigned By      Initials Name Provider Type    Justus Crooks, PT Physical Therapist                                 PT Assessment/Plan       Row Name 10/10/23 8230          PT Assessment    Assessment Comments Pt is doing well with good cervical ROM, good flexibility, and no pain. Feel she no longer requires skilled therapy services.  -GC        PT Plan    PT Plan Comments Pt will contact this therapist if  problems arise.  -               User Key  (r) = Recorded By, (t) = Taken By, (c) = Cosigned By      Initials Name Provider Type    Justus Crooks, PT Physical Therapist                       OP Exercises       Row Name 10/10/23 0683             Subjective    Subjective Comments Pt states she feels good and denies any real pain.  -                User Key  (r) = Recorded By, (t) = Taken By, (c) = Cosigned By      Initials Name Provider Type    Justus Crooks PT Physical Therapist                                                    Time Calculation:                    Justus Tuttle PT  10/10/2023

## 2023-12-08 ENCOUNTER — OFFICE VISIT (OUTPATIENT)
Dept: OBSTETRICS AND GYNECOLOGY | Facility: CLINIC | Age: 63
End: 2023-12-08
Payer: COMMERCIAL

## 2023-12-08 VITALS
SYSTOLIC BLOOD PRESSURE: 126 MMHG | WEIGHT: 272.2 LBS | HEIGHT: 64 IN | DIASTOLIC BLOOD PRESSURE: 86 MMHG | BODY MASS INDEX: 46.47 KG/M2

## 2023-12-08 DIAGNOSIS — Z01.419 PAP SMEAR, AS PART OF ROUTINE GYNECOLOGICAL EXAMINATION: ICD-10-CM

## 2023-12-08 DIAGNOSIS — N32.81 OVERACTIVE BLADDER: ICD-10-CM

## 2023-12-08 DIAGNOSIS — Z11.51 SCREENING FOR HUMAN PAPILLOMAVIRUS: ICD-10-CM

## 2023-12-08 DIAGNOSIS — Z12.31 BREAST CANCER SCREENING BY MAMMOGRAM: ICD-10-CM

## 2023-12-08 DIAGNOSIS — Z78.0 POSTMENOPAUSAL: ICD-10-CM

## 2023-12-08 DIAGNOSIS — Z01.419 ROUTINE GYNECOLOGICAL EXAMINATION: Primary | ICD-10-CM

## 2023-12-08 DIAGNOSIS — Z13.820 OSTEOPOROSIS SCREENING: ICD-10-CM

## 2023-12-08 RX ORDER — OXYBUTYNIN CHLORIDE 5 MG/1
5 TABLET ORAL DAILY
Qty: 30 TABLET | Refills: 2 | Status: SHIPPED | OUTPATIENT
Start: 2023-12-08

## 2023-12-08 NOTE — PROGRESS NOTES
GYN Post-menopausal Annual Exam     Chief Complaint   Patient presents with    Gynecologic Exam     ANNUAL       Zulema Sousa is a 63 y.o. female who presents for annual well woman exam. She is new to me - yes, but is an established patient of this practice.  Periods absent since 20+. She denies vaginal spotting or discharge. She performs SBE monthly- on occasion.  Takes Vesicare for OA; some improvement but still voids on herself.  Denies stress incontinence.     HPI   OB History          2    Para   2    Term   2            AB        Living             SAB        IAB        Ectopic        Molar        Multiple        Live Births                    Last Mammogram: 2023- neg  Last Dexa: 2016- normal  Last Colonoscopy: 2018  Last Pap : 2021  HRT: no  History of abnormal Pap smear: no  Family history of uterine, colon or ovarian cancer: yes - aunt with ovarian  Family history of breast cancer: no  History of abnormal mammogram: no        Past Medical History:   Diagnosis Date    Acid reflux     Back pain     Cardiac tamponade     2015    Cholelithiasis     Deep vein thrombosis     Difficulty breathing     during exertion    Edema     Esophagitis, reflux     Essential hypertriglyceridemia     GERD (gastroesophageal reflux disease)     Health care maintenance     Heartburn     Hyperlipidemia     Hypertriglyceridemia     Increased appetite     Morbid obesity     Multiple joint pain     Obesity, Class III, BMI 40-49.9 (morbid obesity) 2018    Osteoarthritis of knee     Pericarditis     Pulmonary embolism     Janurary     RHA (rheumatoid arthritis)     Sciatica     Sleep apnea     Unintended weight gain        Past Surgical History:   Procedure Laterality Date    ANKLE SURGERY      treatment of ankle fracture    APPENDECTOMY      CARDIAC SURGERY      cardiac tamponade    CHOLECYSTECTOMY      COLONOSCOPY N/A 2018    Procedure: COLONOSCOPY to cecum and t.i. with polypectomy and  clip x2 to ascending colon;  Surgeon: Pierre Ornelas MD;  Location: [facility] ENDOSCOPY;  Service: Gastroenterology    DILATATION AND CURETTAGE      ENDOSCOPY N/A 2018    Procedure: ESOPHAGOGASTRODUODENOSCOPY;  Surgeon: Pierre Ornelas MD;  Location: [facility] ENDOSCOPY;  Service: Gastroenterology    FRACTURE SURGERY      GASTRIC BANDING REMOVAL N/A 2019    Procedure: GASTRIC BANDING AND PORT REMOVAL, LYSIS OF ADHESIONS, REPAIR OF COLOTOMY, AND INCISIONAL HERNIA REPAIR LAPAROSCOPIC;  Surgeon: Randell Salamanca Jr., MD;  Location: [facility] OR [facility] [place];  Service: General    LAPAROSCOPIC GASTRIC BANDING      PULMONARY EMBOLISM SURGERY      TUBAL ABDOMINAL LIGATION           Current Outpatient Medications:     multivitamin (THERAGRAN) tablet tablet, Take  by mouth Daily., Disp: , Rfl:     omeprazole (priLOSEC) 40 MG capsule, Take 1 capsule by mouth Daily., Disp: 90 capsule, Rfl: 3    oxybutynin (DITROPAN) 5 MG tablet, Take 1 tablet by mouth Daily., Disp: 30 tablet, Rfl: 2    rivaroxaban (Xarelto) 10 MG tablet, Take 1 tablet by mouth Daily., Disp: 90 tablet, Rfl: 2    rosuvastatin (Crestor) 5 MG tablet, Take 1 tablet by mouth Daily., Disp: 90 tablet, Rfl: 1    Semaglutide,0.25 or 0.5MG/DOS, (Ozempic, 0.25 or 0.5 MG/DOSE,) 2 MG/3ML solution pen-injector, Inject 0.5 mg under the skin into the appropriate area as directed 1 (One) Time Per Week., Disp: 9 mL, Rfl: 1    Sodium Fluoride (Sodium Fluoride 5000 Plus) 1.1 % cream, Use daily as directed to brush teeth, Disp: 51 g, Rfl: 2    Allergies   Allergen Reactions    Nsaids Other (See Comments)     DOESN'T TAKE BECAUSE SHES ON BLOOD THINNER.       Social History     Tobacco Use    Smoking status: Former     Packs/day: 0.50     Years: 10.00     Additional pack years: 0.00     Total pack years: 5.00     Types: Cigarettes     Quit date: 1/10/2023     Years since quittin.9     Passive exposure: Past    Smokeless tobacco: Never    Tobacco comments:     caffeine use  "  Vaping Use    Vaping Use: Some days    Substances: Nicotine   Substance Use Topics    Alcohol use: Not Currently     Alcohol/week: 0.0 - 2.0 standard drinks of alcohol     Comment: Very rarely    Drug use: No       Family History   Problem Relation Age of Onset    Diabetes Father     Hypertension Father     Heart disease Father     Obesity Father     Arthritis Father     COPD Father     Depression Father     Cancer Mother     Obesity Mother     Heart disease Brother     Heart disease Brother     Arthritis Sister     COPD Sister     Diabetes Paternal Grandfather     No Known Problems Paternal Grandmother     No Known Problems Maternal Grandmother     No Known Problems Maternal Grandfather     Diabetes Paternal Aunt     Diabetes Paternal Aunt     Malig Hyperthermia Neg Hx     Breast cancer Neg Hx     Uterine cancer Neg Hx     Colon cancer Neg Hx        Review of Systems   Endocrine: Negative for heat intolerance.   Genitourinary:  Positive for urinary incontinence. Negative for breast discharge, breast lump, breast pain, dysuria, vaginal bleeding and vaginal discharge.         /86   Ht 162.6 cm (64\")   Wt 123 kg (272 lb 3.2 oz)   LMP  (LMP Unknown)   BMI 46.72 kg/m²     Physical Exam  Vitals reviewed.   Constitutional:       General: She is awake. She is not in acute distress.     Appearance: She is not ill-appearing.   HENT:      Head: Normocephalic and atraumatic.   Eyes:      Conjunctiva/sclera: Conjunctivae normal.   Pulmonary:      Effort: Pulmonary effort is normal. No respiratory distress.   Chest:   Breasts:     Right: Normal.      Left: Normal.   Abdominal:      Palpations: Abdomen is soft. There is no mass.      Tenderness: There is no abdominal tenderness.   Genitourinary:     General: Normal vulva.      Exam position: Supine.      Pubic Area: No rash.       Labia:         Right: No rash.         Left: No rash.       Urethra: No urethral lesion.      Vagina: Normal.      Cervix: Normal.      " Uterus: Normal.       Adnexa: Right adnexa normal and left adnexa normal.      Comments: Vaginal atrophy noted  Musculoskeletal:      Cervical back: Neck supple. No rigidity.   Lymphadenopathy:      Upper Body:      Right upper body: No axillary adenopathy.      Left upper body: No axillary adenopathy.      Lower Body: No right inguinal adenopathy. No left inguinal adenopathy.   Skin:     General: Skin is warm and dry.      Capillary Refill: Capillary refill takes less than 2 seconds.   Neurological:      Mental Status: She is alert and oriented to person, place, and time.   Psychiatric:         Mood and Affect: Mood and affect normal.         Behavior: Behavior normal.            Assessment     1) GYN annual well woman exam.   2) Postmenopausal   3) OA     Plan     1) Breast Health - Clinical breast exam & mammogram yearly, Self breast awareness. Schedule screening mammogram.   2) Pap - performed today  3) STD-declines  4) Smoking status- vapes on occasion; trying to quit  5) Colon health - screening colonoscopy recommended if not up to date  6) Body mass index is 46.72 kg/m². Morbid obesity.  Diet and exercise discussed.  7) Bone health - Weight bearing exercise, dietary calcium recommendations and vitamin D  reviewed.  Routine DEXA ordered.  8) Patient reports that she experiences urinary urge incontinence daily.  Stop Vesicare.  ERX oxybutynin 5 mg daily.    Follow up prn and one year    I spent 20 minutes caring for Zulema on this date of service. This time includes time spent by me in the following activities: preparing for the visit, reviewing tests, obtaining and/or reviewing a separately obtained history, performing a medically appropriate examination and/or evaluation, counseling and educating the patient/family/caregiver, ordering medications, tests, or procedures, and documenting information in the medical record.    I spent 5 minutes on the separately reported service of OA. This time is not included in  the time used to support the E/M service also reported today.       Elis Harrison, LEDY  12/8/2023  10:34 EST

## 2023-12-12 ENCOUNTER — CLINICAL SUPPORT (OUTPATIENT)
Dept: ORTHOPEDIC SURGERY | Facility: CLINIC | Age: 63
End: 2023-12-12
Payer: COMMERCIAL

## 2023-12-12 VITALS — TEMPERATURE: 97.7 F | WEIGHT: 273 LBS | HEIGHT: 63 IN | BODY MASS INDEX: 48.37 KG/M2

## 2023-12-12 DIAGNOSIS — R52 PAIN: Primary | ICD-10-CM

## 2023-12-12 DIAGNOSIS — M17.0 PRIMARY OSTEOARTHRITIS OF BOTH KNEES: ICD-10-CM

## 2023-12-12 RX ORDER — LIDOCAINE HYDROCHLORIDE 10 MG/ML
2 INJECTION, SOLUTION EPIDURAL; INFILTRATION; INTRACAUDAL; PERINEURAL
Status: COMPLETED | OUTPATIENT
Start: 2023-12-12 | End: 2023-12-12

## 2023-12-12 RX ORDER — METHYLPREDNISOLONE ACETATE 80 MG/ML
80 INJECTION, SUSPENSION INTRA-ARTICULAR; INTRALESIONAL; INTRAMUSCULAR; SOFT TISSUE
Status: COMPLETED | OUTPATIENT
Start: 2023-12-12 | End: 2023-12-12

## 2023-12-12 RX ADMIN — METHYLPREDNISOLONE ACETATE 80 MG: 80 INJECTION, SUSPENSION INTRA-ARTICULAR; INTRALESIONAL; INTRAMUSCULAR; SOFT TISSUE at 15:54

## 2023-12-12 RX ADMIN — LIDOCAINE HYDROCHLORIDE 2 ML: 10 INJECTION, SOLUTION EPIDURAL; INFILTRATION; INTRACAUDAL; PERINEURAL at 15:54

## 2023-12-12 NOTE — PROGRESS NOTES
Zulema Sousa is here today for worsening Bilateral knee pain. Knee injection was discussed with the patient in detail, including indication, risks, benefits, and alternatives. Verbal consent was given for the procedure. Injection site was aseptically prepared.    Radiology:   AP, lateral, 40 degree PA of the bilateral knee obtained in the office today due to pain, with comparison views shows advanced tricompartmental degenerative changes, most significantly  lateral and patellofemoral compartment with osteophyte formation and subchondral sclerosis      KNEE Injection Procedure Note:    Large Joint Arthrocentesis: R knee  Date/Time: 12/12/2023 3:54 PM  Consent given by: patient  Site marked: site marked  Timeout: Immediately prior to procedure a time out was called to verify the correct patient, procedure, equipment, support staff and site/side marked as required   Supporting Documentation  Indications: pain, joint swelling and diagnostic evaluation   Procedure Details  Location: knee - R knee  Preparation: Patient was prepped and draped in the usual sterile fashion  Needle gauge: 21G.  Medications administered: 80 mg methylPREDNISolone acetate 80 MG/ML; 2 mL lidocaine PF 1% 1 %  Patient tolerance: patient tolerated the procedure well with no immediate complications      Large Joint Arthrocentesis: L knee  Date/Time: 12/12/2023 3:54 PM  Consent given by: patient  Site marked: site marked  Timeout: Immediately prior to procedure a time out was called to verify the correct patient, procedure, equipment, support staff and site/side marked as required   Supporting Documentation  Indications: pain   Procedure Details  Location: knee - L knee  Preparation: Patient was prepped and draped in the usual sterile fashion  Needle gauge: 21G.  Medications administered: 80 mg methylPREDNISolone acetate 80 MG/ML; 2 mL lidocaine PF 1% 1 %  Patient tolerance: patient tolerated the procedure well with no immediate  complications    Zulema Sousa tolerated the procedure well. A Bandage was applied to the injection site. At the conclusion of the injection I discussed the importance of continued quad strengthening exercises on a daily basis. I will see the patient back if the symptoms should fail to improve or worsen.    Beryl Hartley, LEDY  12/12/2023    Dictated Utilizing Dragon Dictation

## 2023-12-27 ENCOUNTER — OFFICE VISIT (OUTPATIENT)
Dept: INTERNAL MEDICINE | Facility: CLINIC | Age: 63
End: 2023-12-27
Payer: COMMERCIAL

## 2023-12-27 ENCOUNTER — TELEPHONE (OUTPATIENT)
Dept: INTERNAL MEDICINE | Facility: CLINIC | Age: 63
End: 2023-12-27

## 2023-12-27 VITALS
HEART RATE: 68 BPM | WEIGHT: 269.8 LBS | BODY MASS INDEX: 46.06 KG/M2 | HEIGHT: 64 IN | OXYGEN SATURATION: 96 % | SYSTOLIC BLOOD PRESSURE: 116 MMHG | TEMPERATURE: 97.8 F | DIASTOLIC BLOOD PRESSURE: 74 MMHG

## 2023-12-27 DIAGNOSIS — E78.2 MIXED HYPERLIPIDEMIA: Primary | ICD-10-CM

## 2023-12-27 DIAGNOSIS — E66.01 SEVERE OBESITY (BMI >= 40): ICD-10-CM

## 2023-12-27 DIAGNOSIS — R73.03 PREDIABETES: ICD-10-CM

## 2023-12-27 DIAGNOSIS — Z12.11 COLON CANCER SCREENING: ICD-10-CM

## 2023-12-27 RX ORDER — SEMAGLUTIDE 1.34 MG/ML
1 INJECTION, SOLUTION SUBCUTANEOUS WEEKLY
Qty: 9 ML | Refills: 0 | Status: SHIPPED | OUTPATIENT
Start: 2023-12-27

## 2023-12-27 NOTE — PROGRESS NOTES
Zulema Sousa is a 63 y.o. female, who presents with a chief complaint of   Chief Complaint   Patient presents with    Hyperlipidemia     F/U           Hyperlipidemia       Pt here for follow up.       she quit smoking January 10th. She has been smoking about 1ppd off and on for years. <20 pack year history.     Obesity - she is s/p lap band removal.  She went back to see dr. Salamanca and is on a meal replacement shake.  Weight going back up     Prediabetes - a1c 6.0 -> 5.7->5.8->5.5  she started ozempic and glucoses have come down.  she has been going to the gym and eating better.       HLD - pt on Crestor and doing well.  No cramps or myalgias.    The following portions of the patient's history were reviewed and updated as appropriate: allergies, current medications, past family history, past medical history, past social history, past surgical history and problem list.    Allergies: Nsaids    Review of Systems   Constitutional:  Negative for fatigue.   HENT: Negative.     Eyes: Negative.    Respiratory: Negative.  Negative for cough.    Cardiovascular:  Negative for leg swelling.   Gastrointestinal: Negative.    Endocrine: Negative.    Genitourinary: Negative.    Musculoskeletal: Negative.    Skin: Negative.    Allergic/Immunologic: Negative.    Neurological:  Negative for dizziness.   Hematological: Negative.    Psychiatric/Behavioral: Negative.     All other systems reviewed and are negative.            Wt Readings from Last 3 Encounters:   12/27/23 122 kg (269 lb 12.8 oz)   12/12/23 124 kg (273 lb)   12/08/23 123 kg (272 lb 3.2 oz)     Temp Readings from Last 3 Encounters:   12/27/23 97.8 °F (36.6 °C) (Infrared)   12/12/23 97.7 °F (36.5 °C) (Temporal)   09/05/23 97.3 °F (36.3 °C) (Temporal)     BP Readings from Last 3 Encounters:   12/27/23 116/74   12/08/23 126/86   08/24/23 134/90     Pulse Readings from Last 3 Encounters:   12/27/23 68   08/24/23 84   07/31/23 83     Body mass index is 47.04  kg/m².  SpO2 Readings from Last 3 Encounters:   12/27/23 96%   08/24/23 95%   07/31/23 97%          Physical Exam  Vitals and nursing note reviewed.   Constitutional:       General: She is not in acute distress.     Appearance: She is well-developed.   HENT:      Head: Normocephalic and atraumatic.      Right Ear: External ear normal.      Left Ear: External ear normal.      Nose: Nose normal.   Eyes:      Conjunctiva/sclera: Conjunctivae normal.      Pupils: Pupils are equal, round, and reactive to light.   Cardiovascular:      Rate and Rhythm: Normal rate and regular rhythm.      Heart sounds: Normal heart sounds.   Pulmonary:      Effort: Pulmonary effort is normal. No respiratory distress.      Breath sounds: Normal breath sounds. No wheezing.   Musculoskeletal:         General: Normal range of motion.      Cervical back: Normal range of motion and neck supple.      Comments: Normal gait   Skin:     General: Skin is warm and dry.   Neurological:      Mental Status: She is alert and oriented to person, place, and time.   Psychiatric:         Behavior: Behavior normal.         Thought Content: Thought content normal.         Judgment: Judgment normal.         Results for orders placed or performed in visit on 12/08/23   POC Urinalysis Dipstick    Specimen: Urine   Result Value Ref Range    Color Yellow Yellow, Straw, Dark Yellow, Meg    Clarity, UA Clear Clear    Glucose, UA Negative Negative mg/dL    Bilirubin Negative Negative    Ketones, UA Negative Negative    Specific Gravity  1.005 1.005 - 1.030    Blood, UA Negative Negative    pH, Urine 5.0 5.0 - 8.0    Protein, POC Negative Negative mg/dL    Urobilinogen, UA Normal Normal, 0.2 E.U./dL    Leukocytes Negative Negative    Nitrite, UA Negative Negative   IGP, Apt HPV,rfx 16 / 18,45    Specimen: Cervix; ThinPrep Vial   Result Value Ref Range    Diagnosis Comment     Specimen adequacy: Comment     Clinician Provided ICD-10: Comment     Performed by: Comment      . .     Note: Comment     Method: Comment     HPV Aptima Negative Negative     Result Review :                  Assessment and Plan    Diagnoses and all orders for this visit:    1. Mixed hyperlipidemia (Primary) - cont statin  -     Comprehensive Metabolic Panel  -     Lipid Panel With LDL / HDL Ratio    2. Prediabetes  -     CBC & Differential  -     Comprehensive Metabolic Panel  -     Hemoglobin A1c  -     Lipid Panel With LDL / HDL Ratio  -     T4, Free  -     TSH  -     Semaglutide, 1 MG/DOSE, (Ozempic, 1 MG/DOSE,) 4 MG/3ML solution pen-injector; Inject 1 mg under the skin into the appropriate area as directed 1 (One) Time Per Week.  Dispense: 9 mL; Refill: 0    3. Colon cancer screening  -     Ambulatory Referral For Screening Colonoscopy    4. Severe obesity (BMI >= 40)  -     Semaglutide, 1 MG/DOSE, (Ozempic, 1 MG/DOSE,) 4 MG/3ML solution pen-injector; Inject 1 mg under the skin into the appropriate area as directed 1 (One) Time Per Week.  Dispense: 9 mL; Refill: 0                       Outpatient Medications Prior to Visit   Medication Sig Dispense Refill    multivitamin (THERAGRAN) tablet tablet Take  by mouth Daily.      omeprazole (priLOSEC) 40 MG capsule Take 1 capsule by mouth Daily. 90 capsule 3    oxybutynin (DITROPAN) 5 MG tablet Take 1 tablet by mouth Daily. 30 tablet 2    rivaroxaban (Xarelto) 10 MG tablet Take 1 tablet by mouth Daily. 90 tablet 2    rosuvastatin (Crestor) 5 MG tablet Take 1 tablet by mouth Daily. 90 tablet 1    Sodium Fluoride (Sodium Fluoride 5000 Plus) 1.1 % cream Use daily as directed to brush teeth 51 g 2    Semaglutide,0.25 or 0.5MG/DOS, (Ozempic, 0.25 or 0.5 MG/DOSE,) 2 MG/3ML solution pen-injector Inject 0.5 mg under the skin into the appropriate area as directed 1 (One) Time Per Week. 9 mL 1     No facility-administered medications prior to visit.     New Medications Ordered This Visit   Medications    Semaglutide, 1 MG/DOSE, (Ozempic, 1 MG/DOSE,) 4 MG/3ML solution  pen-injector     Sig: Inject 1 mg under the skin into the appropriate area as directed 1 (One) Time Per Week.     Dispense:  9 mL     Refill:  0     [unfilled]  Medications Discontinued During This Encounter   Medication Reason    Semaglutide,0.25 or 0.5MG/DOS, (Ozempic, 0.25 or 0.5 MG/DOSE,) 2 MG/3ML solution pen-injector *Therapy completed         Return in about 6 months (around 6/27/2024) for Recheck, labs.    Patient was given instructions and counseling regarding her condition or for health maintenance advice. Please see specific information pulled into the AVS if appropriate.

## 2024-01-03 ENCOUNTER — OFFICE VISIT (OUTPATIENT)
Dept: SLEEP MEDICINE | Facility: HOSPITAL | Age: 64
End: 2024-01-03
Payer: COMMERCIAL

## 2024-01-03 VITALS
BODY MASS INDEX: 47.48 KG/M2 | HEIGHT: 63 IN | HEART RATE: 76 BPM | SYSTOLIC BLOOD PRESSURE: 121 MMHG | OXYGEN SATURATION: 95 % | DIASTOLIC BLOOD PRESSURE: 79 MMHG | WEIGHT: 268 LBS

## 2024-01-03 DIAGNOSIS — G47.30 SEVERE SLEEP APNEA: Primary | ICD-10-CM

## 2024-01-03 DIAGNOSIS — E66.9 OBESITY (BMI 30-39.9): ICD-10-CM

## 2024-01-03 PROCEDURE — G0463 HOSPITAL OUTPT CLINIC VISIT: HCPCS

## 2024-01-03 NOTE — PROGRESS NOTES
[facility]  Telephone: [phone] / Fax: [phone] [place]  Telephone: [phone] / Fax: [phone] [place]    PCP: Marsha Young MD    Reason for visit: JOSHUA f/u    Zulema Sousa is a 63 y.o.female  was last seen at [facility] sleep lab in November 2020. Her machine got recalled. She received updated device from [employer] and uses it nightly. She uses a memory foam mask that fits well. No snoring or apneas on the machine reported. CPAP pressure is 10cm H2O and appears adequate.  Her sleep schedule is MN-7am, ESS is 2.    SH- no tobacco, no alcohol, 1 energy drink, 2 caffeine.    ROS- negative.    DME [employer]    Current Medications:    Current Outpatient Medications:     multivitamin (THERAGRAN) tablet tablet, Take  by mouth Daily., Disp: , Rfl:     omeprazole (priLOSEC) 40 MG capsule, Take 1 capsule by mouth Daily., Disp: 90 capsule, Rfl: 3    oxybutynin (DITROPAN) 5 MG tablet, Take 1 tablet by mouth Daily., Disp: 30 tablet, Rfl: 2    rivaroxaban (Xarelto) 10 MG tablet, Take 1 tablet by mouth Daily., Disp: 90 tablet, Rfl: 2    rosuvastatin (Crestor) 5 MG tablet, Take 1 tablet by mouth Daily., Disp: 90 tablet, Rfl: 1    Semaglutide, 1 MG/DOSE, (Ozempic, 1 MG/DOSE,) 4 MG/3ML solution pen-injector, Inject 1 mg under the skin into the appropriate area as directed 1 (One) Time Per Week., Disp: 9 mL, Rfl: 0    Sodium Fluoride (Sodium Fluoride 5000 Plus) 1.1 % cream, Use daily as directed to brush teeth, Disp: 51 g, Rfl: 2   also entered in Sleep Questionnaire    Patient  has a past medical history of Acid reflux, Back pain, Cardiac tamponade, Cholelithiasis, Deep vein thrombosis, Difficulty breathing, Edema, Esophagitis, reflux, Essential hypertriglyceridemia, GERD (gastroesophageal reflux disease), Health care maintenance, Heartburn, Hyperlipidemia, Hypertriglyceridemia, Increased appetite, Morbid obesity, Multiple joint pain, Obesity, Class III, BMI 40-49.9  "(morbid obesity) (01/05/2018), Osteoarthritis of knee, Pericarditis, Pulmonary embolism, RHA (rheumatoid arthritis), Sciatica, Sleep apnea, and Unintended weight gain.    I have reviewed the Past Medical History, Past Surgical History, Social History and Family History.    Vital Signs /79   Pulse 76   Ht 160 cm (63\")   Wt 122 kg (268 lb)   LMP  (LMP Unknown)   SpO2 95%   BMI 47.47 kg/m²  Body mass index is 47.47 kg/m².    General Alert and oriented. No acute distress noted   Pharynx/Throat Class III Mallampati airway, large tongue, no evidence of redundant lateral pharyngeal tissue. No oral lesions. No thrush. Moist mucous membranes.   Head Normocephalic. Symmetrical. Atraumatic.    Nose No septal deviation. No drainage   Chest Wall Normal shape. Symmetric expansion with respiration. No tenderness.   Neck Trachea midline, no thyromegaly or adenopathy    Lungs Clear to auscultation bilaterally. No wheezes. No rhonchi. No rales. Respirations regular, even and unlabored.   Heart Regular rhythm and normal rate. Normal S1 and S2. No murmur   Abdomen Soft, non-tender and non-distended. Normal bowel sounds. No masses.   Extremities Moves all extremities well. No edema   Psychiatric Normal mood and affect.     Testing:  Download 10/5/23-1/2/24 87% use with average nightly use of 6 hours and 13 minutes on CPAP 10cm H2O, AHI 3.1/hr, leak is 1 min and 7 seconds.    Study-2017-Study:  3/22/17- overnight split polysomnogram study.  Diagnostic portion from 10:16 PM to 12:31 AM.  Sleep efficiency 88% with 1.98 hours total sleep time.  Sleep distribution showed decreased REM sleep at 4.6%.  Apnea hypopneas index 47.9 indicating very severe obstructive sleep apnea.  In 43 minutes of supine sleep index was even higher at 115.  In 5 minutes of REM sleep index was 130.  Oxygen saturation fell below 89% for over 9 minutes or over 6% of total sleep time.  Arousal index is 40.  PLM index is not increased.  Snoring for 40% of " sleep time.     Following above titration study from 12:31 AM to 5:48 AM.  Sleep efficiency 83%.  Rebound and slow-wave sleep to 31% rebound in rem sleep to 22%.  Apnea hypopneas index improved even with that her REM sleep.  Maximum CPAP of 16 cm with supine position.  REM sleep was achieved.  Apneas hypopneas were completely corrected even and REM sleep at 16 cm water pressure.    Impression:  1. Severe sleep apnea    2. Obesity (BMI 30-39.9)          Plan:  Zulema is using the CPAP machine and benefits from its use in terms of reduction of hypersomnia and snoring. I reviewed recent download report and original sleep study report with the patient. I advised pt to contact us if PAP pressures feel excessive or insufficient. Weight loss will be strongly beneficial to reduce the severity of sleep-disordered breathing.  I have renewed the order for CPAP supplies.    Follow up with Dr. Franco in one year    Thank you for allowing me to participate in your patient's care.      LEDY Robbins  [facility]  Phone: [phone]      Part of this note may be an electronic transcription/translation of spoken language to printed text using the Dragon Dictation System.

## 2024-02-07 ENCOUNTER — APPOINTMENT (OUTPATIENT)
Dept: BONE DENSITY | Facility: HOSPITAL | Age: 64
End: 2024-02-07
Payer: COMMERCIAL

## 2024-02-07 ENCOUNTER — HOSPITAL ENCOUNTER (OUTPATIENT)
Dept: MAMMOGRAPHY | Facility: HOSPITAL | Age: 64
Discharge: HOME | End: 2024-02-07
Payer: COMMERCIAL

## 2024-02-07 DIAGNOSIS — Z78.0 POSTMENOPAUSAL: ICD-10-CM

## 2024-02-07 DIAGNOSIS — Z13.820 OSTEOPOROSIS SCREENING: ICD-10-CM

## 2024-02-07 DIAGNOSIS — Z12.31 BREAST CANCER SCREENING BY MAMMOGRAM: ICD-10-CM

## 2024-02-07 PROBLEM — M85.80 OSTEOPENIA AFTER MENOPAUSE: Status: ACTIVE | Noted: 2024-02-07

## 2024-02-07 PROCEDURE — 77063 BREAST TOMOSYNTHESIS BI: CPT

## 2024-02-07 PROCEDURE — 77080 DXA BONE DENSITY AXIAL: CPT

## 2024-02-07 PROCEDURE — 77067 SCR MAMMO BI INCL CAD: CPT | Performed by: RADIOLOGY

## 2024-02-07 PROCEDURE — 77067 SCR MAMMO BI INCL CAD: CPT

## 2024-02-07 PROCEDURE — 77063 BREAST TOMOSYNTHESIS BI: CPT | Performed by: RADIOLOGY

## 2024-02-26 ENCOUNTER — OFFICE VISIT (OUTPATIENT)
Dept: CARDIOLOGY | Facility: CLINIC | Age: 64
End: 2024-02-26
Payer: COMMERCIAL

## 2024-02-26 VITALS
SYSTOLIC BLOOD PRESSURE: 141 MMHG | WEIGHT: 261 LBS | DIASTOLIC BLOOD PRESSURE: 70 MMHG | OXYGEN SATURATION: 96 % | HEIGHT: 65 IN | BODY MASS INDEX: 43.49 KG/M2 | HEART RATE: 91 BPM

## 2024-02-26 DIAGNOSIS — Z86.711 HISTORY OF PULMONARY EMBOLUS (PE): Primary | ICD-10-CM

## 2024-02-26 DIAGNOSIS — E78.2 MIXED HYPERLIPIDEMIA: ICD-10-CM

## 2024-02-26 PROCEDURE — 99214 OFFICE O/P EST MOD 30 MIN: CPT | Performed by: NURSE PRACTITIONER

## 2024-02-26 PROCEDURE — 93000 ELECTROCARDIOGRAM COMPLETE: CPT | Performed by: NURSE PRACTITIONER

## 2024-02-26 NOTE — PROGRESS NOTES
Date of Office Visit: 2024  Encounter Provider: LEDY Jade  Place of Service: [facility] CARDIOLOGY  Patient Name: Zulema Sousa  :1960    Chief Complaint   Patient presents with    Follow-up    pulmonary embolus   :     HPI: Zulema Sousa is a 63 y.o. female who is a patient of  Dr. Hutchinson and previously Dr. Hernández. She is new to me today.  In 2016 she suffered bilateral pulmonary emboli and underwent a thrombectomy.  She recovered well.  This was complicated by cardiac tamponade complicated by pericardiocentesis for which she recovered well she had some relapsing pericarditis after that that was treated with steroids.  She was in the office last year she had been off anticoagulation for about a year and had a recurrent PE so she was started back on Xarelto.  She is here for follow-up today.    Overall she is doing well.  She denies any chest pain, pressure or tightness.  She has been trying to lose weight.  She has been going to the gym 3 to 4 days a week doing weights and at least 30 minutes on the treadmill.  She got up to a mile the other day.  She denies any chest pain, pressure or palpitations.  She is maintained on the maintenance dose of Xarelto 10 mg daily.  She has not had any recurrent pulmonary embolus.  Previous testing and notes have been reviewed by me.   Past Medical History:   Diagnosis Date    Acid reflux     Back pain     Cardiac tamponade     2015    Cholelithiasis     Deep vein thrombosis     Difficulty breathing     during exertion    Edema     Esophagitis, reflux     Essential hypertriglyceridemia     GERD (gastroesophageal reflux disease)     Health care maintenance     Heartburn     Hyperlipidemia     Hypertriglyceridemia     Increased appetite     Morbid obesity     Multiple joint pain     Obesity, Class III, BMI 40-49.9 (morbid obesity) 2018    Osteoarthritis of knee     Pericarditis     Pulmonary embolism     [place]      RHA (rheumatoid arthritis)     Sciatica     Sleep apnea     Unintended weight gain        Past Surgical History:   Procedure Laterality Date    ANKLE SURGERY      treatment of ankle fracture    APPENDECTOMY      CARDIAC SURGERY      cardiac tamponade    CHOLECYSTECTOMY      COLONOSCOPY N/A 2018    Procedure: COLONOSCOPY to cecum and t.i. with polypectomy and clip x2 to ascending colon;  Surgeon: Pierre Ornelas MD;  Location: [facility] ENDOSCOPY;  Service: Gastroenterology    DILATATION AND CURETTAGE      ENDOSCOPY N/A 2018    Procedure: ESOPHAGOGASTRODUODENOSCOPY;  Surgeon: Pierre Ornelas MD;  Location: [facility] ENDOSCOPY;  Service: Gastroenterology    FRACTURE SURGERY      GASTRIC BANDING REMOVAL N/A 2019    Procedure: GASTRIC BANDING AND PORT REMOVAL, LYSIS OF ADHESIONS, REPAIR OF COLOTOMY, AND INCISIONAL HERNIA REPAIR LAPAROSCOPIC;  Surgeon: Randell Salamanca Jr., MD;  Location: [facility] OR [facility];  Service: General    LAPAROSCOPIC GASTRIC BANDING      PULMONARY EMBOLISM SURGERY      TUBAL ABDOMINAL LIGATION         Social History     Socioeconomic History    Marital status:      Spouse name: Jac   Tobacco Use    Smoking status: Former     Packs/day: 0.50     Years: 10.00     Additional pack years: 0.00     Total pack years: 5.00     Types: Cigarettes     Quit date: 1/10/2023     Years since quittin.1     Passive exposure: Past    Smokeless tobacco: Never    Tobacco comments:     caffeine use   Vaping Use    Vaping Use: Some days    Substances: Nicotine    Devices: Refillable tank   Substance and Sexual Activity    Alcohol use: Not Currently     Alcohol/week: 0.0 - 2.0 standard drinks of alcohol     Comment: Very rarely    Drug use: No    Sexual activity: Yes     Partners: Male     Birth control/protection: Surgical, Post-menopausal       Family History   Problem Relation Age of Onset    Diabetes Father     Hypertension Father     Heart disease Father     Obesity Father      Arthritis Father     COPD Father     Depression Father     Cancer Mother     Obesity Mother     Heart disease Brother     Heart disease Brother     Arthritis Sister     COPD Sister     Diabetes Paternal Grandfather     No Known Problems Paternal Grandmother     No Known Problems Maternal Grandmother     No Known Problems Maternal Grandfather     Diabetes Paternal Aunt     Diabetes Paternal Aunt     Malig Hyperthermia Neg Hx     Breast cancer Neg Hx     Uterine cancer Neg Hx     Colon cancer Neg Hx        Review of Systems   Constitutional: Negative for diaphoresis and malaise/fatigue.   Cardiovascular:  Negative for chest pain, claudication, dyspnea on exertion, irregular heartbeat, leg swelling, near-syncope, orthopnea, palpitations, paroxysmal nocturnal dyspnea and syncope.   Respiratory:  Negative for cough, shortness of breath and sleep disturbances due to breathing.    Musculoskeletal:  Negative for falls.   Neurological:  Negative for dizziness and weakness.   Psychiatric/Behavioral:  Negative for altered mental status and substance abuse.        Allergies   Allergen Reactions    Nsaids Other (See Comments)     DOESN'T TAKE BECAUSE SHES ON BLOOD THINNER.         Current Outpatient Medications:     multivitamin (THERAGRAN) tablet tablet, Take  by mouth Daily., Disp: , Rfl:     omeprazole (priLOSEC) 40 MG capsule, Take 1 capsule by mouth Daily., Disp: 90 capsule, Rfl: 3    oxybutynin (DITROPAN) 5 MG tablet, Take 1 tablet by mouth Daily., Disp: 30 tablet, Rfl: 2    rivaroxaban (Xarelto) 10 MG tablet, Take 1 tablet by mouth Daily., Disp: 90 tablet, Rfl: 2    rosuvastatin (Crestor) 5 MG tablet, Take 1 tablet by mouth Daily., Disp: 90 tablet, Rfl: 1    Semaglutide, 1 MG/DOSE, (Ozempic, 1 MG/DOSE,) 4 MG/3ML solution pen-injector, Inject 1 mg under the skin into the appropriate area as directed 1 (One) Time Per Week., Disp: 9 mL, Rfl: 0    Sodium Fluoride (Sodium Fluoride 5000 Plus) 1.1 % cream, Use daily as directed  "to brush teeth, Disp: 51 g, Rfl: 2      Objective:     Vitals:    02/26/24 1053   BP: 141/70   Pulse: 91   SpO2: 96%   Weight: 118 kg (261 lb)   Height: 165.1 cm (65\")     Body mass index is 43.43 kg/m².    PHYSICAL EXAM:    Constitutional:       General: Not in acute distress.     Appearance: Normal appearance. Well-developed.   Eyes:      Pupils: Pupils are equal, round, and reactive to light.   HENT:      Head: Normocephalic.   Neck:      Vascular: No carotid bruit or JVD.   Pulmonary:      Effort: Pulmonary effort is normal. No tachypnea.      Breath sounds: Normal breath sounds. No wheezing. No rales.   Cardiovascular:      Normal rate. Regular rhythm.      No gallop.    Pulses:     Intact distal pulses.   Edema:     Peripheral edema absent.   Abdominal:      General: Bowel sounds are normal.      Palpations: Abdomen is soft.      Tenderness: There is no abdominal tenderness.   Musculoskeletal: Normal range of motion.      Cervical back: Normal range of motion and neck supple. No edema. Skin:     General: Skin is warm and dry.   Neurological:      Mental Status: Alert and oriented to person, place, and time.           ECG 12 Lead    Date/Time: 2/26/2024 11:00 AM  Performed by: Lorrie Gomez APRN    Authorized by: Lorrie Gomez APRN  Comparison: compared with previous ECG from 7/16/2023  Similar to previous ECG  Rhythm: sinus rhythm  Rate: normal  QRS axis: normal    Clinical impression: normal ECG            Assessment:       Diagnosis Plan   1. History of pulmonary embolus (PE)     On Xarelto maintenance dose. No issues with bleeding   2. Mixed hyperlipidemia     Working on diet and exercise     Orders Placed This Encounter   Procedures    ECG 12 Lead     This order was created via procedure documentation     Order Specific Question:   Release to patient     Answer:   Routine Release [[id number]]          Plan:       Follow up in 1 year         Your medication list            Accurate as of February " 26, 2024 11:27 AM. If you have any questions, ask your nurse or doctor.                CONTINUE taking these medications        Instructions Last Dose Given Next Dose Due   multivitamin tablet tablet      Take  by mouth Daily.       omeprazole 40 MG capsule  Commonly known as: priLOSEC      Take 1 capsule by mouth Daily.       oxybutynin 5 MG tablet  Commonly known as: DITROPAN      Take 1 tablet by mouth Daily.       Ozempic (1 MG/DOSE) 4 MG/3ML solution pen-injector  Generic drug: Semaglutide (1 MG/DOSE)      Inject 1 mg under the skin into the appropriate area as directed 1 (One) Time Per Week.       rosuvastatin 5 MG tablet  Commonly known as: Crestor      Take 1 tablet by mouth Daily.       Sodium Fluoride 5000 Plus 1.1 % cream  Generic drug: Sodium Fluoride      Use daily as directed to brush teeth       Xarelto 10 MG tablet  Generic drug: rivaroxaban      Take 1 tablet by mouth Daily.                  As always, it has been a pleasure to participate in your patient's care.      Sincerely,     Lorrie VILLAFANA

## 2024-03-03 DIAGNOSIS — N32.81 OVERACTIVE BLADDER: ICD-10-CM

## 2024-03-03 DIAGNOSIS — E78.2 MIXED HYPERLIPIDEMIA: ICD-10-CM

## 2024-03-04 RX ORDER — OXYBUTYNIN CHLORIDE 5 MG/1
5 TABLET ORAL DAILY
Qty: 30 TABLET | Refills: 6 | Status: SHIPPED | OUTPATIENT
Start: 2024-03-04

## 2024-03-04 RX ORDER — ROSUVASTATIN CALCIUM 5 MG/1
5 TABLET, COATED ORAL DAILY
Qty: 90 TABLET | Refills: 1 | Status: SHIPPED | OUTPATIENT
Start: 2024-03-04

## 2024-03-04 NOTE — TELEPHONE ENCOUNTER
Rx Refill Note  Requested Prescriptions     Pending Prescriptions Disp Refills    rosuvastatin (Crestor) 5 MG tablet 90 tablet 1     Sig: Take 1 tablet by mouth Daily.      Last office visit with prescribing clinician: 12/27/2023   Last telemedicine visit with prescribing clinician: Visit date not found   Next office visit with prescribing clinician: 6/28/2024                         Would you like a call back once the refill request has been completed: [] Yes [] No    If the office needs to give you a call back, can they leave a voicemail: [] Yes [] No    Nicola Wong, PCT  03/04/24, 15:52 EST

## 2024-03-12 ENCOUNTER — OFFICE VISIT (OUTPATIENT)
Dept: ORTHOPEDIC SURGERY | Facility: CLINIC | Age: 64
End: 2024-03-12
Payer: COMMERCIAL

## 2024-03-12 VITALS — BODY MASS INDEX: 46.81 KG/M2 | WEIGHT: 264.2 LBS | HEIGHT: 63 IN | TEMPERATURE: 98.4 F

## 2024-03-12 DIAGNOSIS — M17.0 PRIMARY OSTEOARTHRITIS OF BOTH KNEES: Primary | ICD-10-CM

## 2024-03-12 PROCEDURE — 20610 DRAIN/INJ JOINT/BURSA W/O US: CPT | Performed by: NURSE PRACTITIONER

## 2024-03-12 RX ORDER — LIDOCAINE HYDROCHLORIDE 10 MG/ML
2 INJECTION, SOLUTION EPIDURAL; INFILTRATION; INTRACAUDAL; PERINEURAL
Status: COMPLETED | OUTPATIENT
Start: 2024-03-12 | End: 2024-03-12

## 2024-03-12 RX ORDER — METHYLPREDNISOLONE ACETATE 80 MG/ML
80 INJECTION, SUSPENSION INTRA-ARTICULAR; INTRALESIONAL; INTRAMUSCULAR; SOFT TISSUE
Status: COMPLETED | OUTPATIENT
Start: 2024-03-12 | End: 2024-03-12

## 2024-03-12 RX ADMIN — LIDOCAINE HYDROCHLORIDE 2 ML: 10 INJECTION, SOLUTION EPIDURAL; INFILTRATION; INTRACAUDAL; PERINEURAL at 14:44

## 2024-03-12 RX ADMIN — METHYLPREDNISOLONE ACETATE 80 MG: 80 INJECTION, SUSPENSION INTRA-ARTICULAR; INTRALESIONAL; INTRAMUSCULAR; SOFT TISSUE at 14:44

## 2024-03-12 NOTE — PROGRESS NOTES
Zulema Sousa is here today for worsening Bilateral knee pain. Knee injection was discussed with the patient in detail, including indication, risks, benefits, and alternatives. Verbal consent was given for the procedure. Injection site was aseptically prepared.      KNEE Injection Procedure Note:    Large Joint Arthrocentesis: R knee  Date/Time: 3/12/2024 2:44 PM  Consent given by: patient  Site marked: site marked  Timeout: Immediately prior to procedure a time out was called to verify the correct patient, procedure, equipment, support staff and site/side marked as required   Supporting Documentation  Indications: pain, joint swelling and diagnostic evaluation   Procedure Details  Location: knee - R knee  Preparation: Patient was prepped and draped in the usual sterile fashion  Needle gauge: 21G.  Medications administered: 80 mg methylPREDNISolone acetate 80 MG/ML; 2 mL lidocaine PF 1% 1 %  Patient tolerance: patient tolerated the procedure well with no immediate complications      Large Joint Arthrocentesis: L knee  Date/Time: 3/12/2024 2:44 PM  Consent given by: patient  Site marked: site marked  Timeout: Immediately prior to procedure a time out was called to verify the correct patient, procedure, equipment, support staff and site/side marked as required   Supporting Documentation  Indications: pain   Procedure Details  Location: knee - L knee  Preparation: Patient was prepped and draped in the usual sterile fashion  Needle gauge: 21G.  Medications administered: 80 mg methylPREDNISolone acetate 80 MG/ML; 2 mL lidocaine PF 1% 1 %  Patient tolerance: patient tolerated the procedure well with no immediate complications     Zulema Sousa tolerated the procedure well. A Bandage was applied to the injection site. At the conclusion of the injection I discussed the importance of continued quad strengthening exercises on a daily basis. I will see the patient back if the symptoms should fail to improve or  worsen.    Beryl Hartley, APRN  3/12/2024    Dictated Utilizing Dragon Dictation

## 2024-03-18 DIAGNOSIS — N32.81 OVERACTIVE BLADDER: ICD-10-CM

## 2024-03-18 RX ORDER — OXYBUTYNIN CHLORIDE 5 MG/1
5 TABLET ORAL 3 TIMES DAILY
Qty: 90 TABLET | Refills: 6 | Status: SHIPPED | OUTPATIENT
Start: 2024-03-18

## 2024-06-10 ENCOUNTER — TELEPHONE (OUTPATIENT)
Dept: GASTROENTEROLOGY | Facility: CLINIC | Age: 64
End: 2024-06-10
Payer: COMMERCIAL

## 2024-06-10 ENCOUNTER — PREP FOR SURGERY (OUTPATIENT)
Dept: OTHER | Facility: HOSPITAL | Age: 64
End: 2024-06-10
Payer: COMMERCIAL

## 2024-06-10 DIAGNOSIS — Z12.11 ENCOUNTER FOR SCREENING FOR MALIGNANT NEOPLASM OF COLON: Primary | ICD-10-CM

## 2024-06-10 RX ORDER — OMEPRAZOLE 40 MG/1
40 CAPSULE, DELAYED RELEASE ORAL DAILY
Qty: 90 CAPSULE | Refills: 3 | Status: SHIPPED | OUTPATIENT
Start: 2024-06-10

## 2024-06-10 NOTE — TELEPHONE ENCOUNTER
LAST C/S  11/27/18   IN EPIC     PERSONAL HX OF POLYPS    NO FAMILY HX OF POLYPS    NO FAMILY HX OF COLON CA            LIST OF  MEDICATIONS  XARELTO   OMEPRAZOLE  CRESTOR  OXYBUTYNIN            OA QUESTIONNAIRE SCANNED IN MEDIA

## 2024-06-11 ENCOUNTER — TELEPHONE (OUTPATIENT)
Dept: GASTROENTEROLOGY | Facility: CLINIC | Age: 64
End: 2024-06-11
Payer: COMMERCIAL

## 2024-06-11 NOTE — TELEPHONE ENCOUNTER
[facility] - FOR [facility] WITH PROVIDER - [facility] WILL CALL WITH ARRIVE TIME A WEEK PRIOR - DATE IS 10/28/2024- COLON

## 2024-06-13 ENCOUNTER — OFFICE VISIT (OUTPATIENT)
Dept: ORTHOPEDIC SURGERY | Facility: CLINIC | Age: 64
End: 2024-06-13
Payer: COMMERCIAL

## 2024-06-13 VITALS — HEIGHT: 64 IN | BODY MASS INDEX: 45.45 KG/M2 | TEMPERATURE: 98 F | WEIGHT: 266.2 LBS

## 2024-06-13 DIAGNOSIS — M17.0 PRIMARY OSTEOARTHRITIS OF BOTH KNEES: ICD-10-CM

## 2024-06-13 DIAGNOSIS — R52 PAIN: Primary | ICD-10-CM

## 2024-06-13 RX ORDER — METHYLPREDNISOLONE ACETATE 80 MG/ML
80 INJECTION, SUSPENSION INTRA-ARTICULAR; INTRALESIONAL; INTRAMUSCULAR; SOFT TISSUE
Status: COMPLETED | OUTPATIENT
Start: 2024-06-13 | End: 2024-06-13

## 2024-06-13 RX ORDER — LIDOCAINE HYDROCHLORIDE 10 MG/ML
2 INJECTION, SOLUTION EPIDURAL; INFILTRATION; INTRACAUDAL; PERINEURAL
Status: COMPLETED | OUTPATIENT
Start: 2024-06-13 | End: 2024-06-13

## 2024-06-13 RX ADMIN — LIDOCAINE HYDROCHLORIDE 2 ML: 10 INJECTION, SOLUTION EPIDURAL; INFILTRATION; INTRACAUDAL; PERINEURAL at 15:11

## 2024-06-13 RX ADMIN — METHYLPREDNISOLONE ACETATE 80 MG: 80 INJECTION, SUSPENSION INTRA-ARTICULAR; INTRALESIONAL; INTRAMUSCULAR; SOFT TISSUE at 15:11

## 2024-06-13 NOTE — PROGRESS NOTES
Zulema Sousa is here today for worsening Bilateral knee pain. Knee injection was discussed with the patient in detail, including indication, risks, benefits, and alternatives. Verbal consent was given for the procedure. Injection site was aseptically prepared.    Radiology:   AP, lateral, 40 degree PA of the bilateral knee obtained in the office today due to pain, with comparison views shows advanced tricompartmental degenerative changes, most significantly  lateral and patellofemoral compartment with osteophyte formation and subchondral sclerosis       KNEE Injection Procedure Note:    Large Joint Arthrocentesis: R knee  Date/Time: 6/13/2024 3:11 PM  Consent given by: patient  Site marked: site marked  Timeout: Immediately prior to procedure a time out was called to verify the correct patient, procedure, equipment, support staff and site/side marked as required   Supporting Documentation  Indications: pain, joint swelling and diagnostic evaluation   Procedure Details  Location: knee - R knee  Preparation: Patient was prepped and draped in the usual sterile fashion  Needle gauge: 21G.  Medications administered: 80 mg methylPREDNISolone acetate 80 MG/ML; 2 mL lidocaine PF 1% 1 %  Patient tolerance: patient tolerated the procedure well with no immediate complications      Large Joint Arthrocentesis: L knee  Date/Time: 6/13/2024 3:11 PM  Consent given by: patient  Site marked: site marked  Timeout: Immediately prior to procedure a time out was called to verify the correct patient, procedure, equipment, support staff and site/side marked as required   Supporting Documentation  Indications: pain   Procedure Details  Location: knee - L knee  Preparation: Patient was prepped and draped in the usual sterile fashion  Needle gauge: 21G.  Medications administered: 80 mg methylPREDNISolone acetate 80 MG/ML; 2 mL lidocaine PF 1% 1 %  Patient tolerance: patient tolerated the procedure well with no immediate complications      Zulema Sousa tolerated the procedure well. A Bandage was applied to the injection site. At the conclusion of the injection I discussed the importance of continued quad strengthening exercises on a daily basis. I will see the patient back if the symptoms should fail to improve or worsen.    -Patient has been doing more physical fitness with her son and is now up to walking a mile and a half on the treadmill and was able to get back up when she fell twice without help from someone else. These are huge nonskilled victories for her.  She still having to lose weight but she is starting to feel much better with the activity.  The symptoms are little bit better as well.    Beryl Hartley, APRN  6/13/2024    Dictated Utilizing Dragon Dictation

## 2024-06-21 LAB
ALBUMIN SERPL-MCNC: 4.1 G/DL (ref 3.5–5.2)
ALBUMIN/GLOB SERPL: 1.6 G/DL
ALP SERPL-CCNC: 94 U/L (ref 39–117)
ALT SERPL-CCNC: 23 U/L (ref 1–33)
AST SERPL-CCNC: 22 U/L (ref 1–32)
BASOPHILS # BLD AUTO: 0.03 10*3/MM3 (ref 0–0.2)
BASOPHILS NFR BLD AUTO: 0.4 % (ref 0–1.5)
BILIRUB SERPL-MCNC: 0.2 MG/DL (ref 0–1.2)
BUN SERPL-MCNC: 22 MG/DL (ref 8–23)
BUN/CREAT SERPL: 28.2 (ref 7–25)
CALCIUM SERPL-MCNC: 9.7 MG/DL (ref 8.6–10.5)
CHLORIDE SERPL-SCNC: 105 MMOL/L (ref 98–107)
CHOLEST SERPL-MCNC: 158 MG/DL (ref 0–200)
CO2 SERPL-SCNC: 27.8 MMOL/L (ref 22–29)
CREAT SERPL-MCNC: 0.78 MG/DL (ref 0.57–1)
EGFRCR SERPLBLD CKD-EPI 2021: 84.4 ML/MIN/1.73
EOSINOPHIL # BLD AUTO: 0.1 10*3/MM3 (ref 0–0.4)
EOSINOPHIL NFR BLD AUTO: 1.3 % (ref 0.3–6.2)
ERYTHROCYTE [DISTWIDTH] IN BLOOD BY AUTOMATED COUNT: 13.2 % (ref 12.3–15.4)
GLOBULIN SER CALC-MCNC: 2.6 GM/DL
GLUCOSE SERPL-MCNC: 94 MG/DL (ref 65–99)
HBA1C MFR BLD: 5.6 % (ref 4.8–5.6)
HCT VFR BLD AUTO: 41 % (ref 34–46.6)
HDLC SERPL-MCNC: 72 MG/DL (ref 40–60)
HGB BLD-MCNC: 12.8 G/DL (ref 12–15.9)
IMM GRANULOCYTES # BLD AUTO: 0.01 10*3/MM3 (ref 0–0.05)
IMM GRANULOCYTES NFR BLD AUTO: 0.1 % (ref 0–0.5)
LDLC SERPL CALC-MCNC: 75 MG/DL (ref 0–100)
LDLC/HDLC SERPL: 1.06 {RATIO}
LYMPHOCYTES # BLD AUTO: 2.63 10*3/MM3 (ref 0.7–3.1)
LYMPHOCYTES NFR BLD AUTO: 35 % (ref 19.6–45.3)
MCH RBC QN AUTO: 28.7 PG (ref 26.6–33)
MCHC RBC AUTO-ENTMCNC: 31.2 G/DL (ref 31.5–35.7)
MCV RBC AUTO: 91.9 FL (ref 79–97)
MONOCYTES # BLD AUTO: 0.6 10*3/MM3 (ref 0.1–0.9)
MONOCYTES NFR BLD AUTO: 8 % (ref 5–12)
NEUTROPHILS # BLD AUTO: 4.14 10*3/MM3 (ref 1.7–7)
NEUTROPHILS NFR BLD AUTO: 55.2 % (ref 42.7–76)
NRBC BLD AUTO-RTO: 0 /100 WBC (ref 0–0.2)
PLATELET # BLD AUTO: 288 10*3/MM3 (ref 140–450)
POTASSIUM SERPL-SCNC: 4.5 MMOL/L (ref 3.5–5.2)
PROT SERPL-MCNC: 6.7 G/DL (ref 6–8.5)
RBC # BLD AUTO: 4.46 10*6/MM3 (ref 3.77–5.28)
SODIUM SERPL-SCNC: 143 MMOL/L (ref 136–145)
T4 FREE SERPL-MCNC: 1.13 NG/DL (ref 0.92–1.68)
TRIGL SERPL-MCNC: 50 MG/DL (ref 0–150)
TSH SERPL DL<=0.005 MIU/L-ACNC: 3.92 UIU/ML (ref 0.27–4.2)
VLDLC SERPL CALC-MCNC: 11 MG/DL (ref 5–40)
WBC # BLD AUTO: 7.51 10*3/MM3 (ref 3.4–10.8)

## 2024-06-28 ENCOUNTER — OFFICE VISIT (OUTPATIENT)
Dept: INTERNAL MEDICINE | Facility: CLINIC | Age: 64
End: 2024-06-28
Payer: COMMERCIAL

## 2024-06-28 VITALS
WEIGHT: 258.2 LBS | HEART RATE: 67 BPM | SYSTOLIC BLOOD PRESSURE: 118 MMHG | HEIGHT: 64 IN | BODY MASS INDEX: 44.08 KG/M2 | DIASTOLIC BLOOD PRESSURE: 76 MMHG | OXYGEN SATURATION: 100 % | TEMPERATURE: 97.7 F

## 2024-06-28 DIAGNOSIS — E66.01 SEVERE OBESITY (BMI >= 40): ICD-10-CM

## 2024-06-28 DIAGNOSIS — Z23 NEED FOR VACCINATION: Primary | ICD-10-CM

## 2024-06-28 DIAGNOSIS — R73.03 PREDIABETES: ICD-10-CM

## 2024-06-28 DIAGNOSIS — E78.2 MIXED HYPERLIPIDEMIA: ICD-10-CM

## 2024-06-28 PROCEDURE — 90677 PCV20 VACCINE IM: CPT | Performed by: INTERNAL MEDICINE

## 2024-06-28 PROCEDURE — 90471 IMMUNIZATION ADMIN: CPT | Performed by: INTERNAL MEDICINE

## 2024-06-28 PROCEDURE — 99214 OFFICE O/P EST MOD 30 MIN: CPT | Performed by: INTERNAL MEDICINE

## 2024-06-28 NOTE — PROGRESS NOTES
Zulema Sousa is a 64 y.o. female, who presents with a chief complaint of   Chief Complaint   Patient presents with    Hyperlipidemia     Follow up           Hyperlipidemia     Pt here for follow up.       she quit smoking January 10th. She has been smoking about 1ppd off and on for years. <20 pack year history.     Obesity - she is s/p lap band removal.  she is off ozempic.  She is going to the gym and cont to eat a healthy diet.  She is down about 35 pounds over the past 10 months.      Prediabetes - a1c 6.0 -> 5.7->5.8->5.5->5.6    she has been going to the gym and eating better.       HLD - pt on Crestor and doing well.  No cramps or myalgias.      The following portions of the patient's history were reviewed and updated as appropriate: allergies, current medications, past family history, past medical history, past social history, past surgical history and problem list.    Allergies: Nsaids    Review of Systems   Constitutional: Negative.    HENT: Negative.     Eyes: Negative.    Respiratory: Negative.     Cardiovascular: Negative.    Gastrointestinal: Negative.    Endocrine: Negative.    Genitourinary: Negative.    Musculoskeletal: Negative.    Skin: Negative.    Allergic/Immunologic: Negative.    Neurological: Negative.    Hematological: Negative.    Psychiatric/Behavioral: Negative.     All other systems reviewed and are negative.            Wt Readings from Last 3 Encounters:   06/28/24 117 kg (258 lb 3.2 oz)   06/13/24 121 kg (266 lb 3.2 oz)   03/12/24 120 kg (264 lb 3.2 oz)     Temp Readings from Last 3 Encounters:   06/28/24 97.7 °F (36.5 °C) (Infrared)   06/13/24 98 °F (36.7 °C) (Temporal)   03/12/24 98.4 °F (36.9 °C) (Temporal)     BP Readings from Last 3 Encounters:   06/28/24 118/76   02/26/24 141/70   01/03/24 121/79     Pulse Readings from Last 3 Encounters:   06/28/24 67   02/26/24 91   01/03/24 76     Body mass index is 44.32 kg/m².  SpO2 Readings from Last 3 Encounters:   06/28/24 100%    02/26/24 96%   01/03/24 95%          Physical Exam  Vitals and nursing note reviewed.   Constitutional:       General: She is not in acute distress.     Appearance: She is well-developed.   HENT:      Head: Normocephalic and atraumatic.      Right Ear: External ear normal.      Left Ear: External ear normal.      Nose: Nose normal.   Eyes:      Conjunctiva/sclera: Conjunctivae normal.      Pupils: Pupils are equal, round, and reactive to light.   Cardiovascular:      Rate and Rhythm: Normal rate and regular rhythm.      Heart sounds: Normal heart sounds.   Pulmonary:      Effort: Pulmonary effort is normal. No respiratory distress.      Breath sounds: Normal breath sounds. No wheezing.   Musculoskeletal:         General: Normal range of motion.      Cervical back: Normal range of motion and neck supple.      Comments: Normal gait   Skin:     General: Skin is warm and dry.   Neurological:      Mental Status: She is alert and oriented to person, place, and time.   Psychiatric:         Behavior: Behavior normal.         Thought Content: Thought content normal.         Judgment: Judgment normal.       Results for orders placed or performed in visit on 12/27/23   Comprehensive Metabolic Panel    Specimen: Blood   Result Value Ref Range    Glucose 94 65 - 99 mg/dL    BUN 22 8 - 23 mg/dL    Creatinine 0.78 0.57 - 1.00 mg/dL    EGFR Result 84.4 >60.0 mL/min/1.73    BUN/Creatinine Ratio 28.2 (H) 7.0 - 25.0    Sodium 143 136 - 145 mmol/L    Potassium 4.5 3.5 - 5.2 mmol/L    Chloride 105 98 - 107 mmol/L    Total CO2 27.8 22.0 - 29.0 mmol/L    Calcium 9.7 8.6 - 10.5 mg/dL    Total Protein 6.7 6.0 - 8.5 g/dL    Albumin 4.1 3.5 - 5.2 g/dL    Globulin 2.6 gm/dL    A/G Ratio 1.6 g/dL    Total Bilirubin 0.2 0.0 - 1.2 mg/dL    Alkaline Phosphatase 94 39 - 117 U/L    AST (SGOT) 22 1 - 32 U/L    ALT (SGPT) 23 1 - 33 U/L   Hemoglobin A1c    Specimen: Blood   Result Value Ref Range    Hemoglobin A1C 5.60 4.80 - 5.60 %   Lipid Panel  With LDL / HDL Ratio    Specimen: Blood   Result Value Ref Range    Total Cholesterol 158 0 - 200 mg/dL    Triglycerides 50 0 - 150 mg/dL    HDL Cholesterol 72 (H) 40 - 60 mg/dL    VLDL Cholesterol Bird 11 5 - 40 mg/dL    LDL Chol Calc (NIH) 75 0 - 100 mg/dL    LDL/HDL RATIO 1.06    T4, Free    Specimen: Blood   Result Value Ref Range    Free T4 1.13 0.92 - 1.68 ng/dL   TSH    Specimen: Blood   Result Value Ref Range    TSH 3.920 0.270 - 4.200 uIU/mL   CBC & Differential    Specimen: Blood   Result Value Ref Range    WBC 7.51 3.40 - 10.80 10*3/mm3    RBC 4.46 3.77 - 5.28 10*6/mm3    Hemoglobin 12.8 12.0 - 15.9 g/dL    Hematocrit 41.0 34.0 - 46.6 %    MCV 91.9 79.0 - 97.0 fL    MCH 28.7 26.6 - 33.0 pg    MCHC 31.2 (L) 31.5 - 35.7 g/dL    RDW 13.2 12.3 - 15.4 %    Platelets 288 140 - 450 10*3/mm3    Neutrophil Rel % 55.2 42.7 - 76.0 %    Lymphocyte Rel % 35.0 19.6 - 45.3 %    Monocyte Rel % 8.0 5.0 - 12.0 %    Eosinophil Rel % 1.3 0.3 - 6.2 %    Basophil Rel % 0.4 0.0 - 1.5 %    Neutrophils Absolute 4.14 1.70 - 7.00 10*3/mm3    Lymphocytes Absolute 2.63 0.70 - 3.10 10*3/mm3    Monocytes Absolute 0.60 0.10 - 0.90 10*3/mm3    Eosinophils Absolute 0.10 0.00 - 0.40 10*3/mm3    Basophils Absolute 0.03 0.00 - 0.20 10*3/mm3    Immature Granulocyte Rel % 0.1 0.0 - 0.5 %    Immature Grans Absolute 0.01 0.00 - 0.05 10*3/mm3    nRBC 0.0 0.0 - 0.2 /100 WBC     Result Review :                  Assessment and Plan    Diagnoses and all orders for this visit:    1. Need for vaccination (Primary)  -     Pneumococcal Conjugate Vaccine 20-Valent All    2. Mixed hyperlipidemia  -     Comprehensive Metabolic Panel; Future  -     Lipid Panel With LDL / HDL Ratio; Future    3. Prediabetes  -     CBC & Differential; Future  -     Comprehensive Metabolic Panel; Future  -     Hemoglobin A1c; Future  -     T4, Free; Future  -     TSH; Future  -     Lipid Panel With LDL / HDL Ratio; Future    4. Severe obesity (BMI >= 40)       Reviewed current  medication plan with patient today.  Continue current medications as listed below.  Encouraged healthy diet/exercise.  OV labs in  6  months.  Pt to call sooner if any other issues arise.      Encouraged influenza vaccination if not already done    Class 3 Severe Obesity (BMI >=40). Obesity-related health conditions include the following: dyslipidemias and osteoarthritis. Obesity is improving with lifestyle modifications. BMI is is above average; no BMI management plan is appropriate. We discussed portion control and increasing exercise.                 Outpatient Medications Prior to Visit   Medication Sig Dispense Refill    multivitamin (THERAGRAN) tablet tablet Take  by mouth Daily.      omeprazole (priLOSEC) 40 MG capsule Take 1 capsule by mouth Daily. 90 capsule 3    oxybutynin (DITROPAN) 5 MG tablet Take 1 tablet by mouth 3 (Three) Times a Day. 90 tablet 6    rivaroxaban (Xarelto) 10 MG tablet Take 1 tablet by mouth Daily. 90 tablet 2    rosuvastatin (Crestor) 5 MG tablet Take 1 tablet by mouth Daily. 90 tablet 1    Sodium Fluoride (Sodium Fluoride 5000 Plus) 1.1 % cream Use daily as directed to brush teeth 51 g 2    Semaglutide, 1 MG/DOSE, (Ozempic, 1 MG/DOSE,) 4 MG/3ML solution pen-injector Inject 1 mg under the skin into the appropriate area as directed 1 (One) Time Per Week. 9 mL 0     No facility-administered medications prior to visit.     No orders of the defined types were placed in this encounter.    [unfilled]  Medications Discontinued During This Encounter   Medication Reason    Semaglutide, 1 MG/DOSE, (Ozempic, 1 MG/DOSE,) 4 MG/3ML solution pen-injector *Therapy completed         No follow-ups on file.    Patient was given instructions and counseling regarding her condition or for health maintenance advice. Please see specific information pulled into the AVS if appropriate.   Home

## 2024-07-16 ENCOUNTER — TELEPHONE (OUTPATIENT)
Dept: INTERNAL MEDICINE | Facility: CLINIC | Age: 64
End: 2024-07-16
Payer: COMMERCIAL

## 2024-07-16 DIAGNOSIS — L98.9 SKIN LESION: Primary | ICD-10-CM

## 2024-07-16 NOTE — TELEPHONE ENCOUNTER
SHE IS CALLING ABOUT THE REFERRAL TO A DERMATOLOGIST.  I DO NOT SEE ONE I TOLD HER I WOULD SEND A MESSAGE.

## 2024-07-18 ENCOUNTER — OFFICE VISIT (OUTPATIENT)
Dept: INTERNAL MEDICINE | Facility: CLINIC | Age: 64
End: 2024-07-18
Payer: COMMERCIAL

## 2024-07-18 VITALS
OXYGEN SATURATION: 98 % | HEIGHT: 64 IN | SYSTOLIC BLOOD PRESSURE: 126 MMHG | HEART RATE: 65 BPM | TEMPERATURE: 98.4 F | BODY MASS INDEX: 44.25 KG/M2 | DIASTOLIC BLOOD PRESSURE: 68 MMHG | WEIGHT: 259.2 LBS

## 2024-07-18 DIAGNOSIS — T50.Z95A SIDE EFFECTS OF VACCINATION, INITIAL ENCOUNTER: Primary | ICD-10-CM

## 2024-07-18 DIAGNOSIS — B97.89 VIRAL RESPIRATORY ILLNESS: ICD-10-CM

## 2024-07-18 DIAGNOSIS — J98.8 VIRAL RESPIRATORY ILLNESS: ICD-10-CM

## 2024-07-18 DIAGNOSIS — M17.11 PRIMARY OSTEOARTHRITIS OF RIGHT KNEE: ICD-10-CM

## 2024-07-18 PROCEDURE — 99213 OFFICE O/P EST LOW 20 MIN: CPT | Performed by: NURSE PRACTITIONER

## 2024-07-18 NOTE — PROGRESS NOTES
"Zulema Sousa is a 64 y.o. female presenting today for   Chief Complaint   Patient presents with    Medication Reaction     States her left bicep became sore, red and swollen after receiving pneumo shot. Had limited ROM but symptoms improved until woke up with limited ROM again yesterday. Feels run down and achy. Neg home covid test today.     nasal drainage     Neg home covid test this morning.      Pt presents for an acute visit; her PCP is Dr. Young.    Subjective    Medication Reaction  Pertinent negatives include no chills or fever.          Pneumovax 06/28/2024. Experienced redness, swelling, and tenderness surround site of injection. There was also limited ROM in the L UE. This is improving.     For the past couple of days she has just felt \"off.\" Mild nasal drainage. Mild non-productive cough. Neg home COVID test.    C/O pain with position changes affecting her R knee.      The following portions of the patient's history were reviewed and updated as appropriate: allergies, current medications, problem list, past medical history, past surgical history, family history, and social history.    Review of Systems   Constitutional:  Negative for chills and fever.         Objective    Vitals:    07/18/24 1438   BP: 126/68   BP Location: Right arm   Patient Position: Sitting   Cuff Size: Adult   Pulse: 65   Temp: 98.4 °F (36.9 °C)   TempSrc: Infrared   SpO2: 98%   Weight: 118 kg (259 lb 3.2 oz)   Height: 162.6 cm (64\")     Body mass index is 44.49 kg/m².  Nursing notes and vitals reviewed.    Physical Exam  Constitutional:       General: She is not in acute distress.     Appearance: She is well-developed.   HENT:      Right Ear: Hearing, tympanic membrane, ear canal and external ear normal.      Left Ear: Hearing, tympanic membrane, ear canal and external ear normal.      Nose: Nose normal.      Mouth/Throat:      Mouth: Mucous membranes are moist.      Pharynx: Oropharynx is clear. Uvula midline.   Neck:      " Thyroid: No thyroid mass or thyromegaly.   Cardiovascular:      Rate and Rhythm: Regular rhythm.      Pulses: Normal pulses.      Heart sounds: S1 normal and S2 normal. No murmur heard.     No friction rub. No gallop.   Pulmonary:      Effort: Pulmonary effort is normal.      Breath sounds: Normal breath sounds. No wheezing, rhonchi or rales.   Musculoskeletal:      Left shoulder: Normal.      Cervical back: Neck supple.   Lymphadenopathy:      Cervical: No cervical adenopathy.   Neurological:      Mental Status: She is alert and oriented to person, place, and time.      Cranial Nerves: No cranial nerve deficit.      Sensory: No sensory deficit.   Psychiatric:         Attention and Perception: She is attentive.         Speech: Speech normal.         Behavior: Behavior normal.           Assessment and Plan    Diagnoses and all orders for this visit:    1. Side effects of vaccination, initial encounter (Primary)    2. Viral respiratory illness    3. Primary osteoarthritis of right knee        #1. Resolving.  Anticipatory guidance. Discussed supportive care and emergent S&S.    2. Anticipatory guidance. Discussed supportive care and emergent S&S.    3. Under care of ortho.  Discussed topical anti-inflammatories.      Medications, including side effects, were discussed with the patient. Patient verbalized understanding.  The plan of care was discussed. All questions were answered. Patient verbalized understanding.        Return if symptoms worsen or fail to improve.

## 2024-09-17 ENCOUNTER — OFFICE VISIT (OUTPATIENT)
Dept: ORTHOPEDIC SURGERY | Facility: CLINIC | Age: 64
End: 2024-09-17
Payer: COMMERCIAL

## 2024-09-17 VITALS — WEIGHT: 264 LBS | TEMPERATURE: 97.5 F | BODY MASS INDEX: 48.58 KG/M2 | HEIGHT: 62 IN

## 2024-09-17 DIAGNOSIS — E66.01 OBESITY, MORBID, BMI 40.0-49.9: ICD-10-CM

## 2024-09-17 DIAGNOSIS — M17.0 PRIMARY OSTEOARTHRITIS OF BOTH KNEES: Primary | ICD-10-CM

## 2024-09-17 PROCEDURE — 20610 DRAIN/INJ JOINT/BURSA W/O US: CPT | Performed by: NURSE PRACTITIONER

## 2024-09-17 PROCEDURE — 99213 OFFICE O/P EST LOW 20 MIN: CPT | Performed by: NURSE PRACTITIONER

## 2024-09-17 RX ORDER — METHYLPREDNISOLONE ACETATE 80 MG/ML
1 INJECTION, SUSPENSION INTRA-ARTICULAR; INTRALESIONAL; INTRAMUSCULAR; SOFT TISSUE
Status: COMPLETED | OUTPATIENT
Start: 2024-09-17 | End: 2024-09-17

## 2024-09-17 RX ORDER — LIDOCAINE HYDROCHLORIDE 10 MG/ML
2 INJECTION, SOLUTION EPIDURAL; INFILTRATION; INTRACAUDAL; PERINEURAL
Status: COMPLETED | OUTPATIENT
Start: 2024-09-17 | End: 2024-09-17

## 2024-09-17 RX ADMIN — LIDOCAINE HYDROCHLORIDE 2 ML: 10 INJECTION, SOLUTION EPIDURAL; INFILTRATION; INTRACAUDAL; PERINEURAL at 09:55

## 2024-09-17 RX ADMIN — METHYLPREDNISOLONE ACETATE 1 ML: 80 INJECTION, SUSPENSION INTRA-ARTICULAR; INTRALESIONAL; INTRAMUSCULAR; SOFT TISSUE at 09:55

## 2024-10-28 ENCOUNTER — OUTSIDE FACILITY SERVICE (OUTPATIENT)
Dept: GASTROENTEROLOGY | Facility: CLINIC | Age: 64
End: 2024-10-28
Payer: COMMERCIAL

## 2024-10-28 ENCOUNTER — LAB REQUISITION (OUTPATIENT)
Dept: LAB | Facility: HOSPITAL | Age: 64
End: 2024-10-28
Payer: COMMERCIAL

## 2024-10-28 DIAGNOSIS — Z86.0100 HISTORY OF COLON POLYPS: ICD-10-CM

## 2024-10-28 PROCEDURE — 88305 TISSUE EXAM BY PATHOLOGIST: CPT | Performed by: INTERNAL MEDICINE

## 2024-10-28 PROCEDURE — 45385 COLONOSCOPY W/LESION REMOVAL: CPT | Performed by: INTERNAL MEDICINE

## 2024-11-14 ENCOUNTER — TELEPHONE (OUTPATIENT)
Dept: GASTROENTEROLOGY | Facility: CLINIC | Age: 64
End: 2024-11-14
Payer: COMMERCIAL

## 2024-11-14 NOTE — TELEPHONE ENCOUNTER
----- Message from Pierre Ornelas sent at 10/29/2024  1:40 PM EDT -----  Tubular adenoma colon polyp  Colonoscopy recall 3 years

## 2024-11-14 NOTE — TELEPHONE ENCOUNTER
Pt reviewed results via [facility].     Sent pt [facility] msg advising of results and recommendations. Advised to call if any questions.     Colonoscopy placed in  and recall for 10/28/27.

## 2024-11-21 ENCOUNTER — TELEPHONE (OUTPATIENT)
Dept: INTERNAL MEDICINE | Facility: CLINIC | Age: 64
End: 2024-11-21

## 2024-11-21 NOTE — TELEPHONE ENCOUNTER
Caller: Zulema Sousa    Relationship to patient: Self    Best call back number: [phone]     Chief complaint: RESCHEDULE LABS    Type of visit: LABS    Requested date:  BEFORE HER RESCHEDULED PHYSICAL IN 01/08/24    If rescheduling, when is the original appointment:  12/05/24    Additional notes: NEEDS TO RESCHEDULE DUE TO BEING OUT OF TOWN,

## 2024-12-02 DIAGNOSIS — E78.2 MIXED HYPERLIPIDEMIA: ICD-10-CM

## 2024-12-03 RX ORDER — ROSUVASTATIN CALCIUM 5 MG/1
5 TABLET, COATED ORAL DAILY
Qty: 90 TABLET | Refills: 1 | Status: SHIPPED | OUTPATIENT
Start: 2024-12-03

## 2024-12-03 NOTE — TELEPHONE ENCOUNTER
Rx Refill Note  Requested Prescriptions     Pending Prescriptions Disp Refills    rosuvastatin (Crestor) 5 MG tablet 90 tablet 1     Sig: Take 1 tablet by mouth Daily.      Last office visit with prescribing clinician: 6/28/2024   Last telemedicine visit with prescribing clinician: Visit date not found   Next office visit with prescribing clinician: 2/3/2025                         Would you like a call back once the refill request has been completed: [] Yes [] No    If the office needs to give you a call back, can they leave a voicemail: [] Yes [] No    Ginna Barnes MA  12/03/24, 17:32 EST

## 2024-12-10 ENCOUNTER — APPOINTMENT (OUTPATIENT)
Dept: GENERAL RADIOLOGY | Facility: HOSPITAL | Age: 64
End: 2024-12-10
Payer: COMMERCIAL

## 2024-12-10 ENCOUNTER — OFFICE VISIT (OUTPATIENT)
Age: 64
End: 2024-12-10
Payer: COMMERCIAL

## 2024-12-10 ENCOUNTER — HOSPITAL ENCOUNTER (EMERGENCY)
Facility: HOSPITAL | Age: 64
Discharge: HOME | End: 2024-12-10
Attending: EMERGENCY MEDICINE | Admitting: EMERGENCY MEDICINE
Payer: COMMERCIAL

## 2024-12-10 ENCOUNTER — APPOINTMENT (OUTPATIENT)
Dept: CT IMAGING | Facility: HOSPITAL | Age: 64
End: 2024-12-10
Payer: COMMERCIAL

## 2024-12-10 ENCOUNTER — NURSE TRIAGE (OUTPATIENT)
Dept: CALL CENTER | Facility: HOSPITAL | Age: 64
End: 2024-12-10
Payer: COMMERCIAL

## 2024-12-10 VITALS
HEART RATE: 114 BPM | BODY MASS INDEX: 50.61 KG/M2 | WEIGHT: 275 LBS | SYSTOLIC BLOOD PRESSURE: 132 MMHG | RESPIRATION RATE: 15 BRPM | OXYGEN SATURATION: 98 % | TEMPERATURE: 98 F | HEIGHT: 62 IN | DIASTOLIC BLOOD PRESSURE: 59 MMHG

## 2024-12-10 VITALS
SYSTOLIC BLOOD PRESSURE: 122 MMHG | HEART RATE: 75 BPM | BODY MASS INDEX: 50.64 KG/M2 | HEIGHT: 62 IN | WEIGHT: 275.2 LBS | DIASTOLIC BLOOD PRESSURE: 82 MMHG

## 2024-12-10 DIAGNOSIS — R06.09 DYSPNEA ON EXERTION: Primary | ICD-10-CM

## 2024-12-10 DIAGNOSIS — R06.00 DYSPNEA, UNSPECIFIED TYPE: Primary | ICD-10-CM

## 2024-12-10 LAB
ALBUMIN SERPL-MCNC: 3.6 G/DL (ref 3.5–5.2)
ALBUMIN/GLOB SERPL: 1 G/DL
ALP SERPL-CCNC: 111 U/L (ref 39–117)
ALT SERPL W P-5'-P-CCNC: 21 U/L (ref 1–33)
ANION GAP SERPL CALCULATED.3IONS-SCNC: 8 MMOL/L (ref 5–15)
AST SERPL-CCNC: 29 U/L (ref 1–32)
B PARAPERT DNA SPEC QL NAA+PROBE: NOT DETECTED
B PERT DNA SPEC QL NAA+PROBE: NOT DETECTED
BASOPHILS # BLD AUTO: 0.03 10*3/MM3 (ref 0–0.2)
BASOPHILS NFR BLD AUTO: 0.4 % (ref 0–1.5)
BILIRUB SERPL-MCNC: <0.2 MG/DL (ref 0–1.2)
BUN SERPL-MCNC: 18 MG/DL (ref 8–23)
BUN/CREAT SERPL: 23.7 (ref 7–25)
C PNEUM DNA NPH QL NAA+NON-PROBE: NOT DETECTED
CALCIUM SPEC-SCNC: 9 MG/DL (ref 8.6–10.5)
CHLORIDE SERPL-SCNC: 106 MMOL/L (ref 98–107)
CO2 SERPL-SCNC: 24 MMOL/L (ref 22–29)
CREAT SERPL-MCNC: 0.76 MG/DL (ref 0.57–1)
DEPRECATED RDW RBC AUTO: 47.3 FL (ref 37–54)
EGFRCR SERPLBLD CKD-EPI 2021: 87.1 ML/MIN/1.73
EOSINOPHIL # BLD AUTO: 0.23 10*3/MM3 (ref 0–0.4)
EOSINOPHIL NFR BLD AUTO: 2.8 % (ref 0.3–6.2)
ERYTHROCYTE [DISTWIDTH] IN BLOOD BY AUTOMATED COUNT: 13.7 % (ref 12.3–15.4)
FLUAV SUBTYP SPEC NAA+PROBE: NOT DETECTED
FLUBV RNA ISLT QL NAA+PROBE: NOT DETECTED
GEN 5 1HR TROPONIN T REFLEX: 11 NG/L
GLOBULIN UR ELPH-MCNC: 3.5 GM/DL
GLUCOSE SERPL-MCNC: 112 MG/DL (ref 65–99)
HADV DNA SPEC NAA+PROBE: NOT DETECTED
HCOV 229E RNA SPEC QL NAA+PROBE: NOT DETECTED
HCOV HKU1 RNA SPEC QL NAA+PROBE: NOT DETECTED
HCOV NL63 RNA SPEC QL NAA+PROBE: NOT DETECTED
HCOV OC43 RNA SPEC QL NAA+PROBE: NOT DETECTED
HCT VFR BLD AUTO: 37.8 % (ref 34–46.6)
HGB BLD-MCNC: 11.5 G/DL (ref 12–15.9)
HMPV RNA NPH QL NAA+NON-PROBE: NOT DETECTED
HOLD SPECIMEN: NORMAL
HOLD SPECIMEN: NORMAL
HPIV1 RNA ISLT QL NAA+PROBE: NOT DETECTED
HPIV2 RNA SPEC QL NAA+PROBE: NOT DETECTED
HPIV3 RNA NPH QL NAA+PROBE: NOT DETECTED
HPIV4 P GENE NPH QL NAA+PROBE: NOT DETECTED
IMM GRANULOCYTES # BLD AUTO: 0.01 10*3/MM3 (ref 0–0.05)
IMM GRANULOCYTES NFR BLD AUTO: 0.1 % (ref 0–0.5)
LYMPHOCYTES # BLD AUTO: 3.06 10*3/MM3 (ref 0.7–3.1)
LYMPHOCYTES NFR BLD AUTO: 37.2 % (ref 19.6–45.3)
M PNEUMO IGG SER IA-ACNC: NOT DETECTED
MCH RBC QN AUTO: 28.7 PG (ref 26.6–33)
MCHC RBC AUTO-ENTMCNC: 30.4 G/DL (ref 31.5–35.7)
MCV RBC AUTO: 94.3 FL (ref 79–97)
MONOCYTES # BLD AUTO: 0.6 10*3/MM3 (ref 0.1–0.9)
MONOCYTES NFR BLD AUTO: 7.3 % (ref 5–12)
NEUTROPHILS NFR BLD AUTO: 4.29 10*3/MM3 (ref 1.7–7)
NEUTROPHILS NFR BLD AUTO: 52.2 % (ref 42.7–76)
NRBC BLD AUTO-RTO: 0 /100 WBC (ref 0–0.2)
NT-PROBNP SERPL-MCNC: 520 PG/ML (ref 0–900)
PLATELET # BLD AUTO: 280 10*3/MM3 (ref 140–450)
PMV BLD AUTO: 9.4 FL (ref 6–12)
POTASSIUM SERPL-SCNC: 4.3 MMOL/L (ref 3.5–5.2)
PROT SERPL-MCNC: 7.1 G/DL (ref 6–8.5)
QT INTERVAL: 402 MS
QTC INTERVAL: 443 MS
RBC # BLD AUTO: 4.01 10*6/MM3 (ref 3.77–5.28)
RHINOVIRUS RNA SPEC NAA+PROBE: NOT DETECTED
RSV RNA NPH QL NAA+NON-PROBE: NOT DETECTED
SARS-COV-2 RNA NPH QL NAA+NON-PROBE: NOT DETECTED
SODIUM SERPL-SCNC: 138 MMOL/L (ref 136–145)
TROPONIN T DELTA: 0 NG/L
TROPONIN T SERPL HS-MCNC: 11 NG/L
WBC NRBC COR # BLD AUTO: 8.22 10*3/MM3 (ref 3.4–10.8)
WHOLE BLOOD HOLD COAG: NORMAL
WHOLE BLOOD HOLD SPECIMEN: NORMAL

## 2024-12-10 PROCEDURE — 80053 COMPREHEN METABOLIC PANEL: CPT | Performed by: EMERGENCY MEDICINE

## 2024-12-10 PROCEDURE — 93005 ELECTROCARDIOGRAM TRACING: CPT

## 2024-12-10 PROCEDURE — 0202U NFCT DS 22 TRGT SARS-COV-2: CPT | Performed by: EMERGENCY MEDICINE

## 2024-12-10 PROCEDURE — 25510000001 IOPAMIDOL PER 1 ML: Performed by: EMERGENCY MEDICINE

## 2024-12-10 PROCEDURE — 36415 COLL VENOUS BLD VENIPUNCTURE: CPT

## 2024-12-10 PROCEDURE — 85025 COMPLETE CBC W/AUTO DIFF WBC: CPT | Performed by: EMERGENCY MEDICINE

## 2024-12-10 PROCEDURE — 99285 EMERGENCY DEPT VISIT HI MDM: CPT

## 2024-12-10 PROCEDURE — 71045 X-RAY EXAM CHEST 1 VIEW: CPT

## 2024-12-10 PROCEDURE — 99214 OFFICE O/P EST MOD 30 MIN: CPT | Performed by: INTERNAL MEDICINE

## 2024-12-10 PROCEDURE — 83880 ASSAY OF NATRIURETIC PEPTIDE: CPT | Performed by: EMERGENCY MEDICINE

## 2024-12-10 PROCEDURE — 93005 ELECTROCARDIOGRAM TRACING: CPT | Performed by: EMERGENCY MEDICINE

## 2024-12-10 PROCEDURE — 84484 ASSAY OF TROPONIN QUANT: CPT | Performed by: EMERGENCY MEDICINE

## 2024-12-10 PROCEDURE — 71275 CT ANGIOGRAPHY CHEST: CPT

## 2024-12-10 RX ORDER — SODIUM CHLORIDE 0.9 % (FLUSH) 0.9 %
10 SYRINGE (ML) INJECTION AS NEEDED
Status: DISCONTINUED | OUTPATIENT
Start: 2024-12-10 | End: 2024-12-11 | Stop reason: HOSPADM

## 2024-12-10 RX ORDER — IOPAMIDOL 755 MG/ML
100 INJECTION, SOLUTION INTRAVASCULAR
Status: COMPLETED | OUTPATIENT
Start: 2024-12-10 | End: 2024-12-10

## 2024-12-10 RX ORDER — ASPIRIN 325 MG
325 TABLET ORAL ONCE
Status: COMPLETED | OUTPATIENT
Start: 2024-12-10 | End: 2024-12-10

## 2024-12-10 RX ADMIN — IOPAMIDOL 95 ML: 755 INJECTION, SOLUTION INTRAVENOUS at 16:49

## 2024-12-10 RX ADMIN — ASPIRIN 325 MG: 325 TABLET ORAL at 16:25

## 2024-12-10 NOTE — ED PROVIDER NOTES
EMERGENCY DEPARTMENT ENCOUNTER    Room Number:  19/19  PCP: Marsha Young MD  Historian: Patient      HPI:  Chief Complaint: Chest pain shortness of breath  A complete HPI/ROS/PMH/PSH/SH/FH are unobtainable due to: None  Context: Zulema Sousa is a 64 y.o. female who presents to the ED c/o chest pain and shortness of breath.  Patient states she went on vacation recently.  Patient was out of her blood thinners for 4 days.  Patient has history of pulmonary embolism as well as cardiac tamponade.  Patient states she had multiple plane flights.  Patient states she now feels short of breath with exertion.  Has little chest tightness.  Patient has had some leg swelling.  States she is up about 10 pounds.  Patient has no fevers or chills.  Patient was seen by cardiology today and sent here for evaluation.            PAST MEDICAL HISTORY  Active Ambulatory Problems     Diagnosis Date Noted    Gastroesophageal reflux disease 04/22/2016    Edema 04/22/2016    Hyperlipidemia 04/22/2016    Arthralgia of multiple joints 04/22/2016    Osteoarthritis of knee 04/22/2016    Pericarditis 04/22/2016    Rheumatoid arthritis 04/22/2016    Sciatica 04/22/2016    Unintended weight gain 04/22/2016    Arthritis of both knees 05/19/2016    Obstructive sleep apnea, adult     Dietary counseling 03/30/2017    History of DVT (deep vein thrombosis) 04/17/2017    Tear of lateral meniscus of right knee, current 12/01/2017    Arthritis of right knee 01/05/2018    Chronic pain of right knee 01/05/2018    Epigastric pain 09/14/2018    Abnormal UGI series 10/17/2018    Esophageal dilatation 10/17/2018    Abnormal finding on radiology exam 11/12/2018    Overactive bladder 05/24/2019    Right elbow tendonitis 06/24/2019    Obesity, Class III, BMI 40-49.9 (morbid obesity) 07/08/2019    History of removal of laparoscopic gastric banding device 07/08/2019    Cervical radiculopathy 08/24/2023    DDD (degenerative disc disease), cervical  08/24/2023    Arthropathy of cervical facet joint 08/24/2023    Cervical spinal stenosis 08/24/2023    Osteopenia after menopause 02/07/2024    History of pulmonary embolus (PE) 02/26/2024     Resolved Ambulatory Problems     Diagnosis Date Noted    Polyphagia(783.6) 04/22/2016    Obesity, morbid, BMI 40.0-49.9 04/22/2016    Pulmonary embolism 04/22/2016    Pericardial effusion 12/28/2016    Dyspnea 12/28/2016    Obesity, Class III, BMI 40-49.9 (morbid obesity) 01/05/2018    History of laparoscopic adjustable gastric banding 09/14/2018    Gastric band slippage 02/15/2019    Overactive bladder 05/24/2019     Past Medical History:   Diagnosis Date    Acid reflux     Back pain     Cardiac tamponade     Cholelithiasis     Deep vein thrombosis     Difficulty breathing     Esophagitis, reflux     Essential hypertriglyceridemia     GERD (gastroesophageal reflux disease)     Health care maintenance     Heartburn     Hypertriglyceridemia     Increased appetite     Morbid obesity     Multiple joint pain     RHA (rheumatoid arthritis)     Sleep apnea          PAST SURGICAL HISTORY  Past Surgical History:   Procedure Laterality Date    ANKLE SURGERY      treatment of ankle fracture    APPENDECTOMY      CARDIAC SURGERY      cardiac tamponade    CHOLECYSTECTOMY      COLONOSCOPY N/A 11/27/2018    Procedure: COLONOSCOPY to cecum and t.i. with polypectomy and clip x2 to ascending colon;  Surgeon: Pierre Ornelas MD;  Location: [facility] ENDOSCOPY;  Service: Gastroenterology    DILATATION AND CURETTAGE      ENDOSCOPY N/A 11/27/2018    Procedure: ESOPHAGOGASTRODUODENOSCOPY;  Surgeon: Pierre Ornelas MD;  Location: [facility] ENDOSCOPY;  Service: Gastroenterology    FRACTURE SURGERY      GASTRIC BANDING REMOVAL N/A 07/01/2019    Procedure: GASTRIC BANDING AND PORT REMOVAL, LYSIS OF ADHESIONS, REPAIR OF COLOTOMY, AND INCISIONAL HERNIA REPAIR LAPAROSCOPIC;  Surgeon: Randell Salamanca Jr., MD;  [place] [facility] [place] [facility] [place]  Service: General     LAPAROSCOPIC GASTRIC BANDING      PULMONARY EMBOLISM SURGERY      TUBAL ABDOMINAL LIGATION           FAMILY HISTORY  Family History   Problem Relation Age of Onset    Diabetes Father     Hypertension Father     Heart disease Father     Obesity Father     Arthritis Father     COPD Father     Depression Father     Cancer Mother     Obesity Mother     Heart disease Brother     Heart disease Brother     Arthritis Sister     COPD Sister     Diabetes Paternal Grandfather     No Known Problems Paternal Grandmother     No Known Problems Maternal Grandmother     No Known Problems Maternal Grandfather     Diabetes Paternal Aunt     Diabetes Paternal Aunt     Malig Hyperthermia Neg Hx     Breast cancer Neg Hx     Uterine cancer Neg Hx     Colon cancer Neg Hx          SOCIAL HISTORY  Social History     Socioeconomic History    Marital status:      Spouse name: Jac   Tobacco Use    Smoking status: Former     Current packs/day: 0.00     Average packs/day: 0.5 packs/day for 10.0 years (5.0 ttl pk-yrs)     Types: Cigarettes     Start date: 1/10/2013     Quit date: 1/10/2023     Years since quittin.9     Passive exposure: Past    Smokeless tobacco: Never    Tobacco comments:     caffeine use   Vaping Use    Vaping status: Some Days    Substances: Nicotine    Devices: Refillable tank   Substance and Sexual Activity    Alcohol use: Not Currently     Alcohol/week: 0.0 - 2.0 standard drinks of alcohol     Comment: Very rarely    Drug use: No    Sexual activity: Yes     Partners: Male     Birth control/protection: Surgical, Post-menopausal         ALLERGIES  Nsaids        REVIEW OF SYSTEMS  Review of Systems   Shortness of breath and chest pain      PHYSICAL EXAM  ED Triage Vitals   Temp Heart Rate Resp BP SpO2   12/10/24 1504 12/10/24 1504 12/10/24 1504 12/10/24 1508 12/10/24 1504   98 °F (36.7 °C) 114 15 132/59 98 %      Temp src Heart Rate Source Patient Position BP Location FiO2 (%)   -- -- -- -- --               Physical Exam      GENERAL: no acute distress  HENT: nares patent  EYES: no scleral icterus  CV: regular rhythm, normal rate  RESPIRATORY: normal effort  ABDOMEN: soft  MUSCULOSKELETAL: no deformity.  Some bilateral edema  NEURO: alert, moves all extremities, follows commands  PSYCH:  calm, cooperative  SKIN: warm, dry    Vital signs and nursing notes reviewed.          LAB RESULTS  Recent Results (from the past 24 hours)   ECG 12 Lead ED Triage Standing Order; Chest Pain    Collection Time: 12/10/24  3:07 PM   Result Value Ref Range    QT Interval 402 ms    QTC Interval 443 ms   Comprehensive Metabolic Panel    Collection Time: 12/10/24  3:08 PM    Specimen: Arm, Right; Blood   Result Value Ref Range    Glucose 112 (H) 65 - 99 mg/dL    BUN 18 8 - 23 mg/dL    Creatinine 0.76 0.57 - 1.00 mg/dL    Sodium 138 136 - 145 mmol/L    Potassium 4.3 3.5 - 5.2 mmol/L    Chloride 106 98 - 107 mmol/L    CO2 24.0 22.0 - 29.0 mmol/L    Calcium 9.0 8.6 - 10.5 mg/dL    Total Protein 7.1 6.0 - 8.5 g/dL    Albumin 3.6 3.5 - 5.2 g/dL    ALT (SGPT) 21 1 - 33 U/L    AST (SGOT) 29 1 - 32 U/L    Alkaline Phosphatase 111 39 - 117 U/L    Total Bilirubin <0.2 0.0 - 1.2 mg/dL    Globulin 3.5 gm/dL    A/G Ratio 1.0 g/dL    BUN/Creatinine Ratio 23.7 7.0 - 25.0    Anion Gap 8.0 5.0 - 15.0 mmol/L    eGFR 87.1 >60.0 mL/min/1.73   High Sensitivity Troponin T    Collection Time: 12/10/24  3:08 PM    Specimen: Arm, Right; Blood   Result Value Ref Range    HS Troponin T 11 <14 ng/L   Green Top (Gel)    Collection Time: 12/10/24  3:08 PM   Result Value Ref Range    Extra Tube Hold for add-ons.    Lavender Top    Collection Time: 12/10/24  3:08 PM   Result Value Ref Range    Extra Tube hold for add-on    Gold Top - SST    Collection Time: 12/10/24  3:08 PM   Result Value Ref Range    Extra Tube Hold for add-ons.    Light Blue Top    Collection Time: 12/10/24  3:08 PM   Result Value Ref Range    Extra Tube Hold for add-ons.    CBC Auto Differential     Collection Time: 12/10/24  3:08 PM    Specimen: Arm, Right; Blood   Result Value Ref Range    WBC 8.22 3.40 - 10.80 10*3/mm3    RBC 4.01 3.77 - 5.28 10*6/mm3    Hemoglobin 11.5 (L) 12.0 - 15.9 g/dL    Hematocrit 37.8 34.0 - 46.6 %    MCV 94.3 79.0 - 97.0 fL    MCH 28.7 26.6 - 33.0 pg    MCHC 30.4 (L) 31.5 - 35.7 g/dL    RDW 13.7 12.3 - 15.4 %    RDW-SD 47.3 37.0 - 54.0 fl    MPV 9.4 6.0 - 12.0 fL    Platelets 280 140 - 450 10*3/mm3    Neutrophil % 52.2 42.7 - 76.0 %    Lymphocyte % 37.2 19.6 - 45.3 %    Monocyte % 7.3 5.0 - 12.0 %    Eosinophil % 2.8 0.3 - 6.2 %    Basophil % 0.4 0.0 - 1.5 %    Immature Grans % 0.1 0.0 - 0.5 %    Neutrophils, Absolute 4.29 1.70 - 7.00 10*3/mm3    Lymphocytes, Absolute 3.06 0.70 - 3.10 10*3/mm3    Monocytes, Absolute 0.60 0.10 - 0.90 10*3/mm3    Eosinophils, Absolute 0.23 0.00 - 0.40 10*3/mm3    Basophils, Absolute 0.03 0.00 - 0.20 10*3/mm3    Immature Grans, Absolute 0.01 0.00 - 0.05 10*3/mm3    nRBC 0.0 0.0 - 0.2 /100 WBC   BNP    Collection Time: 12/10/24  3:08 PM    Specimen: Arm, Right; Blood   Result Value Ref Range    proBNP 520.0 0.0 - 900.0 pg/mL   Respiratory Panel PCR w/COVID-19(SARS-CoV-2) [facility] In-House, NP Swab in [facility], 2 HR TAT - Swab, Nasopharynx    Collection Time: 12/10/24  3:34 PM    Specimen: Nasopharynx; Swab   Result Value Ref Range    ADENOVIRUS, PCR Not Detected Not Detected    Coronavirus 229E Not Detected Not Detected    Coronavirus HKU1 Not Detected Not Detected    Coronavirus NL63 Not Detected Not Detected    Coronavirus OC43 Not Detected Not Detected    COVID19 Not Detected Not Detected - Ref. Range    Human Metapneumovirus Not Detected Not Detected    Human Rhinovirus/Enterovirus Not Detected Not Detected    Influenza A PCR Not Detected Not Detected    Influenza B PCR Not Detected Not Detected    Parainfluenza Virus 1 Not Detected Not Detected    Parainfluenza Virus 2 Not Detected Not Detected    Parainfluenza Virus 3 Not Detected  Not Detected    Parainfluenza Virus 4 Not Detected Not Detected    RSV, PCR Not Detected Not Detected    Bordetella pertussis pcr Not Detected Not Detected    Bordetella parapertussis PCR Not Detected Not Detected    Chlamydophila pneumoniae PCR Not Detected Not Detected    Mycoplasma pneumo by PCR Not Detected Not Detected   High Sensitivity Troponin T 1Hr    Collection Time: 12/10/24  4:27 PM    Specimen: Blood   Result Value Ref Range    HS Troponin T 11 <14 ng/L    Troponin T Delta 0 Abnormal if >/=3 ng/L       Ordered the above labs and reviewed the results.        RADIOLOGY  CT Angiogram Chest    Result Date: 12/10/2024  CT ANGIOGRAM CHEST-  INDICATIONS: Short of breath  TECHNIQUE: Radiation dose reduction techniques were utilized, including automated exposure control and exposure modulation based on body size. CT angiography of the chest. Three-dimensional reconstructions  COMPARISON: 10/13/2021  FINDINGS:  No pulmonary embolism. Ascending aorta is borderline dilated, 3.6 cm. The aortic lumen is not well characterized owing to phase of contrast enhancement.  The heart size is borderline without pericardial effusion. Coronary arterial calcification is apparent. A few subcentimeter short axis mediastinal lymph nodes are seen.  The airways appear clear.  No pleural effusion or pneumothorax.  The lungs show no focal pulmonary consolidation or mass.  Upper abdominal structures show no acute findings. Mild hiatal hernia is noted. Gallbladder is surgically absent..  Degenerative changes are seen in the spine. No acute fracture is identified.       No pulmonary embolism.  This report was finalized on 12/10/2024 5:33 PM by Dr. Saad Marroquin M.D on Workstation: [id number]      XR Chest 1 View    Result Date: 12/10/2024  XR CHEST 1 VW-  CLINICAL HISTORY:  Chest Pain Triage Protocol  COMPARISON: 10/13/2021  FINDINGS: Single portable view of the chest was obtained. The study is limited by the patient's body habitus. The  heart is within normal limits in size. There is no evidence of infiltrate, effusion or of congestive failure.      This report was finalized on 12/10/2024 3:42 PM by Dr. Brice Lagos M.D on Workstation: [id number]       Ordered the above noted radiological studies.  CT chest independent interpreted by me and shows no obvious saddle pulmonary embolism          PROCEDURES  Procedures    EKG          EKG time: 1507  Rhythm/Rate: Normal sinus rhythm 73  P waves and CA: Normal P waves  QRS, axis: Normal QRS  ST and T waves: Nonspecific ST-T wave    Interpreted Contemporaneously by me, independently viewed  Unchanged compared to prior 7/16/2023      MEDICATIONS GIVEN IN ER  Medications   sodium chloride 0.9 % flush 10 mL (has no administration in time range)   aspirin tablet 325 mg (325 mg Oral Given 12/10/24 1625)   iopamidol (ISOVUE-370) 76 % injection 100 mL (95 mL Intravenous Given by Other 12/10/24 1649)                   MEDICAL DECISION MAKING, PROGRESS, and CONSULTS    All labs have been independently reviewed by me.  All radiology studies have been reviewed by me and I have also reviewed the radiology report.   EKG's independently viewed and interpreted by me.  Discussion below represents my analysis of pertinent findings related to patient's condition, differential diagnosis, treatment plan and final disposition.      Additional sources:  - Discussed/ obtained information from independent historians: None    - External (non-ED) record review: Epic reviewed patient seen by cardiology today in the office    - Chronic or social conditions impacting care: None    - Shared decision making: Discussed options with patient including admission to the hospital versus discharge.  Patient does not want to stay in the hospital.  She understands there is diagnostic uncertainty as willing to take that risk.  Instructed to return here      Orders placed during this visit:  Orders Placed This Encounter   Procedures     Respiratory Panel PCR w/COVID-19(SARS-CoV-2) [facility] In-House, NP Swab in [facility]/VTM, 2 HR TAT - Swab, Nasopharynx    XR Chest 1 View    CT Angiogram Chest    [facility] Draw    Comprehensive Metabolic Panel    High Sensitivity Troponin T    CBC Auto Differential    BNP    High Sensitivity Troponin T 1Hr    NPO Diet NPO Type: Strict NPO    Undress & Gown    Continuous Pulse Oximetry    Oxygen Therapy- Nasal Cannula; Titrate 1-6 LPM Per SpO2; 90 - 95%    ECG 12 Lead ED Triage Standing Order; Chest Pain    ECG 12 Lead ED Triage Standing Order; Chest Pain    Insert Peripheral IV    CBC & Differential    Green Top (Gel)    Lavender Top    Gold Top - SST    Light Blue Top         Additional orders considered but not ordered:  None        Differential diagnosis includes but is not limited to:    Pulmonary embolism versus pneumonia versus pneumothorax versus ACS      Independent interpretation of labs, radiology studies, and discussions with consultants:  ED Course as of 12/10/24 1842   Tue Dec 10, 2024   1833 18:33 EST  Patient here for dyspnea on exertion has been going on for several days.  Patient is having no chest pain pressure tightness.  Patient was seen by cardiology today and sent in to make sure she does not have a blood clot.  Patient has CT scan that shows no evidence of blood clot.  No evidence of pneumonia.  Patient has no evidence of pneumothorax.  Patient has EKG that is unremarkable and has serial troponins that are negative.  Discussed options with patient including admission versus discharge and patient wants to be discharged and follow-up as an outpatient.  Discussed with cardiologist who states she will arrange follow-up stress test.  Patient states she understands to return here for worsening symptoms chest pain pressure tightness [SL]      ED Course User Index  [SL] Darinel Tristan MD                 DIAGNOSIS  Final diagnoses:   Dyspnea, unspecified type          DISPOSITION  DISCHARGE    Patient discharged in stable condition.    Reviewed implications of results, diagnosis, meds, responsibility to follow up, warning signs and symptoms of possible worsening, potential complications and reasons to return to ER, including worsening symptoms    Patient/Family voiced understanding of above instructions.    Discussed plan for discharge, as there is no emergent indication for admission. Patient referred to primary care provider for BP management due to today's BP. Pt/family is agreeable and understands need for follow up and repeat testing.  Pt is aware that discharge does not mean that nothing is wrong but it indicates no emergency is present that requires admission and they must continue care with follow-up as given below or physician of their choice.     FOLLOW-UP  Marsha Young MD  [address]  [address]  [place] [address] [phone]    Schedule an appointment as soon as possible for a visit       Temi Hutchinson MD  [address]  [facility] [id number] [phone]    Schedule an appointment as soon as possible for a visit            Medication List      No changes were made to your prescriptions during this visit.                  Latest Documented Vital Signs:  As of 18:42 EST  BP- 132/59 HR- 114 Temp- 98 °F (36.7 °C) O2 sat- 98%              --    Please note that portions of this were completed with a voice recognition program.       Note Disclaimer: At [facility], we believe that sharing information builds trust and better relationships. You are receiving this note because you are receiving care at [facility] or recently visited. It is possible you will see health information before a provider has talked with you about it. This kind of information can be easy to misunderstand. To help you fully understand what it means for your health, we urge you to discuss this note with your provider.            Darinel Tristan,  MD  12/10/24 8736

## 2024-12-10 NOTE — DISCHARGE INSTRUCTIONS
Cardiology will call you to schedule further testing.  Return for worsening symptoms or any concerns

## 2024-12-10 NOTE — TELEPHONE ENCOUNTER
"Reason for Disposition  • [1] MILD difficulty breathing (e.g., minimal/no SOB at rest, SOB with walking, pulse <100) AND [2] NEW-onset or WORSE than normal    Additional Information  • Negative: SEVERE difficulty breathing (e.g., struggling for each breath, speaks in single words)  • Negative: [1] Breathing stopped AND [2] hasn't returned  • Negative: Choking on something  • Negative: Bluish (or gray) lips or face now  • Negative: Difficult to awaken or acting confused (e.g., disoriented, slurred speech)  • Negative: Passed out (i.e., lost consciousness, collapsed and was not responding)  • Negative: Wheezing started suddenly after medicine, an allergic food or bee sting  • Negative: Stridor (harsh sound while breathing in)  • Negative: Slow, shallow and weak breathing  • Negative: Sounds like a life-threatening emergency to the triager  • Negative: Chest pain  • Negative: [1] Wheezing (high pitched whistling sound) AND [2] previous asthma attacks or use of asthma medicines  • Negative: [1] Difficulty breathing AND [2] only present when coughing  • Negative: [1] Difficulty breathing AND [2] only from stuffy or runny nose  • Negative: [1] Difficulty breathing AND [2] within 14 days of COVID-19 Exposure  • Negative: [1] MODERATE difficulty breathing (e.g., speaks in phrases, SOB even at rest, pulse 100-120) AND [2] NEW-onset or WORSE than normal  • Negative: Oxygen level (e.g., pulse oximetry) 90 percent or lower  • Negative: Wheezing can be heard across the room  • Negative: Drooling or spitting out saliva (because can't swallow)  • Negative: History of prior \"blood clot\" in leg or lungs (i.e., deep vein thrombosis, pulmonary embolism)  • Negative: History of inherited increased risk of blood clots (e.g., Factor 5 Leiden, Anti-thrombin 3, Protein C or Protein S deficiency, Prothrombin mutation)  • Negative: Major surgery in the past month  • Negative: Hip or leg fracture (broken bone) in past month (or had cast on " "leg or ankle in past month)  • Negative: Illness requiring prolonged bedrest in past month (e.g., immobilization, long hospital stay)  • Negative: Long-distance travel in past month (e.g., car, bus, train, plane; with trip lasting 6 or more hours)  • Negative: Cancer treatment in past six months (or has cancer now)  • Negative: Extra heartbeats, irregular heart beating, or heart is beating very fast  (i.e., \"palpitations\")  • Negative: Fever > 103 F (39.4 C)  • Negative: [1] Fever > 101 F (38.3 C) AND [2] age > 60 years  • Negative: [1] Fever > 100.0 F (37.8 C) AND [2] bedridden (e.g., CVA, chronic illness, recovering from surgery)  • Negative: [1] Fever > 100.0 F (37.8 C) AND [2] diabetes mellitus or weak immune system (e.g., HIV positive, cancer chemo, splenectomy, organ transplant, chronic steroids)  • Negative: [1] Periods where breathing stops and then resumes normally AND [2] bedridden (e.g., CVA)  • Negative: Pregnant or postpartum (from 0 to 6 weeks after delivery)  • Negative: Patient sounds very sick or weak to the triager    Answer Assessment - Initial Assessment Questions  1. RESPIRATORY STATUS: \"Describe your breathing?\" (e.g., wheezing, shortness of breath, unable to speak, severe coughing)       Soa started yesterday, just feel winded  2. ONSET: \"When did this breathing problem begin?\"       yesterday  3. PATTERN \"Does the difficult breathing come and go, or has it been constant since it started?\"       Pretty constant   4. SEVERITY: \"How bad is your breathing?\" (e.g., mild, moderate, severe)     - MILD: No SOB at rest, mild SOB with walking, speaks normally in sentences, can lie down, no retractions, pulse < 100.     - MODERATE: SOB at rest, SOB with minimal exertion and prefers to sit, cannot lie down flat, speaks in phrases, mild retractions, audible wheezing, pulse 100-120.     - SEVERE: Very SOB at rest, speaks in single words, struggling to breathe, sitting hunched forward, retractions, pulse > " "120       mild  5. RECURRENT SYMPTOM: \"Have you had difficulty breathing before?\" If Yes, ask: \"When was the last time?\" and \"What happened that time?\"       denies  6. CARDIAC HISTORY: \"Do you have any history of heart disease?\" (e.g., heart attack, angina, bypass surgery, angioplasty)       Yes, and been off her xarelto x three days,   7. LUNG HISTORY: \"Do you have any history of lung disease?\"  (e.g., pulmonary embolus, asthma, emphysema)      denies  8. CAUSE: \"What do you think is causing the breathing problem?\"       unknown  9. OTHER SYMPTOMS: \"Do you have any other symptoms? (e.g., dizziness, runny nose, cough, chest pain, fever)      denies  10. O2 SATURATION MONITOR:  \"Do you use an oxygen saturation monitor (pulse oximeter) at home?\" If Yes, ask: \"What is your reading (oxygen level) today?\" \"What is your usual oxygen saturation reading?\" (e.g., 95%)        Unknown, does not have machine  11. PREGNANCY: \"Is there any chance you are pregnant?\" \"When was your last menstrual period?\"        na  12. TRAVEL: \"Have you traveled out of the country in the last month?\" (e.g., travel history, exposures)        no    Protocols used: Breathing Difficulty-ADULT-AH    "

## 2024-12-10 NOTE — PROGRESS NOTES
Subjective:     Encounter Date: 12/10/24      Patient ID: Zulema Sousa is a 64 y.o. female.    Chief Complaint: PE  Hyperlipidemia  Associated symptoms include shortness of breath.   Shortness of Breath    This is a 64-year-old woman who was previously seen by Dr. Newton Hernández.  In 2016 she suffered bilateral pulmonary emboli.  She underwent thrombectomy with Dr. Hernández, and recovered well.  However, this was complicated by cardiac tamponade.  She underwent pericardiocentesis and recovered well from that.  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    She returns for urgent follow-up today.  Last night she came back from a long trip out west.  They visited [facility] the [place], [place].  She then flew back into [place] yesterday she has been out of prophylactic Xarelto for several days now, and has been on several flights up to 4 hours at a time in the last week.  She was not getting up during the flights to stretch her legs though she was wearing compression stockings.  Yesterday when she landed in [place] she developed severe chest pain as well as shortness of breath.  She had difficulty walking around.  Her dyspnea continues today.  She is not hypoxic and vital signs are stable.  She arrives with chest pressure without pleuritic pain, no hemoptysis or cough or fever.  He has been exercising in the gym, has not had exertional dyspnea when walking on the treadmill.      The following portions of the patient's history were reviewed and updated as appropriate: allergies, current medications, past family history, past medical history, past social history, past surgical history and problem list.         REVIEW OF SYSTEMS:   All systems reviewed.  Pertinent positives identified in HPI.  All other systems are negative.      Past Medical History:    Diagnosis Date    Acid reflux     Back pain     Cardiac tamponade     2015    Cholelithiasis     Deep vein thrombosis     Difficulty breathing     during exertion    Edema     Esophagitis, reflux     Essential hypertriglyceridemia     GERD (gastroesophageal reflux disease)     Health care maintenance     Heartburn     Hyperlipidemia     Hypertriglyceridemia     Increased appetite     Morbid obesity     Multiple joint pain     Obesity, Class III, BMI 40-49.9 (morbid obesity) 2018    Osteoarthritis of knee     Pericarditis     Pulmonary embolism     Janurary     RHA (rheumatoid arthritis)     Sciatica     Sleep apnea     Unintended weight gain        Family History   Problem Relation Age of Onset    Diabetes Father     Hypertension Father     Heart disease Father     Obesity Father     Arthritis Father     COPD Father     Depression Father     Cancer Mother     Obesity Mother     Heart disease Brother     Heart disease Brother     Arthritis Sister     COPD Sister     Diabetes Paternal Grandfather     No Known Problems Paternal Grandmother     No Known Problems Maternal Grandmother     No Known Problems Maternal Grandfather     Diabetes Paternal Aunt     Diabetes Paternal Aunt     Malig Hyperthermia Neg Hx     Breast cancer Neg Hx     Uterine cancer Neg Hx     Colon cancer Neg Hx        Social History     Socioeconomic History    Marital status:      Spouse name: Jac   Tobacco Use    Smoking status: Former     Current packs/day: 0.00     Average packs/day: 0.5 packs/day for 10.0 years (5.0 ttl pk-yrs)     Types: Cigarettes     Start date: 1/10/2013     Quit date: 1/10/2023     Years since quittin.9     Passive exposure: Past    Smokeless tobacco: Never    Tobacco comments:     caffeine use   Vaping Use    Vaping status: Some Days    Substances: Nicotine    Devices: Refillable tank   Substance and Sexual Activity    Alcohol use: Not Currently     Alcohol/week: 0.0 - 2.0 standard drinks of  alcohol     Comment: Very rarely    Drug use: No    Sexual activity: Yes     Partners: Male     Birth control/protection: Surgical, Post-menopausal       Allergies   Allergen Reactions    Nsaids Other (See Comments)     DOESN'T TAKE BECAUSE SHES ON BLOOD THINNER.       Past Surgical History:   Procedure Laterality Date    ANKLE SURGERY      treatment of ankle fracture    APPENDECTOMY      CARDIAC SURGERY      cardiac tamponade    CHOLECYSTECTOMY      COLONOSCOPY N/A 11/27/2018    Procedure: COLONOSCOPY to cecum and t.i. with polypectomy and clip x2 to ascending colon;  Surgeon: Pierre Ornelas MD;  Location: [facility] ENDOSCOPY;  Service: Gastroenterology    DILATATION AND CURETTAGE      ENDOSCOPY N/A 11/27/2018    Procedure: ESOPHAGOGASTRODUODENOSCOPY;  Surgeon: Pierre Ornelas MD;  Location: [facility] ENDOSCOPY;  Service: Gastroenterology    FRACTURE SURGERY      GASTRIC BANDING REMOVAL N/A 07/01/2019    Procedure: GASTRIC BANDING AND PORT REMOVAL, LYSIS OF ADHESIONS, REPAIR OF COLOTOMY, AND INCISIONAL HERNIA REPAIR LAPAROSCOPIC;  Surgeon: Randell Salamanca Jr., MD;  Location: [facility] OR [facility] [place] [facility];  Service: General    LAPAROSCOPIC GASTRIC BANDING      PULMONARY EMBOLISM SURGERY      TUBAL ABDOMINAL LIGATION         Procedures       Objective:         GEN: VSS, no distress, obese  Eyes: normal sclera, normal lids and lashes  HENT: moist mucus membranes,   Respiratory: CTAB, no rales or wheezes  CV: RRR, no murmurs, , +2 DP and 2+ carotid pulses b/l  GI: NABS, soft,  Nontender, nondistended  MSK: Bilateral +1 edema, no scoliosis or kyphosis  Skin: no rash, warm, dry  Heme/Lymph: no bruising or bleeding  Psych: organized thought, normal behavior and affect  Neuro: Cranial nerves grossly intact, Alert and Oriented x 3.               Assessment:          Diagnosis Plan   1. Dyspnea on exertion  D-dimer, Quantitative    CBC (No Diff)    Comprehensive Metabolic Panel               Plan:       1.  PE, remote history x2: Xarelto  10 lifelong anticoagulation  2.  Acute chest pain and dyspnea: She has a history of PE and has been out of her prophylactic anticoagulant for a few days.  She has been on several plane rides and this time.  And now has chest pain and dyspnea.  She has a BMI of 50 which puts her at further risk for PE.  I have sent her to the emergency room for further workup. I was going to draw labs but decided the ER would be more expeditious. I alerted the ER team as well.     Dr. Young, thank you very much for referring this kind patient to me please call with any questions or concerns.  We will see her in 1 year.       Temi Hutchinson MD  12/10/24  [facility]

## 2024-12-19 ENCOUNTER — OFFICE VISIT (OUTPATIENT)
Dept: ORTHOPEDIC SURGERY | Facility: CLINIC | Age: 64
End: 2024-12-19
Payer: COMMERCIAL

## 2024-12-19 VITALS — WEIGHT: 267 LBS | BODY MASS INDEX: 49.13 KG/M2 | HEIGHT: 62 IN | TEMPERATURE: 97.3 F

## 2024-12-19 DIAGNOSIS — R52 PAIN: Primary | ICD-10-CM

## 2024-12-19 DIAGNOSIS — M17.0 PRIMARY OSTEOARTHRITIS OF BOTH KNEES: ICD-10-CM

## 2024-12-19 RX ORDER — METHYLPREDNISOLONE ACETATE 80 MG/ML
80 INJECTION, SUSPENSION INTRA-ARTICULAR; INTRALESIONAL; INTRAMUSCULAR; SOFT TISSUE
Status: COMPLETED | OUTPATIENT
Start: 2024-12-19 | End: 2024-12-19

## 2024-12-19 RX ADMIN — METHYLPREDNISOLONE ACETATE 80 MG: 80 INJECTION, SUSPENSION INTRA-ARTICULAR; INTRALESIONAL; INTRAMUSCULAR; SOFT TISSUE at 14:02

## 2024-12-19 RX ADMIN — METHYLPREDNISOLONE ACETATE 80 MG: 80 INJECTION, SUSPENSION INTRA-ARTICULAR; INTRALESIONAL; INTRAMUSCULAR; SOFT TISSUE at 14:03

## 2024-12-19 NOTE — PROGRESS NOTES
12/19/2024    Zulema Sousa is here today for worsening knee pain. Pt has undergone injection of the knee in the past with good resolution of symptoms. Pt is requesting a repeat injection.     KNEE Injection Procedure Note:    Large Joint Arthrocentesis: L knee  Date/Time: 12/19/2024 2:02 PM  Consent given by: patient  Site marked: site marked  Timeout: Immediately prior to procedure a time out was called to verify the correct patient, procedure, equipment, support staff and site/side marked as required   Supporting Documentation  Indications: pain and joint swelling   Procedure Details  Location: knee - L knee  Preparation: Patient was prepped and draped in the usual sterile fashion  Needle gauge: 21 G.  Approach: anterolateral  Medications administered: 2 mL lidocaine (cardiac); 80 mg methylPREDNISolone acetate 80 MG/ML  Patient tolerance: patient tolerated the procedure well with no immediate complications      Large Joint Arthrocentesis: R knee  Date/Time: 12/19/2024 2:03 PM  Consent given by: patient  Site marked: site marked  Timeout: Immediately prior to procedure a time out was called to verify the correct patient, procedure, equipment, support staff and site/side marked as required   Supporting Documentation  Indications: pain and joint swelling   Procedure Details  Location: knee - R knee  Preparation: Patient was prepped and draped in the usual sterile fashion  Needle gauge: 21 G.  Approach: anterolateral  Medications administered: 2 mL lidocaine (cardiac); 80 mg methylPREDNISolone acetate 80 MG/ML  Patient tolerance: patient tolerated the procedure well with no immediate complications    Both knees 3 views done for pain with comparison films show tricompartmental osteoarthritis worse in the medial compartment  At the conclusion of the injection I discussed the importance of continued quad strengthening exercises on a daily basis. I will see the patient back if the symptoms should fail to improve or  worsen.    Peggy Sy, APRN  12/19/2024

## 2025-01-13 DIAGNOSIS — N32.81 OVERACTIVE BLADDER: ICD-10-CM

## 2025-01-17 RX ORDER — OXYBUTYNIN CHLORIDE 5 MG/1
5 TABLET ORAL 3 TIMES DAILY
Qty: 90 TABLET | Refills: 6 | Status: SHIPPED | OUTPATIENT
Start: 2025-01-17

## 2025-01-28 ENCOUNTER — TELEPHONE (OUTPATIENT)
Dept: INTERNAL MEDICINE | Facility: CLINIC | Age: 65
End: 2025-01-28

## 2025-01-28 NOTE — TELEPHONE ENCOUNTER
Hub staff attempted to follow warm transfer process and was unsuccessful     Caller: Zulema Sousa    Relationship to patient: Self    Best call back number: [phone]     Patient is needing: LAB SCHEDULING

## 2025-02-04 ENCOUNTER — CONSULT (OUTPATIENT)
Dept: BARIATRICS/WEIGHT MGMT | Facility: CLINIC | Age: 65
End: 2025-02-04
Payer: COMMERCIAL

## 2025-02-04 VITALS
TEMPERATURE: 97.4 F | HEIGHT: 62 IN | DIASTOLIC BLOOD PRESSURE: 86 MMHG | BODY MASS INDEX: 50.24 KG/M2 | WEIGHT: 273 LBS | SYSTOLIC BLOOD PRESSURE: 150 MMHG | HEART RATE: 80 BPM

## 2025-02-04 DIAGNOSIS — G47.33 OBSTRUCTIVE SLEEP APNEA, ADULT: ICD-10-CM

## 2025-02-04 DIAGNOSIS — E66.01 OBESITY, CLASS III, BMI 40-49.9 (MORBID OBESITY): Primary | ICD-10-CM

## 2025-02-04 DIAGNOSIS — E78.2 MIXED HYPERLIPIDEMIA: ICD-10-CM

## 2025-02-04 DIAGNOSIS — Z71.3 ENCOUNTER FOR WEIGHT LOSS COUNSELING: ICD-10-CM

## 2025-02-04 DIAGNOSIS — R73.09 ELEVATED HEMOGLOBIN A1C: ICD-10-CM

## 2025-02-04 DIAGNOSIS — Z71.3 DIETARY COUNSELING: ICD-10-CM

## 2025-02-04 NOTE — PROGRESS NOTES
[facility]RIC SURGERY  [address]  [facility] [place] [facility] [id number] [phone]  [address]  [place] [address] [phone]  Dept: [phone]  2/4/2025      Zulema Sousa.  [id number]  [id number]  1960  female      Chief Complaint of weight gain; unable to maintain weight loss    History of Present Illness:   Zulema is a 64 y.o. female who presents today for evaluation, education and consultation regarding weight loss with the supervision of a medical specialist and registered dietitian.     Diet History:Zulema has been overweight for at least 20+ years, has been 35 pounds or more overweight for at least 20+ years, has been 100 pounds or more overweight for 15 or more years and started dieting at age 12.  The most weight Zulema lost was 100 pounds on lapband and maintained the weight loss for 10 years. Zulema describes her eating habits as snacker/grazer. Zulema Sousa has tried Weight Watchers, Fasting, reduced calorie, exercising, previous weight loss surgery, and high protein, low carb among others with success of losing up to 100 pounds, but in each instance regained the weight.     See dietician documentation for complete history.    Bariatric Surgery Evaluation: The patient is being seen for an initial visit for weight evaluation and education.     Bariatric Co-morbidities:  sleep apnea, dyslipidemia, osteoarthritis, GERD, and previous DVT    Patient Active Problem List   Diagnosis   • Gastroesophageal reflux disease   • Edema   • Hyperlipidemia   • Arthralgia of multiple joints   • Osteoarthritis of knee   • Pericarditis   • Rheumatoid arthritis   • Sciatica   • Unintended weight gain   • Arthritis of both knees   • Obstructive sleep apnea, adult   • Dietary counseling   • History of DVT (deep vein thrombosis)   • Tear of lateral meniscus of right knee, current   • Arthritis of right knee   • Chronic pain of right knee    • Epigastric pain   • Abnormal UGI series   • Esophageal dilatation   • Abnormal finding on radiology exam   • Overactive bladder   • Right elbow tendonitis   • Obesity, Class III, BMI 40-49.9 (morbid obesity)   • History of removal of laparoscopic gastric banding device   • Cervical radiculopathy   • DDD (degenerative disc disease), cervical   • Arthropathy of cervical facet joint   • Cervical spinal stenosis   • Osteopenia after menopause   • History of pulmonary embolus (PE)   • Encounter for weight loss counseling   • Elevated hemoglobin A1c       Past Medical History:   Diagnosis Date   • Acid reflux    • Back pain    • Cardiac tamponade     March 2015   • Cholelithiasis    • Clotting disorder    • Deep vein thrombosis    • Difficulty breathing     during exertion   • Edema    • Esophagitis, reflux    • Essential hypertriglyceridemia    • GERD (gastroesophageal reflux disease)    • Health care maintenance    • Heartburn    • Hyperlipidemia    • Hypertriglyceridemia    • Increased appetite    • Morbid obesity    • Multiple joint pain    • Obesity, Class III, BMI 40-49.9 (morbid obesity) 01/05/2018   • Osteoarthritis of knee    • Pericarditis    • Pulmonary embolism     Janurary 2015   • RHA (rheumatoid arthritis)    • Sciatica    • Sleep apnea    • Unintended weight gain        Past Surgical History:   Procedure Laterality Date   • ANKLE SURGERY      treatment of ankle fracture   • APPENDECTOMY     • CARDIAC SURGERY      cardiac tamponade   • CHOLECYSTECTOMY     • COLONOSCOPY N/A 11/27/2018    Procedure: COLONOSCOPY to cecum and t.i. with polypectomy and clip x2 to ascending colon;  Surgeon: Pierre Ornelas MD;  Location: [facility] ENDOSCOPY;  Service: Gastroenterology   • DILATATION AND CURETTAGE     • ENDOSCOPY N/A 11/27/2018    Procedure: ESOPHAGOGASTRODUODENOSCOPY;  Surgeon: Pierre Ornelas MD;  Location: [facility] ENDOSCOPY;  Service: Gastroenterology   • FRACTURE SURGERY     • GASTRIC BANDING REMOVAL N/A  2019    Procedure: GASTRIC BANDING AND PORT REMOVAL, LYSIS OF ADHESIONS, REPAIR OF COLOTOMY, AND INCISIONAL HERNIA REPAIR LAPAROSCOPIC;  Surgeon: Randell Salamanca Jr., MD;  Location: [facility];  Service: General   • LAPAROSCOPIC GASTRIC BANDING     • PULMONARY EMBOLISM SURGERY     • TUBAL ABDOMINAL LIGATION         Allergies   Allergen Reactions   • Nsaids Other (See Comments)     DOESN'T TAKE BECAUSE SHES ON BLOOD THINNER.         Current Outpatient Medications:   •  Cholecalciferol (VITAMIN D-3 PO), Take  by mouth., Disp: , Rfl:   •  MAGNESIUM PO, Take  by mouth., Disp: , Rfl:   •  multivitamin (THERAGRAN) tablet tablet, Take  by mouth Daily., Disp: , Rfl:   •  omeprazole (priLOSEC) 40 MG capsule, Take 1 capsule by mouth Daily., Disp: 90 capsule, Rfl: 3  •  oxybutynin (DITROPAN) 5 MG tablet, Take 1 tablet by mouth 3 (Three) Times a Day., Disp: 90 tablet, Rfl: 6  •  rivaroxaban (Xarelto) 10 MG tablet, Take 1 tablet by mouth Daily., Disp: 90 tablet, Rfl: 2  •  rosuvastatin (Crestor) 5 MG tablet, Take 1 tablet by mouth Daily., Disp: 90 tablet, Rfl: 1  •  Sodium Fluoride (Sodium Fluoride 5000 Plus) 1.1 % cream, Use daily as directed to brush teeth, Disp: 51 g, Rfl: 2    Social History     Socioeconomic History   • Marital status:      Spouse name: Jac   Tobacco Use   • Smoking status: Former     Current packs/day: 0.00     Average packs/day: 0.5 packs/day for 10.0 years (5.0 ttl pk-yrs)     Types: Cigarettes     Start date: 1/10/2013     Quit date: 1/10/2023     Years since quittin.0     Passive exposure: Past   • Smokeless tobacco: Never   • Tobacco comments:     caffeine use   Vaping Use   • Vaping status: Former   • Substances: Nicotine   • Devices: Refillable tank   Substance and Sexual Activity   • Alcohol use: Not Currently     Alcohol/week: 0.0 - 2.0 standard drinks of alcohol   • Drug use: No   • Sexual activity: Defer     Partners: Male     Birth control/protection: Surgical,  Post-menopausal       Family History   Problem Relation Age of Onset   • Cancer Mother    • Obesity Mother    • Diabetes Father    • Hypertension Father    • Heart disease Father    • Obesity Father    • Arthritis Father    • COPD Father    • Depression Father    • Heart attack Father    • Arthritis Sister    • COPD Sister    • Heart disease Brother    • Heart attack Brother    • Heart disease Brother    • Diabetes Paternal Aunt    • Diabetes Paternal Aunt    • No Known Problems Maternal Grandmother    • No Known Problems Maternal Grandfather    • No Known Problems Paternal Grandmother    • Diabetes Paternal Grandfather    • Malig Hyperthermia Neg Hx    • Breast cancer Neg Hx    • Uterine cancer Neg Hx    • Colon cancer Neg Hx          Review of Systems:  Review of Systems   Constitutional:  Positive for appetite change and unexpected weight change.   Musculoskeletal:  Positive for arthralgias.   All other systems reviewed and are negative.    Physical Exam:  Vital Signs:  Weight: 124 kg (273 lb)   Body mass index is 49.92 kg/m².  Temp: 97.4 °F (36.3 °C)   Heart Rate: 80   BP: 150/86     Physical Exam  Vitals reviewed.   Constitutional:       Appearance: Normal appearance. She is well-developed. She is obese.   HENT:      Head: Normocephalic and atraumatic.   Cardiovascular:      Rate and Rhythm: Normal rate.   Pulmonary:      Effort: Pulmonary effort is normal.   Abdominal:      Palpations: Abdomen is soft.   Musculoskeletal:         General: Normal range of motion.   Skin:     General: Skin is warm and dry.   Neurological:      Mental Status: She is alert and oriented to person, place, and time.   Psychiatric:         Behavior: Behavior normal.         Thought Content: Thought content normal.         Judgment: Judgment normal.        Assessment:         Diagnoses and all orders for this visit:    1. Obesity, Class III, BMI 40-49.9 (morbid obesity) (Primary)  -     Hemoglobin A1c  -     TSH    2. Encounter for  weight loss counseling    3. Dietary counseling    4. Elevated hemoglobin A1c  -     Hemoglobin A1c  -     TSH    5. Mixed hyperlipidemia  -     Hemoglobin A1c  -     TSH    6. Obstructive sleep apnea, adult  -     Hemoglobin A1c  -     TSH        Zulema Sousa is a 64 y.o. year old female with an elevated BMI. Weight: 124 kg (273 lb), Body mass index is 49.92 kg/m². and weight related problems including sleep apnea, dyslipidemia, GERD, and previous DVT.    CBC and CMP reviewed and  Hba1c and TSHwere ordered at this time. These will be drawn and patient will be notified with results.     The patient was advised to start a high protein, low fat and low carbohydrate diet  start routine exercise including but not limited to 150 minutes per week. The patient was given individualized information by our dietician along with handouts.     I think she is a good candidate for medical weight loss, and is interested in trying to track her macronutrient intake using a dietary quintin.    Encounter Diagnoses   Name Primary?   • Obesity, Class III, BMI 40-49.9 (morbid obesity) Yes   • Encounter for weight loss counseling    • Dietary counseling    • Elevated hemoglobin A1c    • Mixed hyperlipidemia    • Obstructive sleep apnea, adult        Plan:    Patient will be evaluated by a bariatric dietician individually. We discussed weight loss program options regarding different diet plans and structures and discussed physiology related to hunger, fullness, satiety, and how different macronutrient's are processed and may contribute to the sustainability of her dietary plan.     Discussed checking A1c since she has had elevated levels and taken ozempic in the past. We discussed compounding if her A1c is WNL or prediabetic. Reviewed medication indication, contraindication, dosing, titration, storage, administration, cost and adverse effects. Gave her handouts regarding and she signed consent.    Total time spent during this encounter today  was 60 minutes and includes preparing for the visit, reviewing tests, performing a medically appropriate examination and/or evaluations, counseling and educating the patient/family/caregiver, ordering medications, tests, or procedures, documenting information in the medical record, and independently interpreting results and communicating that information with the patient/family/caregiver  Effie Joseph, APRN  2/4/2025

## 2025-02-04 NOTE — PROGRESS NOTES
"Medical Weight Loss Nutrition Assessment    Patient Name: Zulema Sousa    YOB: 1960   Age: 64 y.o.  Sex: female  MRN: [id number]     ASSESSMENT    Anthropometric Measurements  Estimated body mass index is 49.92 kg/m² as calculated from the following:    Height as of this encounter: 157.5 cm (62.01\").    Weight as of this encounter: 124 kg (273 lb).    Medical History  Past Medical History:   Diagnosis Date    Acid reflux     Back pain     Cardiac tamponade     March 2015    Cholelithiasis     Clotting disorder     Deep vein thrombosis     Difficulty breathing     during exertion    Edema     Esophagitis, reflux     Essential hypertriglyceridemia     GERD (gastroesophageal reflux disease)     Health care maintenance     Heartburn     Hyperlipidemia     Hypertriglyceridemia     Increased appetite     Morbid obesity     Multiple joint pain     Obesity, Class III, BMI 40-49.9 (morbid obesity) 01/05/2018    Osteoarthritis of knee     Pericarditis     Pulmonary embolism     Janurary 2015    RHA (rheumatoid arthritis)     Sciatica     Sleep apnea     Unintended weight gain      Past Surgical History:   Procedure Laterality Date    ANKLE SURGERY      treatment of ankle fracture    APPENDECTOMY      CARDIAC SURGERY      cardiac tamponade    CHOLECYSTECTOMY      COLONOSCOPY N/A 11/27/2018    Procedure: COLONOSCOPY to cecum and t.i. with polypectomy and clip x2 to ascending colon;  Surgeon: Pierre Ornelas MD;  Location: [facility] ENDOSCOPY;  Service: Gastroenterology    DILATATION AND CURETTAGE      ENDOSCOPY N/A 11/27/2018    Procedure: ESOPHAGOGASTRODUODENOSCOPY;  Surgeon: Pierre Ornelas MD;  Location: [facility] ENDOSCOPY;  Service: Gastroenterology    FRACTURE SURGERY      GASTRIC BANDING REMOVAL N/A 07/01/2019    Procedure: GASTRIC BANDING AND PORT REMOVAL, LYSIS OF ADHESIONS, REPAIR OF COLOTOMY, AND INCISIONAL HERNIA REPAIR LAPAROSCOPIC;  Surgeon: Randell Salamanca Jr., MD;  Location: [facility];  " Service: General    LAPAROSCOPIC GASTRIC BANDING      PULMONARY EMBOLISM SURGERY      TUBAL ABDOMINAL LIGATION       Visit Narrative  Zulema Sousa is participating in medical weight loss program under the supervision of a medical specialist and registered dietitian. Spoke with patient today for nutrition consult. She previously had a LAGB, which was removed by [facility]. She needs knee replacement surgery and has been told she needs to lose weight prior to this. Was doing well with diet and exercise and got off track. Focusing on protein and fiber. Logging in My[employer] with goal of 1900 calories daily. Was walking and strength training. Would like to get back to this routine.     DIAGNOSIS     Nutrition problem statement: Obesity related to multifactorial biochemical, behavioral and environmental contributors to disease as evidenced by BMI 49.92 kg/m²    INTERVENTION    Nutrition education and coaching for behavior change completed. Program materials provided including snack ideas and sample meal plan.  Reviewed the appropriate dietary choices with the patient and provided guidance and suggestions for making necessary changes. Discussed sustainable habit changes and setting small, achievable goals that can be maintained long term. Recommended focus on eating protein first and aim for minimum of 70-80 grams per day. Limit or weigh/measure portions sizes for high fat and calorie foods including nuts. Discussed portion plate model for guidelines on putting together balanced meals. Suggested the option of keeping a food journal which will help patient become more aware of the nutritional value of food, establish starting point and identify areas for improvement. Aim for at least 64 oz water daily. Be sure to do routine exercise, 150 minutes per week minimum, including both cardio and strength training. Dietitian to provide ongoing nutrition education and counseling to establish sustainable meal patterns and support  overall health.     MONITORING & EVALUATION    Goals/Plan:   Resume tracking in MyFitnessPal and exercise routine  Slightly more aggressive calorie target: 1800 calories daily  180-203 gm carbohydrate, 113-135 gm protein, 60 gm fat       Total time spent during this encounter today exceeded 30 minutes and includes preparing for the visit, reviewing tests, counseling and educating the patient/family/caregiver and documenting information in the medical record.    Electronically signed by:  Che Crain RD   02/04/25 10:39 EST

## 2025-02-05 ENCOUNTER — LAB (OUTPATIENT)
Dept: LAB | Facility: HOSPITAL | Age: 65
End: 2025-02-05
Payer: COMMERCIAL

## 2025-02-05 PROCEDURE — 85025 COMPLETE CBC W/AUTO DIFF WBC: CPT | Performed by: INTERNAL MEDICINE

## 2025-02-05 PROCEDURE — 80053 COMPREHEN METABOLIC PANEL: CPT | Performed by: INTERNAL MEDICINE

## 2025-02-05 PROCEDURE — 80061 LIPID PANEL: CPT | Performed by: INTERNAL MEDICINE

## 2025-02-05 PROCEDURE — 84443 ASSAY THYROID STIM HORMONE: CPT | Performed by: INTERNAL MEDICINE

## 2025-02-05 PROCEDURE — 83036 HEMOGLOBIN GLYCOSYLATED A1C: CPT | Performed by: INTERNAL MEDICINE

## 2025-02-05 PROCEDURE — 84439 ASSAY OF FREE THYROXINE: CPT | Performed by: INTERNAL MEDICINE

## 2025-02-21 ENCOUNTER — OFFICE VISIT (OUTPATIENT)
Dept: INTERNAL MEDICINE | Facility: CLINIC | Age: 65
End: 2025-02-21
Payer: COMMERCIAL

## 2025-02-21 VITALS
DIASTOLIC BLOOD PRESSURE: 80 MMHG | HEART RATE: 66 BPM | BODY MASS INDEX: 49.66 KG/M2 | SYSTOLIC BLOOD PRESSURE: 132 MMHG | WEIGHT: 271.6 LBS | TEMPERATURE: 97.3 F | OXYGEN SATURATION: 98 %

## 2025-02-21 DIAGNOSIS — Z12.31 BREAST CANCER SCREENING BY MAMMOGRAM: Primary | ICD-10-CM

## 2025-02-21 DIAGNOSIS — E66.01 OBESITY, CLASS III, BMI 40-49.9 (MORBID OBESITY): ICD-10-CM

## 2025-02-21 DIAGNOSIS — Z00.00 HEALTHCARE MAINTENANCE: ICD-10-CM

## 2025-02-21 DIAGNOSIS — E78.2 MIXED HYPERLIPIDEMIA: ICD-10-CM

## 2025-02-21 DIAGNOSIS — K21.9 GASTROESOPHAGEAL REFLUX DISEASE, UNSPECIFIED WHETHER ESOPHAGITIS PRESENT: ICD-10-CM

## 2025-02-21 DIAGNOSIS — R73.03 PREDIABETES: ICD-10-CM

## 2025-02-21 DIAGNOSIS — Z86.711 HISTORY OF PULMONARY EMBOLUS (PE): ICD-10-CM

## 2025-02-21 PROCEDURE — 99214 OFFICE O/P EST MOD 30 MIN: CPT | Performed by: INTERNAL MEDICINE

## 2025-02-21 PROCEDURE — 99397 PER PM REEVAL EST PAT 65+ YR: CPT | Performed by: INTERNAL MEDICINE

## 2025-02-21 NOTE — PROGRESS NOTES
Chief Complaint   Patient presents with    Annual Exam       Patient Name: Zulema Sousa    SUBJECTIVE    Zulema is a 64 y.o. female presenting for Annual Exam    Zulema Sousa 64 y.o. female who presents for an Annual Wellness Visit.  she has a history of   Patient Active Problem List   Diagnosis    Gastroesophageal reflux disease    Edema    Hyperlipidemia    Arthralgia of multiple joints    Osteoarthritis of knee    Pericarditis    Rheumatoid arthritis    Sciatica    Unintended weight gain    Arthritis of both knees    Obstructive sleep apnea, adult    Dietary counseling    History of DVT (deep vein thrombosis)    Tear of lateral meniscus of right knee, current    Arthritis of right knee    Chronic pain of right knee    Epigastric pain    Abnormal UGI series    Esophageal dilatation    Abnormal finding on radiology exam    Overactive bladder    Right elbow tendonitis    Obesity, Class III, BMI 40-49.9 (morbid obesity)    History of removal of laparoscopic gastric banding device    Cervical radiculopathy    DDD (degenerative disc disease), cervical    Arthropathy of cervical facet joint    Cervical spinal stenosis    Osteopenia after menopause    History of pulmonary embolus (PE)    Encounter for weight loss counseling    Elevated hemoglobin A1c   .  she has been doing well with new interval problems.      Health Habits:  Dental Exam. up to date  Eye Exam. not up to date - encouraged pt to schedule appt  Exercise: 2 times/week.  Current exercise activities include: weightlifting    Menstrual history:post menopausal  Last pap date: utd  Abnormal pap? no  Mammogram: due  Dexa: UTD  Colonoscopy:utd.  Due 2027  Tob use: former quit 2024 She has been smoking about 1ppd off and on for years. <20 pack year history.  EtOH use:occ  Qualifies for lung Ca screening?no    she quit smoking January 10th. She has been smoking about 1ppd off and on for years. <20 pack year history.     Obesity - she is s/p lap band removal.   she is off ozempic.  She is lifting weights at home and tries to eat a healthy diet.  She saw a nutritionist and this helps some.  She has struggled with weight for years and saw bariatric clinic earlier this month to discuss medical weight loss.  She is considering GLP-1 therapy rather than another surgery.    Prediabetes - a1c 6.0 -> 5.7->5.8->5.5->5.6->5.6         HLD - pt on Crestor and doing well.  No cramps or myalgias.    OAB - on oxybutynin    Takes xarelto daily bc of hx DVT and bilateral PE x2.  She had soa after flights in December.  CT angiogram neg.      Gerd - on omeprazole daily    Care team updated.  Pt has also been to dermatology.     The following portions of the patient's history were reviewed and updated as appropriate: allergies, current medications, past family history, past medical history, past social history, past surgical history and problem list.    Review of Systems   Constitutional: Negative.    HENT: Negative.     Eyes: Negative.    Respiratory: Negative.     Cardiovascular: Negative.    Gastrointestinal: Negative.    Endocrine: Negative.    Genitourinary: Negative.    Musculoskeletal: Negative.    Skin: Negative.    Allergic/Immunologic: Negative.    Neurological: Negative.    Hematological: Negative.    Psychiatric/Behavioral: Negative.     All other systems reviewed and are negative.      Nsaids      Current Outpatient Medications:     Cholecalciferol (VITAMIN D-3 PO), Take  by mouth., Disp: , Rfl:     MAGNESIUM PO, Take  by mouth., Disp: , Rfl:     multivitamin (THERAGRAN) tablet tablet, Take  by mouth Daily., Disp: , Rfl:     omeprazole (priLOSEC) 40 MG capsule, Take 1 capsule by mouth Daily., Disp: 90 capsule, Rfl: 3    oxybutynin (DITROPAN) 5 MG tablet, Take 1 tablet by mouth 3 (Three) Times a Day., Disp: 90 tablet, Rfl: 6    rivaroxaban (Xarelto) 10 MG tablet, Take 1 tablet by mouth Daily., Disp: 90 tablet, Rfl: 2    rosuvastatin (Crestor) 5 MG tablet, Take 1 tablet by mouth  Daily., Disp: 90 tablet, Rfl: 1    Sodium Fluoride (Sodium Fluoride 5000 Plus) 1.1 % cream, Use daily as directed to brush teeth, Disp: 51 g, Rfl: 2    OBJECTIVE    /80 (BP Location: Left arm, Patient Position: Sitting, Cuff Size: Large Adult)   Pulse 66   Temp 97.3 °F (36.3 °C) (Infrared)   Wt 123 kg (271 lb 9.6 oz)   LMP  (LMP Unknown)   SpO2 98%   BMI 49.66 kg/m²     Wt Readings from Last 3 Encounters:   02/21/25 123 kg (271 lb 9.6 oz)   02/04/25 124 kg (273 lb)   12/19/24 121 kg (267 lb)     Temp Readings from Last 3 Encounters:   02/21/25 97.3 °F (36.3 °C) (Infrared)   02/04/25 97.4 °F (36.3 °C) (Temporal)   12/19/24 97.3 °F (36.3 °C)     BP Readings from Last 3 Encounters:   02/21/25 132/80   02/04/25 150/86   12/10/24 132/59     Pulse Readings from Last 3 Encounters:   02/21/25 66   02/04/25 80   12/10/24 114       Physical Exam  Vitals and nursing note reviewed.   Constitutional:       General: She is not in acute distress.     Appearance: She is well-developed.   HENT:      Head: Normocephalic and atraumatic.      Right Ear: External ear normal.      Left Ear: External ear normal.      Nose: Nose normal.   Eyes:      Conjunctiva/sclera: Conjunctivae normal.      Pupils: Pupils are equal, round, and reactive to light.   Cardiovascular:      Rate and Rhythm: Normal rate and regular rhythm.      Heart sounds: Normal heart sounds.   Pulmonary:      Effort: Pulmonary effort is normal. No respiratory distress.      Breath sounds: Normal breath sounds. No wheezing.   Musculoskeletal:         General: Normal range of motion.      Cervical back: Normal range of motion and neck supple.      Comments: Normal gait   Skin:     General: Skin is warm and dry.   Neurological:      Mental Status: She is alert and oriented to person, place, and time.   Psychiatric:         Behavior: Behavior normal.         Thought Content: Thought content normal.         Judgment: Judgment normal.         Common labs           6/21/2024    08:28 12/10/2024    15:08 2/5/2025    07:31   Common Labs   Glucose 94  112  89    BUN 22  18  20    Creatinine 0.78  0.76  0.79    Sodium 143  138  139    Potassium 4.5  4.3  4.5    Chloride 105  106  103    Calcium 9.7  9.0  10.1    Albumin 4.1  3.6  3.8    Total Bilirubin 0.2  <0.2  0.2    Alkaline Phosphatase 94  111  92    AST (SGOT) 22  29  25    ALT (SGPT) 23  21  21    WBC 7.51  8.22  7.41    Hemoglobin 12.8  11.5  12.5    Hematocrit 41.0  37.8  41.6    Platelets 288  280  286    Total Cholesterol   172    Total Cholesterol 158      Triglycerides 50   63    HDL Cholesterol 72   71    LDL Cholesterol  75   89    Hemoglobin A1C 5.60   5.60       Reviewed recent consult notes from bariatrics and cardiology.  Reviewed CT angiogram.  [unfilled]    ASSESSMENT AND PLAN  Diagnoses and all orders for this visit:    1. Breast cancer screening by mammogram (Primary)  -     Mammo Screening Digital Tomosynthesis Bilateral With CAD; Future    2. Healthcare maintenance  -     Mammo Screening Digital Tomosynthesis Bilateral With CAD; Future    3. Mixed hyperlipidemia - cont crestor  -     CBC & Differential; Future  -     Comprehensive Metabolic Panel; Future  -     Hemoglobin A1c; Future  -     Lipid Panel With LDL / HDL Ratio; Future  -     TSH; Future  -     T4, Free; Future    4. Prediabetes  -     CBC & Differential; Future  -     Comprehensive Metabolic Panel; Future  -     Hemoglobin A1c; Future  -     Lipid Panel With LDL / HDL Ratio; Future  -     TSH; Future  -     T4, Free; Future    5. History of pulmonary embolus (PE) - cont xarelto    6. Obesity, Class III, BMI 40-49.9 (morbid obesity) - pt did well with glp-1 in past.  Agree with medical weight loss program and glp-1 use.   -     CBC & Differential; Future  -     Comprehensive Metabolic Panel; Future  -     Hemoglobin A1c; Future  -     Lipid Panel With LDL / HDL Ratio; Future  -     TSH; Future  -     T4, Free; Future    7. Gastroesophageal  reflux disease, unspecified whether esophagitis present - cont omeprazole        Discussed healthy diet, exercise, cancer screening, immunizations, and preventive care.  Hm tab updated.  Encouraged seat belt use.  No texting while driving. Orders placed as below.     Encouraged covid vaccination if not already done.  Covid vaccination can be scheduled at [email or web address]    begin progressive daily aerobic exercise program, attempt to lose weight, continue current medications, continue current healthy lifestyle patterns, and return for routine annual checkups    [unfilled]    Return in about 6 months (around 8/21/2025) for Recheck, labs.

## 2025-03-11 ENCOUNTER — OFFICE VISIT (OUTPATIENT)
Dept: ORTHOPEDIC SURGERY | Facility: CLINIC | Age: 65
End: 2025-03-11
Payer: COMMERCIAL

## 2025-03-11 VITALS — BODY MASS INDEX: 50.94 KG/M2 | HEIGHT: 62 IN | WEIGHT: 276.8 LBS | TEMPERATURE: 97.4 F

## 2025-03-11 DIAGNOSIS — M54.12 CERVICAL RADICULOPATHY: Primary | ICD-10-CM

## 2025-03-11 PROCEDURE — 99214 OFFICE O/P EST MOD 30 MIN: CPT | Performed by: NURSE PRACTITIONER

## 2025-03-11 RX ORDER — CYCLOBENZAPRINE HCL 10 MG
10 TABLET ORAL NIGHTLY PRN
Qty: 30 TABLET | Refills: 0 | Status: SHIPPED | OUTPATIENT
Start: 2025-03-11

## 2025-03-11 RX ORDER — METHYLPREDNISOLONE 4 MG/1
TABLET ORAL
Qty: 21 TABLET | Refills: 0 | Status: SHIPPED | OUTPATIENT
Start: 2025-03-11

## 2025-03-11 NOTE — PROGRESS NOTES
Patient Name: Zulema Sousa   YOB: 1960  Referring Primary Care Physician: Marsha Young MD      Chief Complaint:    Chief Complaint   Patient presents with    Cervical Spine - Follow-up, Pain          HPI:  Zulema Sousa is a 64 y.o. female who presents to [facility] for evaluation of neck pain referring into the left upper extremity.  She was seen about 18 months ago for similar complaints.  She completed a course of physical therapy and did fairly well.  She was also referred to pain management but ultimately did not proceed with injections.  Symptoms currently have been flared up about 5 weeks without injury.      PFSH:  See attached    ROS: As per HPI, otherwise negative    Objective:      64 y.o. female  Body mass index is 50.63 kg/m²., 126 kg (276 lb 12.8 oz), @HT@  Vitals:    03/11/25 0924   Temp: 97.4 °F (36.3 °C)     Pain Score    03/11/25 0924   PainSc: 7    PainLoc: Neck            Spine Musculoskeletal Exam    Gait    Gait is normal.    Palpation    Cervical Spine    Tenderness: present      Trapezius: left    Range of Motion    Cervical Spine       Cervical flexion: chin to chest.     Cervical extension: reduced extension (0-25%).       Left      Lateral bending: reduced bend (26-50%). Lateral bending detail: pain.       Lateral rotation: reduced rotation (26-50%). Lateral rotation detail: pain.      Strength    Cervical Spine    Cervical spine motor exam is normal.    Sensory    Cervical Spine    Cervical spine sensation is normal.    Reflexes    Right        Biceps: 1/4      Brachioradialis: 1/4      Triceps: 1/4    Left        Biceps: 1/4        Brachioradialis: 1/4      Triceps: 1/4    Special Tests    Cervical Spine      Right      Tinel's - carpal tunnel: negative      Left      Tinel's - carpal tunnel: negative    General      Constitutional: severely obese, well-developed and well-nourished    Scleral icterus: no    Labored breathing:  no    Psychiatric: normal mood and affect and no acute distress    Neurological: alert and oriented x3    Skin: intact        IMAGING:     Indication: pain related symptoms,  Views: 2V AP&LAT cervical  Findings: Loss of normal lordosis, disc space selling most pronounced C5-6, C6-7  Comparison views: Similar to 07/16/2020 3 plain films.  Cervical MRI 08/11/2023 revealed multilevel degenerative change with mild central stenosis C5-6 and C6-7 and varying degrees of foraminal narrowing worse to the left at C4-5, right C5-6 and bilaterally C6-7      Official radiology interpretation will follow and be available in the patient medical records. Patient will be notified of any pertinent discrepancies.    Addendum 03/14/2025:  There is disc space narrowing demonstrated at C5-6 and C6-7  with endplate hypertrophy similar to the previous examination. There is  C5-6, C6-7 and C7-T1 facet hypertrophy. No cervical rib. Prevertebral  soft tissues within normal limits. The appearance of the cervical spine  is similar to 07/16/2023.  Assessment:           Diagnoses and all orders for this visit:    1. Cervical radiculopathy (Primary)  -     Ambulatory Referral to Pain Management Clinic  -     Ambulatory Referral to Physical Therapy for Evaluation & Treatment    Other orders  -     methylPREDNISolone (MEDROL) 4 MG dose pack; Use as directed by package instructions  Dispense: 21 tablet; Refill: 0  -     cyclobenzaprine (FLEXERIL) 10 MG tablet; Take 1 tablet by mouth At Night As Needed for Muscle Spasms.  Dispense: 30 tablet; Refill: 0             Plan:  She did well with physical therapy 18 months ago.  Will send her for a short refresher course and have them utilize traction and set it up at home if beneficial.  She will also take her TENS unit to therapy to have them help her adjust it to appropriate settings.  Will also try Medrol Dosepak to calm down the inflammation and hopefully help her tolerate the therapy better.  Will also  try cyclobenzaprine 10 mg nightly as as needed.  Common side effects discussed of both.  Will also refer her for epidural injections.  She was previously evaluated but ultimately declined the injections as symptoms proved.  For now we will follow-up as needed.  She understands the next level of treatment would be updating cervical MRI and refer her to neurosurgery, but she wants to avoid surgery at all cost.    Return if symptoms worsen or fail to improve.    EMR Dragon/Transcription Disclaimer:   Much of this encounter note is an electronic transcription/translation of spoken language to printed text. The electronic translation of spoken language may permit erroneous, or at times, nonsensical words or phrases to be inadvertently transcribed; Although I have reviewed the note for such errors, some may still exist.  Red flags have been discussed at this or previous visits to include but not limited weakness in extremities, worsening pain that does not respond to conservative treatment and bowel or bladder dysfunction. These are reasons to present to ER and patient has been informed.    The diagnosis(es), natural history, pathophysiology and treatment for diagnosis(es) were discussed. Opportunity given and questions answered. Biomechanics of pertinent body areas discussed.    EXERCISES:  Advice on benefits of, and types of regular/moderate exercise pertaining to diagnosis.  Continue HEP. For back or neck pain, recommend pilates and or yoga as tolerated. Generally it is best to start any new exercise under the guidance of a  or therapist.   MEDICATIONS:  When prescribe, the risks, benefits, warnings,side effects and alternatives of medications discussed. Advised against long term use of narcotics.   PAIN CONTROL:  Cold, heat, OTC lidocaine patches and/or ointment as needed. Avoid direct skin contact with ice. Ice 15-20 minutes 3-4 times daily as needed. For SI joint pain, recommend ice bath in water about 50  degrees for 5 consecutive days, add ice slowly to help with adjustment and may cover with warm towel or robe to help with cold tolerance. If using lidocaine, do not apply heat in conjunction as this can cause a burn.   MEDICAL RECORDS reviewed from other provider(s) for past and current medical history pertinent to this visit..

## 2025-03-18 ENCOUNTER — OFFICE VISIT (OUTPATIENT)
Dept: ORTHOPEDIC SURGERY | Facility: CLINIC | Age: 65
End: 2025-03-18
Payer: COMMERCIAL

## 2025-03-18 VITALS — HEIGHT: 60 IN | WEIGHT: 273 LBS | TEMPERATURE: 98 F | BODY MASS INDEX: 53.6 KG/M2

## 2025-03-18 DIAGNOSIS — M17.0 PRIMARY OSTEOARTHRITIS OF BOTH KNEES: Primary | ICD-10-CM

## 2025-03-20 RX ORDER — METHYLPREDNISOLONE ACETATE 80 MG/ML
80 INJECTION, SUSPENSION INTRA-ARTICULAR; INTRALESIONAL; INTRAMUSCULAR; SOFT TISSUE
Status: COMPLETED | OUTPATIENT
Start: 2025-03-20 | End: 2025-03-20

## 2025-03-20 RX ADMIN — METHYLPREDNISOLONE ACETATE 80 MG: 80 INJECTION, SUSPENSION INTRA-ARTICULAR; INTRALESIONAL; INTRAMUSCULAR; SOFT TISSUE at 12:44

## 2025-03-20 RX ADMIN — METHYLPREDNISOLONE ACETATE 80 MG: 80 INJECTION, SUSPENSION INTRA-ARTICULAR; INTRALESIONAL; INTRAMUSCULAR; SOFT TISSUE at 12:43

## 2025-03-20 NOTE — PROGRESS NOTES
3/20/2025    Zulema Sousa is here today for worsening knee pain. Pt has undergone injection of the knee in the past with good resolution of symptoms. Pt is requesting a repeat injection.     KNEE Injection Procedure Note:    Large Joint Arthrocentesis: L knee  Date/Time: 3/20/2025 12:43 PM  Consent given by: patient  Site marked: site marked  Timeout: Immediately prior to procedure a time out was called to verify the correct patient, procedure, equipment, support staff and site/side marked as required   Supporting Documentation  Indications: pain and joint swelling   Procedure Details  Location: knee - L knee  Preparation: Patient was prepped and draped in the usual sterile fashion  Needle gauge: 21 G.  Approach: anterolateral  Medications administered: 2 mL lidocaine (cardiac); 80 mg methylPREDNISolone acetate 80 MG/ML  Patient tolerance: patient tolerated the procedure well with no immediate complications      Large Joint Arthrocentesis: R knee  Date/Time: 3/20/2025 12:44 PM  Consent given by: patient  Site marked: site marked  Timeout: Immediately prior to procedure a time out was called to verify the correct patient, procedure, equipment, support staff and site/side marked as required   Supporting Documentation  Indications: pain and joint swelling   Procedure Details  Location: knee - R knee  Preparation: Patient was prepped and draped in the usual sterile fashion  Needle gauge: 21 G.  Approach: anterolateral  Medications administered: 2 mL lidocaine (cardiac); 80 mg methylPREDNISolone acetate 80 MG/ML  Patient tolerance: patient tolerated the procedure well with no immediate complications      At the conclusion of the injection I discussed the importance of continued quad strengthening exercises on a daily basis. I will see the patient back if the symptoms should fail to improve or worsen.    Peggy Sy, APRN  3/20/2025

## 2025-03-24 ENCOUNTER — HOSPITAL ENCOUNTER (OUTPATIENT)
Dept: PHYSICAL THERAPY | Facility: HOSPITAL | Age: 65
Setting detail: THERAPIES SERIES
Discharge: HOME | End: 2025-03-24
Payer: COMMERCIAL

## 2025-03-24 DIAGNOSIS — M54.12 CERVICAL RADICULOPATHY AT C7: Primary | ICD-10-CM

## 2025-03-24 PROCEDURE — 97161 PT EVAL LOW COMPLEX 20 MIN: CPT | Performed by: PHYSICAL THERAPIST

## 2025-03-24 NOTE — THERAPY EVALUATION
Outpatient Physical Therapy Ortho Initial Evaluation   Manisha     Patient Name: Zulema Sousa  : 1960  MRN: [id number]  Today's Date: 3/24/2025      Visit Date: 2025    Patient Active Problem List   Diagnosis    Gastroesophageal reflux disease    Edema    Hyperlipidemia    Arthralgia of multiple joints    Osteoarthritis of knee    Pericarditis    Rheumatoid arthritis    Sciatica    Unintended weight gain    Arthritis of both knees    Obstructive sleep apnea, adult    Dietary counseling    History of DVT (deep vein thrombosis)    Tear of lateral meniscus of right knee, current    Arthritis of right knee    Chronic pain of right knee    Epigastric pain    Abnormal UGI series    Esophageal dilatation    Abnormal finding on radiology exam    Overactive bladder    Right elbow tendonitis    Obesity, Class III, BMI 40-49.9 (morbid obesity)    History of removal of laparoscopic gastric banding device    Cervical radiculopathy    DDD (degenerative disc disease), cervical    Arthropathy of cervical facet joint    Cervical spinal stenosis    Osteopenia after menopause    History of pulmonary embolus (PE)    Encounter for weight loss counseling    Elevated hemoglobin A1c        Past Medical History:   Diagnosis Date    Acid reflux     Back pain     Cardiac tamponade     2015    Cholelithiasis     Clotting disorder     Deep vein thrombosis     Difficulty breathing     during exertion    Edema     Esophagitis, reflux     Essential hypertriglyceridemia     GERD (gastroesophageal reflux disease)     Health care maintenance     Heartburn     Hyperlipidemia     Hypertriglyceridemia     Increased appetite     Morbid obesity     Multiple joint pain     Obesity, Class III, BMI 40-49.9 (morbid obesity) 2018    Osteoarthritis of knee     Pericarditis     Pulmonary embolism     ura2015    RHA (rheumatoid arthritis)     Sciatica     Sleep apnea     Unintended weight gain         Past Surgical History:    Procedure Laterality Date    ANKLE SURGERY      treatment of ankle fracture    APPENDECTOMY      CARDIAC SURGERY      cardiac tamponade    CHOLECYSTECTOMY      COLONOSCOPY N/A 11/27/2018    Procedure: COLONOSCOPY to cecum and t.i. with polypectomy and clip x2 to ascending colon;  Surgeon: Pierre Ornelas MD;  Location: [facility] ENDOSCOPY;  Service: Gastroenterology    DILATATION AND CURETTAGE      ENDOSCOPY N/A 11/27/2018    Procedure: ESOPHAGOGASTRODUODENOSCOPY;  Surgeon: Pierre Ornelas MD;  Location: [facility] ENDOSCOPY;  Service: Gastroenterology    FRACTURE SURGERY      GASTRIC BANDING REMOVAL N/A 07/01/2019    Procedure: GASTRIC BANDING AND PORT REMOVAL, LYSIS OF ADHESIONS, REPAIR OF COLOTOMY, AND INCISIONAL HERNIA REPAIR LAPAROSCOPIC;  Surgeon: Randell Salamanca Jr., MD;  [place] [facility] [place] [facility] [place]: General    LAPAROSCOPIC GASTRIC BANDING      PULMONARY EMBOLISM SURGERY      TUBAL ABDOMINAL LIGATION         Visit Dx:     ICD-10-CM ICD-9-CM   1. Cervical radiculopathy at C7  M54.12 723.4          Patient History       Row Name 03/24/25 0650             History    Chief Complaint Difficulty with daily activities;Pain;Tightness  -      Type of Pain Neck pain  -GC      Brief Description of Current Complaint Pt reports an approximate 6 week history of neck pain/tightness with turning and bending of her head and neck. She was seen by LEDY Pennington who did x-rays and patient reports she has disc space narrowing. She was placed on a steroid pack that helped with her pain. She is now referred for therapy.  -      Patient/Caregiver Goals Relieve pain;Return to prior level of function;Improve mobility  -      Occupation/sports/leisure activities desk work  -      What clinical tests have you had for this problem? X-ray  -      Results of Clinical Tests degenerative changes  -      History of Previous Related Injuries Pt has previous symptoms approximately 18 months ago  -         Pain      Pain Location Neck  -GC      Pain at Worst 3  -GC      Pain Frequency Intermittent  -GC      Pain Description Aching;Discomfort;Tightness  -GC      What Performance Factors Make the Current Problem(s) WORSE? Pt c/o pain with turning her head and looking down  -GC      Difficulties at work? 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 Listening;Reading  -GC      Teaching needs identified Home Exercise Program;Management of Condition  -GC      Barriers to learning None  -GC      Pt Participated in POC and Goals Yes  -GC         Safety    Are you being hurt, hit, or frightened by anyone at home or in your life? No  -GC      Are you being neglected by a caregiver No  -GC                User Key  (r) = Recorded By, (t) = Taken By, (c) = Cosigned By      Initials Name Provider Type    GC Justus Tuttle, PT Physical Therapist                     PT Ortho       Row Name 03/24/25 0650       Posture/Observations    Posture/Observations Comments Pt has rounded shoulder, forward posture  -GC       DTR- Upper Quarter Clearing    Biceps (C5/6) Bilateral:;2- Normal response  -GC    Brachioradialis (C6) Bilateral:;2- Normal response  -GC    Triceps (C7) Bilateral:;2- Normal response  -GC       Sensory Screen for Light Touch- Upper Quarter Clearing    C4 (posterior shoulder) Bilateral:;Intact  -GC    C5 (lateral upper arm) Bilateral:;Intact  -GC    C6 (tip of thumb) Bilateral:;Intact  -GC    C7 (tip of 3rd finger) Bilateral:;Intact  -GC    C8 (tip of 5th finger) Bilateral:;Intact  -GC    T1 (medial lower arm) Bilateral:;Intact  -GC       Myotomal Screen- Upper Quarter Clearing    Shoulder flexion (C5) Bilateral:;5 (Normal)  -GC    Elbow flexion/wrist extension (C6) Bilateral:;5 (Normal)  -GC    Elbow extension/wrist flexion (C7) Bilateral:;5 (Normal)  -GC       Cervical  Palpation    Levator Scapula Bilateral:;Tender;Guarded/taut  -GC    Upper Traps Bilateral:;Tender;Guarded/taut  -GC       Cervical Accessory Motions    Sideglide- C3 WNL  -GC    Sideglide- C4 WNL  -GC    Sideglide- C5 WNL  -GC    Sideglide- C6 WNL  -GC    Sideglide- C7 WNL  -GC       Cervical/Thoracic Special Tests    Cervical Compression (Forarminal Compression vs. Facet Pain) Negative  -GC    Cervical Distraction (Foraminal Compression vs. Facet Pain) Negative  -GC    Sharp-Ioana (AA instability) Negative  -GC    Transverse Ligament (Instability) Negative  -GC    Vertebral Artery Test (VBI Sign) Bilateral:;Negative  -GC       Head/Neck/Trunk    Neck Extension AROM 75% range  -GC    Neck Flexion AROM 50% range with pain  -GC    Neck Lt Lateral Flexion AROM 50% range with pain  -GC    Neck Rt Lateral Flexion AROM 50% range with pain  -GC    Neck Lt Rotation AROM 75% range with pain  -GC    Neck Rt Rotation AROM 50% range with pain  -GC       Upper Extremity Flexibility    Scalenes Bilateral:;WNL  -GC    SCM Bilateral:;WNL  -GC    Upper Trapezius Bilateral:;Moderately limited  -GC    Levator Scapula Bilateral:;Moderately limited  -GC              User Key  (r) = Recorded By, (t) = Taken By, (c) = Cosigned By      Initials Name Provider Type    Justus Crooks, PT Physical Therapist                                Therapy Education  Given: HEP, Symptoms/condition management, Pain management, Posture/body mechanics  Program: New  How Provided: Verbal, Demonstration  Provided to: Patient  Level of Understanding: Teach back education performed, Verbalized, Demonstrated      PT OP Goals       Row Name 03/24/25 0650          PT Short Term Goals    STG Date to Achieve 04/07/25  -GC     STG 1 Decrease cervical pain to 1-2/10 with activity.  -GC     STG 2 Increased cervical AROM to at least 75% range all planes with testing.  -GC     STG 3 Increase upper trap and levator flexibility to only a minimal restriction with  testing.  -     STG 4 Pt will be independent with his HEP issued by this therapist.  -        Long Term Goals    LTG Date to Achieve 04/21/25  -     LTG 1 Decrease cervical pain to 0-1/10 with activity.  -     LTG 2 Increased cervical AROM to WFL all planes with testing.  -     LTG 3 Increase upper trap and levator flexibility to WFL with testing.  -     LTG 4 Pt will be independent with all ADLs without pain.  -        Time Calculation    PT Goal Re-Cert Due Date 04/21/25  -               User Key  (r) = Recorded By, (t) = Taken By, (c) = Cosigned By      Initials Name Provider Type     Justus Tuttle, PT Physical Therapist                     PT Assessment/Plan       Row Name 03/24/25 0650          PT Assessment    Functional Limitations Limitation in home management;Limitations in community activities;Limitations in functional capacity and performance;Performance in leisure activities;Performance in sport activities  -     Impairments Range of motion;Pain;Impaired flexibility;Joint mobility  -     Assessment Comments Pt presents with an approximate 6 week history of cervical pain that she rates up to 3/10 with turning her head and looking down. She has decreased cervical ROM, decreased cervical flexibility, and decreased function secondary to the above.  -     Rehab Potential Good  -     Patient/caregiver participated in establishment of treatment plan and goals Yes  -     Patient would benefit from skilled therapy intervention Yes  -        PT Plan    PT Frequency 1x/week;2x/week  -     Predicted Duration of Therapy Intervention (PT) 4 weeks  -     Planned CPT's? PT EVAL LOW COMPLEXITY: 07988;PT THER PROC EA 15 MIN: 63363;PT MANUAL THERAPY EA 15 MIN: 79387;PT HOT OR COLD PACK TREAT MCARE;PT TRACTION CERVICAL: 70014  -     PT Plan Comments Pt is to continue her HEP 2x daily.  -               User Key  (r) = Recorded By, (t) = Taken By, (c) = Cosigned By      Initials Name  Provider Type    GC Justus Tuttle, PT Physical Therapist                     Modalities       Row Name 03/24/25 0650             Moist Heat    MH Applied Yes  -GC      Location cervical spine  -GC      PT Moist Heat Minutes 10  -GC      MH Prior to Rx Yes  -GC         Traction 16077    Traction Type Cervical  -GC      PT Traction Rx Minutes 15  -GC      Duration Intermittent  -GC      Position Supine  -GC      Weight 14  -GC      Hold 60  -GC      Relax 30  -GC                User Key  (r) = Recorded By, (t) = Taken By, (c) = Cosigned By      Initials Name Provider Type     Justus Tuttle, PT Physical Therapist                   OP Exercises       Row Name 03/24/25 0650             Exercise 1    Exercise Name 1 upper trap stretch bilaterally  -GC      Reps 1 5  -GC      Time 1 10 secs  -GC         Exercise 2    Exercise Name 2 levator stretch-bilaterally  -GC      Reps 2 5  -GC      Time 2 10 secs  -GC                User Key  (r) = Recorded By, (t) = Taken By, (c) = Cosigned By      Initials Name Provider Type    GC Justus Tuttle, PT Physical Therapist                                  Outcome Measure Options: Neck Disability Index (NDI)  Neck Disability Index  Section 1 - Pain Intensity: The pain comes and goes and is moderate.  Section 2 - Personal Care: I can look after myself normally without causing extra pain.  Section 3 - Lifting: I can lift heavy weights, but it causes extra pain.  Section 4 - Reading: I can read as much as I want with slight neck pain.  Section 5 - Headaches: I have slight headaches that come infrequently.  Section 6 - Concentration: I can concentrate fully when I want to without difficulty.  Section 7 - Work: I can do as much work as I want.  Section 8 - Driving: I can drive as long as I want with slight neck pain.  Section 9 - Sleeping: My sleep is slightly disturbed (less than 1 hour sleepless).  Section 10 - Recreation: I am able to engage in all recreational activities with some  pain in my neck.  Neck Disability Index Score: 8      Time Calculation:     Start Time: 0650  Stop Time: 0756  Time Calculation (min): 66 min  Untimed Charges  PT Traction Rx Minutes: 15  PT Moist Heat Minutes: 10  Total Minutes  Untimed Charges Total Minutes: 25   Total Minutes: 25     Therapy Charges for Today       Code Description Service Date Service Provider Modifiers Qty    16147349963 HC PT EVAL LOW COMPLEXITY 3 3/24/2025 Justus Tuttle, PT GP 1            PT G-Codes  Outcome Measure Options: Neck Disability Index (NDI)  Neck Disability Index Score: 8         Justus Tuttle, PT  3/24/2025

## 2025-04-08 ENCOUNTER — OFFICE VISIT (OUTPATIENT)
Age: 65
End: 2025-04-08
Payer: COMMERCIAL

## 2025-04-08 VITALS
WEIGHT: 275 LBS | SYSTOLIC BLOOD PRESSURE: 120 MMHG | BODY MASS INDEX: 53.99 KG/M2 | HEIGHT: 60 IN | HEART RATE: 81 BPM | DIASTOLIC BLOOD PRESSURE: 64 MMHG

## 2025-04-08 DIAGNOSIS — R07.9 CHEST PAIN, UNSPECIFIED TYPE: Primary | ICD-10-CM

## 2025-04-08 PROCEDURE — 99213 OFFICE O/P EST LOW 20 MIN: CPT | Performed by: INTERNAL MEDICINE

## 2025-04-08 NOTE — PROGRESS NOTES
Subjective:     Encounter Date: 04/08/25      Patient ID: Zulema Sousa is a 64 y.o. female.    Chief Complaint: PE  Hyperlipidemia  Associated symptoms include shortness of breath.   Shortness of Breath    This is a 64-year-old woman who was previously seen by Dr. Newton Hernández.  In 2016 she suffered bilateral pulmonary emboli.  She underwent thrombectomy with Dr. Hernández, and recovered well.  However, this was complicated by cardiac tamponade.  She underwent pericardiocentesis and recovered well from that.  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    When I saw her in December 2024 she was seen for urgent follow-up due onset chest pain and shortness of breath.  She was sent to the emergency room for further follow-up and was ruled out for PE.  She returns today no further dyspnea or chest pain.  Her weight continues to decline and most of our discussion was regarding weight loss strategies.    The following portions of the patient's history were reviewed and updated as appropriate: allergies, current medications, past family history, past medical history, past social history, past surgical history and problem list.     REVIEW OF SYSTEMS:   All systems reviewed.  Pertinent positives identified in HPI.  All other systems are negative.      Past Medical History:   Diagnosis Date    Acid reflux     Back pain     Cardiac tamponade     March 2015    Cholelithiasis     Clotting disorder     Deep vein thrombosis     Difficulty breathing     during exertion    Edema     Esophagitis, reflux     Essential hypertriglyceridemia     GERD (gastroesophageal reflux disease)     Health care maintenance     Heartburn     Hyperlipidemia     Hypertriglyceridemia     Increased appetite     Morbid obesity     Multiple joint pain     Obesity, Class III, BMI 40-49.9 (morbid  obesity) 2018    Osteoarthritis of knee     [facility]icarditis     [facility]     RHA (rheumatoid arthritis)     Sciatica     Sleep apnea     Unintended weight gain        Family History   Problem Relation Age of Onset    Cancer Mother     Obesity Mother     Diabetes Father     Hypertension Father     Heart disease Father     Obesity Father     Arthritis Father     COPD Father     Depression Father     Heart attack Father     Arthritis Sister     COPD Sister     Heart disease Brother     Heart attack Brother     Heart disease Brother     Diabetes Paternal Aunt     Diabetes Paternal Aunt     No Known Problems Maternal Grandmother     No Known Problems Maternal Grandfather     No Known Problems Paternal Grandmother     Diabetes Paternal Grandfather     Arthritis Sister     COPD Sister     Diabetes Paternal Aunt     Heart disease Brother     Malig Hyperthermia Neg Hx     Breast cancer Neg Hx     Uterine cancer Neg Hx     Colon cancer Neg Hx        Social History     Socioeconomic History    Marital status:      Spouse name: Jac   Tobacco Use    Smoking status: Former     Current packs/day: 0.00     Average packs/day: 0.5 packs/day for 10.0 years (5.0 ttl pk-yrs)     Types: Cigarettes     Start date: 1/10/2013     Quit date: 1/10/2023     Years since quittin.2     Passive exposure: Past    Smokeless tobacco: Never    Tobacco comments:     caffeine use   Vaping Use    Vaping status: Former    Substances: Nicotine    Devices: Refillable tank   Substance and Sexual Activity    Alcohol use: Not Currently     Alcohol/week: 0.0 - 2.0 standard drinks of alcohol    Drug use: No    Sexual activity: Defer     Partners: Male     Birth control/protection: Surgical, Post-menopausal       Allergies   Allergen Reactions    Nsaids Other (See Comments)     DOESN'T TAKE BECAUSE SHES ON BLOOD THINNER.       Past Surgical History:   Procedure Laterality Date    ANKLE SURGERY      treatment of ankle  fracture    APPENDECTOMY      CARDIAC SURGERY      cardiac tamponade    CHOLECYSTECTOMY      COLONOSCOPY N/A 11/27/2018    Procedure: COLONOSCOPY to cecum and t.i. with polypectomy and clip x2 to ascending colon;  Surgeon: Pierre Ornelas MD;  Location: [facility] ENDOSCOPY;  Service: Gastroenterology    DILATATION AND CURETTAGE      ENDOSCOPY N/A 11/27/2018    Procedure: ESOPHAGOGASTRODUODENOSCOPY;  Surgeon: Pierre Ornelas MD;  Location: [facility] ENDOSCOPY;  Service: Gastroenterology    FRACTURE SURGERY      GASTRIC BANDING REMOVAL N/A 07/01/2019    Procedure: GASTRIC BANDING AND PORT REMOVAL, LYSIS OF ADHESIONS, REPAIR OF COLOTOMY, AND INCISIONAL HERNIA REPAIR LAPAROSCOPIC;  Surgeon: Randell Salamanca Jr., MD;  [place] [facility] [place] [facility] [place]  Service: General    LAPAROSCOPIC GASTRIC BANDING      PULMONARY EMBOLISM SURGERY      TUBAL ABDOMINAL LIGATION         Procedures       Objective:         GEN: VSS, no distress, obese  Eyes: normal sclera, normal lids and lashes  HENT: moist mucus membranes,   Respiratory: CTAB, no rales or wheezes  CV: RRR, no murmurs, , +2 DP and 2+ carotid pulses b/l  GI: NABS, soft,  Nontender, nondistended  MSK: Bilateral +1 edema, no scoliosis or kyphosis  Skin: no rash, warm, dry  Heme/Lymph: no bruising or bleeding  Psych: organized thought, normal behavior and affect  Neuro: Cranial nerves grossly intact, Alert and Oriented x 3.               Assessment:         No diagnosis found.           Plan:       1.  PE, remote history x2: Xarelto 10 lifelong anticoagulation  2.  Acute chest pain and dyspnea resolved: Ruled out ACS and PE, EKG was unremarkable.  No further symptoms.  3.  BMI 53: Suggested GLP-1's, increased exercise etc.    Dr. Young, thank you very much for referring this kind patient to me please call with any questions or concerns.  We will see her in 1 year.       Temi Hutchinson MD  04/08/25  [facility]

## 2025-04-14 ENCOUNTER — OFFICE VISIT (OUTPATIENT)
Dept: PAIN MEDICINE | Facility: CLINIC | Age: 65
End: 2025-04-14
Payer: COMMERCIAL

## 2025-04-14 VITALS
DIASTOLIC BLOOD PRESSURE: 81 MMHG | HEIGHT: 60 IN | TEMPERATURE: 97.6 F | OXYGEN SATURATION: 99 % | BODY MASS INDEX: 54.38 KG/M2 | RESPIRATION RATE: 16 BRPM | SYSTOLIC BLOOD PRESSURE: 126 MMHG | WEIGHT: 277 LBS | HEART RATE: 74 BPM

## 2025-04-14 DIAGNOSIS — M50.30 DDD (DEGENERATIVE DISC DISEASE), CERVICAL: ICD-10-CM

## 2025-04-14 DIAGNOSIS — M47.812 ARTHROPATHY OF CERVICAL FACET JOINT: Primary | ICD-10-CM

## 2025-04-14 PROCEDURE — 99214 OFFICE O/P EST MOD 30 MIN: CPT | Performed by: PHYSICIAN ASSISTANT

## 2025-04-14 NOTE — PROGRESS NOTES
CHIEF COMPLAINT  Neck pain  Patient reports her pain had gotten better, until about 6 weeks ago, the pain   started up again.      Subjective   Zulema Sousa is a 64 y.o. female  who presents for follow-up.  She has a history of chronic neck pain.  This patient was last evaluated in the office on 8/4/2023 and then was lost to follow-up as she stated the neck pain significantly improved until approximately 6 weeks ago.  She is now describing mostly left-sided cervical spine pain that is referred into the left shoulder and into the top portion of the shoulder blade and feels as a more continuous sore sensation that occasion becomes sharp if she turns to look towards her left.  Physical therapy and feels that traction was significantly helpful and she is no longer experiencing left upper extremity pain.    Patient is currently attending physical therapy once per week.    Anticoagulated on Xarelto with history of pulmonary emboli x 2 with the last occurrence in 2017.    Pain today 2/10 VAS in severity.      Neck Pain   This is a recurrent problem. The current episode started more than 1 month ago. The problem occurs constantly. The problem has been unchanged. The pain is associated with nothing. The pain is present in the left side. Quality: sharp when turning to the left/mostly soreness. The pain is at a severity of 2/10. The pain is mild. The symptoms are aggravated by position (prolonged sitting/turning to look to the left). The pain is Worse during the day. Associated symptoms include headaches. Pertinent negatives include no numbness or weakness.        PEG Assessment   What number best describes your pain on average in the past week?4  What number best describes how, during the past week, pain has interfered with your enjoyment of life?4  What number best describes how, during the past week, pain has interfered with your general activity?  0    Review of Pertinent Medical Data ---  XR SPINE CERVICAL 2 VW-    "  CLINICAL: Pain.     COMPARISON: Examination 07/16/2023.     FINDINGS: There is disc space narrowing demonstrated at C5-6 and C6-7  with endplate hypertrophy similar to the previous examination. There is  C5-6, C6-7 and C7-T1 facet hypertrophy. No cervical rib. Prevertebral  soft tissues within normal limits. The appearance of the cervical spine  is similar to 07/16/2023.     CONCLUSION: Degenerative change as described above similar to  07/16/2023.     This report was finalized on 3/13/2025 10:59 AM by Dr. Deon Niño M.D on Workstation: [id number]    The following portions of the patient's history were reviewed and updated as appropriate: allergies, current medications, past family history, past medical history, past social history, past surgical history, and problem list.    Review of Systems   Constitutional:  Negative for activity change.   Gastrointestinal:  Positive for constipation. Negative for diarrhea.   Genitourinary:  Negative for difficulty urinating.   Musculoskeletal:  Positive for neck pain and neck stiffness.   Neurological:  Positive for headaches. Negative for dizziness, weakness, light-headedness and numbness.   Psychiatric/Behavioral:  Positive for sleep disturbance. Negative for self-injury and suicidal ideas.    I have reviewed and confirmed the accuracy of the ROS as documented by the MA/LPN/RN RAMON Cr  Vitals:    04/14/25 0841   BP: 126/81   Pulse: 74   Resp: 16   Temp: 97.6 °F (36.4 °C)   SpO2: 99%   Weight: 126 kg (277 lb)   Height: 152.4 cm (60\")   PainSc: 2          Objective   Physical Exam  Vitals and nursing note reviewed.   Constitutional:       Appearance: Normal appearance. She is obese.   HENT:      Head: Normocephalic.   Pulmonary:      Effort: Pulmonary effort is normal.   Musculoskeletal:      Cervical back: Tenderness (moderate pain to palpation over the left cervical facet joint spaces) present. Pain with movement (Increased left cervical pain with left " lateral rotation) present. Decreased range of motion.   Skin:     General: Skin is warm and dry.   Neurological:      General: No focal deficit present.      Mental Status: She is alert and oriented to person, place, and time.      Cranial Nerves: Cranial nerves 2-12 are intact.      Sensory: Sensation is intact.      Motor: Motor function is intact.      Gait: Gait is intact.      Deep Tendon Reflexes:      Reflex Scores:       Tricep reflexes are 1+ on the right side and 1+ on the left side.       Bicep reflexes are 1+ on the right side and 1+ on the left side.       Brachioradialis reflexes are 1+ on the right side and 1+ on the left side.  Psychiatric:         Mood and Affect: Mood normal.         Behavior: Behavior normal.         Thought Content: Thought content normal.         Judgment: Judgment normal.             Assessment & Plan   Diagnoses and all orders for this visit:    1. Arthropathy of cervical facet joint (Primary)  -     Ibuprofen 3 %, Gabapentin 10 %, Baclofen 2 %, lidocaine 4 %, Ketamine HCl 4 %; Apply 1-2 g topically to the appropriate area as directed 3 (Three) to 4 (Four) times daily.  Dispense: 90 g; Refill: 0    2. DDD (degenerative disc disease), cervical  -     Ibuprofen 3 %, Gabapentin 10 %, Baclofen 2 %, lidocaine 4 %, Ketamine HCl 4 %; Apply 1-2 g topically to the appropriate area as directed 3 (Three) to 4 (Four) times daily.  Dispense: 90 g; Refill: 0        Zulema Sousa reports a pain score of 2.  Given her pain assessment as noted, treatment options were discussed and the following options were decided upon as a follow-up plan to address the patient's pain: continuation of current treatment plan for pain, prescription for non-opiod analgesics, and use of non-medical modalities (ice, heat, stretching and/or behavior modifications).    Tobacco Use: Medium Risk (4/14/2025)    Patient History     Smoking Tobacco Use: Former     Smokeless Tobacco Use: Never     Passive Exposure: Past            --- Follow-up in 6 weeks or sooner for further evaluation and treat recommendations to be made  --- I have discussed with the patient that I would recommend proceeding with #1 diagnostic left C3-5 cervical medial branch block under fluoroscopy guidance and with favorable response with radiofrequency ablation.  At this time Jann wishes to complete physical therapy course to see if this will continue to provide improvement of pain before proceeding with injective therapy.  --- I have prescribed ketamine topical pain cream through [facility] for her to utilize within the cervical as well as over her knees.                    Dictated utilizing Dragon dictation.

## 2025-05-13 ENCOUNTER — PATIENT ROUNDING (BHMG ONLY) (OUTPATIENT)
Dept: URGENT CARE | Facility: CLINIC | Age: 65
End: 2025-05-13
Payer: COMMERCIAL

## 2025-05-13 NOTE — ED NOTES
Thank you for letting us care for you in your recent visit to our urgent care center. We would love to hear about your experience with us. Was this the first time you have visited our location?    We’re always looking for ways to make our patients’ experiences even better. Do you have any recommendations on ways we may improve?     I appreciate you taking the time to respond. Please be on the lookout for a survey about your recent visit from [facility] [employer] via text or email. We would greatly appreciate if you could fill that out and turn it back in. We want your voice to be heard and we value your feedback.   Thank you for choosing [facility] for your healthcare needs.

## 2025-06-08 DIAGNOSIS — E78.2 MIXED HYPERLIPIDEMIA: ICD-10-CM

## 2025-06-09 RX ORDER — OMEPRAZOLE 40 MG/1
40 CAPSULE, DELAYED RELEASE ORAL DAILY
Qty: 90 CAPSULE | Refills: 3 | Status: SHIPPED | OUTPATIENT
Start: 2025-06-09

## 2025-06-09 RX ORDER — ROSUVASTATIN CALCIUM 5 MG/1
5 TABLET, COATED ORAL DAILY
Qty: 90 TABLET | Refills: 1 | Status: SHIPPED | OUTPATIENT
Start: 2025-06-09

## 2025-06-09 NOTE — TELEPHONE ENCOUNTER
Rx Refill Note  Requested Prescriptions     Pending Prescriptions Disp Refills    omeprazole (priLOSEC) 40 MG capsule 90 capsule 3     Sig: Take 1 capsule by mouth Daily.    rosuvastatin (Crestor) 5 MG tablet 90 tablet 1     Sig: Take 1 tablet by mouth Daily.      Last office visit with prescribing clinician: 2/21/2025   Last telemedicine visit with prescribing clinician: Visit date not found   Next office visit with prescribing clinician: 8/22/2025                         Would you like a call back once the refill request has been completed: [] Yes [] No    If the office needs to give you a call back, can they leave a voicemail: [] Yes [] No    Staci Rudolph MA  06/09/25, 12:19 EDT

## 2025-06-18 ENCOUNTER — OFFICE VISIT (OUTPATIENT)
Age: 65
End: 2025-06-18
Payer: COMMERCIAL

## 2025-06-18 VITALS — WEIGHT: 276.4 LBS | RESPIRATION RATE: 20 BRPM | BODY MASS INDEX: 50.86 KG/M2 | HEIGHT: 62 IN | TEMPERATURE: 98.4 F

## 2025-06-18 DIAGNOSIS — M17.0 PRIMARY OSTEOARTHRITIS OF BOTH KNEES: Primary | ICD-10-CM

## 2025-06-18 PROCEDURE — 20611 DRAIN/INJ JOINT/BURSA W/US: CPT | Performed by: STUDENT IN AN ORGANIZED HEALTH CARE EDUCATION/TRAINING PROGRAM

## 2025-06-18 RX ORDER — LIDOCAINE HYDROCHLORIDE 10 MG/ML
2 INJECTION, SOLUTION EPIDURAL; INFILTRATION; INTRACAUDAL; PERINEURAL
Status: COMPLETED | OUTPATIENT
Start: 2025-06-18 | End: 2025-06-18

## 2025-06-18 RX ORDER — METHYLPREDNISOLONE ACETATE 80 MG/ML
80 INJECTION, SUSPENSION INTRA-ARTICULAR; INTRALESIONAL; INTRAMUSCULAR; SOFT TISSUE
Status: COMPLETED | OUTPATIENT
Start: 2025-06-18 | End: 2025-06-18

## 2025-06-18 RX ADMIN — METHYLPREDNISOLONE ACETATE 80 MG: 80 INJECTION, SUSPENSION INTRA-ARTICULAR; INTRALESIONAL; INTRAMUSCULAR; SOFT TISSUE at 13:27

## 2025-06-18 RX ADMIN — LIDOCAINE HYDROCHLORIDE 2 ML: 10 INJECTION, SOLUTION EPIDURAL; INFILTRATION; INTRACAUDAL; PERINEURAL at 13:27

## 2025-06-18 NOTE — PROGRESS NOTES
"Chief Complaint   Patient presents with    Left Knee - Pain, Follow-up     Bilateral knee injection.    Right Knee - Pain, Follow-up     Bilateral knee injection. Radiating pain from right hip to knee.        History of Present Illness  Zulema is a 65 y.o. year old female patient of [facility], with previously diagnosed osteoarthritis here today for intra-articular cortisone injection. Last injection was on 3/20/25. No new injuries or complaints.    The following data was reviewed by: Annie Alejandro DO on 06/18/2025:  Data reviewed : Radiologic studies 12/19/24     Temp 98.4 °F (36.9 °C) (Temporal)   Resp 20   Ht 157.5 cm (62\")   Wt 125 kg (276 lb 6.4 oz)   LMP  (LMP Unknown)   BMI 50.55 kg/m²        Physical Exam  Vital signs reviewed.   General: Well developed, well nourished; No acute distress.  Eyes: conjunctiva clear; pupils equally round and reactive  ENT: external ears and nose atraumatic; hearing normal  CV: extremity warm and well perfused  Resp: normal respiratory effort, no use of accessory muscles  Skin: normal color and pigmentation; no rashes or wounds; normal turgor  Psych: alert and oriented; mood and affect appropriate; recent and remote memory intact    - Large Joint Arthrocentesis: bilateral knee on 6/18/2025 1:27 PM  Indications: pain  Details: 22 G (18 G on right for aspiration) needle, ultrasound-guided superolateral approach  Medications (Right): 2 mL lidocaine PF 1% 1 %; 80 mg methylPREDNISolone acetate 80 MG/ML  Aspirate (Right): 17 mL clear and yellow  Medications (Left): 2 mL lidocaine PF 1% 1 %; 80 mg methylPREDNISolone acetate 80 MG/ML  Outcome: tolerated well, no immediate complications  Procedure, treatment alternatives, risks and benefits explained, specific risks discussed. Consent was given by the patient. Immediately prior to procedure a time out was called to verify the correct patient, procedure, equipment, support staff and site/side marked as required. Patient was " prepped and draped in the usual sterile fashion.         Assessment and Plan  Diagnoses and all orders for this visit:    1. Primary osteoarthritis of both knees (Primary)  -     - Large Joint Arthrocentesis: bilateral knee    The procedure was well tolerated. She has been educated on the signs and symptoms of local reaction and infection and should call the office if she experiences any of these. She should take it easy for the next 24 to 48 hours and ice as needed. After that, she may return to normal activity. It was recommended to continue low impact activity. She may continue with previously recommended supportive measures. Will follow-up with Peggy as needed.    All of her questions were answered and she is agreeable with the plan.    Dictated utilizing Dragon dictation.

## 2025-07-15 ENCOUNTER — HOSPITAL ENCOUNTER (OUTPATIENT)
Dept: MAMMOGRAPHY | Facility: HOSPITAL | Age: 65
Discharge: HOME | End: 2025-07-15
Admitting: INTERNAL MEDICINE
Payer: COMMERCIAL

## 2025-07-15 DIAGNOSIS — Z12.31 BREAST CANCER SCREENING BY MAMMOGRAM: ICD-10-CM

## 2025-07-15 DIAGNOSIS — Z00.00 HEALTHCARE MAINTENANCE: ICD-10-CM

## 2025-07-15 PROCEDURE — 77063 BREAST TOMOSYNTHESIS BI: CPT

## 2025-07-15 PROCEDURE — 77067 SCR MAMMO BI INCL CAD: CPT

## 2025-07-16 PROCEDURE — 77067 SCR MAMMO BI INCL CAD: CPT | Performed by: RADIOLOGY

## 2025-07-16 PROCEDURE — 77063 BREAST TOMOSYNTHESIS BI: CPT | Performed by: RADIOLOGY

## 2025-07-28 DIAGNOSIS — M17.0 BILATERAL PRIMARY OSTEOARTHRITIS OF KNEE: Primary | ICD-10-CM

## 2025-07-31 ENCOUNTER — TELEPHONE (OUTPATIENT)
Dept: SPORTS MEDICINE | Facility: CLINIC | Age: 65
End: 2025-07-31
Payer: COMMERCIAL

## 2025-08-16 DIAGNOSIS — N32.81 OVERACTIVE BLADDER: ICD-10-CM

## 2025-08-18 RX ORDER — OXYBUTYNIN CHLORIDE 5 MG/1
5 TABLET ORAL 3 TIMES DAILY
Qty: 90 TABLET | Refills: 6 | OUTPATIENT
Start: 2025-08-18

## 2025-08-21 ENCOUNTER — OFFICE VISIT (OUTPATIENT)
Dept: ORTHOPEDIC SURGERY | Facility: CLINIC | Age: 65
End: 2025-08-21
Payer: COMMERCIAL

## 2025-08-21 VITALS — HEIGHT: 62 IN | WEIGHT: 274 LBS | BODY MASS INDEX: 50.42 KG/M2 | TEMPERATURE: 98.6 F

## 2025-08-21 DIAGNOSIS — M17.0 PRIMARY OSTEOARTHRITIS OF BOTH KNEES: Primary | ICD-10-CM

## 2025-08-21 RX ORDER — METHYLPREDNISOLONE ACETATE 80 MG/ML
80 INJECTION, SUSPENSION INTRA-ARTICULAR; INTRALESIONAL; INTRAMUSCULAR; SOFT TISSUE
Status: COMPLETED | OUTPATIENT
Start: 2025-08-21 | End: 2025-08-21

## 2025-08-21 RX ADMIN — METHYLPREDNISOLONE ACETATE 80 MG: 80 INJECTION, SUSPENSION INTRA-ARTICULAR; INTRALESIONAL; INTRAMUSCULAR; SOFT TISSUE at 09:25

## 2025-08-22 ENCOUNTER — OFFICE VISIT (OUTPATIENT)
Dept: INTERNAL MEDICINE | Facility: CLINIC | Age: 65
End: 2025-08-22
Payer: COMMERCIAL

## 2025-08-22 VITALS
BODY MASS INDEX: 51.14 KG/M2 | HEART RATE: 90 BPM | SYSTOLIC BLOOD PRESSURE: 130 MMHG | OXYGEN SATURATION: 96 % | TEMPERATURE: 98.2 F | DIASTOLIC BLOOD PRESSURE: 76 MMHG | WEIGHT: 279.6 LBS

## 2025-08-22 DIAGNOSIS — G47.33 OSA ON CPAP: ICD-10-CM

## 2025-08-22 DIAGNOSIS — E78.2 MIXED HYPERLIPIDEMIA: Primary | ICD-10-CM

## 2025-08-22 DIAGNOSIS — E66.813 OBESITY, CLASS III, BMI 40-49.9 (MORBID OBESITY): ICD-10-CM

## 2025-08-22 DIAGNOSIS — R73.03 PREDIABETES: ICD-10-CM

## 2025-08-22 DIAGNOSIS — R60.0 BILATERAL LEG EDEMA: ICD-10-CM

## 2025-08-22 PROCEDURE — 99214 OFFICE O/P EST MOD 30 MIN: CPT | Performed by: INTERNAL MEDICINE

## 2025-08-22 RX ORDER — FUROSEMIDE 20 MG/1
20 TABLET ORAL 2 TIMES DAILY
Qty: 30 TABLET | Refills: 0 | Status: SHIPPED | OUTPATIENT
Start: 2025-08-22

## 2025-08-29 ENCOUNTER — LAB (OUTPATIENT)
Dept: LAB | Facility: HOSPITAL | Age: 65
End: 2025-08-29
Payer: COMMERCIAL

## 2025-08-29 DIAGNOSIS — E78.2 MIXED HYPERLIPIDEMIA: ICD-10-CM

## 2025-08-29 DIAGNOSIS — R73.03 PREDIABETES: ICD-10-CM

## 2025-08-29 DIAGNOSIS — E66.813 OBESITY, CLASS III, BMI 40-49.9 (MORBID OBESITY): ICD-10-CM

## 2025-08-29 LAB
ALBUMIN SERPL-MCNC: 4 G/DL (ref 3.5–5.2)
ALBUMIN/GLOB SERPL: 1.2 G/DL
ALP SERPL-CCNC: 89 U/L (ref 39–117)
ALT SERPL W P-5'-P-CCNC: 29 U/L (ref 1–33)
ANION GAP SERPL CALCULATED.3IONS-SCNC: 13 MMOL/L (ref 5–15)
AST SERPL-CCNC: 27 U/L (ref 1–32)
BASOPHILS # BLD AUTO: 0.03 10*3/MM3 (ref 0–0.2)
BASOPHILS NFR BLD AUTO: 0.4 % (ref 0–1.5)
BILIRUB SERPL-MCNC: 0.3 MG/DL (ref 0–1.2)
BUN SERPL-MCNC: 27 MG/DL (ref 8–23)
BUN/CREAT SERPL: 35.1 (ref 7–25)
CALCIUM SPEC-SCNC: 9.8 MG/DL (ref 8.6–10.5)
CHLORIDE SERPL-SCNC: 101 MMOL/L (ref 98–107)
CHOLEST SERPL-MCNC: 175 MG/DL (ref 0–200)
CO2 SERPL-SCNC: 26 MMOL/L (ref 22–29)
CREAT SERPL-MCNC: 0.77 MG/DL (ref 0.57–1)
DEPRECATED RDW RBC AUTO: 46.7 FL (ref 37–54)
EGFRCR SERPLBLD CKD-EPI 2021: 85.2 ML/MIN/1.73
EOSINOPHIL # BLD AUTO: 0.12 10*3/MM3 (ref 0–0.4)
EOSINOPHIL NFR BLD AUTO: 1.6 % (ref 0.3–6.2)
ERYTHROCYTE [DISTWIDTH] IN BLOOD BY AUTOMATED COUNT: 13.4 % (ref 12.3–15.4)
GLOBULIN UR ELPH-MCNC: 3.3 GM/DL
GLUCOSE SERPL-MCNC: 98 MG/DL (ref 65–99)
HBA1C MFR BLD: 5.9 % (ref 4.8–5.6)
HCT VFR BLD AUTO: 41.3 % (ref 34–46.6)
HDLC SERPL-MCNC: 76 MG/DL (ref 40–60)
HGB BLD-MCNC: 12.8 G/DL (ref 12–15.9)
IMM GRANULOCYTES # BLD AUTO: 0.01 10*3/MM3 (ref 0–0.05)
IMM GRANULOCYTES NFR BLD AUTO: 0.1 % (ref 0–0.5)
LDLC SERPL CALC-MCNC: 86 MG/DL (ref 0–100)
LDLC/HDLC SERPL: 1.12 {RATIO}
LYMPHOCYTES # BLD AUTO: 2.82 10*3/MM3 (ref 0.7–3.1)
LYMPHOCYTES NFR BLD AUTO: 37.9 % (ref 19.6–45.3)
MCH RBC QN AUTO: 29.2 PG (ref 26.6–33)
MCHC RBC AUTO-ENTMCNC: 31 G/DL (ref 31.5–35.7)
MCV RBC AUTO: 94.3 FL (ref 79–97)
MONOCYTES # BLD AUTO: 0.62 10*3/MM3 (ref 0.1–0.9)
MONOCYTES NFR BLD AUTO: 8.3 % (ref 5–12)
NEUTROPHILS NFR BLD AUTO: 3.84 10*3/MM3 (ref 1.7–7)
NEUTROPHILS NFR BLD AUTO: 51.7 % (ref 42.7–76)
NRBC BLD AUTO-RTO: 0 /100 WBC (ref 0–0.2)
PLATELET # BLD AUTO: 303 10*3/MM3 (ref 140–450)
PMV BLD AUTO: 9.5 FL (ref 6–12)
POTASSIUM SERPL-SCNC: 4.3 MMOL/L (ref 3.5–5.2)
PROT SERPL-MCNC: 7.3 G/DL (ref 6–8.5)
RBC # BLD AUTO: 4.38 10*6/MM3 (ref 3.77–5.28)
SODIUM SERPL-SCNC: 140 MMOL/L (ref 136–145)
T4 FREE SERPL-MCNC: 1.13 NG/DL (ref 0.92–1.68)
TRIGL SERPL-MCNC: 71 MG/DL (ref 0–150)
TSH SERPL DL<=0.05 MIU/L-ACNC: 3.65 UIU/ML (ref 0.27–4.2)
VLDLC SERPL-MCNC: 13 MG/DL (ref 5–40)
WBC NRBC COR # BLD AUTO: 7.44 10*3/MM3 (ref 3.4–10.8)

## 2025-08-29 PROCEDURE — 85025 COMPLETE CBC W/AUTO DIFF WBC: CPT

## 2025-08-29 PROCEDURE — 36415 COLL VENOUS BLD VENIPUNCTURE: CPT

## 2025-08-29 PROCEDURE — 80061 LIPID PANEL: CPT

## 2025-08-29 PROCEDURE — 83036 HEMOGLOBIN GLYCOSYLATED A1C: CPT

## (undated) DEVICE — PENCL E/S HNDSWCH ROCKR CB

## (undated) DEVICE — 2, DISPOSABLE SUCTION/IRRIGATOR WITH DISPOSABLE TIP: Brand: STRYKEFLOW

## (undated) DEVICE — BITEBLOCK OMNI BLOC

## (undated) DEVICE — HARMONIC ACE +7 LAPAROSCOPIC SHEARS ADVANCED HEMOSTASIS 5MM DIAMETER 45CM SHAFT LENGTH  FOR USE WITH GRAY HAND PIECE ONLY: Brand: HARMONIC ACE

## (undated) DEVICE — SNAR POLYP SENSATION STDOVL 27 240 BX40

## (undated) DEVICE — DEV COND GAS LAP INSUFLOW W/LUER CONN

## (undated) DEVICE — THE TORRENT IRRIGATION SCOPE CONNECTOR IS USED WITH THE TORRENT IRRIGATION TUBING TO PROVIDE IRRIGATION FLUIDS SUCH AS STERILE WATER DURING GASTROINTESTINAL ENDOSCOPIC PROCEDURES WHEN USED IN CONJUNCTION WITH AN IRRIGATION PUMP (OR ELECTROSURGICAL UNIT).: Brand: TORRENT

## (undated) DEVICE — PK BARIATRIC 10 40 70

## (undated) DEVICE — PAD GRND REM POLYHESIVE A/ DISP

## (undated) DEVICE — DISPOSABLE MONOPOLAR ENDOSCOPIC CORD 10 FT. (3M): Brand: KIRWAN

## (undated) DEVICE — GLV SURG SENSICARE MICRO PF LF 8.5 STRL

## (undated) DEVICE — SUT MNCRYL 4/0 PS2 18 IN

## (undated) DEVICE — ADHS SKIN DERMABOND TOP ADVANCED

## (undated) DEVICE — ENDOPATH XCEL BLADELESS TROCARS WITH STABILITY SLEEVES: Brand: ENDOPATH XCEL

## (undated) DEVICE — TROCAR: Brand: KII OPTICAL ACCESS SYSTEM

## (undated) DEVICE — GLV SURG SENSICARE MICRO PF LF 6 STRL

## (undated) DEVICE — ENDOPATH XCEL UNIVERSAL TROCAR STABLILITY SLEEVES: Brand: ENDOPATH XCEL

## (undated) DEVICE — Device: Brand: DEFENDO AIR/WATER/SUCTION AND BIOPSY VALVE

## (undated) DEVICE — ENDOPATH XCEL WITH OPTIVIEW TECHNOLOGY BLADELESS TROCARS WITH STABILITY SLEEVES: Brand: ENDOPATH XCEL OPTIVIEW

## (undated) DEVICE — MINI ENDOCUT SCISSOR TIP, DISPOSABLE: Brand: RENEW

## (undated) DEVICE — GLV SURG SENSICARE PI PF LF 8.5 GRN STRL

## (undated) DEVICE — CANN NASL CO2 TRULINK W/O2 A/

## (undated) DEVICE — FRCP BX RADJAW4 NDL 2.8 240CM LG OG BX40

## (undated) DEVICE — THE SINGLE USE ETRAP – POLYP TRAP IS USED FOR SUCTION RETRIEVAL OF ENDOSCOPICALLY REMOVED POLYPS.: Brand: ETRAP

## (undated) DEVICE — TUBING, SUCTION, 1/4" X 10', STRAIGHT: Brand: MEDLINE

## (undated) DEVICE — APPL CHLORAPREP W/TINT 26ML ORNG

## (undated) DEVICE — SUT VIC 2/0 SH 27IN